# Patient Record
Sex: FEMALE | Race: WHITE | NOT HISPANIC OR LATINO | Employment: UNEMPLOYED | URBAN - METROPOLITAN AREA
[De-identification: names, ages, dates, MRNs, and addresses within clinical notes are randomized per-mention and may not be internally consistent; named-entity substitution may affect disease eponyms.]

---

## 2017-04-06 ENCOUNTER — HOSPITAL ENCOUNTER (OUTPATIENT)
Dept: RADIOLOGY | Age: 47
Discharge: HOME | End: 2017-04-06
Payer: COMMERCIAL

## 2017-04-06 ENCOUNTER — TRANSCRIBE ORDERS (OUTPATIENT)
Dept: ADMINISTRATIVE | Facility: HOSPITAL | Age: 47
End: 2017-04-06

## 2017-04-06 DIAGNOSIS — M54.2 CERVICALGIA: ICD-10-CM

## 2017-04-06 DIAGNOSIS — R52 PAIN: ICD-10-CM

## 2017-04-06 DIAGNOSIS — R52 PAIN: Primary | ICD-10-CM

## 2017-04-06 PROCEDURE — 76536 US EXAM OF HEAD AND NECK: CPT

## 2017-06-13 ENCOUNTER — ALLSCRIPTS OFFICE VISIT (OUTPATIENT)
Dept: OTHER | Facility: OTHER | Age: 47
End: 2017-06-13

## 2017-06-27 ENCOUNTER — ALLSCRIPTS OFFICE VISIT (OUTPATIENT)
Dept: OTHER | Facility: OTHER | Age: 47
End: 2017-06-27

## 2017-06-27 ENCOUNTER — TRANSCRIBE ORDERS (OUTPATIENT)
Dept: ADMINISTRATIVE | Facility: HOSPITAL | Age: 47
End: 2017-06-27

## 2017-06-27 DIAGNOSIS — I10 ESSENTIAL HYPERTENSION, MALIGNANT: Primary | ICD-10-CM

## 2017-06-27 DIAGNOSIS — I10 ESSENTIAL (PRIMARY) HYPERTENSION: ICD-10-CM

## 2017-07-18 ENCOUNTER — ALLSCRIPTS OFFICE VISIT (OUTPATIENT)
Dept: OTHER | Facility: OTHER | Age: 47
End: 2017-07-18

## 2017-09-03 ENCOUNTER — OFFICE VISIT (OUTPATIENT)
Dept: URGENT CARE | Facility: MEDICAL CENTER | Age: 47
End: 2017-09-03
Payer: COMMERCIAL

## 2017-09-03 PROCEDURE — 99203 OFFICE O/P NEW LOW 30 MIN: CPT

## 2018-01-13 VITALS
BODY MASS INDEX: 25.36 KG/M2 | HEART RATE: 64 BPM | SYSTOLIC BLOOD PRESSURE: 180 MMHG | HEIGHT: 60 IN | DIASTOLIC BLOOD PRESSURE: 100 MMHG | WEIGHT: 129.19 LBS

## 2018-01-13 VITALS
HEIGHT: 60 IN | BODY MASS INDEX: 25.13 KG/M2 | WEIGHT: 128 LBS | DIASTOLIC BLOOD PRESSURE: 100 MMHG | SYSTOLIC BLOOD PRESSURE: 180 MMHG

## 2018-01-14 VITALS — SYSTOLIC BLOOD PRESSURE: 136 MMHG | WEIGHT: 129 LBS | BODY MASS INDEX: 25.19 KG/M2 | DIASTOLIC BLOOD PRESSURE: 92 MMHG

## 2018-01-23 NOTE — PROGRESS NOTES
Assessment    1  Hives (628 9) (L50 9)    Plan  Hives    · MethylPREDNISolone 4 MG Oral Tablet Therapy Pack (Medrol); As directed on card    Discussion/Summary  Discussion Summary:   Stop using face wash  Take steroid as directed  Medication Side Effects Reviewed: Possible side effects of new medications were reviewed with the patient/guardian today  Understands and agrees with treatment plan: The treatment plan was reviewed with the patient/guardian  The patient/guardian understands and agrees with the treatment plan   Counseling Documentation With Imm: The patient was counseled regarding diagnostic results, instructions for management, risk factor reductions, prognosis, patient and family education, impressions, risks and benefits of treatment options, importance of compliance with treatment  Follow Up Instructions: Follow Up with your Primary Care Provider in 1-2 days  If your symptoms worsen, go to the nearest [place] [address] Emergency Department  Chief Complaint    1  Rash  Chief Complaint Free Text Note Form: pt presents with hives and facial swelling since yesterday, improving with benadryl      History of Present Illness  HPI: 51 y/o c/o hives  Pt reports she woke up yesterday with swollen eyes and hives  Denies any lip tongue or throat swelling  Denies any chest pain or shortness of breath  Patient denies any known allergies   Hospital Based Practices Required Assessment:   Pain Assessment   the patient states they do not have pain  Review of Systems  Focused-Female:   Constitutional: No fever, no chills, feels well, no tiredness, no recent weight gain or loss  ENT: no ear ache, no loss of hearing, no nosebleeds or nasal discharge, no sore throat or hoarseness  Cardiovascular: no complaints of slow or fast heart rate, no chest pain, no palpitations, no leg claudication or lower extremity edema     Respiratory: no complaints of shortness of breath, no wheezing, no dyspnea on exertion, no orthopnea or PND  Breasts: no complaints of breast pain, breast lump or nipple discharge  Gastrointestinal: no complaints of abdominal pain, no constipation, no nausea or diarrhea, no vomiting, no bloody stools  Genitourinary: no complaints of dysuria, no incontinence, no pelvic pain, no dysmenorrhea, no vaginal discharge or abnormal vaginal bleeding  Musculoskeletal: no complaints of arthralgia, no myalgia, no joint swelling or stiffness, no limb pain or swelling  Integumentary: no complaints of skin rash or lesion, no itching or dry skin, no skin wounds  Neurological: no complaints of headache, no confusion, no numbness or tingling, no dizziness or fainting  Active Problems    1  ASCUS with positive high risk HPV (796 9)   2  ASCUS with positive high risk human papillomavirus of vagina (795 15)   (R87 620,R87 811)   3  Condyloma acuminata (078 11) (A63 0)   4  Encounter for screening mammogram for malignant neoplasm of breast (V76 12)   (Z12 31)   5  Hypertension (401 9) (I10)   6  LGSIL Pap smear of vagina (795 13) (R87 622)   7  Pap smear of vagina with LGSIL (795 13) (R87 622)   8  Pap smear, as part of routine gynecological examination (V76 2) (Z01 419)   9  Vaginitis (616 10) (N76 0)   10  VAIN I (vaginal intraepithelial neoplasia grade I) (623 0) (N89 0)   11  Visit for screening mammogram (V76 12) (Z12 31)    Past Medical History    1  History of Bacterial vaginosis (616 10,041 9) (N76 0,B96 89)   2  History of Cervical dysplasia (622 10) (N87 9)   3  History of menorrhagia (V13 29) (Z87 42)   4  History of sleep disturbance (V13 89) (Z87 898)   5  History of uterine leiomyoma (V13 29) (Z86 018)   6  History of Previous Pregnancies Resulted In 2  Living Children   7  History of Previously Pregnant Including 1  Miscarriage(S)   8  History of Previously Pregnant With 1  Normal Vaginal Deliveries   9  History of Problem Concerning Adopted Child (V61 24)   10   History of Summary Of Previous Pregnancies  3  (Total No )  Active Problems And Past Medical History Reviewed: The active problems and past medical history were reviewed and updated today  Family History  Mother    1  Adopted  Father    2  Family history of Hypertension (V17 49)  Family History Reviewed: The family history was reviewed and updated today  Social History    · Being A Social Drinker   · Caffeine use (V49 89) (F15 90)   · Denied: History of Drug Use   · Moderate Exercising Less Than 3 Times A Week   · Never A Smoker  Social History Reviewed: The social history was reviewed and updated today  Surgical History    1  History of Breast Surgery Enlargement Procedure   2  History of Cervix Cryosurgery   3  History of Excision Of Perianal Skin Tags   4  History of Hysterectomy  Surgical History Reviewed: The surgical history was reviewed and updated today  Current Meds   1  Amlodipine Besy-Benazepril HCl - 5-10 MG Oral Capsule; TAKE 1 CAPSULE Every twelve   hours; Therapy: [id number] to (Evaluate:[id number])  Requested for: 2017; Last   QB:[id number] Ordered  Medication List Reviewed: The medication list was reviewed and updated today  Allergies    1  No Known Drug Allergies    2  Shellfish    Vitals  Signs   Recorded: [id number] 03:16PM   Temperature: 98 6 F  Heart Rate: 56  Respiration: 16  Systolic: 582  Diastolic: 77  O2 Saturation: 98  Pain Scale: 0    Physical Exam    Constitutional   General appearance: No acute distress, well appearing and well nourished  Eyes   Conjunctiva and lids: No swelling, erythema or discharge  Edema surrounding bilateral eyes  Pupils and irises: Equal, round and reactive to light  Ears, Nose, Mouth, and Throat   External inspection of ears and nose: Normal     Otoscopic examination: Tympanic membranes translucent with normal light reflex  Canals patent without erythema  Nasal mucosa, septum, and turbinates: Normal without edema or erythema  Pulmonary   Respiratory effort: No increased work of breathing or signs of respiratory distress  Auscultation of lungs: Clear to auscultation  Cardiovascular   Auscultation of heart: Normal rate and rhythm, normal S1 and S2, without murmurs  Lymphatic   Palpation of lymph nodes in neck: No lymphadenopathy  Skin   Examination of the skin for lesions: Abnormal   Hives bilateral upper extremity        Signatures   Electronically signed by : Emmanuel Oh, [facility]; Jan 19 2018  6:05PM EST                       (Author)

## 2018-06-12 ENCOUNTER — OFFICE VISIT (OUTPATIENT)
Dept: GYNECOLOGIC ONCOLOGY | Facility: CLINIC | Age: 48
End: 2018-06-12
Payer: COMMERCIAL

## 2018-06-12 VITALS
SYSTOLIC BLOOD PRESSURE: 162 MMHG | DIASTOLIC BLOOD PRESSURE: 98 MMHG | RESPIRATION RATE: 16 BRPM | HEART RATE: 75 BPM | BODY MASS INDEX: 23.85 KG/M2 | TEMPERATURE: 98.6 F | WEIGHT: 121.5 LBS | HEIGHT: 60 IN

## 2018-06-12 DIAGNOSIS — N89.0 VAIN I (VAGINAL INTRAEPITHELIAL NEOPLASIA GRADE I): Primary | ICD-10-CM

## 2018-06-12 DIAGNOSIS — A63.0 VULVOVAGINAL CONDYLOMA: ICD-10-CM

## 2018-06-12 PROCEDURE — 88175 CYTOPATH C/V AUTO FLUID REDO: CPT | Performed by: PATHOLOGY

## 2018-06-12 PROCEDURE — 99213 OFFICE O/P EST LOW 20 MIN: CPT | Performed by: PHYSICIAN ASSISTANT

## 2018-06-12 PROCEDURE — 88141 CYTOPATH C/V INTERPRET: CPT | Performed by: PATHOLOGY

## 2018-06-12 PROCEDURE — 87624 HPV HI-RISK TYP POOLED RSLT: CPT | Performed by: PHYSICIAN ASSISTANT

## 2018-06-12 NOTE — ASSESSMENT & PLAN NOTE
PAP smear obtained today  If negative, cotesting in 5 yrs would be appropriate per ASSCP guidelines

## 2018-06-12 NOTE — ASSESSMENT & PLAN NOTE
Persistent ongoing issue  Biopsy in 2017, low grade  Patient did not use aldara cream      Now with 3 separate lesions  Return to the office in the next few weeks for treatment discussion

## 2018-06-12 NOTE — PROGRESS NOTES
Assessment/Plan:    Problem List Items Addressed This Visit        Musculoskeletal and Integument    Vulvovaginal condyloma     Persistent ongoing issue  Biopsy in 2017, low grade  Patient did not use aldara cream      Now with 3 separate lesions  Return to the office in the next few weeks for treatment discussion  Genitourinary    VAIN I (vaginal intraepithelial neoplasia grade I) - Primary     PAP smear obtained today  If negative, cotesting in 5 yrs would be appropriate per [facility] guidelines  Relevant Orders    Liquid-based pap, diagnostic            CHIEF COMPLAINT:   Follow-up    Problem:  History of cervical dysplasia  Biopsy-proven VAIN 1, September 2014  Vulvar condyloma    Previous therapy:  1  Cervical cryotherapy  2  Vaginal hysterectomy for fibroid uterus, menorrhagia and low-grade cervical dysplasia  3  Imiquimod and TCA treatments for vulvar condyloma  Patient ID: Marian Biswas is a 52 y o  female  who presents to the office for follow-up  Patient notes vulvar lesions  She has not received treatment since 2016  The patient never used Aldara cream as prescribed  She denies vaginal bleeding or discharge  She is without pelvic pain  The following portions of the patient's history were reviewed and updated as appropriate: allergies, current medications, past medical history, past surgical history and problem list     Review of Systems   Constitutional: Negative  Respiratory: Negative  Gastrointestinal: Negative  Genitourinary:        As per HPI       No current outpatient prescriptions on file  No current facility-administered medications for this visit  Objective:    Blood pressure 162/98, pulse 75, temperature 98 6 °F (37 °C), temperature source Oral, resp  rate 16, height 5' (1 524 m), weight 55 1 kg (121 lb 8 oz)  Body mass index is 23 73 kg/m²  Body surface area is 1 51 meters squared      Physical Exam   Constitutional: She appears well-developed and well-nourished  Pulmonary/Chest: Effort normal    Genitourinary:         Genitourinary Comments: Three separate areas of vulvar condyloma noted  Speculum exam reveals a grossly normal vagina   PAP smear obtained

## 2018-06-14 LAB — HPV RRNA GENITAL QL NAA+PROBE: NORMAL

## 2018-06-15 LAB
LAB AP GYN PRIMARY INTERPRETATION: NORMAL
Lab: NORMAL
PATH INTERP SPEC-IMP: NORMAL

## 2018-07-12 ENCOUNTER — OFFICE VISIT (OUTPATIENT)
Dept: GYNECOLOGIC ONCOLOGY | Facility: CLINIC | Age: 48
End: 2018-07-12
Payer: COMMERCIAL

## 2018-07-12 VITALS
HEIGHT: 60 IN | TEMPERATURE: 98.1 F | BODY MASS INDEX: 25.52 KG/M2 | WEIGHT: 130 LBS | DIASTOLIC BLOOD PRESSURE: 96 MMHG | HEART RATE: 76 BPM | RESPIRATION RATE: 14 BRPM | SYSTOLIC BLOOD PRESSURE: 140 MMHG

## 2018-07-12 DIAGNOSIS — A63.0 VULVOVAGINAL CONDYLOMA: Primary | ICD-10-CM

## 2018-07-12 PROCEDURE — 99213 OFFICE O/P EST LOW 20 MIN: CPT | Performed by: OBSTETRICS & GYNECOLOGY

## 2018-07-12 RX ORDER — SODIUM CHLORIDE, SODIUM LACTATE, POTASSIUM CHLORIDE, CALCIUM CHLORIDE 600; 310; 30; 20 MG/100ML; MG/100ML; MG/100ML; MG/100ML
125 INJECTION, SOLUTION INTRAVENOUS CONTINUOUS
Status: CANCELLED | OUTPATIENT
Start: 2018-07-12 | End: 2019-07-12

## 2018-07-12 NOTE — H&P
Assessment/Plan:    Problem List Items Addressed This Visit        Musculoskeletal and Integument    Vulvovaginal condyloma - Primary     Recurrent vulvar condyloma, multifocal   She also has a low-grade Pap smear that was negative for HPV  Her performance status is 0     1  I discussed the risks and benefits of a CO2 laser ablation of vulvar condyloma  She understands the risks and benefits of the surgery and agrees to proceed as outlined  Consent was obtained by me in the office  I spent 20 minutes with the patient  More than half the time was spent in counseling and coordination of care regarding treatment of her recurrent vulvar condyloma  CHIEF COMPLAINT:   Recurrent vulvar condyloma            Patient ID: Cathye Phoenix is a 52 y o  female   80-year-old with recurrent vulvar condyloma  She has had previous therapy with laser, TCA  Her recent Pap smear in 2018 was low-grade squamous intraepithelial lesion and was HPV negative  No new complaints  No other interval change in her medications or medical history since her last visit  Review of Systems   Constitutional: Negative for activity change  Gastrointestinal: Negative for abdominal distention and abdominal pain  Genitourinary: Negative for menstrual problem and vaginal discharge  All other systems reviewed and are negative  No current outpatient prescriptions on file  No current facility-administered medications for this visit          Allergies   Allergen Reactions    Shellfish Allergy Anaphylaxis       Past Medical History:   Diagnosis Date    Cervical dysplasia     Uterine leiomyoma        Past Surgical History:   Procedure Laterality Date    AUGMENTATION MAMMAPLASTY      GYNECOLOGIC CRYOSURGERY      CERVIX    HYSTERECTOMY      SKIN TAG REMOVAL      EXICISON OF PERIANAL       OB History      Para Term  AB Living    3              SAB TAB Ectopic Multiple Live Births Obstetric Comments    MENORRHAGIA  2 LIVING CHILDREN  1 MISCARRIAGE  1 NORMAL VAGINAL DELIVERY          Family History   Problem Relation Age of Onset    Hypertension Father        The following portions of the patient's history were reviewed and updated as appropriate: allergies, current medications, past family history, past medical history, past social history, past surgical history and problem list       Objective:    Blood pressure 140/96, pulse 76, temperature 98 1 °F (36 7 °C), temperature source Oral, resp  rate 14, height 5' (1 524 m), weight 59 kg (130 lb)  Body mass index is 25 39 kg/m²  Physical Exam   Constitutional: She is oriented to person, place, and time  She appears well-developed and well-nourished  No distress  HENT:   Head: Normocephalic and atraumatic  Cardiovascular: Normal rate, regular rhythm and normal heart sounds  Pulmonary/Chest: Effort normal and breath sounds normal    Genitourinary:   Genitourinary Comments: Deferred     Neurological: She is alert and oriented to person, place, and time  Skin: Skin is warm and dry  She is not diaphoretic  Psychiatric: She has a normal mood and affect   Judgment normal

## 2018-07-12 NOTE — ASSESSMENT & PLAN NOTE
Recurrent vulvar condyloma, multifocal   She also has a low-grade Pap smear that was negative for HPV  Her performance status is 0     1  I discussed the risks and benefits of a CO2 laser ablation of vulvar condyloma  She understands the risks and benefits of the surgery and agrees to proceed as outlined  Consent was obtained by me in the office  I spent 20 minutes with the patient  More than half the time was spent in counseling and coordination of care regarding treatment of her recurrent vulvar condyloma

## 2018-07-18 NOTE — PRE-PROCEDURE INSTRUCTIONS
No outpatient prescriptions have been marked as taking for the 7/30/18 encounter [facility] Encounter)  Pre op and bathing instructions given   Pt has hibiclens

## 2018-07-30 ENCOUNTER — ANESTHESIA (OUTPATIENT)
Dept: PERIOP | Facility: HOSPITAL | Age: 48
End: 2018-07-30
Payer: COMMERCIAL

## 2018-07-30 ENCOUNTER — ANESTHESIA EVENT (OUTPATIENT)
Dept: PERIOP | Facility: HOSPITAL | Age: 48
End: 2018-07-30
Payer: COMMERCIAL

## 2018-07-30 ENCOUNTER — HOSPITAL ENCOUNTER (OUTPATIENT)
Facility: HOSPITAL | Age: 48
Setting detail: OUTPATIENT SURGERY
Discharge: HOME | End: 2018-07-30
Attending: OBSTETRICS & GYNECOLOGY | Admitting: OBSTETRICS & GYNECOLOGY
Payer: COMMERCIAL

## 2018-07-30 VITALS
DIASTOLIC BLOOD PRESSURE: 121 MMHG | TEMPERATURE: 97.6 F | HEIGHT: 60 IN | WEIGHT: 130 LBS | RESPIRATION RATE: 18 BRPM | BODY MASS INDEX: 25.52 KG/M2 | SYSTOLIC BLOOD PRESSURE: 214 MMHG | HEART RATE: 54 BPM | OXYGEN SATURATION: 100 %

## 2018-07-30 DIAGNOSIS — A63.0 VULVOVAGINAL CONDYLOMA: Primary | ICD-10-CM

## 2018-07-30 PROCEDURE — 56515 DESTROY VULVA LESION/S COMPL: CPT | Performed by: OBSTETRICS & GYNECOLOGY

## 2018-07-30 RX ORDER — SODIUM CHLORIDE 9 MG/ML
INJECTION, SOLUTION INTRAVENOUS CONTINUOUS PRN
Status: DISCONTINUED | OUTPATIENT
Start: 2018-07-30 | End: 2018-07-30 | Stop reason: SURG

## 2018-07-30 RX ORDER — ONDANSETRON 2 MG/ML
4 INJECTION INTRAMUSCULAR; INTRAVENOUS ONCE AS NEEDED
Status: DISCONTINUED | OUTPATIENT
Start: 2018-07-30 | End: 2018-07-30 | Stop reason: HOSPADM

## 2018-07-30 RX ORDER — LABETALOL HYDROCHLORIDE 5 MG/ML
10 INJECTION, SOLUTION INTRAVENOUS ONCE
Status: COMPLETED | OUTPATIENT
Start: 2018-07-30 | End: 2018-07-30

## 2018-07-30 RX ORDER — FENTANYL CITRATE/PF 50 MCG/ML
25 SYRINGE (ML) INJECTION
Status: DISCONTINUED | OUTPATIENT
Start: 2018-07-30 | End: 2018-07-30 | Stop reason: HOSPADM

## 2018-07-30 RX ORDER — LABETALOL HYDROCHLORIDE 5 MG/ML
INJECTION, SOLUTION INTRAVENOUS AS NEEDED
Status: DISCONTINUED | OUTPATIENT
Start: 2018-07-30 | End: 2018-07-30 | Stop reason: SURG

## 2018-07-30 RX ORDER — SODIUM CHLORIDE, SODIUM LACTATE, POTASSIUM CHLORIDE, CALCIUM CHLORIDE 600; 310; 30; 20 MG/100ML; MG/100ML; MG/100ML; MG/100ML
125 INJECTION, SOLUTION INTRAVENOUS CONTINUOUS
Status: DISCONTINUED | OUTPATIENT
Start: 2018-07-30 | End: 2018-07-30 | Stop reason: HOSPADM

## 2018-07-30 RX ORDER — SODIUM CHLORIDE 9 MG/ML
100 INJECTION, SOLUTION INTRAVENOUS CONTINUOUS
Status: DISCONTINUED | OUTPATIENT
Start: 2018-07-30 | End: 2018-07-30 | Stop reason: HOSPADM

## 2018-07-30 RX ORDER — IBUPROFEN 600 MG/1
600 TABLET ORAL EVERY 6 HOURS PRN
Status: DISCONTINUED | OUTPATIENT
Start: 2018-07-30 | End: 2018-07-30 | Stop reason: HOSPADM

## 2018-07-30 RX ORDER — ONDANSETRON 2 MG/ML
4 INJECTION INTRAMUSCULAR; INTRAVENOUS EVERY 6 HOURS PRN
Status: DISCONTINUED | OUTPATIENT
Start: 2018-07-30 | End: 2018-07-30 | Stop reason: HOSPADM

## 2018-07-30 RX ORDER — IBUPROFEN 600 MG/1
600 TABLET ORAL EVERY 6 HOURS PRN
Qty: 30 TABLET | Refills: 0
Start: 2018-07-30 | End: 2019-01-27 | Stop reason: ALTCHOICE

## 2018-07-30 RX ORDER — FENTANYL CITRATE 50 UG/ML
INJECTION, SOLUTION INTRAMUSCULAR; INTRAVENOUS AS NEEDED
Status: DISCONTINUED | OUTPATIENT
Start: 2018-07-30 | End: 2018-07-30 | Stop reason: SURG

## 2018-07-30 RX ORDER — KETOROLAC TROMETHAMINE 30 MG/ML
INJECTION, SOLUTION INTRAMUSCULAR; INTRAVENOUS AS NEEDED
Status: DISCONTINUED | OUTPATIENT
Start: 2018-07-30 | End: 2018-07-30 | Stop reason: SURG

## 2018-07-30 RX ORDER — MAGNESIUM HYDROXIDE 1200 MG/15ML
LIQUID ORAL AS NEEDED
Status: DISCONTINUED | OUTPATIENT
Start: 2018-07-30 | End: 2018-07-30 | Stop reason: HOSPADM

## 2018-07-30 RX ORDER — MIDAZOLAM HYDROCHLORIDE 1 MG/ML
INJECTION INTRAMUSCULAR; INTRAVENOUS AS NEEDED
Status: DISCONTINUED | OUTPATIENT
Start: 2018-07-30 | End: 2018-07-30 | Stop reason: SURG

## 2018-07-30 RX ORDER — TRAMADOL HYDROCHLORIDE 50 MG/1
50 TABLET ORAL EVERY 6 HOURS PRN
Status: DISCONTINUED | OUTPATIENT
Start: 2018-07-30 | End: 2018-07-30 | Stop reason: HOSPADM

## 2018-07-30 RX ORDER — GLYCOPYRROLATE 0.2 MG/ML
INJECTION INTRAMUSCULAR; INTRAVENOUS AS NEEDED
Status: DISCONTINUED | OUTPATIENT
Start: 2018-07-30 | End: 2018-07-30 | Stop reason: SURG

## 2018-07-30 RX ORDER — PROPOFOL 10 MG/ML
INJECTION, EMULSION INTRAVENOUS AS NEEDED
Status: DISCONTINUED | OUTPATIENT
Start: 2018-07-30 | End: 2018-07-30 | Stop reason: SURG

## 2018-07-30 RX ORDER — ACETAMINOPHEN 325 MG/1
650 TABLET ORAL EVERY 6 HOURS PRN
Status: DISCONTINUED | OUTPATIENT
Start: 2018-07-30 | End: 2018-07-30 | Stop reason: HOSPADM

## 2018-07-30 RX ORDER — LIDOCAINE HYDROCHLORIDE 10 MG/ML
INJECTION, SOLUTION INFILTRATION; PERINEURAL AS NEEDED
Status: DISCONTINUED | OUTPATIENT
Start: 2018-07-30 | End: 2018-07-30 | Stop reason: SURG

## 2018-07-30 RX ADMIN — FENTANYL CITRATE 25 MCG: 50 INJECTION INTRAMUSCULAR; INTRAVENOUS at 12:40

## 2018-07-30 RX ADMIN — SODIUM CHLORIDE: 0.9 INJECTION, SOLUTION INTRAVENOUS at 11:27

## 2018-07-30 RX ADMIN — KETOROLAC TROMETHAMINE 30 MG: 30 INJECTION, SOLUTION INTRAMUSCULAR at 12:43

## 2018-07-30 RX ADMIN — LABETALOL HYDROCHLORIDE 10 MG: 5 INJECTION, SOLUTION INTRAVENOUS at 12:20

## 2018-07-30 RX ADMIN — PROPOFOL 200 MG: 10 INJECTION, EMULSION INTRAVENOUS at 12:16

## 2018-07-30 RX ADMIN — GLYCOPYRROLATE 0.2 MG: 0.2 INJECTION, SOLUTION INTRAMUSCULAR; INTRAVENOUS at 12:16

## 2018-07-30 RX ADMIN — MIDAZOLAM HYDROCHLORIDE 2 MG: 1 INJECTION, SOLUTION INTRAMUSCULAR; INTRAVENOUS at 12:12

## 2018-07-30 RX ADMIN — LABETALOL 20 MG/4 ML (5 MG/ML) INTRAVENOUS SYRINGE 10 MG: at 13:10

## 2018-07-30 RX ADMIN — FENTANYL CITRATE 50 MCG: 50 INJECTION INTRAMUSCULAR; INTRAVENOUS at 12:20

## 2018-07-30 RX ADMIN — FENTANYL CITRATE 25 MCG: 50 INJECTION INTRAMUSCULAR; INTRAVENOUS at 12:33

## 2018-07-30 RX ADMIN — LIDOCAINE HYDROCHLORIDE 50 MG: 10 INJECTION, SOLUTION INFILTRATION; PERINEURAL at 12:16

## 2018-07-30 NOTE — DISCHARGE INSTRUCTIONS
Condyloma  WHAT YOU NEED TO KNOW:   Genital warts are caused by the human papillomavirus (HPV)  Condyloma are growths that appear in or on the penis, vagina, or anus  They are spread during genital, anal, or oral sex or by contact  DISCHARGE INSTRUCTIONS:   Contact your healthcare provider if:   · Your genital warts return  · The skin that is being treated for genital warts is very painful or swollen  · You see or feel new warts on another part of your body  · You have questions or concerns about your condition or care  Medicines:   · Immunomodulators  help strengthen your immune system and treat genital warts  · Antiproliferatives  help stop genital warts from growing in size or increasing in number  · Antivirals  help control and stop virus growth, such as HPV  · Take your medicine as directed  Contact your healthcare provider if you think your medicine is not helping or if you have side effects  Tell him or her if you are allergic to any medicine  Keep a list of the medicines, vitamins, and herbs you take  Include the amounts, and when and why you take them  Bring the list or the pill bottles to follow-up visits  Carry your medicine list with you in case of an emergency  Self-care:   · Do not touch or scratch the warts  This can cause the infection to spread to other parts of your body  · Do not have sex while you are being treated for genital warts  Medicine used on your skin weakens condoms and diaphragms  You also risk spreading genital warts to your partner  · Get regular Pap smears  If you are a woman, this can help diagnose HPV and prevent the spread of the virus  Prevent genital warts:   · Tell your sexual partners that you are being treated for genital warts  They may also be infected and need treatment  · Get the HPV vaccine  The HPV vaccine is given at 5to 32years of age to help prevent cervical cancer and genital warts   Ask your healthcare provider for more information about this vaccine  Follow up with your healthcare provider as directed:  Write down your questions so you remember to ask them during your visits  © 2017 [employer] Information is for End User's use only and may not be sold, redistributed or otherwise used for commercial purposes  All illustrations and images included in CareNotes® are the copyrighted property of [employer]  or Matthieu Mcqueen  The above information is an  only  It is not intended as medical advice for individual conditions or treatments  Talk to your doctor, nurse or pharmacist before following any medical regimen to see if it is safe and effective for you

## 2018-07-30 NOTE — OP NOTE
OPERATIVE REPORT  PATIENT NAME: Vale Tan    :  1970  MRN: [id number]  Pt Location: AN OR ROOM 01    SURGERY DATE: 2018    Surgeon(s) and Role:     * Jerod Koenig MD - Primary     * Marisela Restrepo MD - Horacio España MD - Assisting    Preop Diagnosis:  Vulvovaginal condyloma [A63 0]    Post-Op Diagnosis Codes:     * Vulvovaginal condyloma [A63 0]    Procedure(s) (LRB):  OMNIGUIDE LASER ABLATION OF VULVA (N/A)    Specimen(s):  * No specimens in log *    Estimated Blood Loss:   Minimal    Drains:       Anesthesia Type:   General/LMA    Operative Indications:  Vulvovaginal condyloma [A610]  80-year-old with recurrent multifocal vulvar condyloma presents for CO2 laser ablation    Operative Findings:  Condyloma identified on the labia minora bilaterally at 11 o'clock and 1 o'clock as well as at the vaginal epithelium/vulvar border at 11 o'clock and on the perineal body  Complications:   None    Procedure and Technique:  After informed consent was obtained, the patient was taken the operating room where general anesthesia was administered via LMA  She was then prepped and draped in normal sterile fashion in the dorsal lithotomy position  Exam under anesthesia revealed the above-mentioned findings  The CO2 laser was set at 15 w continuous and the visible vulvar condyloma were ablated  A split speculum examination was then performed to evaluate the introitus  No additional condyloma were identified  Hemostasis was achieved using bipolar cautery to the left labia minora  Silvadene cream was then applied  She was then awakened and transferred to the recovery room stable condition  All instrument counts correct x2 for procedure  No complications   Estimated blood loss minimal    I was present for the entire procedure    Patient Disposition:  PACU     SIGNATURE: Jerod Koenig MD  DATE: 2018  TIME: 12:53 PM

## 2018-07-30 NOTE — ANESTHESIA POSTPROCEDURE EVALUATION
Post-Op Assessment Note      CV Status:  Stable    Hydration Status:  Euvolemic    PONV Controlled:  Controlled    Airway Patency:  Patent    Post Op Vitals Reviewed: Yes          Staff: CRNA       Comments: sleeping vss report rn          BP     Temp 98 °F (36 7 °C) (07/30/18 1252)    Pulse     Resp      SpO2

## 2018-07-30 NOTE — H&P (VIEW-ONLY)
Assessment/Plan:    Problem List Items Addressed This Visit        Musculoskeletal and Integument    Vulvovaginal condyloma - Primary     Recurrent vulvar condyloma, multifocal   She also has a low-grade Pap smear that was negative for HPV  Her performance status is 0     1  I discussed the risks and benefits of a CO2 laser ablation of vulvar condyloma  She understands the risks and benefits of the surgery and agrees to proceed as outlined  Consent was obtained by me in the office  I spent 20 minutes with the patient  More than half the time was spent in counseling and coordination of care regarding treatment of her recurrent vulvar condyloma  CHIEF COMPLAINT:   Recurrent vulvar condyloma            Patient ID: Asha Ellsworth is a 52 y o  female   75-year-old with recurrent vulvar condyloma  She has had previous therapy with laser, TCA  Her recent Pap smear in 2018 was low-grade squamous intraepithelial lesion and was HPV negative  No new complaints  No other interval change in her medications or medical history since her last visit  Review of Systems   Constitutional: Negative for activity change  Gastrointestinal: Negative for abdominal distention and abdominal pain  Genitourinary: Negative for menstrual problem and vaginal discharge  All other systems reviewed and are negative  No current outpatient prescriptions on file  No current facility-administered medications for this visit          Allergies   Allergen Reactions    Shellfish Allergy Anaphylaxis       Past Medical History:   Diagnosis Date    Cervical dysplasia     Uterine leiomyoma        Past Surgical History:   Procedure Laterality Date    AUGMENTATION MAMMAPLASTY      GYNECOLOGIC CRYOSURGERY      CERVIX    HYSTERECTOMY      SKIN TAG REMOVAL      EXICISON OF PERIANAL       OB History      Para Term  AB Living    3              SAB TAB Ectopic Multiple Live Births Obstetric Comments    MENORRHAGIA  2 LIVING CHILDREN  1 MISCARRIAGE  1 NORMAL VAGINAL DELIVERY          Family History   Problem Relation Age of Onset    Hypertension Father        The following portions of the patient's history were reviewed and updated as appropriate: allergies, current medications, past family history, past medical history, past social history, past surgical history and problem list       Objective:    Blood pressure 140/96, pulse 76, temperature 98 1 °F (36 7 °C), temperature source Oral, resp  rate 14, height 5' (1 524 m), weight 59 kg (130 lb)  Body mass index is 25 39 kg/m²  Physical Exam   Constitutional: She is oriented to person, place, and time  She appears well-developed and well-nourished  No distress  HENT:   Head: Normocephalic and atraumatic  Cardiovascular: Normal rate, regular rhythm and normal heart sounds  Pulmonary/Chest: Effort normal and breath sounds normal    Genitourinary:   Genitourinary Comments: Deferred     Neurological: She is alert and oriented to person, place, and time  Skin: Skin is warm and dry  She is not diaphoretic  Psychiatric: She has a normal mood and affect   Judgment normal

## 2018-08-16 ENCOUNTER — OFFICE VISIT (OUTPATIENT)
Dept: GYNECOLOGIC ONCOLOGY | Facility: CLINIC | Age: 48
End: 2018-08-16

## 2018-08-16 VITALS
WEIGHT: 122.5 LBS | BODY MASS INDEX: 24.05 KG/M2 | HEIGHT: 60 IN | RESPIRATION RATE: 16 BRPM | HEART RATE: 70 BPM | DIASTOLIC BLOOD PRESSURE: 92 MMHG | SYSTOLIC BLOOD PRESSURE: 142 MMHG | TEMPERATURE: 98.6 F

## 2018-08-16 DIAGNOSIS — A63.0 VULVOVAGINAL CONDYLOMA: Primary | ICD-10-CM

## 2018-08-16 PROCEDURE — 99024 POSTOP FOLLOW-UP VISIT: CPT | Performed by: OBSTETRICS & GYNECOLOGY

## 2018-08-16 NOTE — PROGRESS NOTES
Assessment/Plan:    Problem List Items Addressed This Visit        Musculoskeletal and Integument    Vulvovaginal condyloma - Primary      Recovering well from CO2 laser  1  Return in 6 months for evaluation                 CHIEF COMPLAINT: postoperative visit       Problem:   vulvar condyloma    Previous therapy:   status post CO2 laser ablation of vulvar condyloma on 7/30/2018    Patient ID: Asha Ellsworth is a 52 y o  female   26-year-old returns for postoperative visit  She is recovering well from surgery  She had some postoperative bleeding from 1 of the sites which has resolved  She is back to her normal level of activity  The following portions of the patient's history were reviewed and updated as appropriate: allergies, current medications, past family history, past medical history, past social history, past surgical history and problem list     Review of Systems      Current Outpatient Prescriptions:     ibuprofen (MOTRIN) 600 mg tablet, Take 1 tablet (600 mg total) by mouth every 6 (six) hours as needed for mild pain or moderate pain, Disp: 30 tablet, Rfl: 0    tetrahydrozoline-zinc (VISINE-AC) 0 05-0 25 % ophthalmic solution, 2 drops 3 (three) times a day as needed, Disp: , Rfl:     Objective:    Blood pressure 142/92, pulse 70, temperature 98 6 °F (37 °C), temperature source Oral, resp  rate 16, height 5' (1 524 m), weight 55 6 kg (122 lb 8 oz)  Body mass index is 23 92 kg/m²  Body surface area is 1 52 meters squared  Physical Exam   Constitutional: She appears well-developed and well-nourished  No distress  Pulmonary/Chest: Effort normal    Genitourinary:   Genitourinary Comments: There is evidence of mild hyperkeratosis from the CO2 laser at the perineal body  All other laser sites have healed well  Skin: She is not diaphoretic  Psychiatric: She has a normal mood and affect   Her behavior is normal  Judgment normal

## 2018-11-06 ENCOUNTER — TRANSCRIBE ORDERS (OUTPATIENT)
Dept: ADMINISTRATIVE | Facility: HOSPITAL | Age: 48
End: 2018-11-06

## 2018-11-06 DIAGNOSIS — M79.671 RIGHT FOOT PAIN: Primary | ICD-10-CM

## 2019-01-05 NOTE — ANESTHESIA PREPROCEDURE EVALUATION
Review of Systems/Medical History  Patient summary reviewed        Cardiovascular  Hypertension ,    Pulmonary  Negative pulmonary ROS        GI/Hepatic    GERD ,        Negative  ROS        Endo/Other  Negative endo/other ROS      GYN  Negative gynecology ROS          Hematology  Negative hematology ROS      Musculoskeletal  Negative musculoskeletal ROS        Neurology  Negative neurology ROS      Psychology   Negative psychology ROS              Physical Exam    Airway    Mallampati score: II  TM Distance: >3 FB  Neck ROM: full     Dental       Cardiovascular  Cardiovascular exam normal    Pulmonary  Pulmonary exam normal     Other Findings        Anesthesia Plan  ASA Score- 2     Anesthesia Type- general with ASA Monitors  Additional Monitors:   Airway Plan:         Plan Factors-    Induction- intravenous  Postoperative Plan-     Informed Consent- Anesthetic plan and risks discussed with patient  I personally reviewed this patient with the CRNA  Discussed and agreed on the Anesthesia Plan with the CRNA  Paras White Productive cough w/ fever and chills (subjective Tmax 102F yesterday evening) X2 days.  Pt. is visiting from [place] until Sunday, January 6, 2019

## 2019-01-27 ENCOUNTER — APPOINTMENT (EMERGENCY)
Dept: CT IMAGING | Facility: HOSPITAL | Age: 49
End: 2019-01-27
Payer: COMMERCIAL

## 2019-01-27 ENCOUNTER — OFFICE VISIT (OUTPATIENT)
Dept: URGENT CARE | Age: 49
End: 2019-01-27
Payer: COMMERCIAL

## 2019-01-27 ENCOUNTER — HOSPITAL ENCOUNTER (OUTPATIENT)
Facility: HOSPITAL | Age: 49
Setting detail: OBSERVATION
Discharge: HOME | End: 2019-01-28
Attending: EMERGENCY MEDICINE | Admitting: INTERNAL MEDICINE
Payer: COMMERCIAL

## 2019-01-27 VITALS
BODY MASS INDEX: 20.99 KG/M2 | HEART RATE: 62 BPM | HEIGHT: 65 IN | WEIGHT: 126 LBS | DIASTOLIC BLOOD PRESSURE: 90 MMHG | RESPIRATION RATE: 20 BRPM | SYSTOLIC BLOOD PRESSURE: 150 MMHG | OXYGEN SATURATION: 97 % | TEMPERATURE: 98.9 F

## 2019-01-27 DIAGNOSIS — I16.0 HYPERTENSIVE URGENCY: Primary | ICD-10-CM

## 2019-01-27 DIAGNOSIS — R51.9 ACUTE NONINTRACTABLE HEADACHE, UNSPECIFIED HEADACHE TYPE: ICD-10-CM

## 2019-01-27 DIAGNOSIS — R41.0 DISORIENTATION: Primary | ICD-10-CM

## 2019-01-27 LAB
ALBUMIN SERPL BCP-MCNC: 4.2 G/DL (ref 3.5–5)
ALP SERPL-CCNC: 76 U/L (ref 46–116)
ALT SERPL W P-5'-P-CCNC: 25 U/L (ref 12–78)
ANION GAP SERPL CALCULATED.3IONS-SCNC: 12 MMOL/L (ref 4–13)
AST SERPL W P-5'-P-CCNC: 15 U/L (ref 5–45)
BASOPHILS # BLD AUTO: 0.03 THOUSANDS/ΜL (ref 0–0.1)
BASOPHILS NFR BLD AUTO: 0 % (ref 0–1)
BILIRUB SERPL-MCNC: 0.8 MG/DL (ref 0.2–1)
BUN SERPL-MCNC: 12 MG/DL (ref 5–25)
CALCIUM SERPL-MCNC: 9.4 MG/DL (ref 8.3–10.1)
CHLORIDE SERPL-SCNC: 103 MMOL/L (ref 100–108)
CO2 SERPL-SCNC: 25 MMOL/L (ref 21–32)
CREAT SERPL-MCNC: 0.88 MG/DL (ref 0.6–1.3)
EOSINOPHIL # BLD AUTO: 0.04 THOUSAND/ΜL (ref 0–0.61)
EOSINOPHIL NFR BLD AUTO: 0 % (ref 0–6)
ERYTHROCYTE [DISTWIDTH] IN BLOOD BY AUTOMATED COUNT: 12 % (ref 11.6–15.1)
GFR SERPL CREATININE-BSD FRML MDRD: 78 ML/MIN/1.73SQ M
GLUCOSE SERPL-MCNC: 89 MG/DL (ref 65–140)
HCT VFR BLD AUTO: 44.1 % (ref 34.8–46.1)
HGB BLD-MCNC: 15.1 G/DL (ref 11.5–15.4)
IMM GRANULOCYTES # BLD AUTO: 0.03 THOUSAND/UL (ref 0–0.2)
IMM GRANULOCYTES NFR BLD AUTO: 0 % (ref 0–2)
LYMPHOCYTES # BLD AUTO: 1.7 THOUSANDS/ΜL (ref 0.6–4.47)
LYMPHOCYTES NFR BLD AUTO: 16 % (ref 14–44)
MCH RBC QN AUTO: 31.9 PG (ref 26.8–34.3)
MCHC RBC AUTO-ENTMCNC: 34.2 G/DL (ref 31.4–37.4)
MCV RBC AUTO: 93 FL (ref 82–98)
MONOCYTES # BLD AUTO: 0.51 THOUSAND/ΜL (ref 0.17–1.22)
MONOCYTES NFR BLD AUTO: 5 % (ref 4–12)
NEUTROPHILS # BLD AUTO: 8.53 THOUSANDS/ΜL (ref 1.85–7.62)
NEUTS SEG NFR BLD AUTO: 79 % (ref 43–75)
NRBC BLD AUTO-RTO: 0 /100 WBCS
PLATELET # BLD AUTO: 237 THOUSANDS/UL (ref 149–390)
PMV BLD AUTO: 10 FL (ref 8.9–12.7)
POTASSIUM SERPL-SCNC: 4.2 MMOL/L (ref 3.5–5.3)
PROT SERPL-MCNC: 7.8 G/DL (ref 6.4–8.2)
RBC # BLD AUTO: 4.74 MILLION/UL (ref 3.81–5.12)
SODIUM SERPL-SCNC: 140 MMOL/L (ref 136–145)
TROPONIN I SERPL-MCNC: <0.02 NG/ML
WBC # BLD AUTO: 10.84 THOUSAND/UL (ref 4.31–10.16)

## 2019-01-27 PROCEDURE — 99213 OFFICE O/P EST LOW 20 MIN: CPT | Performed by: FAMILY MEDICINE

## 2019-01-27 PROCEDURE — 99220 PR INITIAL OBSERVATION CARE/DAY 70 MINUTES: CPT | Performed by: PHYSICIAN ASSISTANT

## 2019-01-27 PROCEDURE — 96374 THER/PROPH/DIAG INJ IV PUSH: CPT

## 2019-01-27 PROCEDURE — 85025 COMPLETE CBC W/AUTO DIFF WBC: CPT | Performed by: EMERGENCY MEDICINE

## 2019-01-27 PROCEDURE — 36415 COLL VENOUS BLD VENIPUNCTURE: CPT | Performed by: EMERGENCY MEDICINE

## 2019-01-27 PROCEDURE — 93005 ELECTROCARDIOGRAM TRACING: CPT

## 2019-01-27 PROCEDURE — 70450 CT HEAD/BRAIN W/O DYE: CPT

## 2019-01-27 PROCEDURE — 99285 EMERGENCY DEPT VISIT HI MDM: CPT

## 2019-01-27 PROCEDURE — 80053 COMPREHEN METABOLIC PANEL: CPT | Performed by: EMERGENCY MEDICINE

## 2019-01-27 PROCEDURE — 96375 TX/PRO/DX INJ NEW DRUG ADDON: CPT

## 2019-01-27 PROCEDURE — 84484 ASSAY OF TROPONIN QUANT: CPT | Performed by: EMERGENCY MEDICINE

## 2019-01-27 RX ORDER — HYDRALAZINE HYDROCHLORIDE 20 MG/ML
10 INJECTION INTRAMUSCULAR; INTRAVENOUS ONCE
Status: COMPLETED | OUTPATIENT
Start: 2019-01-27 | End: 2019-01-27

## 2019-01-27 RX ORDER — ACETAMINOPHEN 325 MG/1
650 TABLET ORAL EVERY 6 HOURS PRN
Status: DISCONTINUED | OUTPATIENT
Start: 2019-01-27 | End: 2019-01-28 | Stop reason: HOSPADM

## 2019-01-27 RX ORDER — HYDRALAZINE HYDROCHLORIDE 20 MG/ML
5 INJECTION INTRAMUSCULAR; INTRAVENOUS EVERY 4 HOURS PRN
Status: DISCONTINUED | OUTPATIENT
Start: 2019-01-27 | End: 2019-01-28 | Stop reason: HOSPADM

## 2019-01-27 RX ORDER — LABETALOL 20 MG/4 ML (5 MG/ML) INTRAVENOUS SYRINGE
10 ONCE
Status: COMPLETED | OUTPATIENT
Start: 2019-01-27 | End: 2019-01-27

## 2019-01-27 RX ADMIN — HYDRALAZINE HYDROCHLORIDE 10 MG: 20 INJECTION INTRAMUSCULAR; INTRAVENOUS at 20:34

## 2019-01-27 RX ADMIN — HYDRALAZINE HYDROCHLORIDE 10 MG: 20 INJECTION INTRAMUSCULAR; INTRAVENOUS at 19:18

## 2019-01-27 RX ADMIN — LABETALOL 20 MG/4 ML (5 MG/ML) INTRAVENOUS SYRINGE 10 MG: at 18:35

## 2019-01-27 NOTE — ED PROVIDER NOTES
History  Chief Complaint   Patient presents with    Hypertension     Pt  c/o headache, blurred vision with high blood pressure for one day  Pt has hx  of high blood pressure  50year-old female, presents intermittent episodes of headaches, dizziness over past few days  Went to urgent care center found to be hypertensive patient at the urgent care center said she did start feeling panicky but was also very confused and cannot remember the reason why she came to the urgent care center  The symptoms have resolved she now is completely alert orientated under does remember confusion believes it is related to anxiety but is not 100% sure  Currently denies any headache  No fevers no chills no nausea no vomiting  There has been no trauma  History provided by:  Patient, spouse and medical records  Headache   Pain location:  Generalized  Quality:  Dull  Radiates to:  Does not radiate  Severity currently:  0/10  Severity at highest:  5/10  Onset quality:  Gradual  Duration:  1 week  Timing:  Intermittent  Progression:  Waxing and waning  Chronicity:  New  Similar to prior headaches: no    Relieved by:  None tried  Worsened by:  Nothing  Ineffective treatments:  None tried  Associated symptoms: no abdominal pain, no congestion, no cough, no diarrhea, no dizziness, no eye pain, no fever, no nausea, no neck pain, no neck stiffness, no numbness, no sinus pressure, no sore throat and no vomiting        Prior to Admission Medications   Prescriptions Last Dose Informant Patient Reported? Taking?    tetrahydrozoline-zinc (VISINE-AC) 0 05-0 25 % ophthalmic solution  Self Yes Yes   Si drops 3 (three) times a day as needed      Facility-Administered Medications: None       Past Medical History:   Diagnosis Date    Cervical dysplasia     GERD (gastroesophageal reflux disease)     occasionally    Hypertension     controlling with diet and exercise    Uterine leiomyoma        Past Surgical History:   Procedure Laterality Date    AUGMENTATION MAMMAPLASTY      GYNECOLOGIC CRYOSURGERY      CERVIX    HYSTERECTOMY      LASER ABLATION OF CONDYLOMAS N/A 7/30/2018    Procedure: Lavclari Casar ABLATION OF VULVA;  Surgeon: Nicole Soliman MD;  [facility];  Service: Gynecology Oncology    SKIN TAG REMOVAL      EXICISON OF PERIANAL    WISDOM TOOTH EXTRACTION         Family History   Problem Relation Age of Onset    Hypertension Father      I have reviewed and agree with the history as documented  Social History   Substance Use Topics    Smoking status: Never Smoker    Smokeless tobacco: Never Used    Alcohol use Yes      Comment: SOCIAL-1-2 drinks per week        Review of Systems   Constitutional: Negative for activity change, chills, diaphoresis and fever  HENT: Negative for congestion, sinus pressure and sore throat  Eyes: Negative for pain and visual disturbance  Respiratory: Negative for cough, chest tightness, shortness of breath, wheezing and stridor  Cardiovascular: Negative for chest pain and palpitations  Gastrointestinal: Negative for abdominal distention, abdominal pain, constipation, diarrhea, nausea and vomiting  Genitourinary: Negative for dysuria and frequency  Musculoskeletal: Negative for neck pain and neck stiffness  Skin: Negative for rash  Neurological: Positive for headaches  Negative for dizziness, speech difficulty, light-headedness and numbness  Physical Exam  Physical Exam   Constitutional: She is oriented to person, place, and time  She appears well-developed  HENT:   Head: Normocephalic and atraumatic  Eyes: Pupils are equal, round, and reactive to light  Neck: Normal range of motion  Neck supple  No tracheal deviation present  Cardiovascular: Normal rate, regular rhythm, normal heart sounds and intact distal pulses  No murmur heard  Pulmonary/Chest: Effort normal and breath sounds normal  No stridor  No respiratory distress  Abdominal: Soft  She exhibits no distension  There is no tenderness  There is no rebound and no guarding  Musculoskeletal: Normal range of motion  Neurological: She is alert and oriented to person, place, and time  Skin: Skin is warm and dry  She is not diaphoretic  No erythema  No pallor  Psychiatric: She has a normal mood and affect  Vitals reviewed        Vital Signs  ED Triage Vitals   Temperature Pulse Respirations Blood Pressure SpO2   01/27/19 1822 01/27/19 1731 01/27/19 1731 01/27/19 1731 01/27/19 1731   98 2 °F (36 8 °C) 65 18 (!) 244/124 97 %      Temp Source Heart Rate Source Patient Position - Orthostatic VS BP Location FiO2 (%)   01/27/19 1822 01/27/19 1834 01/27/19 1834 01/27/19 1834 --   Oral Monitor Sitting Left arm       Pain Score       01/27/19 1731       No Pain           Vitals:    01/27/19 1932 01/27/19 1945 01/27/19 1955 01/27/19 2023   BP: (!) 191/91 (!) 191/91 (!) 196/107 (!) 194/96   Pulse: 72 68 75 73   Patient Position - Orthostatic VS: Lying Lying Lying Sitting       Visual Acuity      ED Medications  Medications   hydrALAZINE (APRESOLINE) injection 10 mg (not administered)   Labetalol HCl (NORMODYNE) injection 10 mg (10 mg Intravenous Given 1/27/19 1835)   hydrALAZINE (APRESOLINE) injection 10 mg (10 mg Intravenous Given 1/27/19 1918)       Diagnostic Studies  Results Reviewed     Procedure Component Value Units Date/Time    Troponin I [[id number]]  (Normal) Collected:  01/27/19 1827    Lab Status:  Final result Specimen:  Blood from Arm, Right Updated:  01/27/19 1852     Troponin I <0 02 ng/mL     Comprehensive metabolic panel [[id number]] Collected:  01/27/19 1827    Lab Status:  Final result Specimen:  Blood from Arm, Right Updated:  01/27/19 1849     Sodium 140 mmol/L      Potassium 4 2 mmol/L      Chloride 103 mmol/L      CO2 25 mmol/L      ANION GAP 12 mmol/L      BUN 12 mg/dL      Creatinine 0 88 mg/dL      Glucose 89 mg/dL      Calcium 9 4 mg/dL      AST 15 U/L      ALT 25 U/L      Alkaline Phosphatase 76 U/L      Total Protein 7 8 g/dL      Albumin 4 2 g/dL      Total Bilirubin 0 80 mg/dL      eGFR 78 ml/min/1 73sq m     Narrative:         National Kidney Disease Education Program recommendations are as follows:  GFR calculation is accurate only with a steady state creatinine  Chronic Kidney disease less than 60 ml/min/1 73 sq  meters  Kidney failure less than 15 ml/min/1 73 sq  meters  CBC and differential [[id number]]  (Abnormal) Collected:  01/27/19 1827    Lab Status:  Final result Specimen:  Blood from Arm, Right Updated:  01/27/19 1833     WBC 10 84 (H) Thousand/uL      RBC 4 74 Million/uL      Hemoglobin 15 1 g/dL      Hematocrit 44 1 %      MCV 93 fL      MCH 31 9 pg      MCHC 34 2 g/dL      RDW 12 0 %      MPV 10 0 fL      Platelets 379 Thousands/uL      nRBC 0 /100 WBCs      Neutrophils Relative 79 (H) %      Immat GRANS % 0 %      Lymphocytes Relative 16 %      Monocytes Relative 5 %      Eosinophils Relative 0 %      Basophils Relative 0 %      Neutrophils Absolute 8 53 (H) Thousands/µL      Immature Grans Absolute 0 03 Thousand/uL      Lymphocytes Absolute 1 70 Thousands/µL      Monocytes Absolute 0 51 Thousand/µL      Eosinophils Absolute 0 04 Thousand/µL      Basophils Absolute 0 03 Thousands/µL                  CT head without contrast   Final Result by Callum Lyle MD (01/27 1956)      No acute intracranial abnormality                    Workstation performed: [id number]                    Procedures  ECG 12 Lead Documentation  Date/Time: 1/27/2019 6:50 PM  Performed by: Suri Connor by: Jose Nieves     ECG reviewed by me, the ED Provider: yes    Patient location:  ED  Interpretation:     Interpretation: non-specific    Rate:     ECG rate:  53    ECG rate assessment: bradycardic    Rhythm:     Rhythm: sinus rhythm    Ectopy:     Ectopy: none    QRS:     QRS axis:  Normal    QRS intervals:  Normal  Conduction:     Conduction: normal    ST segments:     ST segments:  Normal  T waves:     T waves: normal             Phone Contacts  ED Phone Contact    ED Course                               MDM  Number of Diagnoses or Management Options  Hypertensive urgency: new and requires workup  Diagnosis management comments:       Initial ED assessment:  51-year-old female, presents with hypertensive urgency       Initial ED plan:   IV antihypertensives, evaluate for end-organ damage as she is having headaches will CT head, ultimate disposition pending ED workup        Final ED summary/disposition:   After evaluation and workup in the emergency department, after multiple doses of IV antihypertensives blood pressure did decrease  , admitted to hospital for further management        Amount and/or Complexity of Data Reviewed  Clinical lab tests: ordered and reviewed  Tests in the radiology section of CPT®: ordered and reviewed  Decide to obtain previous medical records or to obtain history from someone other than the patient: yes  Obtain history from someone other than the patient: yes  Review and summarize past medical records: yes  Independent visualization of images, tracings, or specimens: yes      The patient presented with a condition in which there was a high probability of imminent or life-threatening deterioration, and critical care services (excluding separately billable procedures) totalled 30-74 minutes  Disposition  Final diagnoses:   Hypertensive urgency     Time reflects when diagnosis was documented in both MDM as applicable and the Disposition within this note     Time User Action Codes Description Comment    1/27/2019  8:27 PM Vaughn aMce Add [I16 0] Hypertensive urgency       ED Disposition     ED Disposition Condition Comment    Admit  Case was discussed with rosa Metcalf and the patient's admission status was agreed to be Admission Status: observation status to the service of Dr Mayra Bone           Follow-up Information    None         Patient's Medications   Discharge Prescriptions    No medications on file     No discharge procedures on file      ED Provider  Electronically Signed by           Lyndsey Garzon DO  01/27/19 2028

## 2019-01-27 NOTE — PROGRESS NOTES
[facility]        NAME: Shireen Goldman is a 50 y o  female  : 1970    MRN: [id number]  DATE: 2019  TIME: 4:50 PM    Assessment and Plan   Disorientation [R41 0]  1  Disorientation  Transfer to other facility   2  Acute nonintractable headache, unspecified headache type  Transfer to other facility         Patient Instructions       Follow up with PCP in 3-5 days  Proceed to  ER if symptoms worsen  Chief Complaint     Chief Complaint   Patient presents with    Headache     Sinus pressure between eyes and vison loss for example everthing turn black off and on uit started today   Eye Problem     patient feels disorient   Dizziness         History of Present Illness       Patient is here for evaluation but states she does not remember why she came here  She did tell the tech that she was having headaches and having loss of vision like she was going to pass out off and on throughout the day  She also complains of a headache and flashes of light in her vision upon further questioning  She denies any history of migraines or similar symptoms    She states she has had problems with her blood pressure in the past   Patient was driven here by her         Review of Systems   Review of Systems      Current Medications       Current Outpatient Prescriptions:     ibuprofen (MOTRIN) 600 mg tablet, Take 1 tablet (600 mg total) by mouth every 6 (six) hours as needed for mild pain or moderate pain (Patient not taking: Reported on 2019 ), Disp: 30 tablet, Rfl: 0    tetrahydrozoline-zinc (VISINE-AC) 0 05-0 25 % ophthalmic solution, 2 drops 3 (three) times a day as needed, Disp: , Rfl:     Current Allergies     Allergies as of 2019 - Reviewed 2019   Allergen Reaction Noted    Shellfish allergy Anaphylaxis 10/06/2014            The following portions of the patient's history were reviewed and updated as appropriate: allergies, current medications, past family history, past medical history, past social history, past surgical history and problem list      Past Medical History:   Diagnosis Date    Cervical dysplasia     GERD (gastroesophageal reflux disease)     occasionally    Hypertension     controlling with diet and exercise    Uterine leiomyoma        Past Surgical History:   Procedure Laterality Date    AUGMENTATION MAMMAPLASTY      GYNECOLOGIC CRYOSURGERY      CERVIX    HYSTERECTOMY      LASER ABLATION OF CONDYLOMAS N/A 7/30/2018    Procedure: Anirudh Fisher;  Surgeon: Ramiro Taylor MD;  [facility];  Service: Gynecology Oncology    SKIN TAG REMOVAL      EXICISON OF PERIANAL    WISDOM TOOTH EXTRACTION         Family History   Problem Relation Age of Onset    Hypertension Father          Medications have been verified  Objective   /90   Pulse 62   Temp 98 9 °F (37 2 °C) (Temporal)   Resp 20   Ht 5' 5" (1 651 m)   Wt 57 2 kg (126 lb)   SpO2 97%   BMI 20 97 kg/m²        Physical Exam     Physical Exam   Constitutional: She appears well-developed and well-nourished  No distress  HENT:   Head: Normocephalic and atraumatic  Tongue midline   Eyes: Pupils are equal, round, and reactive to light  Conjunctivae and EOM are normal    Neurological: She is alert  No cranial nerve deficit  Coordination normal    Psychiatric: Her affect is labile  Her speech is rapid and/or pressured  Cognition and memory are impaired (Patient is unsure why she is here)  Nursing note and vitals reviewed

## 2019-01-28 VITALS
WEIGHT: 125.44 LBS | TEMPERATURE: 98.9 F | DIASTOLIC BLOOD PRESSURE: 96 MMHG | BODY MASS INDEX: 26.33 KG/M2 | RESPIRATION RATE: 18 BRPM | HEART RATE: 62 BPM | SYSTOLIC BLOOD PRESSURE: 142 MMHG | HEIGHT: 58 IN | OXYGEN SATURATION: 96 %

## 2019-01-28 LAB
BASOPHILS # BLD AUTO: 0.04 THOUSANDS/ΜL (ref 0–0.1)
BASOPHILS NFR BLD AUTO: 0 % (ref 0–1)
EOSINOPHIL # BLD AUTO: 0.11 THOUSAND/ΜL (ref 0–0.61)
EOSINOPHIL NFR BLD AUTO: 1 % (ref 0–6)
ERYTHROCYTE [DISTWIDTH] IN BLOOD BY AUTOMATED COUNT: 12.1 % (ref 11.6–15.1)
HCT VFR BLD AUTO: 43.4 % (ref 34.8–46.1)
HGB BLD-MCNC: 14.8 G/DL (ref 11.5–15.4)
IMM GRANULOCYTES # BLD AUTO: 0.03 THOUSAND/UL (ref 0–0.2)
IMM GRANULOCYTES NFR BLD AUTO: 0 % (ref 0–2)
LYMPHOCYTES # BLD AUTO: 2.19 THOUSANDS/ΜL (ref 0.6–4.47)
LYMPHOCYTES NFR BLD AUTO: 23 % (ref 14–44)
MCH RBC QN AUTO: 31.3 PG (ref 26.8–34.3)
MCHC RBC AUTO-ENTMCNC: 34.1 G/DL (ref 31.4–37.4)
MCV RBC AUTO: 92 FL (ref 82–98)
MONOCYTES # BLD AUTO: 0.82 THOUSAND/ΜL (ref 0.17–1.22)
MONOCYTES NFR BLD AUTO: 9 % (ref 4–12)
NEUTROPHILS # BLD AUTO: 6.44 THOUSANDS/ΜL (ref 1.85–7.62)
NEUTS SEG NFR BLD AUTO: 67 % (ref 43–75)
NRBC BLD AUTO-RTO: 0 /100 WBCS
PLATELET # BLD AUTO: 243 THOUSANDS/UL (ref 149–390)
PMV BLD AUTO: 10 FL (ref 8.9–12.7)
RBC # BLD AUTO: 4.73 MILLION/UL (ref 3.81–5.12)
WBC # BLD AUTO: 9.63 THOUSAND/UL (ref 4.31–10.16)

## 2019-01-28 PROCEDURE — 85025 COMPLETE CBC W/AUTO DIFF WBC: CPT | Performed by: PHYSICIAN ASSISTANT

## 2019-01-28 PROCEDURE — 99217 PR OBSERVATION CARE DISCHARGE MANAGEMENT: CPT | Performed by: INTERNAL MEDICINE

## 2019-01-28 RX ORDER — AMLODIPINE BESYLATE 5 MG/1
5 TABLET ORAL DAILY
Qty: 30 TABLET | Refills: 0 | Status: SHIPPED | OUTPATIENT
Start: 2019-01-29 | End: 2019-01-29

## 2019-01-28 RX ORDER — AMLODIPINE BESYLATE 5 MG/1
5 TABLET ORAL DAILY
Status: DISCONTINUED | OUTPATIENT
Start: 2019-01-28 | End: 2019-01-28 | Stop reason: HOSPADM

## 2019-01-28 RX ADMIN — ACETAMINOPHEN 650 MG: 325 TABLET ORAL at 06:31

## 2019-01-28 RX ADMIN — AMLODIPINE BESYLATE 5 MG: 5 TABLET ORAL at 09:40

## 2019-01-28 RX ADMIN — ACETAMINOPHEN 650 MG: 325 TABLET ORAL at 00:19

## 2019-01-28 NOTE — ASSESSMENT & PLAN NOTE
· Presents with HA/dizziness and some disorientation  · Seen at urgent care and found to be hypertensive  · Presents today at 240/120>>191/91 after 10 mg Labetalol  · 182/84 on arrival to the floor after 10 mg IV hydralazine x2 before leaving ED  · Currently symptoms have resolved  · Trop negative, CMP unremarkable  · CBC with WBC of 10, likely reactive, will repeat  · CT head negative   · Will admit for obs and BP monitoring   · Admits to self discontinuation of what sounds like a benazepril-amlodipine combo pill  · Prn IV anti-hypertensives  · Once BP stable, consider starting PO anti-hypertensive

## 2019-01-28 NOTE — UTILIZATION REVIEW
Initial Clinical Review    Admission: Date/Time/Statement: 1/27/2019 2031 OBSERVATION     Orders Placed This Encounter   Procedures    Place in Observation (expected length of stay for this patient is less than two midnights)     Standing Status:   Standing     Number of Occurrences:   1     Order Specific Question:   Admitting Physician     Answer:   Isabelle Babin     Order Specific Question:   Level of Care     Answer:   Med Surg [16]         ED: Date/Time/Mode of Arrival:   ED Arrival Information     Expected Arrival Acuity Means of Arrival Escorted By Service Admission Type    1/27/2019 16:55 1/27/2019 17:16 Emergent [facility]In [facility] General Medicine Emergency    Arrival Complaint    disorientation          Chief Complaint:   Chief Complaint   Patient presents with    Hypertension     Pt  c/o headache, blurred vision with high blood pressure for one day  Pt has hx  of high blood pressure  History of Illness: 50 y o  female who presents with headache and lightheadedness  She states she began having symptoms of sinus congestion and dry cough for the past couple weeks and had plans to go to urgent care today for further evaluation  She says that today she developed intermittent headache on the top of her head, and further reports that just before leaving for urgent care, she got up to go to the bathroom and had visual chagnes she describes as "kalaeidoscope vision" and blurred that lasted about two minutes  She also felt very lightheaded  She states she was able to feel her way to the bed and did not pass out although she felt she was close  She also admits to nausea  Denies any numbness, tingling or paraesthesias of the face or extremities  She only reports resolved heaviness of her LE's when she became lightheaded  No weakness  She denies vomiting or abdominal pain   Reports one episode of diarrhea this AM    Patient states she has had HTN and has required trips to the ED previously for symptomatic hypertension  She also states she had been on what she believes was a combo pill of benazepril and amlodipine which she stopped herself as she felt well and did not like taking medication  She does state however that she has recently seen the [address] [place] [address] team as an OP and was told that her BP's had been controlled         ED Vital Signs:   ED Triage Vitals   Temperature Pulse Respirations Blood Pressure SpO2   01/27/19 1822 01/27/19 1731 01/27/19 1731 01/27/19 1731 01/27/19 1731   98 2 °F (36 8 °C) 65 18 (!) 244/124 97 %      Temp Source Heart Rate Source Patient Position - Orthostatic VS BP Location FiO2 (%)   01/27/19 1822 01/27/19 1834 01/27/19 1834 01/27/19 1834 --   Oral Monitor Sitting Left arm       Pain Score       01/27/19 1731       No Pain        Wt Readings from Last 1 Encounters:   01/27/19 56 9 kg (125 lb 7 1 oz)       Vital Signs (abnormal):   01/27/19 2036  --  75  19   203/97  99 %  None (Room air)       Abnormal Labs/Diagnostic Test Results:   Wbc 10 84   hgb 15 1, hct 44 1     Ct head - No acute intracranial abnormality    ED Treatment:   Medication Administration from 01/27/2019 1655 to 01/27/2019 2112       Date/Time Order Dose Route Action Comments     01/27/2019 1835 Labetalol HCl (NORMODYNE) injection 10 mg 10 mg Intravenous Given      01/27/2019 1918 hydrALAZINE (APRESOLINE) injection 10 mg 10 mg Intravenous Given      01/27/2019 2034 hydrALAZINE (APRESOLINE) injection 10 mg 10 mg Intravenous Given           Past Medical/Surgical History:   Past Medical History:   Diagnosis Date    Cervical dysplasia     GERD (gastroesophageal reflux disease)     Hypertension     Uterine leiomyoma        Admitting Diagnosis: Hypertension [I10]  Hypertensive urgency [I16 0]    Age/Sex: 50 y o  female    Assessment/Plan:   Hypertensive urgency   Assessment & Plan     · Presents with HA/dizziness and some disorientation  ? Seen at urgent care and found to be hypertensive  ?  Presents today at 240/120>>191/91 after 10 mg Labetalol  ? 182/84 on arrival to the floor after 10 mg IV hydralazine x2 before leaving ED  ? Currently symptoms have resolved  ? Trop negative, CMP unremarkable  ? CBC with WBC of 10, likely reactive, will repeat  ? CT head negative   · Will admit for obs and BP monitoring   ? Admits to self discontinuation of what sounds like a benazepril-amlodipine combo pill  · Prn IV anti-hypertensives  · Once BP stable, consider starting PO anti-hypertensive      Acute nonintractable headache   Assessment & Plan     · CT head negative  · Supportive care, tight BP control          Admission Orders:  1/27/2019 2031 OBSERVATION   Scheduled Meds:   Current Facility-Administered Medications:  acetaminophen 650 mg Oral Q6H PRN   hydrALAZINE 5 mg Intravenous Q4H PRN     Continuous Infusions:    PRN Meds:   Acetaminophen - used x 2  Network Utilization Review Department  Phone: [phone]; Fax [phone]  [email or web address]  ATTENTION: Please call with any questions or concerns to [phone]  and carefully listen to the prompts so that you are directed to the right person  Send all requests for admission clinical reviews, approved or denied determinations and any other requests to fax [phone]   All voicemails are confidential

## 2019-01-28 NOTE — H&P
H&P- Payton Mohamud 1970, 50 y o  female MRN: [id number]    Unit/Bed#: -01 Encounter: [id number]    Primary Care Provider: Yoel Lima DO   Date and time admitted to hospital: 1/27/2019  6:02 PM    * Hypertensive urgency   Assessment & Plan    · Presents with HA/dizziness and some disorientation  · Seen at urgent care and found to be hypertensive  · Presents today at 240/120>>191/91 after 10 mg Labetalol  · 182/84 on arrival to the floor after 10 mg IV hydralazine x2 before leaving ED  · Currently symptoms have resolved  · Trop negative, CMP unremarkable  · CBC with WBC of 10, likely reactive, will repeat  · CT head negative   · Will admit for obs and BP monitoring   · Admits to self discontinuation of what sounds like a benazepril-amlodipine combo pill  · Prn IV anti-hypertensives  · Once BP stable, consider starting PO anti-hypertensive     Acute nonintractable headache   Assessment & Plan    · CT head negative  · Supportive care, tight BP control        VTE Prophylaxis: low risk, not indicated  / sequential compression device   Code Status: FULL  POLST: POLST form is not discussed and not completed at this time  Discussion with family: none    Anticipated Length of Stay:  Patient will be admitted on an Observation basis with an anticipated length of stay of  < 2 midnights  Justification for Hospital Stay: per plan above    Total Time for Visit, including Counseling / Coordination of Care: 30 minutes  Greater than 50% of this total time spent on direct patient counseling and coordination of care  Chief Complaint:   HA, dizziness    History of Present Illness:    Payton Mohamud is a 50 y o  female who presents with headache and lightheadedness  She states she began having symptoms of sinus congestion and dry cough for the past couple weeks and had plans to go to urgent care today for further valuation    She says that today she developed intermittent headache on the top of her head, and further reports that just before leaving for urgent care, she got up to go to the bathroom and had visual cahgnes she describes as "kalaeidoscope vision" and blurred that lasted about two minutes  She also felt very lightheaded  She states she was able to feel her way to the bed and did not pass out although she felt she was close  She also admits to nausea  Denies any numbness, tingling or paraesthesias of the face or extremities  She only reports resolved heaviness of her LE's when she became lightheaded  No weakness  She denies vomiting or abdominal pain  Reports one episode of diarrhea this AM  Denies any fever or chills  Samantha chest pain or SOB  Denies tinnitus  Denies any significant diet change in the form of increased salt intake or packaged foods  Patient states she has had HTN and has required trips to the ED previously for symptomatic hypertension  She also states she had been on what she believes was a combo pill of benazepril and amlodipine which she stopped herself as she felt well and did not like taking medication  She does state however that she has recently seen the [address] [place] [address] team as an OP and was told that her BP's had been controlled  Review of Systems:    Review of Systems   Constitutional: Negative  HENT: Positive for congestion and sinus pressure  Eyes: Positive for visual disturbance  Respiratory: Positive for cough  Cardiovascular: Negative  Gastrointestinal: Positive for diarrhea and nausea  Endocrine: Negative  Genitourinary: Negative  Musculoskeletal: Negative  Skin: Negative  Allergic/Immunologic: Negative  Neurological: Positive for dizziness, light-headedness and headaches  Hematological: Negative  Psychiatric/Behavioral: Negative          Past Medical and Surgical History:     Past Medical History:   Diagnosis Date    Cervical dysplasia     GERD (gastroesophageal reflux disease)     occasionally    Hypertension     controlling with diet and exercise    Uterine leiomyoma        Past Surgical History:   Procedure Laterality Date    AUGMENTATION MAMMAPLASTY      GYNECOLOGIC CRYOSURGERY      CERVIX    HYSTERECTOMY      LASER ABLATION OF CONDYLOMAS N/A 7/30/2018    Procedure: Vitaliy Sawyer ABLATION OF VULVA;  Surgeon: Ramiro Taylor MD;  Location: [facility];  Service: Gynecology Oncology    SKIN TAG REMOVAL      EXICISON OF PERIANAL    WISDOM TOOTH EXTRACTION         Meds/Allergies:    Prior to Admission medications    Medication Sig Start Date End Date Taking? Authorizing Provider   tetrahydrozoline-zinc (VISINE-AC) 0 05-0 25 % ophthalmic solution 2 drops 3 (three) times a day as needed   Yes Historical Provider, MD   ibuprofen (MOTRIN) 600 mg tablet Take 1 tablet (600 mg total) by mouth every 6 (six) hours as needed for mild pain or moderate pain  Patient not taking: Reported on 1/27/2019 7/30/18 1/27/19  Alesha Luis MD     I have reviewed home medications with patient personally  Allergies:    Allergies   Allergen Reactions    Shellfish Allergy Anaphylaxis       Social History:     Marital Status: /Civil Union   Occupation: not discussed  Patient Pre-hospital Living Situation: not discussed  Patient Pre-hospital Level of Mobility: independent  Patient Pre-hospital Diet Restrictions: none  Substance Use History:   History   Alcohol Use    Yes     Comment: SOCIAL-1-2 drinks per week     History   Smoking Status    Never Smoker   Smokeless Tobacco    Never Used     History   Drug Use No       Family History:    non-contributory    Physical Exam:     Vitals:   Blood Pressure: 162/84 (01/27/19 2112)  Pulse: 102 (01/27/19 2112)  Temperature: 98 8 °F (37 1 °C) (01/27/19 2112)  Temp Source: Oral (01/27/19 2112)  Respirations: 17 (01/27/19 2112)  Height: 4' 10" (147 3 cm) (01/27/19 2112)  Weight - Scale: 56 9 kg (125 lb 7 1 oz) (01/27/19 2112)  SpO2: 99 % (01/27/19 2112)    Physical Exam   Constitutional: She appears well-developed and well-nourished  No distress  HENT:   Head: Normocephalic  Mouth/Throat: Oropharynx is clear and moist    Eyes:   Conjunctival injection   Cardiovascular: Normal rate, regular rhythm, normal heart sounds and intact distal pulses  Exam reveals no gallop and no friction rub  No murmur heard  Pulmonary/Chest: Effort normal and breath sounds normal  No respiratory distress  She has no wheezes  She has no rales  She exhibits no tenderness  Abdominal: Soft  Bowel sounds are normal  She exhibits no distension and no mass  There is no tenderness  There is no rebound and no guarding  Musculoskeletal: She exhibits no edema or tenderness  Neurological: She is alert  Skin: Skin is warm and dry  No rash noted  She is not diaphoretic  No erythema  No pallor  Flush   Psychiatric: She has a normal mood and affect  Her behavior is normal    Nursing note and vitals reviewed  Additional Data:     Lab Results: I have personally reviewed pertinent reports  Results from last 7 days  Lab Units 01/27/19  1827   WBC Thousand/uL 10 84*   HEMOGLOBIN g/dL 15 1   HEMATOCRIT % 44 1   PLATELETS Thousands/uL 237   NEUTROS PCT % 79*   LYMPHS PCT % 16   MONOS PCT % 5   EOS PCT % 0       Results from last 7 days  Lab Units 01/27/19  1827   SODIUM mmol/L 140   POTASSIUM mmol/L 4 2   CHLORIDE mmol/L 103   CO2 mmol/L 25   BUN mg/dL 12   CREATININE mg/dL 0 88   ANION GAP mmol/L 12   CALCIUM mg/dL 9 4   ALBUMIN g/dL 4 2   TOTAL BILIRUBIN mg/dL 0 80   ALK PHOS U/L 76   ALT U/L 25   AST U/L 15   GLUCOSE RANDOM mg/dL 89                       Imaging: I have personally reviewed pertinent reports  CT head without contrast   Final Result by Noah Davis MD (01/27 1956)      No acute intracranial abnormality  Workstation performed: [id number]             Informative / [employer] Records Reviewed: Yes     ** Please Note: This note has been constructed using a voice recognition system  **

## 2019-01-28 NOTE — DISCHARGE INSTRUCTIONS
Dear Angle Liang,     It was our pleasure to care for you here at [facility]   It is our hope that we were always able to meet and exceed the expected standards for your care during your stay  You were hospitalized due to Hypertensive Urgency  You were cared for on the 3rd floor under the service of Abundio Berg DO with the [facility] who covers for your primary care physician (PCP), Lexie Velez DO, while you were hospitalized  If you have any questions or concerns related to this hospitalization, you may contact us at [phone]  For follow up, we recommend that you follow up with your primary care physician  Please review this entire discharge summary as additional general instructions may be provided later as well  However, at this time we provide for you here, the most important instructions / recommendations at discharge:     · Start amlodipine 5 mg daily  Follow up with PCP within the next 1 week to have blood pressure rechecked and determine if any adjustments of this medication are needed  · Follow a low sodium diet  Do not add salt to foods and be sure to read labels on food, avoiding foods that have a lot of sodium  Limit sodium to no more than 2000 mg per day  · See below for additional information regarding your medical condition and treatment plan  Sincerely,     Abundio Berg DO    Hypertensive Crisis   WHAT YOU NEED TO KNOW:   A hypertensive crisis is a sudden spike in blood pressure to 180/120 or higher  It is also known as acute hypertension  A hypertensive crisis is a medical emergency  It could lead to organ damage or be life-threatening  DISCHARGE INSTRUCTIONS:   Medicines: The following medicines may be ordered for you:  · Blood pressure medicine  is given to lower your blood pressure  There are many different types of blood pressure medicine, and you may need more than one type       · Diuretics  help decrease extra fluid that collects in your blood vessels  This lowers your blood pressure by reducing pressure in your arteries  Diuretics are often called water pills  You may urinate more often while you take this medicine  · Take your medicine as directed  Contact your healthcare provider if you think your medicine is not helping or if you have side effects  Tell him or her if you are allergic to any medicine  Keep a list of the medicines, vitamins, and herbs you take  Include the amounts, and when and why you take them  Bring the list or the pill bottles to follow-up visits  Carry your medicine list with you in case of an emergency  Follow up with your healthcare provider within 1 week or as directed: You will need to return to have your blood pressure checked and other tests  Your healthcare provider may also refer to you a cardiologist  Write down your questions so you remember to ask them during your visits  Monitor your blood pressure at home:  Take your blood pressure while in a seated position  Take it at least twice a day, such as morning and evening  Keep a log of your blood pressure readings and bring it to your follow-up visits  Help prevent another hypertensive crisis:   · Manage other health conditions  such as diabetes, thyroid disease, or adrenal problems  These conditions can cause or worsen a hypertensive crisis  · Eat a variety of healthy foods  including fruits, vegetables, whole-grain breads, low-fat dairy products, beans, lean meats, and fish  You will need to limit how much sodium and fat you eat  You also may need to eat more potassium  Ask if you need to be on a special diet  Changes to your diet will help lower your blood pressure  · Ask if you are at a healthy weight  Ask your healthcare provider to help you create a weight loss plan if you are overweight  Even a little weight loss can help lower your blood pressure       · Ask your healthcare provider about the best exercise plan for you  Exercise may help lower your blood pressure  · Limit alcohol  to 1 drink a day if you are a woman  A man should limit alcohol to 2 drinks a day  A drink of alcohol is 12 ounces of beer, 5 ounces of wine, or 1½ ounces of liquor  · Do not smoke  Smoking causes heart disease and may also raise your blood pressure  If you smoke, it is never too late to quit  Ask your healthcare provider for information if you need help to quit smoking  For more information:   · [address]  [place] , [place]   Phone: [phone]  Web Address: [email or web address]  [email or web address]   Contact your healthcare provider if:   · You run out of medicine  · You have questions or concerns about your condition or care  Seek care immediately or call 911 if:   · You take your blood pressure and it is 180/110 or higher  · You have a severe headache  · You have chest pain or shortness of breath  · You have weakness or numbness in your face, arms, or legs  · You cannot see or talk as well as usual   © 2017 [facility] [employer] is for End User's use only and may not be sold, redistributed or otherwise used for commercial purposes  All illustrations and images included in CareNotes® are the copyrighted property of A D A M , Inc  or Matthieu Mcqueen  The above information is an  only  It is not intended as medical advice for individual conditions or treatments  Talk to your doctor, nurse or pharmacist before following any medical regimen to see if it is safe and effective for you  Low-Sodium Diet   WHAT YOU NEED TO KNOW:   A low-sodium diet limits foods that are high in sodium (salt)  You will need to follow a low-sodium diet if you have high blood pressure, kidney disease, or heart failure  You may also need to follow this diet if you have a condition that is causing your body to retain (hold) extra fluid   You may need to limit the amount of sodium you eat to 1,500 mg  Ask your healthcare provider how much sodium you can have each day  DISCHARGE INSTRUCTIONS:   How to use food labels to choose foods that are low in sodium:  Read food labels to find the amount of sodium they contain  The amount of sodium is listed in milligrams (mg)  The % Daily Value (DV) column tells you how much of your daily needs are met by 1 serving of the food for each nutrient listed  Choose foods that have less than 5% of the DV of sodium  These foods are considered low in sodium  Foods that have 20% or more of the DV of sodium are considered high in sodium  Some food labels may also list any of the following terms that tell you about the sodium content in the food:  · Sodium-free:  Less than 5 mg in each serving    · Very low sodium:  35 mg of sodium or less in each serving    · Low sodium:  140 mg of sodium or less in each serving    · Reduced sodium: At least 25% less sodium in each serving than the regular type    · Light in sodium:  50% less sodium in each serving    · Unsalted or no added salt:  No extra salt is added during processing (the food may still contain sodium)  Foods to avoid:  Salty foods are high in sodium   You should avoid the following:  · Processed foods:      ¨ Mixes for cornbread, biscuits, cake, and pudding     ¨ Instant foods, such as potatoes, cereals, noodles, and rice     ¨ Packaged foods, such as bread stuffing, rice and pasta mixes, snack dip mixes, and macaroni and cheese     ¨ Canned foods, such as canned vegetables, soups, broths, sauces, and vegetable or tomato juice    ¨ Snack foods, such as salted chips, popcorn, pretzels, pork rinds, salted crackers, and salted nuts    ¨ Frozen foods, such as dinners, entrees, vegetables with sauces, and breaded meats    ¨ Sauerkraut, pickled vegetables, and other foods prepared in brine    · Meats and cheeses:      ¨ Smoked or cured meat, such as corned beef, jones, ham, hot dogs, and sausage    ¨ Canned meats or spreads, such as potted meats, sardines, anchovies, and imitation seafood    ¨ Deli or lunch meats, such as bologna, ham, turkey, and roast beef    ¨ Processed cheese, such as American cheese and cheese spreads    · Condiments, sauces, and seasonings:      ¨ Salt (¼ teaspoon of salt contains 575 mg of sodium)    ¨ Seasonings made with salt, such as garlic salt, celery salt, onion salt, and seasoned salt    ¨ Regular soy sauce, barbecue sauce, teriyaki sauce, steak sauce, Worcestershire sauce, and most flavored vinegars    ¨ Canned gravy and mixes     ¨ Regular condiments, such as mustard, ketchup, and salad dressings    ¨ Pickles and olives    ¨ Meat tenderizers and monosodium glutamate (MSG)  Foods to include:  Read the food label to find the amount of sodium in each serving  · Bread and cereal:  Try to choose breads with less than 80 mg of sodium per serving  ¨ Bread, roll, delilah, tortilla, or unsalted crackers  ¨ Ready-to-eat cereals with less than 5% DV of sodium (examples include shredded wheat and puffed rice)    ¨ Pasta    · Vegetables and fruits:      ¨ Unsalted fresh, frozen, or canned vegetables    ¨ Fresh, frozen, or canned fruits    ¨ Fruit juice    · Dairy:  One serving has about 150 mg of sodium  ¨ Milk, all types    ¨ Yogurt    ¨ Hard cheese, such as cheddar, Swiss, [employer], or mozzarella    · Meat and other protein foods:  Some raw meats may have added sodium  ¨ Plain meats, fish, and poultry     ¨ Egg    · Other foods:      ¨ Homemade pudding    ¨ Unsalted nuts, popcorn, or pretzels    ¨ Unsalted butter or margarine  Ways to decrease sodium:   · Add spices and herbs to foods instead of salt during cooking  Use salt-free seasonings to add flavor to foods  Examples include onion powder, garlic powder, basil, park powder, paprika, and parsley  Try lemon or lime juice or vinegar to give foods a tart flavor   Use hot peppers, pepper, or cayenne pepper to add a spicy flavor to foods  · Do not keep a salt shaker at your kitchen table  This may help keep you from adding salt to food at the table  It may take time to get used to enjoying the natural flavor of food instead of adding salt  Talk to your healthcare provider before you use salt substitutes  Some salt substitutes have a high amount of potassium and need to be avoided if you have kidney disease  · Choose low-sodium foods at restaurants  Meals from restaurants are often high in sodium  Some restaurants have nutrition information on the menu that tells you the amount of sodium in their foods  If possible, ask for your food to be prepared with less, or no salt  · Shop for unsalted or low-sodium foods and snacks at the grocery store  Examples include unsalted or low-sodium broths, soups, and canned vegetables  Choose fresh or frozen vegetables instead  Choose unsalted nuts or seeds or fresh fruits or vegetables as snacks  Read food labels and choose salt-free, very low-sodium, or low-sodium foods  © 2017 [address] Information is for End User's use only and may not be sold, redistributed or otherwise used for commercial purposes  All illustrations and images included in CareNotes® are the copyrighted property of A D A M , Inc  or Matthieu Mcqueen  The above information is an  only  It is not intended as medical advice for individual conditions or treatments  Talk to your doctor, nurse or pharmacist before following any medical regimen to see if it is safe and effective for you  Amlodipine (By mouth)   Amlodipine ([id number])  Treats high blood pressure and angina (chest pain)  This medicine is a calcium channel blocker  Brand Name(s): Norvasc   There may be other brand names for this medicine  When This Medicine Should Not Be Used: This medicine is not right for everyone  Do not use it if you had an allergic reaction to amlodipine    How to Use This Medicine:   Tablet, Dissolving Tablet  · Take your medicine as directed  Your dose may need to be changed several times to find what works best for you  Take this medicine at the same time each day  · Read and follow the patient instructions that come with this medicine  Talk to your doctor or pharmacist if you have any questions  · Missed dose: Take a dose as soon as you remember  If it has been more than 12 hours since you were supposed to take your dose, skip the missed dose and take your next regular dose at the regular time  · Store the medicine in a closed container at room temperature, away from heat, moisture, and direct light  Drugs and Foods to Avoid:   Ask your doctor or pharmacist before using any other medicine, including over-the-counter medicines, vitamins, and herbal products  · Some medicines can affect how amlodipine works  Tell your doctor if you are also using any of the following:   ¨ Clarithromycin, cyclosporine, diltiazem, itraconazole, ritonavir, sildenafil, simvastatin, tacrolimus  Warnings While Using This Medicine:   · Tell your doctor if you are pregnant or breastfeeding, or if you have liver disease, heart disease, coronary artery disease, or aortic stenosis  · This medicine could lower your blood pressure too much, especially when you first use it or if you are dehydrated  Stand or sit up slowly if you feel lightheaded or dizzy  · Your doctor will check your progress and the effects of this medicine at regular visits  Keep all appointments  · Do not stop using this medicine without asking your doctor, even if you feel well  This medicine will not cure high blood pressure, but it will help keep it in normal range  You may have to take blood pressure medicine for the rest of your life  · Keep all medicine out of the reach of children  Never share your medicine with anyone    Possible Side Effects While Using This Medicine:   Call your doctor right away if you notice any of these side effects:  · Allergic reaction: Itching or hives, swelling in your face or hands, swelling or tingling in your mouth or throat, chest tightness, trouble breathing  · Lightheadedness, dizziness  · New or worsening chest pain  · Swelling in your hands, ankles, or legs  · Trouble breathing, nausea, unusual sweating, fainting  If you notice other side effects that you think are caused by this medicine, tell your doctor  Call your doctor for medical advice about side effects  You may report side effects to FDA at 0-016-FDA-7175  © 2017 [address] [employer] Information is for End User's use only and may not be sold, redistributed or otherwise used for commercial purposes  The above information is an  only  It is not intended as medical advice for individual conditions or treatments  Talk to your doctor, nurse or pharmacist before following any medical regimen to see if it is safe and effective for you

## 2019-01-28 NOTE — PLAN OF CARE
CARDIOVASCULAR - ADULT     Maintains optimal cardiac output and hemodynamic stability Progressing     Absence of cardiac dysrhythmias or at baseline rhythm Progressing        HEMATOLOGIC - ADULT     Maintains hematologic stability Progressing        Potential for Falls     Patient will remain free of falls Progressing

## 2019-01-28 NOTE — PLAN OF CARE
CARDIOVASCULAR - ADULT     Maintains optimal cardiac output and hemodynamic stability Completed     Absence of cardiac dysrhythmias or at baseline rhythm Completed        HEMATOLOGIC - ADULT     Maintains hematologic stability Completed        Potential for Falls     Patient will remain free of falls Completed

## 2019-01-29 ENCOUNTER — HOSPITAL ENCOUNTER (EMERGENCY)
Facility: HOSPITAL | Age: 49
Discharge: HOME | End: 2019-01-29
Attending: EMERGENCY MEDICINE
Payer: COMMERCIAL

## 2019-01-29 ENCOUNTER — APPOINTMENT (EMERGENCY)
Dept: CT IMAGING | Facility: HOSPITAL | Age: 49
End: 2019-01-29
Payer: COMMERCIAL

## 2019-01-29 VITALS
OXYGEN SATURATION: 97 % | DIASTOLIC BLOOD PRESSURE: 94 MMHG | HEART RATE: 86 BPM | RESPIRATION RATE: 18 BRPM | TEMPERATURE: 98.6 F | SYSTOLIC BLOOD PRESSURE: 185 MMHG

## 2019-01-29 DIAGNOSIS — R51.9 HEADACHE: ICD-10-CM

## 2019-01-29 DIAGNOSIS — I10 HYPERTENSION: Primary | ICD-10-CM

## 2019-01-29 LAB
ALBUMIN SERPL BCP-MCNC: 4.3 G/DL (ref 3.5–5)
ALP SERPL-CCNC: 78 U/L (ref 46–116)
ALT SERPL W P-5'-P-CCNC: 24 U/L (ref 12–78)
ANION GAP SERPL CALCULATED.3IONS-SCNC: 11 MMOL/L (ref 4–13)
AST SERPL W P-5'-P-CCNC: 18 U/L (ref 5–45)
ATRIAL RATE: 53 BPM
ATRIAL RATE: 54 BPM
BASOPHILS # BLD AUTO: 0.04 THOUSANDS/ΜL (ref 0–0.1)
BASOPHILS NFR BLD AUTO: 1 % (ref 0–1)
BILIRUB SERPL-MCNC: 1.1 MG/DL (ref 0.2–1)
BUN SERPL-MCNC: 14 MG/DL (ref 5–25)
CALCIUM SERPL-MCNC: 9.3 MG/DL (ref 8.3–10.1)
CHLORIDE SERPL-SCNC: 103 MMOL/L (ref 100–108)
CO2 SERPL-SCNC: 26 MMOL/L (ref 21–32)
CREAT SERPL-MCNC: 0.82 MG/DL (ref 0.6–1.3)
EOSINOPHIL # BLD AUTO: 0.13 THOUSAND/ΜL (ref 0–0.61)
EOSINOPHIL NFR BLD AUTO: 2 % (ref 0–6)
ERYTHROCYTE [DISTWIDTH] IN BLOOD BY AUTOMATED COUNT: 12.2 % (ref 11.6–15.1)
GFR SERPL CREATININE-BSD FRML MDRD: 85 ML/MIN/1.73SQ M
GLUCOSE SERPL-MCNC: 107 MG/DL (ref 65–140)
HCT VFR BLD AUTO: 46.3 % (ref 34.8–46.1)
HGB BLD-MCNC: 15.8 G/DL (ref 11.5–15.4)
IMM GRANULOCYTES # BLD AUTO: 0.02 THOUSAND/UL (ref 0–0.2)
IMM GRANULOCYTES NFR BLD AUTO: 0 % (ref 0–2)
LYMPHOCYTES # BLD AUTO: 2.11 THOUSANDS/ΜL (ref 0.6–4.47)
LYMPHOCYTES NFR BLD AUTO: 24 % (ref 14–44)
MCH RBC QN AUTO: 31.7 PG (ref 26.8–34.3)
MCHC RBC AUTO-ENTMCNC: 34.1 G/DL (ref 31.4–37.4)
MCV RBC AUTO: 93 FL (ref 82–98)
MONOCYTES # BLD AUTO: 0.77 THOUSAND/ΜL (ref 0.17–1.22)
MONOCYTES NFR BLD AUTO: 9 % (ref 4–12)
NEUTROPHILS # BLD AUTO: 5.76 THOUSANDS/ΜL (ref 1.85–7.62)
NEUTS SEG NFR BLD AUTO: 64 % (ref 43–75)
NRBC BLD AUTO-RTO: 0 /100 WBCS
P AXIS: 40 DEGREES
P AXIS: 70 DEGREES
PLATELET # BLD AUTO: 253 THOUSANDS/UL (ref 149–390)
PMV BLD AUTO: 10.1 FL (ref 8.9–12.7)
POTASSIUM SERPL-SCNC: 3.9 MMOL/L (ref 3.5–5.3)
PR INTERVAL: 146 MS
PR INTERVAL: 152 MS
PROT SERPL-MCNC: 8 G/DL (ref 6.4–8.2)
QRS AXIS: 63 DEGREES
QRS AXIS: 65 DEGREES
QRSD INTERVAL: 78 MS
QRSD INTERVAL: 78 MS
QT INTERVAL: 426 MS
QT INTERVAL: 426 MS
QTC INTERVAL: 399 MS
QTC INTERVAL: 403 MS
RBC # BLD AUTO: 4.99 MILLION/UL (ref 3.81–5.12)
SODIUM SERPL-SCNC: 140 MMOL/L (ref 136–145)
T WAVE AXIS: 63 DEGREES
T WAVE AXIS: 65 DEGREES
TROPONIN I SERPL-MCNC: <0.02 NG/ML
VENTRICULAR RATE: 53 BPM
VENTRICULAR RATE: 54 BPM
WBC # BLD AUTO: 8.83 THOUSAND/UL (ref 4.31–10.16)

## 2019-01-29 PROCEDURE — 70450 CT HEAD/BRAIN W/O DYE: CPT

## 2019-01-29 PROCEDURE — 85025 COMPLETE CBC W/AUTO DIFF WBC: CPT | Performed by: EMERGENCY MEDICINE

## 2019-01-29 PROCEDURE — 93005 ELECTROCARDIOGRAM TRACING: CPT

## 2019-01-29 PROCEDURE — 96374 THER/PROPH/DIAG INJ IV PUSH: CPT

## 2019-01-29 PROCEDURE — 84484 ASSAY OF TROPONIN QUANT: CPT | Performed by: EMERGENCY MEDICINE

## 2019-01-29 PROCEDURE — 93010 ELECTROCARDIOGRAM REPORT: CPT | Performed by: INTERNAL MEDICINE

## 2019-01-29 PROCEDURE — 36415 COLL VENOUS BLD VENIPUNCTURE: CPT | Performed by: EMERGENCY MEDICINE

## 2019-01-29 PROCEDURE — 80053 COMPREHEN METABOLIC PANEL: CPT | Performed by: EMERGENCY MEDICINE

## 2019-01-29 PROCEDURE — 99284 EMERGENCY DEPT VISIT MOD MDM: CPT

## 2019-01-29 RX ORDER — AMLODIPINE BESYLATE 10 MG/1
10 TABLET ORAL DAILY
Qty: 14 TABLET | Refills: 0 | Status: SHIPPED | OUTPATIENT
Start: 2019-01-29

## 2019-01-29 RX ORDER — AMLODIPINE BESYLATE 5 MG/1
5 TABLET ORAL ONCE
Status: COMPLETED | OUTPATIENT
Start: 2019-01-29 | End: 2019-01-29

## 2019-01-29 RX ORDER — HYDRALAZINE HYDROCHLORIDE 20 MG/ML
10 INJECTION INTRAMUSCULAR; INTRAVENOUS ONCE
Status: COMPLETED | OUTPATIENT
Start: 2019-01-29 | End: 2019-01-29

## 2019-01-29 RX ORDER — ACETAMINOPHEN 325 MG/1
650 TABLET ORAL ONCE
Status: COMPLETED | OUTPATIENT
Start: 2019-01-29 | End: 2019-01-29

## 2019-01-29 RX ADMIN — AMLODIPINE BESYLATE 5 MG: 5 TABLET ORAL at 14:26

## 2019-01-29 RX ADMIN — HYDRALAZINE HYDROCHLORIDE 10 MG: 20 INJECTION INTRAMUSCULAR; INTRAVENOUS at 12:16

## 2019-01-29 RX ADMIN — ACETAMINOPHEN 650 MG: 325 TABLET ORAL at 14:27

## 2019-01-29 NOTE — ED PROVIDER NOTES
History  Chief Complaint   Patient presents with    Dizziness     Pt reports sudden onset of HA, nausea, and lightheadedness about 1 hr ago  She reports recent admission for HTN  History provided by:  Patient   used: No    Hypertension   Severity:  Moderate  Onset quality:  Gradual  Duration:  1 day  Timing:  Intermittent  Progression:  Partially resolved  Chronicity:  New  Context: caffeine and noncompliance    Relieved by:  None tried  Worsened by:  Nothing  Ineffective treatments:  None tried  Associated symptoms: dizziness, headaches and nausea    Associated symptoms: no abdominal pain, no anxiety, no blurred vision, no chest pain, no confusion, no epistaxis, no fatigue, no fever, no hematuria, no neck pain, no palpitations, no shortness of breath, not vomiting and no weakness    Risk factors: no cocaine use        Prior to Admission Medications   Prescriptions Last Dose Informant Patient Reported? Taking?    amLODIPine (NORVASC) 5 mg tablet   No Yes   Sig: Take 1 tablet (5 mg total) by mouth daily   tetrahydrozoline-zinc (VISINE-AC) 0 05-0 25 % ophthalmic solution  Self Yes Yes   Si drops 3 (three) times a day as needed      Facility-Administered Medications: None       Past Medical History:   Diagnosis Date    Cervical dysplasia     GERD (gastroesophageal reflux disease)     occasionally    Hypertension     controlling with diet and exercise    Uterine leiomyoma        Past Surgical History:   Procedure Laterality Date    AUGMENTATION MAMMAPLASTY      GYNECOLOGIC CRYOSURGERY      CERVIX    HYSTERECTOMY      LASER ABLATION OF CONDYLOMAS N/A 2018    Procedure: Kedar Saldaña;  Surgeon: Juan Ramon Baptiste MD;  Location: [facility];  Service: Gynecology Oncology    SKIN TAG REMOVAL      EXICISON OF PERIANAL    WISDOM TOOTH EXTRACTION         Family History   Problem Relation Age of Onset    Hypertension Father      I have reviewed and agree with the history as documented  Social History   Substance Use Topics    Smoking status: Never Smoker    Smokeless tobacco: Never Used    Alcohol use Yes      Comment: SOCIAL-1-2 drinks per week        Review of Systems   Constitutional: Negative for activity change, appetite change, chills, diaphoresis, fatigue and fever  HENT: Negative for congestion, dental problem, ear discharge, facial swelling, nosebleeds, rhinorrhea, sinus pressure and trouble swallowing  Eyes: Negative for blurred vision, photophobia, discharge, itching and visual disturbance  Respiratory: Negative for choking, chest tightness and shortness of breath  Cardiovascular: Negative for chest pain, palpitations and leg swelling  Gastrointestinal: Positive for nausea  Negative for abdominal distention, abdominal pain, constipation, diarrhea and vomiting  Endocrine: Negative for polydipsia and polyphagia  Genitourinary: Negative for decreased urine volume, difficulty urinating, dysuria, flank pain, frequency, hematuria, vaginal bleeding and vaginal discharge  Musculoskeletal: Negative for back pain, gait problem, joint swelling, neck pain and neck stiffness  Skin: Negative for color change and rash  Neurological: Positive for dizziness and headaches  Negative for facial asymmetry, speech difficulty, weakness and light-headedness  Psychiatric/Behavioral: Negative for agitation, behavioral problems and confusion  The patient is not nervous/anxious and is not hyperactive  All other systems reviewed and are negative  Physical Exam  Physical Exam   Constitutional: She is oriented to person, place, and time  She appears well-developed and well-nourished  No distress  HENT:   Head: Normocephalic and atraumatic  Eyes: Pupils are equal, round, and reactive to light  EOM are normal    Neck: Normal range of motion  Neck supple  Cardiovascular: Normal rate, regular rhythm and normal heart sounds      No murmur heard   Pulmonary/Chest: Effort normal and breath sounds normal  No respiratory distress  She has no wheezes  She has no rales  Abdominal: Soft  Bowel sounds are normal  She exhibits no distension  There is no tenderness  There is no rebound and no guarding  Musculoskeletal: Normal range of motion  She exhibits no edema or deformity  Lymphadenopathy:     She has no cervical adenopathy  Neurological: She is alert and oriented to person, place, and time  No cranial nerve deficit  She exhibits normal muscle tone  Coordination normal    Full range of motion of extremities  No focal neuro deficits  GCS 15  Skin: Capillary refill takes less than 2 seconds  No rash noted  No erythema  Psychiatric: She has a normal mood and affect  Her behavior is normal    Nursing note and vitals reviewed        Vital Signs  ED Triage Vitals   Temperature Pulse Respirations Blood Pressure SpO2   01/29/19 1143 01/29/19 1141 01/29/19 1141 01/29/19 1141 01/29/19 1141   98 6 °F (37 °C) 71 18 (!) 232/111 98 %      Temp Source Heart Rate Source Patient Position - Orthostatic VS BP Location FiO2 (%)   01/29/19 1143 01/29/19 1141 01/29/19 1141 01/29/19 1141 --   Oral Monitor Sitting Right arm       Pain Score       01/29/19 1141       3           Vitals:    01/29/19 1230 01/29/19 1345 01/29/19 1415 01/29/19 1430   BP: 169/83 163/87 (!) 185/94    Pulse: 76 68  86   Patient Position - Orthostatic VS:           Visual Acuity      ED Medications  Medications   hydrALAZINE (APRESOLINE) injection 10 mg (10 mg Intravenous Given 1/29/19 1216)   acetaminophen (TYLENOL) tablet 650 mg (650 mg Oral Given 1/29/19 1427)   amLODIPine (NORVASC) tablet 5 mg (5 mg Oral Given 1/29/19 1426)       Diagnostic Studies  Results Reviewed     Procedure Component Value Units Date/Time    Comprehensive metabolic panel [[id number]]  (Abnormal) Collected:  01/29/19 1218    Lab Status:  Final result Specimen:  Blood from Arm, Left Updated:  01/29/19 1305     Sodium 140 mmol/L      Potassium 3 9 mmol/L      Chloride 103 mmol/L      CO2 26 mmol/L      ANION GAP 11 mmol/L      BUN 14 mg/dL      Creatinine 0 82 mg/dL      Glucose 107 mg/dL      Calcium 9 3 mg/dL      AST 18 U/L      ALT 24 U/L      Alkaline Phosphatase 78 U/L      Total Protein 8 0 g/dL      Albumin 4 3 g/dL      Total Bilirubin 1 10 (H) mg/dL      eGFR 85 ml/min/1 73sq m     Narrative:         National Kidney Disease Education Program recommendations are as follows:  GFR calculation is accurate only with a steady state creatinine  Chronic Kidney disease less than 60 ml/min/1 73 sq  meters  Kidney failure less than 15 ml/min/1 73 sq  meters  Troponin I [[id number]]  (Normal) Collected:  01/29/19 1218    Lab Status:  Final result Specimen:  Blood from Arm, Left Updated:  01/29/19 1245     Troponin I <0 02 ng/mL     CBC and differential [[id number]]  (Abnormal) Collected:  01/29/19 1218    Lab Status:  Final result Specimen:  Blood from Arm, Left Updated:  01/29/19 1225     WBC 8 83 Thousand/uL      RBC 4 99 Million/uL      Hemoglobin 15 8 (H) g/dL      Hematocrit 46 3 (H) %      MCV 93 fL      MCH 31 7 pg      MCHC 34 1 g/dL      RDW 12 2 %      MPV 10 1 fL      Platelets 476 Thousands/uL      nRBC 0 /100 WBCs      Neutrophils Relative 64 %      Immat GRANS % 0 %      Lymphocytes Relative 24 %      Monocytes Relative 9 %      Eosinophils Relative 2 %      Basophils Relative 1 %      Neutrophils Absolute 5 76 Thousands/µL      Immature Grans Absolute 0 02 Thousand/uL      Lymphocytes Absolute 2 11 Thousands/µL      Monocytes Absolute 0 77 Thousand/µL      Eosinophils Absolute 0 13 Thousand/µL      Basophils Absolute 0 04 Thousands/µL                  CT head without contrast   Final Result by Karthik Lopez DO (01/29 1223)   No acute intracranial abnormality                    Workstation performed: [id number]                    Procedures  ECG 12 Lead Documentation  Date/Time: 1/29/2019 12:16 PM  Performed by: Venkat Mccoy  Authorized by: Venkat Mccoy     Indications / Diagnosis:  Hypertension  ECG reviewed by me, the ED Provider: yes    Previous ECG:     Previous ECG:  Compared to current    Similarity:  No change  Interpretation:     Interpretation: abnormal    Rate:     ECG rate:  54    ECG rate assessment: bradycardic    Rhythm:     Rhythm: sinus bradycardia    Ectopy:     Ectopy: none    QRS:     QRS axis:  Normal    QRS intervals:  Normal  Conduction:     Conduction: normal    ST segments:     ST segments:  Normal  T waves:     T waves: normal             Phone Contacts  ED Phone Contact    ED Course                               MDM  Number of Diagnoses or Management Options  Headache: new and requires workup  Hypertension: new and requires workup  Diagnosis management comments: Patient with return of headache, nausea and lightheadedness  Recent admission for hypertensive urgency, recently started on amlodipine  Denies any fevers  Vital signs reveal hypertension  Normal neuro exam   Due to significant hypertension, headache and nausea, CT head obtained which showed no acute findings  Laboratory studies revealed normal troponin, normal CMP  Patient given hydralazine and amlodipine in emergency department  Complete resolution of patient's symptoms  No focal neuro deficits, not suspect TIA or stroke  No end-organ damage, no signs of hypertensive emergency  I had a long discussion with patient  She has a long history of hypertension and was lost to follow-up  Increased amlodipine to 10 mg and asked patient to see her family doctor  She states she will call and get an appointment in the next day or 2 for blood pressure recheck, long-term management of chronic hypertension  Recommended decreasing caffeine  Work note provided as she works in a high stress environment and I feel like she needs time to get her blood pressure under control    Patient ambulatory, no acute distress at time of discharge  Blood pressure improved with ED treatment  Amount and/or Complexity of Data Reviewed  Clinical lab tests: ordered and reviewed  Tests in the radiology section of CPT®: ordered and reviewed  Tests in the medicine section of CPT®: ordered and reviewed    Risk of Complications, Morbidity, and/or Mortality  Presenting problems: moderate  Diagnostic procedures: moderate  Management options: moderate    Patient Progress  Patient progress: improved      Disposition  Final diagnoses:   Hypertension   Headache     Time reflects when diagnosis was documented in both MDM as applicable and the Disposition within this note     Time User Action Codes Description Comment    1/29/2019  2:50 PM Mello Muster Add [I10] Hypertension     1/29/2019  2:50 PM Mello Muster Add [R51] Headache       ED Disposition     ED Disposition Condition Date/Time Comment    Discharge  Tue Jan 29, 2019  2:50  [address] discharge to home/self care  Condition at discharge: Good        Follow-up Information     Follow up With Specialties Details Why Contact Info    Jamari Simmons DO Family Medicine Schedule an appointment as soon as possible for a visit in 2 days For BP recheck [place] [id number]  [phone]            Patient's Medications   Discharge Prescriptions    AMLODIPINE (NORVASC) 10 MG TABLET    Take 1 tablet (10 mg total) by mouth daily for 14 days       Start Date: 1/29/2019 End Date: 2/12/2019       Order Dose: 10 mg       Quantity: 14 tablet    Refills: 0     No discharge procedures on file      ED Provider  Electronically Signed by           Michael Aguilar MD  01/29/19 3740

## 2019-01-29 NOTE — DISCHARGE INSTRUCTIONS
Acute Headache   WHAT YOU NEED TO KNOW:   An acute headache is pain or discomfort that starts suddenly and gets worse quickly  You may have an acute headache only when you feel stress or eat certain foods  Other acute headache pain can happen every day, and sometimes several times a day  DISCHARGE INSTRUCTIONS:   Seek care immediately if:   · You have severe pain  · You have numbness or weakness on one side of your face or body  · You have a headache that occurs after a blow to the head, a fall, or other trauma  · You have a headache, are forgetful or confused, or have trouble speaking  · You have a headache, stiff neck, and a fever  Contact your healthcare provider if:   · You have a constant headache and are vomiting  · You have a headache each day that does not get better, even after treatment  · You have changes in your headaches, or new symptoms that occur when you have a headache  · You have questions or concerns about your condition or care  Medicines: You may need any of the following:  · Prescription pain medicine  may be given  The medicine your healthcare provider recommends will depend on the kind of headaches you have  You will need to take prescription headache medicines as directed to prevent a problem called rebound headache  These headaches happen with regular use of pain relievers for headache disorders  · NSAIDs , such as ibuprofen, help decrease swelling, pain, and fever  This medicine is available with or without a doctor's order  NSAIDs can cause stomach bleeding or kidney problems in certain people  If you take blood thinner medicine, always ask your healthcare provider if NSAIDs are safe for you  Always read the medicine label and follow directions  · Acetaminophen  decreases pain and fever  It is available without a doctor's order  Ask how much to take and how often to take it  Follow directions   Read the labels of all other medicines you are using to see if they also contain acetaminophen, or ask your doctor or pharmacist  Acetaminophen can cause liver damage if not taken correctly  Do not use more than 3 grams (3,000 milligrams) total of acetaminophen in one day  · Antidepressants  may be given for some kinds of headaches  · Take your medicine as directed  Contact your healthcare provider if you think your medicine is not helping or if you have side effects  Tell him or her if you are allergic to any medicine  Keep a list of the medicines, vitamins, and herbs you take  Include the amounts, and when and why you take them  Bring the list or the pill bottles to follow-up visits  Carry your medicine list with you in case of an emergency  Manage your symptoms:   · Apply heat or ice  on the headache area  Use a heat or ice pack  For an ice pack, you can also put crushed ice in a plastic bag  Cover the pack or bag with a towel before you apply it to your skin  Ice and heat both help decrease pain, and heat also helps decrease muscle spasms  Apply heat for 20 to 30 minutes every 2 hours  Apply ice for 15 to 20 minutes every hour  Apply heat or ice for as long and for as many days as directed  You may alternate heat and ice  · Relax your muscles  Lie down in a comfortable position and close your eyes  Relax your muscles slowly  Start at your toes and work your way up your body  · Keep a record of your headaches  Write down when your headaches start and stop  Include your symptoms and what you were doing when the headache began  Record what you ate or drank for 24 hours before the headache started  Describe the pain and where it hurts  Keep track of what you did to treat your headache and if it worked  Prevent an acute headache:   · Avoid anything that triggers an acute headache  Examples include exposure to chemicals, going to high altitude, or not getting enough sleep  Create a regular sleep routine   Go to sleep at the same time and wake up at the same time each day  Do not use electronic devices before bedtime  These may trigger a headache or prevent you from sleeping well  · Do not smoke  Nicotine and other chemicals in cigarettes and cigars can trigger an acute headache or make it worse  Ask your healthcare provider for information if you currently smoke and need help to quit  E-cigarettes or smokeless tobacco still contain nicotine  Talk to your healthcare provider before you use these products  · Limit alcohol as directed  Alcohol can trigger an acute headache or make it worse  If you have cluster headaches, do not drink alcohol during an episode  For other types of headaches, ask your healthcare provider if it is safe for you to drink alcohol  Ask how much is safe for you to drink, and how often  · Exercise as directed  Exercise can reduce tension and help with headache pain  Aim for 30 minutes of physical activity on most days of the week  Your healthcare provider can help you create an exercise plan  · Eat a variety of healthy foods  Healthy foods include fruits, vegetables, low-fat dairy products, lean meats, fish, whole grains, and cooked beans  Your healthcare provider or dietitian can help you create meals plans if you need to avoid foods that trigger headaches  Follow up with your healthcare provider as directed:  Bring your headache record with you when you see your healthcare provider  Write down your questions so you remember to ask them during your visits  © 2017 [employer] Information is for End User's use only and may not be sold, redistributed or otherwise used for commercial purposes  All illustrations and images included in CareNotes® are the copyrighted property of [employer]  or [employer]  The above information is an  only  It is not intended as medical advice for individual conditions or treatments   Talk to your doctor, nurse or pharmacist before following any medical regimen to see if it is safe and effective for you  Chronic Hypertension   WHAT YOU NEED TO KNOW:   Hypertension is high blood pressure (BP)  Your BP is the force of your blood moving against the walls of your arteries  Normal BP is less than 120/80  Prehypertension is between 120/80 and 139/89  Hypertension is 140/90 or higher  Hypertension causes your BP to get so high that your heart has to work much harder than normal  This can damage your heart  Chronic hypertension is a long-term condition that you can control with a healthy lifestyle or medicines  A controlled blood pressure helps protect your organs, such as your heart, lungs, brain, and kidneys  DISCHARGE INSTRUCTIONS:   Call 911 for any of the following:   · You have discomfort in your chest that feels like squeezing, pressure, fullness, or pain  · You become confused or have difficulty speaking  · You suddenly feel lightheaded or have trouble breathing  · You have pain or discomfort in your back, neck, jaw, stomach, or arm  Seek care immediately if:   · You have a severe headache or vision loss  · You have weakness in an arm or leg  Contact your healthcare provider if:   · You feel faint, dizzy, confused, or drowsy  · You have been taking your BP medicine and your BP is still higher than your healthcare provider says it should be  · You have questions or concerns about your condition or care  Medicines: You may need any of the following:  · Medicine  may be used to help lower your BP  You may need more than one type of medicine  Take the medicine exactly as directed  · Diuretics  help decrease extra fluid that collects in your body  This will help lower your BP  You may urinate more often while you take this medicine  · Cholesterol medicine  helps lower your cholesterol level  A low cholesterol level helps prevent heart disease and makes it easier to control your blood pressure       · Take your medicine as directed  Contact your healthcare provider if you think your medicine is not helping or if you have side effects  Tell him or her if you are allergic to any medicine  Keep a list of the medicines, vitamins, and herbs you take  Include the amounts, and when and why you take them  Bring the list or the pill bottles to follow-up visits  Carry your medicine list with you in case of an emergency  Follow up with your healthcare provider as directed: You will need to return to have your blood pressure checked and to have other lab tests done  Write down your questions so you remember to ask them during your visits  Manage chronic hypertension:  Talk with your healthcare provider about these and other ways to manage hypertension:  · Take your BP at home  Sit and rest for 5 minutes before you take your BP  Extend your arm and support it on a flat surface  Your arm should be at the same level as your heart  Follow the directions that came with your BP monitor  If possible, take at least 2 BP readings each time  Take your BP at least twice a day at the same times each day, such as morning and evening  Keep a record of your BP readings and bring it to your follow-up visits  Ask your healthcare provider what your blood pressure should be  · Limit sodium (salt) as directed  Too much sodium can affect your fluid balance  Check labels to find low-sodium or no-salt-added foods  Some low-sodium foods use potassium salts for flavor  Too much potassium can also cause health problems  Your healthcare provider will tell you how much sodium and potassium are safe for you to have in a day  He or she may recommend that you limit sodium to 2,300 mg a day  · Follow the meal plan recommended by your healthcare provider  A dietitian or your provider can give you more information on low-sodium plans or the DASH (Dietary Approaches to Stop Hypertension) eating plan   The DASH plan is low in sodium, unhealthy fats, and total fat  It is high in potassium, calcium, and fiber  · Exercise to maintain a healthy weight  Exercise at least 30 minutes per day, on most days of the week  This will help decrease your blood pressure  Ask about the best exercise plan for you  · Decrease stress  This may help lower your BP  Learn ways to relax, such as deep breathing or listening to music  · Limit alcohol  Women should limit alcohol to 1 drink a day  Men should limit alcohol to 2 drinks a day  A drink of alcohol is 12 ounces of beer, 5 ounces of wine, or 1½ ounces of liquor  · Do not smoke  Nicotine and other chemicals in cigarettes and cigars can increase your BP and also cause lung damage  Ask your healthcare provider for information if you currently smoke and need help to quit  E-cigarettes or smokeless tobacco still contain nicotine  Talk to your healthcare provider before you use these products  © 2017 [employer]  Information is for End User's use only and may not be sold, redistributed or otherwise used for commercial purposes  All illustrations and images included in CareNotes® are the copyrighted property of [employer]  or [employer]que[employer]  The above information is an  only  It is not intended as medical advice for individual conditions or treatments  Talk to your doctor, nurse or pharmacist before following any medical regimen to see if it is safe and effective for you

## 2019-02-01 ENCOUNTER — OFFICE VISIT (OUTPATIENT)
Dept: CARDIOLOGY CLINIC | Facility: CLINIC | Age: 49
End: 2019-02-01
Payer: COMMERCIAL

## 2019-02-01 VITALS
BODY MASS INDEX: 24.86 KG/M2 | SYSTOLIC BLOOD PRESSURE: 154 MMHG | DIASTOLIC BLOOD PRESSURE: 98 MMHG | WEIGHT: 123.3 LBS | HEIGHT: 59 IN | HEART RATE: 64 BPM

## 2019-02-01 DIAGNOSIS — H53.9 VISION CHANGES: ICD-10-CM

## 2019-02-01 DIAGNOSIS — I16.0 HYPERTENSIVE URGENCY: Primary | ICD-10-CM

## 2019-02-01 DIAGNOSIS — I10 ESSENTIAL HYPERTENSION: ICD-10-CM

## 2019-02-01 PROCEDURE — 99214 OFFICE O/P EST MOD 30 MIN: CPT | Performed by: INTERNAL MEDICINE

## 2019-02-01 NOTE — PROGRESS NOTES
Cardiology Follow Up    Karie Severs  1970  [id number]  [address] [facility]  [address] [place] [address]  [phone]    1  Hypertensive urgency  Echo complete with contrast if indicated    TSH, 3rd generation with Free T4 reflex    VAS carotid complete study    Basic metabolic panel    Lipid Panel with Direct LDL reflex   2  Essential hypertension     3  Vision changes  VAS carotid complete study       Discussion/Summary:  She is here today for a hospital follow-up  She has poorly controlled blood pressure and has not been taking medications  She was recently started on Norvasc 10 mg a day which I agree with  Hydrochlorothiazide was added yesterday she will be starting on this tomorrow  I have given her a slip for a basic metabolic profile to have this checked 1 week after initiating HCTZ  She needs an echocardiogram as well as thyroid function testing  She needs lipid for cardiac risk stratification  She did have some visual changes which was felt to be secondary to hypertensive urgency I have recommended a carotid scan to make sure there is no abnormal plaque  I will see her back in 6 weeks  Interval History:  42-year-old female with a history of difficult to control hypertension mostly secondary to medication noncompliance presents for a hospital follow-up  She has had headaches and visual changes with blood pressures over 317 systolic  She was admitted to the hospital overnight started on medications  She has some stressors in her life diet is moderate in sodium  She denies any exertional chest pain or discomfort shortness of breath, palpitations, lower extremity edema, PND, orthopnea  She has had some periods where she states that she was clumsy  She had 2 CT scans of the head which did not show any abnormalities      Problem List     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    Acute nonintractable headache    Disorientation    Hypertensive urgency    Essential hypertension    Vision changes        Past Medical History:   Diagnosis Date    Cervical dysplasia     GERD (gastroesophageal reflux disease)     occasionally    Hypertension     controlling with diet and exercise    Uterine leiomyoma      Social History     Social History    Marital status: /Civil Union     Spouse name: N/A    Number of children: N/A    Years of education: N/A     Occupational History    Not on file       Social History Main Topics    Smoking status: Never Smoker    Smokeless tobacco: Never Used    Alcohol use Yes      Comment: SOCIAL-1-2 drinks per week    Drug use: No    Sexual activity: Not on file     Other Topics Concern    Not on file     Social History Narrative    PROBLEMS CONCERNING ADOPTED CHILD    CAFFEINE USE    MODERATE EXERCISING LESS THAN 3x WEEK      Family History   Problem Relation Age of Onset    Hypertension Father      Past Surgical History:   Procedure Laterality Date    AUGMENTATION MAMMAPLASTY      GYNECOLOGIC CRYOSURGERY      CERVIX    HYSTERECTOMY      LASER ABLATION OF CONDYLOMAS N/A 7/30/2018    Procedure: Shila Lino ABLATION OF VULVA;  Surgeon: Yaneth Wang MD;  Location: [facility];  Service: Gynecology Oncology    SKIN TAG REMOVAL      EXICISON OF PERIANAL    WISDOM TOOTH EXTRACTION         Current Outpatient Prescriptions:     amLODIPine (NORVASC) 10 mg tablet, Take 1 tablet (10 mg total) by mouth daily for 14 days, Disp: 14 tablet, Rfl: 0    tetrahydrozoline-zinc (VISINE-AC) 0 05-0 25 % ophthalmic solution, 2 drops 3 (three) times a day as needed, Disp: , Rfl:   Allergies   Allergen Reactions    Shellfish Allergy Anaphylaxis       Labs:     Chemistry        Component Value Date/Time    K 3 9 01/29/2019 1218     01/29/2019 1218    CO2 26 01/29/2019 1218    BUN 14 01/29/2019 1218    CREATININE 0 82 01/29/2019 1218 Component Value Date/Time    CALCIUM 9 3 01/29/2019 1218    ALKPHOS 78 01/29/2019 1218    AST 18 01/29/2019 1218    ALT 24 01/29/2019 1218            No results found for: CHOL  No results found for: HDL  No results found for: LDLCALC  No results found for: TRIG  No results found for: CHOLHDL    Imaging: Ct Head Without Contrast    Result Date: 1/29/2019  Narrative: CT BRAIN - WITHOUT CONTRAST INDICATION:   Headache, hypertension  COMPARISON:  January 27, 2019 TECHNIQUE:  CT examination of the brain was performed  In addition to axial images, coronal 2D reformatted images were created and submitted for interpretation  Radiation dose length product (DLP) for this visit:  830 mGy-cm   This examination, like all CT scans performed in the [facility], was performed utilizing techniques to minimize radiation dose exposure, including the use of iterative reconstruction and automated exposure control  IMAGE QUALITY:  Diagnostic  FINDINGS: PARENCHYMA:  No intracranial mass, mass effect or midline shift  No CT signs of acute infarction  No acute parenchymal hemorrhage  VENTRICLES AND EXTRA-AXIAL SPACES:  Normal for the patient's age  VISUALIZED ORBITS AND PARANASAL SINUSES:  Unremarkable  CALVARIUM AND EXTRACRANIAL SOFT TISSUES:  Normal      Impression: No acute intracranial abnormality  Workstation performed: [id number]     Ct Head Without Contrast    Result Date: 1/27/2019  Narrative: CT BRAIN - WITHOUT CONTRAST INDICATION:   headaches, confusion, hypertensive urgency  COMPARISON:  March 22, 2009 TECHNIQUE:  CT examination of the brain was performed  In addition to axial images, coronal 2D reformatted images were created and submitted for interpretation  Radiation dose length product (DLP) for this visit:  923 mGy-cm     This examination, like all CT scans performed in the [facility], was performed utilizing techniques to minimize radiation dose exposure, including the use of iterative reconstruction and automated exposure control  IMAGE QUALITY:  Diagnostic  FINDINGS: PARENCHYMA:  No intracranial mass, mass effect or midline shift  No CT signs of acute infarction  No acute parenchymal hemorrhage  VENTRICLES AND EXTRA-AXIAL SPACES:  Normal for the patient's age  VISUALIZED ORBITS AND PARANASAL SINUSES:  Unremarkable  CALVARIUM AND EXTRACRANIAL SOFT TISSUES:  Normal      Impression: No acute intracranial abnormality  Workstation performed: [id number]       ECG:        Review of Systems   Constitution: Negative  HENT: Negative  Eyes: Negative  Cardiovascular: Negative  Respiratory: Negative  Endocrine: Negative  Hematologic/Lymphatic: Negative  Skin: Negative  Musculoskeletal: Negative  Gastrointestinal: Negative  Genitourinary: Negative  Neurological: Negative  Psychiatric/Behavioral: Negative  Vitals:    02/01/19 1022   BP: 154/98   Pulse: 64     Vitals:    02/01/19 1022   Weight: 55 9 kg (123 lb 4 8 oz)     Height: 4' 11" (149 9 cm)   Body mass index is 24 9 kg/m²  Physical Exam:  Vital signs reviewed    General appearance:  Appears stated age, alert, well appearing and in no distress  HEENT:  PERRLA, EOMI, no scleral icterus, no conjunctival pallor  NECK:  Supple, No elevated JVP, no thyromegaly, no carotid bruits  HEART:  Regular rate and rhythm positive S1/S2 no S3 no S4 no murmurs rubs or gallops  LUNGS:  Clear to auscultation bilaterally no wheezes rales or rhonchi  ABDOMEN:  Soft, non-tender, positive bowel sounds, no rebound or guarding, no organomegaly   EXTREMITIES:  No edema no clubbing no cyanosis good range of motion  VASCULAR:  Normal pedal pulses, good pulse volume   SKIN: No lesions or rashes on exposed skin  NEURO:  CN II-XII intact, no focal deficits

## 2019-02-04 ENCOUNTER — HOSPITAL ENCOUNTER (EMERGENCY)
Facility: HOSPITAL | Age: 49
End: 2019-02-05
Attending: EMERGENCY MEDICINE | Admitting: EMERGENCY MEDICINE
Payer: COMMERCIAL

## 2019-02-04 ENCOUNTER — APPOINTMENT (EMERGENCY)
Dept: MRI IMAGING | Facility: HOSPITAL | Age: 49
End: 2019-02-04
Payer: COMMERCIAL

## 2019-02-04 DIAGNOSIS — I77.74 VERTEBRAL ARTERY DISSECTION (HCC): Primary | ICD-10-CM

## 2019-02-04 LAB
ALBUMIN SERPL BCP-MCNC: 4.5 G/DL (ref 3.5–5)
ALP SERPL-CCNC: 75 U/L (ref 46–116)
ALT SERPL W P-5'-P-CCNC: 25 U/L (ref 12–78)
ANION GAP SERPL CALCULATED.3IONS-SCNC: 9 MMOL/L (ref 4–13)
AST SERPL W P-5'-P-CCNC: 19 U/L (ref 5–45)
BASOPHILS # BLD AUTO: 0.05 THOUSANDS/ΜL (ref 0–0.1)
BASOPHILS NFR BLD AUTO: 0 % (ref 0–1)
BILIRUB SERPL-MCNC: 0.4 MG/DL (ref 0.2–1)
BUN SERPL-MCNC: 17 MG/DL (ref 5–25)
CALCIUM SERPL-MCNC: 9.1 MG/DL (ref 8.3–10.1)
CHLORIDE SERPL-SCNC: 99 MMOL/L (ref 100–108)
CO2 SERPL-SCNC: 29 MMOL/L (ref 21–32)
CREAT SERPL-MCNC: 0.78 MG/DL (ref 0.6–1.3)
EOSINOPHIL # BLD AUTO: 0.09 THOUSAND/ΜL (ref 0–0.61)
EOSINOPHIL NFR BLD AUTO: 1 % (ref 0–6)
ERYTHROCYTE [DISTWIDTH] IN BLOOD BY AUTOMATED COUNT: 11.8 % (ref 11.6–15.1)
GFR SERPL CREATININE-BSD FRML MDRD: 90 ML/MIN/1.73SQ M
GLUCOSE SERPL-MCNC: 104 MG/DL (ref 65–140)
HCT VFR BLD AUTO: 43 % (ref 34.8–46.1)
HGB BLD-MCNC: 15 G/DL (ref 11.5–15.4)
IMM GRANULOCYTES # BLD AUTO: 0.05 THOUSAND/UL (ref 0–0.2)
IMM GRANULOCYTES NFR BLD AUTO: 0 % (ref 0–2)
LYMPHOCYTES # BLD AUTO: 1.57 THOUSANDS/ΜL (ref 0.6–4.47)
LYMPHOCYTES NFR BLD AUTO: 14 % (ref 14–44)
MCH RBC QN AUTO: 31.6 PG (ref 26.8–34.3)
MCHC RBC AUTO-ENTMCNC: 34.9 G/DL (ref 31.4–37.4)
MCV RBC AUTO: 91 FL (ref 82–98)
MONOCYTES # BLD AUTO: 0.91 THOUSAND/ΜL (ref 0.17–1.22)
MONOCYTES NFR BLD AUTO: 8 % (ref 4–12)
NEUTROPHILS # BLD AUTO: 8.56 THOUSANDS/ΜL (ref 1.85–7.62)
NEUTS SEG NFR BLD AUTO: 77 % (ref 43–75)
NRBC BLD AUTO-RTO: 0 /100 WBCS
PLATELET # BLD AUTO: 231 THOUSANDS/UL (ref 149–390)
PMV BLD AUTO: 10 FL (ref 8.9–12.7)
POTASSIUM SERPL-SCNC: 3.7 MMOL/L (ref 3.5–5.3)
PROT SERPL-MCNC: 7.9 G/DL (ref 6.4–8.2)
RBC # BLD AUTO: 4.75 MILLION/UL (ref 3.81–5.12)
SODIUM SERPL-SCNC: 137 MMOL/L (ref 136–145)
WBC # BLD AUTO: 11.23 THOUSAND/UL (ref 4.31–10.16)

## 2019-02-04 PROCEDURE — 80053 COMPREHEN METABOLIC PANEL: CPT | Performed by: EMERGENCY MEDICINE

## 2019-02-04 PROCEDURE — 70551 MRI BRAIN STEM W/O DYE: CPT

## 2019-02-04 PROCEDURE — 93005 ELECTROCARDIOGRAM TRACING: CPT | Performed by: PHYSICIAN ASSISTANT

## 2019-02-04 PROCEDURE — 70544 MR ANGIOGRAPHY HEAD W/O DYE: CPT

## 2019-02-04 PROCEDURE — 96361 HYDRATE IV INFUSION ADD-ON: CPT

## 2019-02-04 PROCEDURE — 96365 THER/PROPH/DIAG IV INF INIT: CPT

## 2019-02-04 PROCEDURE — 36415 COLL VENOUS BLD VENIPUNCTURE: CPT | Performed by: EMERGENCY MEDICINE

## 2019-02-04 PROCEDURE — 99285 EMERGENCY DEPT VISIT HI MDM: CPT

## 2019-02-04 PROCEDURE — 96375 TX/PRO/DX INJ NEW DRUG ADDON: CPT

## 2019-02-04 PROCEDURE — 85025 COMPLETE CBC W/AUTO DIFF WBC: CPT | Performed by: EMERGENCY MEDICINE

## 2019-02-04 RX ORDER — METOCLOPRAMIDE HYDROCHLORIDE 5 MG/ML
10 INJECTION INTRAMUSCULAR; INTRAVENOUS ONCE
Status: COMPLETED | OUTPATIENT
Start: 2019-02-04 | End: 2019-02-04

## 2019-02-04 RX ORDER — DIPHENHYDRAMINE HYDROCHLORIDE 50 MG/ML
25 INJECTION INTRAMUSCULAR; INTRAVENOUS ONCE
Status: COMPLETED | OUTPATIENT
Start: 2019-02-04 | End: 2019-02-04

## 2019-02-04 RX ORDER — DIAZEPAM 5 MG/ML
2.5 INJECTION, SOLUTION INTRAMUSCULAR; INTRAVENOUS ONCE
Status: COMPLETED | OUTPATIENT
Start: 2019-02-04 | End: 2019-02-04

## 2019-02-04 RX ORDER — MAGNESIUM SULFATE HEPTAHYDRATE 40 MG/ML
2 INJECTION, SOLUTION INTRAVENOUS ONCE
Status: COMPLETED | OUTPATIENT
Start: 2019-02-04 | End: 2019-02-04

## 2019-02-04 RX ADMIN — DIPHENHYDRAMINE HYDROCHLORIDE 25 MG: 50 INJECTION, SOLUTION INTRAMUSCULAR; INTRAVENOUS at 20:38

## 2019-02-04 RX ADMIN — MAGNESIUM SULFATE HEPTAHYDRATE 2 G: 40 INJECTION, SOLUTION INTRAVENOUS at 20:45

## 2019-02-04 RX ADMIN — Medication 2.5 MG: at 20:36

## 2019-02-04 RX ADMIN — METOCLOPRAMIDE 10 MG: 5 INJECTION, SOLUTION INTRAMUSCULAR; INTRAVENOUS at 20:37

## 2019-02-04 RX ADMIN — SODIUM CHLORIDE 1000 ML: 0.9 INJECTION, SOLUTION INTRAVENOUS at 20:29

## 2019-02-05 ENCOUNTER — APPOINTMENT (INPATIENT)
Dept: RADIOLOGY | Facility: HOSPITAL | Age: 49
DRG: 299 | End: 2019-02-05
Payer: COMMERCIAL

## 2019-02-05 ENCOUNTER — HOSPITAL ENCOUNTER (INPATIENT)
Facility: HOSPITAL | Age: 49
LOS: 6 days | Discharge: HOME | DRG: 299 | End: 2019-02-11
Attending: EMERGENCY MEDICINE | Admitting: EMERGENCY MEDICINE
Payer: COMMERCIAL

## 2019-02-05 ENCOUNTER — APPOINTMENT (EMERGENCY)
Dept: CT IMAGING | Facility: HOSPITAL | Age: 49
End: 2019-02-05
Payer: COMMERCIAL

## 2019-02-05 ENCOUNTER — APPOINTMENT (INPATIENT)
Dept: NON INVASIVE DIAGNOSTICS | Facility: HOSPITAL | Age: 49
DRG: 299 | End: 2019-02-05
Payer: COMMERCIAL

## 2019-02-05 VITALS
RESPIRATION RATE: 18 BRPM | BODY MASS INDEX: 25.96 KG/M2 | TEMPERATURE: 97.9 F | SYSTOLIC BLOOD PRESSURE: 164 MMHG | HEART RATE: 63 BPM | DIASTOLIC BLOOD PRESSURE: 88 MMHG | OXYGEN SATURATION: 99 % | WEIGHT: 128.53 LBS

## 2019-02-05 DIAGNOSIS — I63.9 CVA (CEREBRAL VASCULAR ACCIDENT) (HCC): ICD-10-CM

## 2019-02-05 DIAGNOSIS — I77.74 VERTEBRAL ARTERY DISSECTION (HCC): Primary | ICD-10-CM

## 2019-02-05 LAB
ABO GROUP BLD: NORMAL
ALBUMIN SERPL BCP-MCNC: 4.6 G/DL (ref 3.5–5)
ALP SERPL-CCNC: 82 U/L (ref 46–116)
ALT SERPL W P-5'-P-CCNC: 20 U/L (ref 12–78)
ANION GAP SERPL CALCULATED.3IONS-SCNC: 11 MMOL/L (ref 4–13)
APTT PPP: 28 SECONDS (ref 26–38)
APTT PPP: 47 SECONDS (ref 26–38)
AST SERPL W P-5'-P-CCNC: 17 U/L (ref 5–45)
ATRIAL RATE: 59 BPM
BASOPHILS # BLD AUTO: 0.06 THOUSANDS/ΜL (ref 0–0.1)
BASOPHILS NFR BLD AUTO: 1 % (ref 0–1)
BILIRUB SERPL-MCNC: 0.59 MG/DL (ref 0.2–1)
BLD GP AB SCN SERPL QL: NEGATIVE
BUN SERPL-MCNC: 9 MG/DL (ref 5–25)
CALCIUM SERPL-MCNC: 8.8 MG/DL (ref 8.3–10.1)
CHLORIDE SERPL-SCNC: 106 MMOL/L (ref 100–108)
CHOLEST SERPL-MCNC: 262 MG/DL (ref 50–200)
CO2 SERPL-SCNC: 23 MMOL/L (ref 21–32)
CREAT SERPL-MCNC: 0.58 MG/DL (ref 0.6–1.3)
EOSINOPHIL # BLD AUTO: 0.12 THOUSAND/ΜL (ref 0–0.61)
EOSINOPHIL NFR BLD AUTO: 2 % (ref 0–6)
ERYTHROCYTE [DISTWIDTH] IN BLOOD BY AUTOMATED COUNT: 11.7 % (ref 11.6–15.1)
EST. AVERAGE GLUCOSE BLD GHB EST-MCNC: 105 MG/DL
GFR SERPL CREATININE-BSD FRML MDRD: 109 ML/MIN/1.73SQ M
GLUCOSE SERPL-MCNC: 101 MG/DL (ref 65–140)
HBA1C MFR BLD: 5.3 % (ref 4.2–6.3)
HCT VFR BLD AUTO: 43.8 % (ref 34.8–46.1)
HDLC SERPL-MCNC: 58 MG/DL (ref 40–60)
HGB BLD-MCNC: 15.1 G/DL (ref 11.5–15.4)
IMM GRANULOCYTES # BLD AUTO: 0.03 THOUSAND/UL (ref 0–0.2)
IMM GRANULOCYTES NFR BLD AUTO: 0 % (ref 0–2)
INR PPP: 1.03 (ref 0.86–1.17)
INR PPP: 1.09 (ref 0.86–1.17)
INR PPP: 1.15 (ref 0.86–1.17)
LDLC SERPL CALC-MCNC: 181 MG/DL (ref 0–100)
LYMPHOCYTES # BLD AUTO: 2.31 THOUSANDS/ΜL (ref 0.6–4.47)
LYMPHOCYTES NFR BLD AUTO: 29 % (ref 14–44)
MAGNESIUM SERPL-MCNC: 2.5 MG/DL (ref 1.6–2.6)
MCH RBC QN AUTO: 31.5 PG (ref 26.8–34.3)
MCHC RBC AUTO-ENTMCNC: 34.5 G/DL (ref 31.4–37.4)
MCV RBC AUTO: 91 FL (ref 82–98)
MONOCYTES # BLD AUTO: 0.67 THOUSAND/ΜL (ref 0.17–1.22)
MONOCYTES NFR BLD AUTO: 8 % (ref 4–12)
NEUTROPHILS # BLD AUTO: 4.84 THOUSANDS/ΜL (ref 1.85–7.62)
NEUTS SEG NFR BLD AUTO: 60 % (ref 43–75)
NONHDLC SERPL-MCNC: 204 MG/DL
NRBC BLD AUTO-RTO: 0 /100 WBCS
P AXIS: 69 DEGREES
PHOSPHATE SERPL-MCNC: 2.3 MG/DL (ref 2.7–4.5)
PLATELET # BLD AUTO: 222 THOUSANDS/UL (ref 149–390)
PMV BLD AUTO: 10.2 FL (ref 8.9–12.7)
POTASSIUM SERPL-SCNC: 3.4 MMOL/L (ref 3.5–5.3)
PR INTERVAL: 162 MS
PROT SERPL-MCNC: 7.9 G/DL (ref 6.4–8.2)
PROTHROMBIN TIME: 13.6 SECONDS (ref 11.8–14.2)
PROTHROMBIN TIME: 14.2 SECONDS (ref 11.8–14.2)
PROTHROMBIN TIME: 14.4 SECONDS (ref 11.8–14.2)
QRS AXIS: 49 DEGREES
QRSD INTERVAL: 90 MS
QT INTERVAL: 460 MS
QTC INTERVAL: 455 MS
RBC # BLD AUTO: 4.8 MILLION/UL (ref 3.81–5.12)
RH BLD: POSITIVE
SODIUM SERPL-SCNC: 140 MMOL/L (ref 136–145)
SPECIMEN EXPIRATION DATE: NORMAL
T WAVE AXIS: 83 DEGREES
T4 FREE SERPL-MCNC: 1.16 NG/DL (ref 0.76–1.46)
TRIGL SERPL-MCNC: 115 MG/DL
TROPONIN I SERPL-MCNC: <0.02 NG/ML
TSH SERPL DL<=0.05 MIU/L-ACNC: 5.26 UIU/ML (ref 0.36–3.74)
VENTRICULAR RATE: 59 BPM
WBC # BLD AUTO: 8.03 THOUSAND/UL (ref 4.31–10.16)

## 2019-02-05 PROCEDURE — 85610 PROTHROMBIN TIME: CPT | Performed by: EMERGENCY MEDICINE

## 2019-02-05 PROCEDURE — 99291 CRITICAL CARE FIRST HOUR: CPT | Performed by: EMERGENCY MEDICINE

## 2019-02-05 PROCEDURE — 99255 IP/OBS CONSLTJ NEW/EST HI 80: CPT | Performed by: PSYCHIATRY & NEUROLOGY

## 2019-02-05 PROCEDURE — 85730 THROMBOPLASTIN TIME PARTIAL: CPT | Performed by: EMERGENCY MEDICINE

## 2019-02-05 PROCEDURE — 86901 BLOOD TYPING SEROLOGIC RH(D): CPT | Performed by: EMERGENCY MEDICINE

## 2019-02-05 PROCEDURE — G8988 SELF CARE GOAL STATUS: HCPCS

## 2019-02-05 PROCEDURE — G8987 SELF CARE CURRENT STATUS: HCPCS

## 2019-02-05 PROCEDURE — 85025 COMPLETE CBC W/AUTO DIFF WBC: CPT | Performed by: INTERNAL MEDICINE

## 2019-02-05 PROCEDURE — 84484 ASSAY OF TROPONIN QUANT: CPT | Performed by: EMERGENCY MEDICINE

## 2019-02-05 PROCEDURE — 96361 HYDRATE IV INFUSION ADD-ON: CPT

## 2019-02-05 PROCEDURE — 36415 COLL VENOUS BLD VENIPUNCTURE: CPT | Performed by: EMERGENCY MEDICINE

## 2019-02-05 PROCEDURE — 84100 ASSAY OF PHOSPHORUS: CPT | Performed by: INTERNAL MEDICINE

## 2019-02-05 PROCEDURE — G8979 MOBILITY GOAL STATUS: HCPCS

## 2019-02-05 PROCEDURE — 70450 CT HEAD/BRAIN W/O DYE: CPT

## 2019-02-05 PROCEDURE — 93306 TTE W/DOPPLER COMPLETE: CPT | Performed by: INTERNAL MEDICINE

## 2019-02-05 PROCEDURE — 80061 LIPID PANEL: CPT | Performed by: INTERNAL MEDICINE

## 2019-02-05 PROCEDURE — 97163 PT EVAL HIGH COMPLEX 45 MIN: CPT

## 2019-02-05 PROCEDURE — 70498 CT ANGIOGRAPHY NECK: CPT

## 2019-02-05 PROCEDURE — 93306 TTE W/DOPPLER COMPLETE: CPT

## 2019-02-05 PROCEDURE — 83735 ASSAY OF MAGNESIUM: CPT | Performed by: INTERNAL MEDICINE

## 2019-02-05 PROCEDURE — 70496 CT ANGIOGRAPHY HEAD: CPT

## 2019-02-05 PROCEDURE — G8978 MOBILITY CURRENT STATUS: HCPCS

## 2019-02-05 PROCEDURE — 84443 ASSAY THYROID STIM HORMONE: CPT | Performed by: INTERNAL MEDICINE

## 2019-02-05 PROCEDURE — 93010 ELECTROCARDIOGRAM REPORT: CPT | Performed by: INTERNAL MEDICINE

## 2019-02-05 PROCEDURE — 80053 COMPREHEN METABOLIC PANEL: CPT | Performed by: INTERNAL MEDICINE

## 2019-02-05 PROCEDURE — G8980 MOBILITY D/C STATUS: HCPCS

## 2019-02-05 PROCEDURE — 85610 PROTHROMBIN TIME: CPT | Performed by: INTERNAL MEDICINE

## 2019-02-05 PROCEDURE — G8989 SELF CARE D/C STATUS: HCPCS

## 2019-02-05 PROCEDURE — 86900 BLOOD TYPING SEROLOGIC ABO: CPT | Performed by: EMERGENCY MEDICINE

## 2019-02-05 PROCEDURE — 99255 IP/OBS CONSLTJ NEW/EST HI 80: CPT | Performed by: NEUROLOGICAL SURGERY

## 2019-02-05 PROCEDURE — 84439 ASSAY OF FREE THYROXINE: CPT | Performed by: INTERNAL MEDICINE

## 2019-02-05 PROCEDURE — 86850 RBC ANTIBODY SCREEN: CPT | Performed by: EMERGENCY MEDICINE

## 2019-02-05 PROCEDURE — 99285 EMERGENCY DEPT VISIT HI MDM: CPT

## 2019-02-05 PROCEDURE — 83036 HEMOGLOBIN GLYCOSYLATED A1C: CPT | Performed by: EMERGENCY MEDICINE

## 2019-02-05 PROCEDURE — 97166 OT EVAL MOD COMPLEX 45 MIN: CPT

## 2019-02-05 RX ORDER — HYDRALAZINE HYDROCHLORIDE 20 MG/ML
10 INJECTION INTRAMUSCULAR; INTRAVENOUS EVERY 6 HOURS PRN
Status: DISCONTINUED | OUTPATIENT
Start: 2019-02-05 | End: 2019-02-11 | Stop reason: HOSPADM

## 2019-02-05 RX ORDER — POTASSIUM CHLORIDE 20 MEQ/1
20 TABLET, EXTENDED RELEASE ORAL ONCE
Status: COMPLETED | OUTPATIENT
Start: 2019-02-05 | End: 2019-02-05

## 2019-02-05 RX ORDER — AMLODIPINE BESYLATE 10 MG/1
10 TABLET ORAL DAILY
Status: DISCONTINUED | OUTPATIENT
Start: 2019-02-05 | End: 2019-02-05

## 2019-02-05 RX ORDER — ASPIRIN 325 MG
325 TABLET ORAL DAILY
Status: DISCONTINUED | OUTPATIENT
Start: 2019-02-05 | End: 2019-02-05

## 2019-02-05 RX ORDER — METOCLOPRAMIDE HYDROCHLORIDE 5 MG/ML
10 INJECTION INTRAMUSCULAR; INTRAVENOUS EVERY 8 HOURS PRN
Status: DISCONTINUED | OUTPATIENT
Start: 2019-02-05 | End: 2019-02-06

## 2019-02-05 RX ORDER — ASPIRIN 325 MG
325 TABLET ORAL ONCE
Status: COMPLETED | OUTPATIENT
Start: 2019-02-05 | End: 2019-02-05

## 2019-02-05 RX ORDER — SODIUM CHLORIDE 9 MG/ML
100 INJECTION, SOLUTION INTRAVENOUS CONTINUOUS
Status: DISCONTINUED | OUTPATIENT
Start: 2019-02-05 | End: 2019-02-05

## 2019-02-05 RX ORDER — ACETAMINOPHEN 325 MG/1
975 TABLET ORAL ONCE
Status: COMPLETED | OUTPATIENT
Start: 2019-02-05 | End: 2019-02-05

## 2019-02-05 RX ORDER — ACETAMINOPHEN 325 MG/1
650 TABLET ORAL EVERY 6 HOURS PRN
Status: DISCONTINUED | OUTPATIENT
Start: 2019-02-05 | End: 2019-02-11 | Stop reason: HOSPADM

## 2019-02-05 RX ORDER — ATORVASTATIN CALCIUM 40 MG/1
40 TABLET, FILM COATED ORAL EVERY EVENING
Status: DISCONTINUED | OUTPATIENT
Start: 2019-02-05 | End: 2019-02-05

## 2019-02-05 RX ORDER — ATORVASTATIN CALCIUM 80 MG/1
80 TABLET, FILM COATED ORAL
Status: DISCONTINUED | OUTPATIENT
Start: 2019-02-05 | End: 2019-02-11 | Stop reason: HOSPADM

## 2019-02-05 RX ORDER — DIAZEPAM 5 MG/ML
5 INJECTION, SOLUTION INTRAMUSCULAR; INTRAVENOUS ONCE
Status: COMPLETED | OUTPATIENT
Start: 2019-02-05 | End: 2019-02-05

## 2019-02-05 RX ORDER — HEPARIN SODIUM 10000 [USP'U]/100ML
3-20 INJECTION, SOLUTION INTRAVENOUS
Status: DISCONTINUED | OUTPATIENT
Start: 2019-02-05 | End: 2019-02-10

## 2019-02-05 RX ORDER — ASPIRIN 81 MG/1
81 TABLET ORAL DAILY
Status: DISCONTINUED | OUTPATIENT
Start: 2019-02-05 | End: 2019-02-11 | Stop reason: HOSPADM

## 2019-02-05 RX ORDER — AMLODIPINE BESYLATE 10 MG/1
10 TABLET ORAL DAILY
Status: DISCONTINUED | OUTPATIENT
Start: 2019-02-05 | End: 2019-02-11 | Stop reason: HOSPADM

## 2019-02-05 RX ORDER — HYDROCHLOROTHIAZIDE 12.5 MG/1
12.5 TABLET ORAL DAILY
Status: DISCONTINUED | OUTPATIENT
Start: 2019-02-05 | End: 2019-02-06

## 2019-02-05 RX ORDER — PHENYLEPHRINE HYDROCHLORIDE 100 MG/ML
1 SOLUTION/ DROPS OPHTHALMIC AS NEEDED
Status: DISCONTINUED | OUTPATIENT
Start: 2019-02-05 | End: 2019-02-05

## 2019-02-05 RX ORDER — MORPHINE SULFATE 4 MG/ML
4 INJECTION, SOLUTION INTRAMUSCULAR; INTRAVENOUS ONCE
Status: COMPLETED | OUTPATIENT
Start: 2019-02-05 | End: 2019-02-05

## 2019-02-05 RX ORDER — HYDRALAZINE HYDROCHLORIDE 50 MG/1
50 TABLET, FILM COATED ORAL EVERY 8 HOURS PRN
Status: DISCONTINUED | OUTPATIENT
Start: 2019-02-05 | End: 2019-02-05

## 2019-02-05 RX ORDER — HYDRALAZINE HYDROCHLORIDE 20 MG/ML
10 INJECTION INTRAMUSCULAR; INTRAVENOUS EVERY 4 HOURS PRN
Status: DISCONTINUED | OUTPATIENT
Start: 2019-02-05 | End: 2019-02-05

## 2019-02-05 RX ORDER — HYDRALAZINE HYDROCHLORIDE 20 MG/ML
INJECTION INTRAMUSCULAR; INTRAVENOUS
Status: COMPLETED
Start: 2019-02-05 | End: 2019-02-05

## 2019-02-05 RX ORDER — HYDROCHLOROTHIAZIDE 12.5 MG/1
25 TABLET ORAL DAILY
COMMUNITY
End: 2019-02-26 | Stop reason: DRUGHIGH

## 2019-02-05 RX ORDER — DIAZEPAM 5 MG/ML
2.5 INJECTION, SOLUTION INTRAMUSCULAR; INTRAVENOUS EVERY 8 HOURS PRN
Status: DISCONTINUED | OUTPATIENT
Start: 2019-02-05 | End: 2019-02-05

## 2019-02-05 RX ORDER — ASPIRIN 81 MG/1
81 TABLET, CHEWABLE ORAL DAILY
Status: DISCONTINUED | OUTPATIENT
Start: 2019-02-05 | End: 2019-02-05

## 2019-02-05 RX ORDER — HYDROCHLOROTHIAZIDE 12.5 MG/1
12.5 TABLET ORAL DAILY
Status: DISCONTINUED | OUTPATIENT
Start: 2019-02-05 | End: 2019-02-05

## 2019-02-05 RX ADMIN — DIBASIC SODIUM PHOSPHATE, MONOBASIC POTASSIUM PHOSPHATE AND MONOBASIC SODIUM PHOSPHATE 1 TABLET: 852; 155; 130 TABLET ORAL at 16:35

## 2019-02-05 RX ADMIN — ENOXAPARIN SODIUM 40 MG: 40 INJECTION SUBCUTANEOUS at 08:33

## 2019-02-05 RX ADMIN — ACETAMINOPHEN 975 MG: 325 TABLET, FILM COATED ORAL at 17:18

## 2019-02-05 RX ADMIN — ASPIRIN 325 MG: 325 TABLET ORAL at 02:08

## 2019-02-05 RX ADMIN — SODIUM CHLORIDE 100 ML/HR: 0.9 INJECTION, SOLUTION INTRAVENOUS at 05:00

## 2019-02-05 RX ADMIN — HYDRALAZINE HYDROCHLORIDE 10 MG: 20 INJECTION INTRAMUSCULAR; INTRAVENOUS at 16:36

## 2019-02-05 RX ADMIN — ACETAMINOPHEN 650 MG: 325 TABLET, FILM COATED ORAL at 05:56

## 2019-02-05 RX ADMIN — ASPIRIN 325 MG ORAL TABLET 325 MG: 325 PILL ORAL at 08:33

## 2019-02-05 RX ADMIN — HYDRALAZINE HYDROCHLORIDE 10 MG: 20 INJECTION INTRAMUSCULAR; INTRAVENOUS at 04:35

## 2019-02-05 RX ADMIN — HEPARIN SODIUM AND DEXTROSE 12 UNITS/KG/HR: 10000; 5 INJECTION INTRAVENOUS at 11:47

## 2019-02-05 RX ADMIN — METOCLOPRAMIDE 10 MG: 5 INJECTION, SOLUTION INTRAMUSCULAR; INTRAVENOUS at 05:54

## 2019-02-05 RX ADMIN — DIBASIC SODIUM PHOSPHATE, MONOBASIC POTASSIUM PHOSPHATE AND MONOBASIC SODIUM PHOSPHATE 1 TABLET: 852; 155; 130 TABLET ORAL at 11:17

## 2019-02-05 RX ADMIN — DIBASIC SODIUM PHOSPHATE, MONOBASIC POTASSIUM PHOSPHATE AND MONOBASIC SODIUM PHOSPHATE 1 TABLET: 852; 155; 130 TABLET ORAL at 08:34

## 2019-02-05 RX ADMIN — ATORVASTATIN CALCIUM 80 MG: 80 TABLET, FILM COATED ORAL at 16:35

## 2019-02-05 RX ADMIN — DIBASIC SODIUM PHOSPHATE, MONOBASIC POTASSIUM PHOSPHATE AND MONOBASIC SODIUM PHOSPHATE 1 TABLET: 852; 155; 130 TABLET ORAL at 22:30

## 2019-02-05 RX ADMIN — MORPHINE SULFATE 4 MG: 4 INJECTION INTRAVENOUS at 17:55

## 2019-02-05 RX ADMIN — Medication 5 MG: at 17:11

## 2019-02-05 RX ADMIN — IOHEXOL 85 ML: 350 INJECTION, SOLUTION INTRAVENOUS at 01:00

## 2019-02-05 RX ADMIN — POTASSIUM CHLORIDE 20 MEQ: 1500 TABLET, EXTENDED RELEASE ORAL at 08:34

## 2019-02-05 NOTE — PROGRESS NOTES
02/05/19 1400   Spiritual Beliefs/Perceptions   Support Systems Spouse/significant other;Parent; Children   Stress Factors   Patient Stress Factors Health changes   Coping Responses   Patient Coping Open/discussion   Plan of Care   Comments Provided chaplaincy education  Explored extended support network of family and friends     Assessment Completed by: Unit visit

## 2019-02-05 NOTE — SOCIAL WORK
CM met pt at bedside and made aware of CM role at dc  Pt denies having a LW or POA  Primary contact is her  Licia Kehr- [phone]  Pt lives with Licia Kehr and their 13year old son in a 1 story house with no CARLOS form the front door and 13 steps to the basement  Pt was [employer] with all ADL's, drive and employed  Pt has no DME  Pharmacy is [facility] in [facility]  PCP is with  [facility]  Pt denies hx with HHC, STR alc, drug and IP psych tx  Pt reported that she was dx with depression but not on meds and follows with PCP  Pt ha transportation when dc  CM reviewed d/c planning process including the following: identifying help at home, patient preference for d/c planning needs, Discharge Lounge, [facility] Meds to Bed program, availability of treatment team to discuss questions or concerns patient and/or family may have regarding understanding medications and recognizing signs and symptoms once discharged  CM also encouraged patient to follow up with all recommended appointments after discharge  Patient advised of importance for patient and family to participate in managing patients medical well being

## 2019-02-05 NOTE — ED PROVIDER NOTES
History  Chief Complaint   Patient presents with    Dizziness     Pt reports taking a shower, and feeling suddenly dizzy "room spinning, couldnt stand up" and nausea with vomiting  Pt reports headaches for the last week, was here last week for BP and headaches, reports headache worsened today, still has posterior head pain     70-year-old female comes in for evaluation of dizziness  Patient states that this evening she was taking a shower when she suddenly began to feel dizzy describes it is being lightheaded like she was going to pass out and also some elements of the room spinning  Patient states she became nauseous went to sit down on the floor symptoms got somewhat better but when she went to stand up she became nauseous and vomited  Patient has been seen twice over the last 10 days in the emergency department for headache and hypertension  Patient had CT scan both times as well as blood work that was within normal limits she was placed on new blood pressure medications and her blood pressure is now under control  However patient still complains of a posterior headache          History provided by:  Patient   used: No    Dizziness   Quality:  Lightheadedness and room spinning  Severity:  Severe  Onset quality:  Sudden  Duration:  1 hour  Timing:  Constant  Progression:  Waxing and waning  Chronicity:  New  Context: head movement    Relieved by:  Lying down  Worsened by:  Standing up  Associated symptoms: headaches, nausea and vomiting    Associated symptoms: no blood in stool, no chest pain, no diarrhea, no shortness of breath and no tinnitus    Headaches:     Severity:  Severe    Onset quality:  Gradual    Duration:  10 days    Timing:  Constant    Progression:  Waxing and waning    Chronicity:  New  Nausea:     Severity:  Severe    Onset quality:  Sudden    Duration:  1 hour    Timing:  Constant    Progression:  Waxing and waning  Vomiting:     Quality:  Stomach contents    Severity: Mild    Progression:  Resolved  Risk factors: new medications    Risk factors: no anemia and no Meniere's disease        Prior to Admission Medications   Prescriptions Last Dose Informant Patient Reported? Taking? amLODIPine (NORVASC) 10 mg tablet  Self No Yes   Sig: Take 1 tablet (10 mg total) by mouth daily for 14 days   tetrahydrozoline-zinc (VISINE-AC) 0 05-0 25 % ophthalmic solution  Self Yes Yes   Si drops 3 (three) times a day as needed      Facility-Administered Medications: None       Past Medical History:   Diagnosis Date    Cervical dysplasia     GERD (gastroesophageal reflux disease)     occasionally    Hypertension     controlling with diet and exercise    Uterine leiomyoma        Past Surgical History:   Procedure Laterality Date    AUGMENTATION MAMMAPLASTY      GYNECOLOGIC CRYOSURGERY      CERVIX    HYSTERECTOMY      LASER ABLATION OF CONDYLOMAS N/A 2018    Procedure: Anirudh Fisher;  Surgeon: Ramiro Taylor MD;  [facility];  Service: Gynecology Oncology    SKIN TAG REMOVAL      EXICISON OF PERIANAL    WISDOM TOOTH EXTRACTION         Family History   Problem Relation Age of Onset    Adopted: Yes     I have reviewed and agree with the history as documented  Social History   Substance Use Topics    Smoking status: Never Smoker    Smokeless tobacco: Never Used    Alcohol use Yes      Comment: SOCIAL-1-2 drinks per week        Review of Systems   Constitutional: Negative for fatigue and fever  HENT: Negative for congestion, ear pain and tinnitus  Eyes: Negative for discharge and redness  Respiratory: Negative for apnea, cough, shortness of breath and wheezing  Cardiovascular: Negative for chest pain  Gastrointestinal: Positive for nausea and vomiting  Negative for abdominal pain, blood in stool and diarrhea  Endocrine: Negative for cold intolerance and polydipsia  Genitourinary: Negative for difficulty urinating and hematuria  Musculoskeletal: Negative for arthralgias and back pain  Skin: Negative for color change and rash  Allergic/Immunologic: Negative for environmental allergies and immunocompromised state  Neurological: Positive for dizziness and headaches  Negative for numbness  Hematological: Negative for adenopathy  Does not bruise/bleed easily  Psychiatric/Behavioral: Negative for agitation and behavioral problems  Physical Exam  Physical Exam   Constitutional: She is oriented to person, place, and time  Vital signs are normal  She appears well-developed and well-nourished  Non-toxic appearance  She appears distressed  HENT:   Head: Normocephalic and atraumatic  Right Ear: Tympanic membrane and external ear normal    Left Ear: Tympanic membrane and external ear normal    Nose: Nose normal  No rhinorrhea, sinus tenderness or nasal deformity  Mouth/Throat: Uvula is midline and oropharynx is clear and moist  Normal dentition  Eyes: Pupils are equal, round, and reactive to light  Conjunctivae, EOM and lids are normal  Right eye exhibits no discharge  Left eye exhibits no discharge  Neck: Trachea normal and normal range of motion  Neck supple  No JVD present  Carotid bruit is not present  Cardiovascular: Normal rate, regular rhythm, intact distal pulses and normal pulses  No extrasystoles are present  PMI is not displaced  Pulmonary/Chest: Effort normal and breath sounds normal  No accessory muscle usage  No respiratory distress  She has no wheezes  She has no rhonchi  She has no rales  Abdominal: Soft  Normal appearance and bowel sounds are normal  She exhibits no mass  There is no tenderness  There is no rigidity, no rebound and no guarding  Musculoskeletal:        Right shoulder: She exhibits normal range of motion, no bony tenderness, no swelling and no deformity  Cervical back: Normal  She exhibits normal range of motion, no tenderness, no bony tenderness and no deformity  Lymphadenopathy:     She has no cervical adenopathy  She has no axillary adenopathy  Neurological: She is alert and oriented to person, place, and time  She has normal strength and normal reflexes  No cranial nerve deficit or sensory deficit  GCS eye subscore is 4  GCS verbal subscore is 5  GCS motor subscore is 6  Skin: Skin is warm and dry  No rash noted  Psychiatric: She has a normal mood and affect  Her speech is normal and behavior is normal    Nursing note and vitals reviewed        Vital Signs  ED Triage Vitals   Temperature Pulse Respirations Blood Pressure SpO2   02/04/19 1914 02/04/19 1914 02/04/19 1914 02/04/19 1914 02/04/19 1914   97 9 °F (36 6 °C) 78 18 139/65 98 %      Temp Source Heart Rate Source Patient Position - Orthostatic VS BP Location FiO2 (%)   02/04/19 1914 02/04/19 1914 02/04/19 1914 02/04/19 1914 --   Oral Monitor Sitting Right arm       Pain Score       02/04/19 2012       5           Vitals:    02/04/19 2130 02/04/19 2200 02/05/19 0116 02/05/19 0215   BP: 153/85 165/85 154/95 164/88   Pulse: 66 64 61 63   Patient Position - Orthostatic VS: Sitting Sitting Lying Sitting       Visual Acuity  Visual Acuity      Most Recent Value   L Pupil Size (mm)  3   R Pupil Size (mm)  3          ED Medications  Medications   sodium chloride 0 9 % bolus 1,000 mL (0 mL Intravenous Stopped 2/5/19 0141)   diphenhydrAMINE (BENADRYL) injection 25 mg (25 mg Intravenous Given 2/4/19 2038)   metoclopramide (REGLAN) injection 10 mg (10 mg Intravenous Given 2/4/19 2037)   magnesium sulfate 2 g/50 mL IVPB (premix) 2 g (0 g Intravenous Stopped 2/4/19 2155)   diazepam (VALIUM) injection 2 5 mg (2 5 mg Intravenous Given 2/4/19 2036)   iohexol (OMNIPAQUE) 350 MG/ML injection (SINGLE-DOSE) 85 mL (85 mL Intravenous Given 2/5/19 0100)   aspirin tablet 325 mg (325 mg Oral Given 2/5/19 0208)       Diagnostic Studies  Results Reviewed     Procedure Component Value Units Date/Time    Troponin I [[id number]] (Normal) Collected:  02/05/19 0121    Lab Status:  Final result Specimen:  Blood from Arm, Left Updated:  02/05/19 0153     Troponin I <0 02 ng/mL     Protime-INR [[id number]]  (Abnormal) Collected:  02/05/19 0121    Lab Status:  Final result Specimen:  Blood from Arm, Left Updated:  02/05/19 0140     Protime 14 4 (H) seconds      INR 1 15    APTT [[id number]]  (Normal) Collected:  02/05/19 0121    Lab Status:  Final result Specimen:  Blood from Arm, Left Updated:  02/05/19 0140     PTT 28 seconds     Comprehensive metabolic panel [[id number]]  (Abnormal) Collected:  02/04/19 2029    Lab Status:  Final result Specimen:  Blood from Arm, Left Updated:  02/04/19 2126     Sodium 137 mmol/L      Potassium 3 7 mmol/L      Chloride 99 (L) mmol/L      CO2 29 mmol/L      ANION GAP 9 mmol/L      BUN 17 mg/dL      Creatinine 0 78 mg/dL      Glucose 104 mg/dL      Calcium 9 1 mg/dL      AST 19 U/L      ALT 25 U/L      Alkaline Phosphatase 75 U/L      Total Protein 7 9 g/dL      Albumin 4 5 g/dL      Total Bilirubin 0 40 mg/dL      eGFR 90 ml/min/1 73sq m     Narrative:         National Kidney Disease Education Program recommendations are as follows:  GFR calculation is accurate only with a steady state creatinine  Chronic Kidney disease less than 60 ml/min/1 73 sq  meters  Kidney failure less than 15 ml/min/1 73 sq  meters      CBC and differential [[id number]]  (Abnormal) Collected:  02/04/19 2029    Lab Status:  Final result Specimen:  Blood from Arm, Left Updated:  02/04/19 2041     WBC 11 23 (H) Thousand/uL      RBC 4 75 Million/uL      Hemoglobin 15 0 g/dL      Hematocrit 43 0 %      MCV 91 fL      MCH 31 6 pg      MCHC 34 9 g/dL      RDW 11 8 %      MPV 10 0 fL      Platelets 930 Thousands/uL      nRBC 0 /100 WBCs      Neutrophils Relative 77 (H) %      Immat GRANS % 0 %      Lymphocytes Relative 14 %      Monocytes Relative 8 %      Eosinophils Relative 1 %      Basophils Relative 0 %      Neutrophils Absolute 8 56 (H) Thousands/µL      Immature Grans Absolute 0 05 Thousand/uL      Lymphocytes Absolute 1 57 Thousands/µL      Monocytes Absolute 0 91 Thousand/µL      Eosinophils Absolute 0 09 Thousand/µL      Basophils Absolute 0 05 Thousands/µL                  CTA head and neck w wo contrast   Final Result by Pee Carson MD (02/05 0149)         1  Left vertebral artery dissection, 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 and extending just proximal to the vertebrobasilar junction  Neurovascular service consultation recommended  2   Multifocal progressive narrowing of the distal intradural segment of the right vertebral artery and mild narrowing in the proximal basilar artery  3   Multifocal mild to moderate narrowing in the left posterior cerebral artery  I personally discussed this study with Angela Pickett on 2/5/2019 at 1:38 AM                      Workstation performed: [id number]         MRI brain wo contrast   Final Result by Pee Carson MD (02/05 0048)         1  Acute to early subacute infarcts in the bilateral posterior parietal lobes and in the right cerebellar hemisphere  No hemorrhage or mass effect  2   Signal abnormality within distal cervical and intracranial segments of the left vertebral artery could represent thrombus or dissection  Recommend CT angiogram of the head and neck for further evaluation  I personally discussed this study with Angela Pickett on 2/5/2019 at 12:36 AM                Workstation performed: [id number]         MRA and or MRV head wo contrast   Final Result by Pee Carson MD (02/05 0048)         1  Absence of signal within the distal cervical and proximal intradural segments of the left vertebral artery, compatible with findings of signal abnormality on the MRI, concerning for thrombus or dissection  Multifocal irregularity of the distal    intradural segment of the right vertebral artery    Recommend CTA of the head and neck to exclude vertebral artery dissection  2   No other evidence of significant stenosis in the posterior circulation  3   No stenosis or occlusion of the major vessels in the anterior circulation               I personally discussed this study with Justyn Aguilera on 2/5/2019 at 12:48 AM                   Workstation performed: [id number]                    Procedures  ECG 12 Lead Documentation  Date/Time: 2/4/2019 8:24 PM  Performed by: Heather Higgins by: Trixie NEIL     Patient location:  ED  Previous ECG:     Previous ECG:  Compared to current  Rate:     ECG rate:  59  Rhythm:     Rhythm: sinus bradycardia    Ectopy:     Ectopy: none             Phone Contacts  ED Phone Contact    ED Course                               MDM  Number of Diagnoses or Management Options  Vertebral artery dissection [facility]): new and requires workup     Amount and/or Complexity of Data Reviewed  Clinical lab tests: ordered and reviewed  Tests in the radiology section of CPT®: ordered and reviewed  Tests in the medicine section of CPT®: ordered and reviewed  Decide to obtain previous medical records or to obtain history from someone other than the patient: yes  Review and summarize past medical records: yes  Discuss the patient with other providers: yes  Independent visualization of images, tracings, or specimens: yes    Patient Progress  Patient progress: stable      Disposition  Final diagnoses:   Vertebral artery dissection ([facility] [address] )     Time reflects when diagnosis was documented in both MDM as applicable and the Disposition within this note     Time User Action Codes Description Comment    2/5/2019  2:12 AM Geoff Post Add [I77 74] Vertebral artery dissection [facility])       ED Disposition     ED Disposition Condition Date/Time Comment    Transfer to Another Facility  Tue Feb 5, 2019  2:12 AM Mackey Najjar should be transferred out to [facility] critical care doctor keila MD Documentation      Most Recent Value   Patient Condition  The patient has been stabilized such that within reasonable medical probability, no material deterioration of the patient condition or the condition of the unborn child(kaz) is likely to result from the transfer   Reason for Transfer  Level of Care needed not available at this facility   Benefits of Transfer  Specialized equipment and/or services available at the receiving facility (Include comment)________________________   Risks of Transfer  Potential for delay in receiving treatment   Accepting Physician  dr Lupe Cotnreras Name, [address]     (Name & Tel number)  kathleen   Transported by [employer] and Unit #)  slets   Sending MD garcia   Provider Certification  General risk, such as traffic hazards, adverse weather conditions, rough terrain or turbulence, possible failure of equipment (including vehicle or aircraft), or consequences of actions of persons outside the control of the transport personnel      RN Documentation      [address] Name, [facility]   [facility]     (Name & Tel number)  kathleen   Transported by [employer] and Unit #)  slets      Follow-up Information    None         Discharge Medication List as of 2/5/2019  2:39 AM      CONTINUE these medications which have NOT CHANGED    Details   amLODIPine (NORVASC) 10 mg tablet Take 1 tablet (10 mg total) by mouth daily for 14 days, Starting Tue 1/29/2019, Until Tue 2/12/2019, Print      tetrahydrozoline-zinc (VISINE-AC) 0 05-0 25 % ophthalmic solution 2 drops 3 (three) times a day as needed, Historical Med           No discharge procedures on file      ED Provider  Electronically Signed by           Marychuy Amanda DO  02/06/19 2551

## 2019-02-05 NOTE — UTILIZATION REVIEW
Initial Clinical Review    Admission: Date/Time/Statement: 2/5/19 @ 0426   Orders Placed This Encounter   Procedures    Inpatient Admission     Standing Status:   Standing     Number of Occurrences:   1     Order Specific Question:   Admitting Physician     Answer:   Lian Cerrato [[id number]]     Order Specific Question:   Level of Care     Answer:   Critical Care [15]     Order Specific Question:   Estimated length of stay     Answer:   More than 2 Midnights     Order Specific Question:   Certification     Answer:   I certify that inpatient services are medically necessary for this patient for a duration of greater than two midnights  See H&P and MD Progress Notes for additional information about the patient's course of treatment  2/4/2019 - 2/5/2019 (6 hours)  [facility]  VERTEBRAL ARTERY DISSECTION  Transfer to [facility]    Prior to arrival   Iv mag, iv diazepam, ivf  9% ns 1 L bolus, iv diphenyhydramine,       ED: Date/Time/Mode of Arrival:   ED Arrival Information     Expected Arrival Acuity Means of Arrival Escorted By Service Admission Type    2/5/2019 2/5/2019 02:53 [facility]) Critical Care/ICU Emergency    Arrival Complaint    headache        Chief Complaint:   Chief Complaint   Patient presents with    Headache     Pt transferred from [facility] for CC evaluation due to abnormal CT findings  History of Illness:    50year old female received from [facility] for further evaluation and management of dizziness and abnormal finding of possible  Vertebral artery dissection      Patient also has severe headache  And 2 episodes of kaleidescope vision  And intermittent short term memory loss     ED Vital Signs:   02/05/19 0254 02/05/19 0254 02/05/19 0254 02/05/19 0254 02/05/19 0254   98 1 °F (36 7 °C) 59 16 (!) 209/96 100 %         Pain Score       5       02/05/19 54 3 kg (119 lb 11 4 oz)         Pertinent Labs/Diagnostic Test Results:    MRI brain indication dizziness   Acute to early subacute infarcts in the bilateral posterior parietal lobes and in the right cerebellar hemisphere  Signal abnormality within distal cervical and intracranial segments of the left vertebral artery could represent thrombus or dissection  MRA  Brain   Absence of signal within the distal cervical and proximal intradural segments of the left vertebral artery, compatible with findings of signal abnormality on the MRI, concerning for thrombus or dissection  Multifocal irregularity of the distal   intradural segment of the right vertebral artery  Recommend CTA of the head and neck to exclude vertebral artery dissection  ED Treatment:   Medication Administration from 02/05/2019 0241 to 02/05/2019 0426     None        Past Medical/Surgical History:     Diagnosis    VAIN I (vaginal intraepithelial neoplasia grade I)    Vulvovaginal condyloma    Acute nonintractable headache    Disorientation    Hypertensive urgency    Essential hypertension    Vision changes         Admitting Diagnosis:     Vertebral artery dissection ([facility]) [I77 74]  CVA (cerebral vascular accident) ([facility] [address] ) [I63 9]  Headache [R51]      Age/Sex: 50 y o  female       Assessment/Plan    Vertebral artery dissection  Consult neuro surgery  Aspirin daily  Repeat CT of head   Echo TSH  Lipid panel and A! C  Cardene gtt if sbp is uncontrolled   Goal  140 to 160      Reason for not initiating IV thrombolytic Dissection unknown stroke onset    Admission Orders:    Heparin infusion protocol   PT OT and Speech eval and treat  Neuro checks  q1 hr  Consult neuro surgery  Consult neurology  Npo   Sequential compression device  Stroke education   Sequential compression device  echo  Renal artery complete   Carotid complete study   APTT  Q6 HR  HBA1C    LIPID PANEL     Scheduled Meds:     acetaminophen 650 mg Oral Q6H PRN   amLODIPine 10 mg Oral Daily   aspirin 81 mg Oral Daily atorvastatin 80 mg Oral Daily With Dinner   heparin (porcine) 3-20 Units/kg/hr (Order-Specific) Intravenous Titrated   hydrALAZINE 10 mg Intravenous Q6H PRN   hydrochlorothiazide 12 5 mg Oral Daily   metoclopramide 10 mg Intravenous Q8H PRN   potassium-sodium phosphateS 1 tablet Oral 4x Daily (with meals and at bedtime)

## 2019-02-05 NOTE — CONSULTS
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 extending just proximal to the vertebrobasilar junction identified on CTA head/neck  Plan:  -Heparin gtt and aspirin 81mg   -Lipitor 80mg daily  -Recommend normotension  -Continue telemetry  -PT/OT  -Continue supportive care per primary team  -Continue to monitor and notify with changes  Follow up as outpatient with stroke clinic 2-4 weeks  Appointment requesting  Results:  -MRI brain without contrast revealed acute to early subacute infarcts in the bilateral posterior parietal lobes and the right cerebellar hemisphere  -CTA head/neck revealed 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 level and extending just proximally to the vertebrobasilar junction, multifocal progressive narrowing of the distal intradural segment of the right vertebral artery and mild narrowing in the proximal basilar artery, and multifocal mild to moderate narrowing in the left PCA  -TTE: LVEF 70%, no regional wall abnormalities, no thrombus, atria size normal b/l  -TSH elevated, T4 normal; ; HbA1c 5 3    History of Present Illness     Reason for Consult / Principal Problem: Vertebral dissection    HPI: Dean Morley is a 50 y o  female with past medical history of hypertension who presented to [address] [place] [address] ED last evening with repeated complaints of headache and dizziness    Patient has recently been to the ED to times previously for similar complaints, the 1st time on 01/27 with a BP of 244/124 requiring short admission and the 2nd on 01/29 with a BP of 232/111  Patient noted to have nonfocal exam   CT head x2 negative for acute abnormality  Patient re-presented again last evening with headache and dizziness are associated with nausea vomiting  BP noted to be 209/96  MRI brain completed, which revealed acute to early subacute infarcts in the bilateral posterior parietal lobes and the right cerebellar hemisphere  MRA head revealed absence of signal within the distal cervical a proximal intradural segment of the left vertebral artery concerning for thrombus or dissection  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 level extending just proximal of the vertebrobasilar junction  Patient [facility] for [facility] care  Today, patient is seen resting in bed comfortably  She currently states all symptoms of dizziness and headache have resolved  She denies history of trauma, vigorous exercise, chiropractic adjustment, prolong flexion or extension of neck  She denies similar episodes prior to the events in the last week  She is adopted and cannot offer any family history  Denies CP, SOB, headache, dizziness, vision changes, N/V, abdominal pain, weakness or numbness  Inpatient consult to Neurology  Consult performed by: [place]  Consult ordered by: Misti Montgomery          Review of Systems  12 point ROS performed, as stated above, all others negative      Historical Information   Past Medical History:   Diagnosis Date    Cervical dysplasia     GERD (gastroesophageal reflux disease)     occasionally    Hypertension     controlling with diet and exercise    Uterine leiomyoma      Past Surgical History:   Procedure Laterality Date    AUGMENTATION MAMMAPLASTY      GYNECOLOGIC CRYOSURGERY      CERVIX    HYSTERECTOMY      LASER ABLATION OF CONDYLOMAS N/A 7/30/2018    Procedure: Dolly [place] LASER ABLATION OF VULVA;  Surgeon: Ravi Wang Ila Corado MD;  Location: [facility];  Service: Gynecology Oncology    SKIN TAG REMOVAL      EXICISON OF PERIANAL    WISDOM TOOTH EXTRACTION       Social History   History   Alcohol Use    Yes     Comment: SOCIAL-1-2 drinks per week     History   Drug Use No     History   Smoking Status    Never Smoker   Smokeless Tobacco    Never Used     Family History:   Family History   Problem Relation Age of Onset    Hypertension Father        Review of previous medical records was completed  Meds/Allergies   all current active meds have been reviewed and current meds:   Current Facility-Administered Medications   Medication Dose Route Frequency    acetaminophen (TYLENOL) tablet 650 mg  650 mg Oral Q6H PRN    amLODIPine (NORVASC) tablet 10 mg  10 mg Oral Daily    aspirin tablet 325 mg  325 mg Oral Daily    atorvastatin (LIPITOR) tablet 80 mg  80 mg Oral Daily With Dinner    enoxaparin (LOVENOX) subcutaneous injection 40 mg  40 mg Subcutaneous Q24H FABIENNE    hydrALAZINE (APRESOLINE) injection 10 mg  10 mg Intravenous Q4H PRN    hydrochlorothiazide (HYDRODIURIL) tablet 12 5 mg  12 5 mg Oral Daily    metoclopramide (REGLAN) injection 10 mg  10 mg Intravenous Q8H PRN    potassium chloride (K-DUR,KLOR-CON) CR tablet 20 mEq  20 mEq Oral Once    potassium-sodium phosphateS (K-PHOS,PHOSPHA 250) -250 mg tablet 1 tablet  1 tablet Oral 4x Daily (with meals and at bedtime)    sodium chloride 0 9 % infusion  100 mL/hr Intravenous Continuous       Allergies   Allergen Reactions    Shellfish Allergy Anaphylaxis       Objective   Vitals:Blood pressure 122/78, pulse 76, temperature 98 3 °F (36 8 °C), temperature source Oral, resp  rate (!) 33, height 4' 11" (1 499 m), weight 54 3 kg (119 lb 11 4 oz), SpO2 99 %  ,Body mass index is 24 18 kg/m²      Intake/Output Summary (Last 24 hours) at 02/05/19 0727  Last data filed at 02/05/19 0600   Gross per 24 hour   Intake              100 ml   Output              400 ml   Net -300 ml       Invasive Devices: Invasive Devices     Peripheral Intravenous Line            Peripheral IV 02/04/19 Left Antecubital less than 1 day    Peripheral IV 02/05/19 Left Hand less than 1 day                Physical Exam   Constitutional: She is oriented to person, place, and time  No distress  HENT:   Head: Normocephalic and atraumatic  Eyes: Pupils are equal, round, and reactive to light  EOM are normal    Neck: Normal range of motion  Neck supple  Cardiovascular: Normal rate and regular rhythm  Pulmonary/Chest: Effort normal and breath sounds normal    Neurological: She is oriented to person, place, and time  She has normal strength  She has a normal Finger-Nose-Finger Test    Reflex Scores:       Tricep reflexes are 2+ on the right side and 2+ on the left side  Bicep reflexes are 2+ on the right side and 2+ on the left side  Brachioradialis reflexes are 2+ on the right side and 2+ on the left side  Patellar reflexes are 3+ on the right side and 3+ on the left side  Achilles reflexes are 2+ on the right side and 2+ on the left side  Skin: Skin is warm and dry  She is not diaphoretic  Psychiatric: She has a normal mood and affect  Her speech is normal and behavior is normal  Judgment and thought content normal      Neurologic Exam     Mental Status   Oriented to person, place, and time  Attention: normal  Concentration: normal    Speech: speech is normal   Level of consciousness: alert    Cranial Nerves     CN II   Visual fields full to confrontation  CN III, IV, VI   Pupils are equal, round, and reactive to light  Extraocular motions are normal      CN V   Facial sensation intact  CN VII   Facial expression full, symmetric       CN VIII   CN VIII normal      CN IX, X   CN IX normal    CN X normal      CN XI   CN XI normal      CN XII   CN XII normal      Motor Exam   Right arm pronator drift: absent  Left arm pronator drift: absent    Strength   Strength 5/5 throughout  Sensory Exam   Intact to light touch and temperature throughout     Gait, Coordination, and Reflexes     Coordination   Finger to nose coordination: normal    Reflexes   Right brachioradialis: 2+  Left brachioradialis: 2+  Right biceps: 2+  Left biceps: 2+  Right triceps: 2+  Left triceps: 2+  Right patellar: 3+  Left patellar: 3+  Right achilles: 2+  Left achilles: 2+  Right plantar: equivocal  Left plantar: equivocal  Right Ackerman: absent  Left Ackerman: absent  Right ankle clonus: absent  Left ankle clonus: absent  Positive crossed adductor on the right       Lab Results: I have personally reviewed pertinent reports       Recent Results (from the past 24 hour(s))   CBC and differential    Collection Time: 02/04/19  8:29 PM   Result Value Ref Range    WBC 11 23 (H) 4 31 - 10 16 Thousand/uL    RBC 4 75 3 81 - 5 12 Million/uL    Hemoglobin 15 0 11 5 - 15 4 g/dL    Hematocrit 43 0 34 8 - 46 1 %    MCV 91 82 - 98 fL    MCH 31 6 26 8 - 34 3 pg    MCHC 34 9 31 4 - 37 4 g/dL    RDW 11 8 11 6 - 15 1 %    MPV 10 0 8 9 - 12 7 fL    Platelets 464 005 - 007 Thousands/uL    nRBC 0 /100 WBCs    Neutrophils Relative 77 (H) 43 - 75 %    Immat GRANS % 0 0 - 2 %    Lymphocytes Relative 14 14 - 44 %    Monocytes Relative 8 4 - 12 %    Eosinophils Relative 1 0 - 6 %    Basophils Relative 0 0 - 1 %    Neutrophils Absolute 8 56 (H) 1 85 - 7 62 Thousands/µL    Immature Grans Absolute 0 05 0 00 - 0 20 Thousand/uL    Lymphocytes Absolute 1 57 0 60 - 4 47 Thousands/µL    Monocytes Absolute 0 91 0 17 - 1 22 Thousand/µL    Eosinophils Absolute 0 09 0 00 - 0 61 Thousand/µL    Basophils Absolute 0 05 0 00 - 0 10 Thousands/µL   Comprehensive metabolic panel    Collection Time: 02/04/19  8:29 PM   Result Value Ref Range    Sodium 137 136 - 145 mmol/L    Potassium 3 7 3 5 - 5 3 mmol/L    Chloride 99 (L) 100 - 108 mmol/L    CO2 29 21 - 32 mmol/L    ANION GAP 9 4 - 13 mmol/L    BUN 17 5 - 25 mg/dL    Creatinine 0 78 0 60 - 1 30 mg/dL    Glucose 104 65 - 140 mg/dL    Calcium 9 1 8 3 - 10 1 mg/dL    AST 19 5 - 45 U/L    ALT 25 12 - 78 U/L    Alkaline Phosphatase 75 46 - 116 U/L    Total Protein 7 9 6 4 - 8 2 g/dL    Albumin 4 5 3 5 - 5 0 g/dL    Total Bilirubin 0 40 0 20 - 1 00 mg/dL    eGFR 90 ml/min/1 73sq m   Protime-INR    Collection Time: 02/05/19  1:21 AM   Result Value Ref Range    Protime 14 4 (H) 11 8 - 14 2 seconds    INR 1 15 0 86 - 1 17   APTT    Collection Time: 02/05/19  1:21 AM   Result Value Ref Range    PTT 28 26 - 38 seconds   Troponin I    Collection Time: 02/05/19  1:21 AM   Result Value Ref Range    Troponin I <0 02 <=0 04 ng/mL   Type and screen    Collection Time: 02/05/19  1:21 AM   Result Value Ref Range    ABO Grouping A     Rh Factor Positive     Antibody Screen Negative     Specimen Expiration Date [id number]    CBC and differential    Collection Time: 02/05/19  4:39 AM   Result Value Ref Range    WBC 8 03 4 31 - 10 16 Thousand/uL    RBC 4 80 3 81 - 5 12 Million/uL    Hemoglobin 15 1 11 5 - 15 4 g/dL    Hematocrit 43 8 34 8 - 46 1 %    MCV 91 82 - 98 fL    MCH 31 5 26 8 - 34 3 pg    MCHC 34 5 31 4 - 37 4 g/dL    RDW 11 7 11 6 - 15 1 %    MPV 10 2 8 9 - 12 7 fL    Platelets 818 629 - 085 Thousands/uL    nRBC 0 /100 WBCs    Neutrophils Relative 60 43 - 75 %    Immat GRANS % 0 0 - 2 %    Lymphocytes Relative 29 14 - 44 %    Monocytes Relative 8 4 - 12 %    Eosinophils Relative 2 0 - 6 %    Basophils Relative 1 0 - 1 %    Neutrophils Absolute 4 84 1 85 - 7 62 Thousands/µL    Immature Grans Absolute 0 03 0 00 - 0 20 Thousand/uL    Lymphocytes Absolute 2 31 0 60 - 4 47 Thousands/µL    Monocytes Absolute 0 67 0 17 - 1 22 Thousand/µL    Eosinophils Absolute 0 12 0 00 - 0 61 Thousand/µL    Basophils Absolute 0 06 0 00 - 0 10 Thousands/µL   Comprehensive metabolic panel    Collection Time: 02/05/19  4:39 AM   Result Value Ref Range    Sodium 140 136 - 145 mmol/L    Potassium 3 4 (L) 3 5 - 5 3 mmol/L    Chloride 106 100 - 108 mmol/L    CO2 23 21 - 32 mmol/L    ANION GAP 11 4 - 13 mmol/L    BUN 9 5 - 25 mg/dL    Creatinine 0 58 (L) 0 60 - 1 30 mg/dL    Glucose 101 65 - 140 mg/dL    Calcium 8 8 8 3 - 10 1 mg/dL    AST 17 5 - 45 U/L    ALT 20 12 - 78 U/L    Alkaline Phosphatase 82 46 - 116 U/L    Total Protein 7 9 6 4 - 8 2 g/dL    Albumin 4 6 3 5 - 5 0 g/dL    Total Bilirubin 0 59 0 20 - 1 00 mg/dL    eGFR 109 ml/min/1 73sq m   Magnesium    Collection Time: 02/05/19  4:39 AM   Result Value Ref Range    Magnesium 2 5 1 6 - 2 6 mg/dL   Phosphorus    Collection Time: 02/05/19  4:39 AM   Result Value Ref Range    Phosphorus 2 3 (L) 2 7 - 4 5 mg/dL   Protime-INR    Collection Time: 02/05/19  4:39 AM   Result Value Ref Range    Protime 13 6 11 8 - 14 2 seconds    INR 1 03 0 86 - 1 17   Lipid panel    Collection Time: 02/05/19  4:39 AM   Result Value Ref Range    Cholesterol 262 (H) 50 - 200 mg/dL    Triglycerides 115 <=150 mg/dL    HDL, Direct 58 40 - 60 mg/dL    LDL Calculated 181 (H) 0 - 100 mg/dL    Non-HDL-Chol (CHOL-HDL) 204 mg/dl   TSH, 3rd generation with Free T4 reflex    Collection Time: 02/05/19  4:39 AM   Result Value Ref Range    TSH 3RD GENERATON 5 260 (H) 0 358 - 3 740 uIU/mL   T4, free    Collection Time: 02/05/19  4:39 AM   Result Value Ref Range    Free T4 1 16 0 76 - 1 46 ng/dL   Hemoglobin A1c w/EAG Estimation    Collection Time: 02/05/19  4:43 AM   Result Value Ref Range    Hemoglobin A1C 5 3 4 2 - 6 3 %     mg/dl     Imaging Studies: I have personally reviewed pertinent reports  and I have personally reviewed pertinent films in PACS  EKG, Pathology, and Other Studies: I have personally reviewed pertinent reports      VTE Prophylaxis: Enoxaparin (Lovenox)

## 2019-02-05 NOTE — EMTALA/ACUTE CARE TRANSFER
[address] [address] [address]  Dept: [phone]      EMTALA TRANSFER CONSENT    NAME Andres Arnold                                         1970                              MRN [id number]    I have been informed of my rights regarding examination, treatment, and transfer   by Dr Maureen Bay DO    Benefits: Specialized equipment and/or services available at the receiving facility (Include comment)________________________    Risks: Potential for delay in receiving treatment      Consent for Transfer:  I acknowledge that my medical condition has been evaluated and explained to me by the emergency department physician or other qualified medical person and/or my attending physician, who has recommended that I be transferred to the service of  Accepting Physician: dr Orlando Camacho at [facility] Name, [address] : [place]   The above potential benefits of such transfer, the potential risks associated with such transfer, and the probable risks of not being transferred have been explained to me, and I fully understand them  The doctor has explained that, in my case, the benefits of transfer outweigh the risks  I agree to be transferred  I authorize the performance of emergency medical procedures and treatments upon me in both transit and upon arrival at the receiving facility  Additionally, I authorize the release of any and all medical records to the receiving facility and request they be transported with me, if possible  I understand that the safest mode of transportation during a medical emergency is an ambulance and that the Hospital advocates the use of this mode of transport  Risks of traveling to the receiving facility by car, including absence of medical control, life sustaining equipment, such as oxygen, and medical personnel has been explained to me and I fully understand them      (JESSI CORRECT BOX BELOW)  [  ]  I consent to the stated transfer and to be transported by ambulance/helicopter  [  ]  I consent to the stated transfer, but refuse transportation by ambulance and accept full responsibility for my transportation by car  I understand the risks of non-ambulance transfers and I exonerate the Hospital and its staff from any deterioration in my condition that results from this refusal     X___________________________________________    DATE  19  TIME________  Signature of patient or legally responsible individual signing on patient behalf           RELATIONSHIP TO PATIENT_________________________          Provider Certification    NAME Devora Arellano                                         1970                              MRN [id number]    A medical screening exam was performed on the above named patient  Based on the examination:    Condition Necessitating Transfer The encounter diagnosis was Vertebral artery dissection ([address] )  Patient Condition: The patient has been stabilized such that within reasonable medical probability, no material deterioration of the patient condition or the condition of the unborn child(kaz) is likely to result from the transfer    Reason for Transfer: Level of Care needed not available at this facility    Transfer Requirements: Facility [facility]    · Space available and qualified personnel available for treatment as acknowledged by kathleen  · Agreed to accept transfer and to provide appropriate medical treatment as acknowledged by       dr Maite Paris  · Appropriate medical records of the examination and treatment of the patient are provided at the time of transfer   [address], [address] _______  · Transfer will be performed by qualified personnel from [facility]  and appropriate transfer equipment as required, including the use of necessary and appropriate life support measures      Provider Certification: I have examined the patient and explained the following risks and benefits of being transferred/refusing transfer to the patient/family:  General risk, such as traffic hazards, adverse weather conditions, rough terrain or turbulence, possible failure of equipment (including vehicle or aircraft), or consequences of actions of persons outside the control of the transport personnel      Based on these reasonable risks and benefits to the patient and/or the unborn child(kaz), and based upon the information available at the time of the patients examination, I certify that the medical benefits reasonably to be expected from the provision of appropriate medical treatments at another medical facility outweigh the increasing risks, if any, to the individuals medical condition, and in the case of labor to the unborn child, from effecting the transfer      X____________________________________________ DATE 02/05/19        TIME_______      ORIGINAL - SEND TO MEDICAL RECORDS   COPY - SEND WITH PATIENT DURING TRANSFER

## 2019-02-05 NOTE — OCCUPATIONAL THERAPY NOTE
[address] Evaluation     Patient Name: Cathy Kohli  GSWSN'U Date: 2/5/2019  Problem List  Patient Active Problem List   Diagnosis    VAIN I (vaginal intraepithelial neoplasia grade I)    Vulvovaginal condyloma    Acute nonintractable headache    Disorientation    Hypertensive urgency    Essential hypertension    Vision changes    [facility] [address] [facility]    Vertebral artery dissection ([facility] [address] )     Past Medical History  Past Medical History:   Diagnosis Date    Cervical dysplasia     GERD (gastroesophageal reflux disease)     occasionally    Hypertension     controlling with diet and exercise    Uterine leiomyoma      Past Surgical History  Past Surgical History:   Procedure Laterality Date    AUGMENTATION MAMMAPLASTY      GYNECOLOGIC CRYOSURGERY      CERVIX    HYSTERECTOMY      LASER ABLATION OF CONDYLOMAS N/A 7/30/2018    Procedure: Sharon Pipes ABLATION OF VULVA;  Surgeon: Macey Vasques MD;  [facility];  Service: Gynecology Oncology    SKIN TAG REMOVAL      EXICISON OF PERIANAL    WISDOM TOOTH EXTRACTION             02/05/19 0958   Note Type   Note type Eval only   Restrictions/Precautions   Weight Bearing Precautions Per Order No   Other Precautions Multiple lines;Telemetry; Fall Risk   Pain Assessment   Pain Assessment No/denies pain   Pain Score No Pain   Home Living   Type of Home House   Home Layout Two level;Performs ADLs on one level; Able to live on main level with bedroom/bathroom  (no CARLOS)   Bathroom Shower/Tub Tub/shower unit   Bathroom Toilet Standard   Bathroom Equipment Grab bars in [facility] Other (Comment)  (denies any)   Additional Comments Pt reports living in 2 SH, 0 CARLOS, 1 floor setup   Prior Function   Level of [place] Independent with ADLs and functional mobility   Lives With Spouse   Receives Help From Family   ADL Assistance Independent   IADLs Independent   Falls in the last 6 months 0   Vocational Full time employment  (and PTE)   Comments Pt reports being I w/ ADLS, IADLS, transfers and functional mobility PTA   Lifestyle   Autonomy I ADLS, IADLS, transfers/functional mobility PTA   Reciprocal Relationships Pt lives w/ spouse   Service to Others Pt works full time as a manager and also works at [employer] in [place] [employer] Enjoys yoga and vacation   Psychosocial   Psychosocial (WDL) [address] [place]  Independent   Grooming Assistance 7  [facility] 7  [address] 5  [facility] 66  7  [address] [facility] 5  [address]  5  [address]; Perineal hygiene;Grab bar use   Functional Assistance 5  Supervision/Setup   Functional Deficit Toilet transfer;Supervision/safety   Bed Mobility   Supine to Sit 6  Modified independent   Additional items HOB elevated;Verbal cues   Sit to Supine Unable to assess   Additional Comments Pt went from supine to sit w/ Mod I; HOB elevated for assist   Transfers   Sit to Stand 5  Supervision   Additional items Verbal cues   Stand to Sit 5  Supervision   Additional items Verbal cues   Toilet transfer 5  Supervision   Additional items Increased time required;Verbal cues;Standard toilet   Additional Comments Pt performed sit-stand from EOB w/ S and 1 toilet transfer to standard toilet w/ S for safety, grab bars for support   Functional Mobility   Functional Mobility 5  Supervision   Additional Comments Pt ambulated short household distance w/ S for safety, no AD/DME needed, assist for line management   Balance   Static Sitting Normal   Dynamic Sitting Good   Static Standing Good   Dynamic Standing Fair +   Ambulatory Good   Activity Tolerance   Activity Tolerance Patient tolerated treatment well   Medical Staff Made Aware PT, Rhonda   Nurse Made Aware yes, Rohini Marrero Assessment   RUE Assessment [facility] LUE Assessment   LUE Assessment WFL   Hand Function   Gross Motor Coordination Functional   Fine Motor Coordination Functional   Sensation   Light Touch No apparent deficits   Proprioception   Proprioception No apparent deficits   Vision-Basic Assessment   Current Vision No visual deficits   Vision - Complex Assessment   Ocular Range of Motion WFL   Perception   Inattention/Neglect Appears intact   Cognition   Overall Cognitive Status WFL   Arousal/Participation Responsive; Cooperative   Attention Within functional limits   Orientation Level Oriented X4   Memory Within functional limits   Following Commands Follows all commands and directions without difficulty   Comments Pt is pleasant and cooperative   Assessment   Limitation (no significant deficits)   Prognosis Good   Assessment Pt is a 49 y/o female seen for OT eval s/p adm to [facility] as a transfer from [facility] where she presented w/ dizziness, nausea, and vomiting  CTA h&n this admission showed left vertebral artery dissection, right vertebral and basilar narrowing and mild to moderate narrowing of left PCA  MRI/MRA sowed acute to subacute infarcts in bilateral parietal loves and in the right cerebellar hemisphere  Pt is dx'd w/ vertebral artery dissection  Comorbidities include a h/o cervical dysplasia, GERD, HTN, and uterine leiomyoma  Pt with active OT orders and up with assistance  orders  Pt lives with spouse and kid in 2 SH, no CARLOS, small flight inside to main floor  Pt was I w/  ADLS and IADLS, drove, & required no use of DME PTA  Pt is not currently demonstrating any significant impairments in occupational performance at this time  Pt is overall functioning at a Mod I/ supervision level w/ no use of AD/DME needed  Pt will have family support at home as needed  Pt scored overall 90/100 on the Barthel Index   Based on the aforementioned OT evaluation, functional performance deficits, and assessments, pt has been identified as a moderate complexity evaluation  Recommend home with family support upon D/C  Pt will no longer benefit from immediate acute skilled OT services  Pt left w/ OT contact information if questions arise in the future  Pt w/ no further questions or concerns regarding OT services  Will D/C OT at this time  Please re-consult if pt's medical status changes     Goals   Patient Goals to get back to work   Recommendation   OT Discharge Recommendation Home with family support   OT - OK to Discharge Yes  (when medically stable)   Barthel Index   Feeding 10   Bathing 5   Grooming Score 5   Dressing Score 10   Bladder Score 10   Bowels Score 10   Toilet Use Score 10   Transfers (Bed/Chair) Score 15   Mobility (Level Surface) Score 10   Stairs Score 5   Barthel Index Score 90   Modified Bibb Scale   Modified Zurdo Scale 0       Marichuy Freeman MS, OTR/L

## 2019-02-05 NOTE — CONSULTS
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  3  Right VA stenosis  4  Left PCA stenosis  5  Cephalgia  6  HTN  7  HLD    Plan:  · Exam:  Grossly nonfocal   GCS 15  Visual fields full to confrontation  Extraocular movements intact without nystagmus  Moving all extremities with full strength  No drift  Good finger-nose coordination  · Continue to monitor neurological exam    · Images reviewed personally and by attending  Final results as below  · MRI brain wo 2/5/19: acute to early subacute infarcts bilateral posterior parietal lobes in right cerebellum without hemorrhage  · CTA head and neck wo 2/5/19: left vertebral artery dissection originating approx  C2 level  Multifocal progressive narrowing distal right VA and mild narrowing proximal basilar artery  Multifocal narrowing left PCA  · CT head wo 2/5/19: No evidence of hemorrhage  · Patient was started in ASA and given 325mg this AM  Will change to ASA 81mg with plan to start Heparin gtt today with goal aPTT 50-70  · Recommend repeat CTA in 1 week  At that time, will consider transition to DAPT  · Recommend normotensive  · Neurology consult pending  · Mobilize as tolerated with assistance  · DVT PPX: SCDs and plan to start heparin gtt today  · Ongoing medical management per primary team  · No neurosurgical intervention anticipated at this juncture  · Will continue to follow  Call with questions/concerns       History of Present Illness     HPI: [facility] is a 50 y o  female with PMH including hypertension and GERD who presents with complaint of headache, nausea and vomiting one day ago  Patient with recent admission January 27th to 28th 2019 for hypertensive urgency  Patient presented the emergency room with complaints of headache and lightheadedness and presenting blood pressure 244/124  She also noted intermittent "kalaeidoscope vision and blurry vision  She required multiple IV medication and was ultimately discharged today on amlodipine  Patient admits longstanding intermittent difficulties with blood pressure control since birth of her 13year-old son  She required hospitalization around that time and was given magnesium though denies any frequency or eclampsia  She was on oral medications but failed ongoing follow-up  Patient then presented to the emergency room with similar complaints of headache, nausea and lightheadedness and blood pressure 232/111  She was given IV hydralazine and again discharged home on increased dose of amlodipine close outpatient follow-up  Patient states since that time she has not felt overall well and rather disconnected  She continues posterior severe intermittent nonradiating headaches along dizziness and lightheadedness  She had persistent transient visual complaints without definitive field cuts  Patient then developed severe nausea and vomiting with progressive headache and for this reason re-presented to the emergency room on February 4, 2019  Blood pressure is now better controlled at 139/65  Patient underwent an MRI/A which demonstrated multiple posterior fossa ischemic changes and concern for left vertebral artery dissection  Follow-up CTA confirmed left vertebral artery dissection along with additional areas of stenosis  For this reason patient was admitted to the intensive care unit for close monitoring and Neurosurgical along with Neurology consult for requested        Review of Systems   Constitutional: Positive for activity change  Negative for fatigue and fever  HENT: Negative for trouble swallowing and voice change  Eyes: Positive for visual disturbance  Negative for photophobia  Respiratory: Negative for cough and shortness of breath  Cardiovascular: Negative for chest pain and leg swelling  Gastrointestinal: Positive for nausea and vomiting  Negative for abdominal pain  Genitourinary: Negative for decreased urine volume and difficulty urinating  Musculoskeletal: Negative for back pain, gait problem and neck pain  Skin: Negative for pallor, rash and wound  Neurological: Positive for dizziness, light-headedness and headaches  Negative for tremors, seizures, speech difficulty, weakness and numbness  Psychiatric/Behavioral: Negative for agitation and confusion         Historical Information   Past Medical History:   Diagnosis Date    Cervical dysplasia     GERD (gastroesophageal reflux disease)     occasionally    Hypertension     controlling with diet and exercise    Uterine leiomyoma      Past Surgical History:   Procedure Laterality Date    AUGMENTATION MAMMAPLASTY      GYNECOLOGIC CRYOSURGERY      CERVIX    HYSTERECTOMY      LASER ABLATION OF CONDYLOMAS N/A 7/30/2018    Procedure: Gayatri Reba ABLATION OF VULVA;  Surgeon: Radha Ferguson MD;  [facility];  Service: Gynecology Oncology    SKIN TAG REMOVAL      EXICISON OF PERIANAL    WISDOM TOOTH EXTRACTION       History   Alcohol Use    Yes     Comment: SOCIAL-1-2 drinks per week     History   Drug Use No     History   Smoking Status    Never Smoker   Smokeless Tobacco    Never Used     Family History   Problem Relation Age of Onset    Adopted: Yes       Meds/Allergies   all current active meds have been reviewed, current meds:   Current Facility-Administered Medications   Medication Dose Route Frequency    acetaminophen (TYLENOL) tablet 650 mg  650 mg Oral Q6H PRN    amLODIPine (NORVASC) tablet 10 mg  10 mg Oral Daily    aspirin (ECOTRIN LOW STRENGTH) EC tablet 81 mg  81 mg Oral Daily    atorvastatin (LIPITOR) tablet 80 mg  80 mg Oral Daily With Dinner    heparin (porcine) 25,000 units in 250 mL infusion (premix)  3-20 Units/kg/hr (Order-Specific) Intravenous Titrated    hydrALAZINE (APRESOLINE) injection 10 mg  10 mg Intravenous Q6H PRN    hydrochlorothiazide (HYDRODIURIL) tablet 12 5 mg  12 5 mg Oral Daily    metoclopramide (REGLAN) injection 10 mg  10 mg Intravenous Q8H PRN    potassium-sodium phosphateS (K-PHOS,PHOSPHA 250) -250 mg tablet 1 tablet  1 tablet Oral 4x Daily (with meals and at bedtime)    and PTA meds:   Prior to Admission Medications   Prescriptions Last Dose Informant Patient Reported? Taking? amLODIPine (NORVASC) 10 mg tablet 2019 at 1500 Self No Yes   Sig: Take 1 tablet (10 mg total) by mouth daily for 14 days   hydrochlorothiazide (HYDRODIURIL) 12 5 mg tablet 2019 at 1500  Yes Yes   Sig: Take by mouth daily   tetrahydrozoline-zinc (VISINE-AC) 0 05-0 25 % ophthalmic solution  Self Yes No   Si drops 3 (three) times a day as needed      Facility-Administered Medications: None     Allergies   Allergen Reactions    Shellfish Allergy Anaphylaxis       Objective   I/O       701 -  07 07 -  0700  07 -  0700    I V  (mL/kg)  100 (1 8)     Total Intake(mL/kg)  100 (1 8)     Urine (mL/kg/hr)  400     Total Output   400      Net   -300                   Physical Exam   Constitutional: She is oriented to person, place, and time  She appears well-developed and well-nourished  No distress  HENT:   Head: Normocephalic and atraumatic  Eyes: Pupils are equal, round, and reactive to light  Conjunctivae and EOM are normal  Right eye exhibits no discharge  Left eye exhibits no discharge  No scleral icterus  Neck: Normal range of motion  Neck supple  Cardiovascular: Normal rate  Pulmonary/Chest: Effort normal  No respiratory distress  Abdominal: Soft   She exhibits no distension  Musculoskeletal: She exhibits deformity  Neurological: She is oriented to person, place, and time  She has normal strength  She has a normal Finger-Nose-Finger Test    Reflex Scores:       Bicep reflexes are 2+ on the right side and 2+ on the left side  Patellar reflexes are 2+ on the right side and 2+ on the left side  Skin: Skin is warm and dry  No rash noted  Psychiatric: She has a normal mood and affect  Her speech is normal and behavior is normal  Judgment and thought content normal      Neurologic Exam     Mental Status   Oriented to person, place, and time  Follows 3 step commands  Attention: normal  Concentration: normal    Speech: speech is normal   Level of consciousness: alert  Knowledge: good  Able to perform simple calculations  Normal comprehension  Cranial Nerves     CN II   Visual fields full to confrontation  CN III, IV, VI   Pupils are equal, round, and reactive to light  Extraocular motions are normal    Right pupil: Size: 3 mm  Shape: regular  Reactivity: brisk  Left pupil: Size: 3 mm  Shape: regular  Reactivity: brisk  Nystagmus: none   Upgaze: normal  Conjugate gaze: present    CN V   Facial sensation intact  CN VII   Facial expression full, symmetric  CN VIII   Hearing: intact    CN XI   Right trapezius strength: normal  Left trapezius strength: normal    CN XII   Tongue: not atrophic  Fasciculations: absent  Tongue deviation: none    Motor Exam   Muscle bulk: normal  Overall muscle tone: normal  Right arm pronator drift: absent  Left arm pronator drift: absent    Strength   Strength 5/5 throughout       Sensory Exam   Light touch normal      Gait, Coordination, and Reflexes     Coordination   Finger to nose coordination: normal    Tremor   Resting tremor: absent  Intention tremor: absent  Action tremor: absent    Reflexes   Right biceps: 2+  Left biceps: 2+  Right patellar: 2+  Left patellar: 2+  Right Ackerman: absent  Left Ackerman: absent  Right ankle clonus: absent  Left ankle clonus: absent      Vitals:Blood pressure 153/94, pulse 78, temperature 98 1 °F (36 7 °C), temperature source Oral, resp  rate (!) 29, height 4' 11" (1 499 m), weight 54 3 kg (119 lb 11 4 oz), SpO2 98 %  ,Body mass index is 24 18 kg/m²  Lab Results:     Results from last 7 days  Lab Units 02/05/19 0439 02/04/19 2029   WBC Thousand/uL 8 03 11 23*   HEMOGLOBIN g/dL 15 1 15 0   HEMATOCRIT % 43 8 43 0   PLATELETS Thousands/uL 222 231   NEUTROS PCT % 60 77*   MONOS PCT % 8 8       Results from last 7 days  Lab Units 02/05/19 0439 02/04/19 2029   POTASSIUM mmol/L 3 4* 3 7   CHLORIDE mmol/L 106 99*   CO2 mmol/L 23 29   BUN mg/dL 9 17   CREATININE mg/dL 0 58* 0 78   CALCIUM mg/dL 8 8 9 1   ALK PHOS U/L 82 75   ALT U/L 20 25   AST U/L 17 19       Results from last 7 days  Lab Units 02/05/19  0439   MAGNESIUM mg/dL 2 5       Results from last 7 days  Lab Units 02/05/19  0439   PHOSPHORUS mg/dL 2 3*       Results from last 7 days  Lab Units 02/05/19 0439 02/05/19  0121   INR  1 03 1 15   PTT seconds  --  28     No results found for: TROPONINT  ABG:No results found for: PHART, XVV2QAF, PO2ART, KSX7GXN, D7ZRMKHK, BEART, SOURCE    Imaging Studies: I have personally reviewed pertinent reports  and I have personally reviewed pertinent films in PACS  Cta Head And Neck W Wo Contrast    Result Date: 2/5/2019  Impression: 1  Left vertebral artery dissection, 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 and extending just proximal to the vertebrobasilar junction  Neurovascular service consultation recommended  2   Multifocal progressive narrowing of the distal intradural segment of the right vertebral artery and mild narrowing in the proximal basilar artery  3   Multifocal mild to moderate narrowing in the left posterior cerebral artery    I personally discussed this study with Tiff Goodman on 2/5/2019 at 1:38 AM  Workstation performed: [id number]     Ct Head Wo Contrast    Result Date: 2/5/2019  Impression: No acute intracranial abnormality  Workstation performed: [id number]     Mra And Or Mrv Head Wo Contrast    Result Date: 2/5/2019  Impression: 1  Absence of signal within the distal cervical and proximal intradural segments of the left vertebral artery, compatible with findings of signal abnormality on the MRI, concerning for thrombus or dissection  Multifocal irregularity of the distal intradural segment of the right vertebral artery  Recommend CTA of the head and neck to exclude vertebral artery dissection  2   No other evidence of significant stenosis in the posterior circulation  3   No stenosis or occlusion of the major vessels in the anterior circulation  I personally discussed this study with Missy Alejandre on 2/5/2019 at 12:48 AM  Workstation performed: [id number]     Mri Brain Wo Contrast    Result Date: 2/5/2019  Impression: 1  Acute to early subacute infarcts in the bilateral posterior parietal lobes and in the right cerebellar hemisphere  No hemorrhage or mass effect  2   Signal abnormality within distal cervical and intracranial segments of the left vertebral artery could represent thrombus or dissection  Recommend CT angiogram of the head and neck for further evaluation  I personally discussed this study with Missy Alejandre on 2/5/2019 at 12:36 AM  Workstation performed: [id number]     EKG, Pathology, and Other Studies: I have personally reviewed pertinent reports  VTE Prophylaxis: Sequential compression device Paola Harkins)     Code Status: Level 1 - Full Code  Advance Directive and Living Will:      Power of :    POLST:      Counseling / Coordination of Care  I spent 45 minutes with the patient

## 2019-02-05 NOTE — H&P
History and Physical - Critical Care   Shireen Goldman 50 y o  female MRN: [id number]  Unit/Bed#: 3150 [facility] -01 Encounter: [id number]    Reason for Admission / Chief Complaint: Vertebral artery dissection    History of Present Illness:  Shireen Goldman is a 50 y o  female with h/o HTN on Norvasc and more recently with addition of HCTZ who presented to [facility] earlier this evening with dizziness, nausea and vomiting  She was taking a shower and suddenly felt dizzy, the room was spinning and she felt faint  She laid herself to the floor without hitting her head and then proceeded to feel nauseous and vomit  There was no loss of consciousness  Patient had been seen twice in the hospital over the past 10 days for posterior headache and hypertensive urgency  She had non-con CT head both visits which showed no evidence of acute bleed or other acute process, and labwork during those visits were grossly unremarkable  She was admitted from 1/27-1/28 for hypertensive urgency and discharged on Norvasc 10 mg daily  No MRI was performed at either visit  She followed up with cardiology outpatient on 2/01 and was started on hctz 12 5 mg at that time which patient reports she has been taking  She states that aleve did not resolve her headache  She has also been having visual changes, 2 episodes of "kaleidescope" vision and earlier tonight her vision went black temporarily  Also reports intermittent problems with short term memory the past 2 weeks  Denies pain in her eyes, blurry or double vision  Also denies CP, SOB, palpitations, fever/chills, weakness, seizure-like activity, & slurred speech  CTA h&n this admission showed left vertebral artery dissection, right vertebral and basilar narrowing and mild to moderate narrowing of left PCA  MRI/MRA sowed acute to subacute infarcts in bilateral parietal loves and in the right cerebellar hemisphere   The patient has no focal neurologic deficits on exam  BP in the 255'M systolic on arrival  Labs were essentially WNL  History obtained from the patient  Past Medical History:  Past Medical History:   Diagnosis Date    Cervical dysplasia     GERD (gastroesophageal reflux disease)     occasionally    Hypertension     controlling with diet and exercise    Uterine leiomyoma        Past Surgical History:  Past Surgical History:   Procedure Laterality Date    AUGMENTATION MAMMAPLASTY      GYNECOLOGIC CRYOSURGERY      CERVIX    HYSTERECTOMY      LASER ABLATION OF CONDYLOMAS N/A 7/30/2018    Procedure: OMNIGUIDE LASER ABLATION OF VULVA;  Surgeon: Aron Martinez MD;  [facility];  Service: Gynecology Oncology    SKIN TAG REMOVAL      EXICISON OF PERIANAL    WISDOM TOOTH EXTRACTION         Past Family History:  Family History   Problem Relation Age of Onset    Hypertension Father        Social History:  History   Smoking Status    Never Smoker   Smokeless Tobacco    Never Used     History   Alcohol Use    Yes     Comment: SOCIAL-1-2 drinks per week     History   Drug Use No       Medications:  Current Facility-Administered Medications   Medication Dose Route Frequency    acetaminophen (TYLENOL) tablet 650 mg  650 mg Oral Q6H PRN    amLODIPine (NORVASC) tablet 10 mg  10 mg Oral Daily    aspirin tablet 325 mg  325 mg Oral Daily    atorvastatin (LIPITOR) tablet 80 mg  80 mg Oral Daily With Dinner    diazepam (VALIUM) injection 2 5 mg  2 5 mg Intravenous Q8H PRN    hydrALAZINE (APRESOLINE) tablet 50 mg  50 mg Oral Q8H PRN    hydrochlorothiazide (HYDRODIURIL) tablet 12 5 mg  12 5 mg Oral Daily    metoclopramide (REGLAN) injection 10 mg  10 mg Intravenous Q8H PRN    phenylephrine (HOLGER-SYNEPHRINE) 10 % ophthalmic solution 1 drop  1 drop Both Eyes PRN     Home medications:  Prior to Admission medications    Medication Sig Start Date End Date Taking?  Authorizing Provider   amLODIPine (NORVASC) 10 mg tablet Take 1 tablet (10 mg total) by mouth daily for 14 days 1/29/19 19 Yes Milka Bates MD   hydrochlorothiazide (HYDRODIURIL) 12 5 mg tablet Take by mouth daily   Yes Historical Provider, MD   tetrahydrozoline-zinc (VISINE-AC) 0 05-0 25 % ophthalmic solution 2 drops 3 (three) times a day as needed    Historical Provider, MD     Allergies: Allergies   Allergen Reactions    Shellfish Allergy Anaphylaxis       ROS:   Review of Systems   Constitutional: Negative for activity change, appetite change, chills and fever  Eyes: Positive for visual disturbance  Negative for pain  Respiratory: Negative for chest tightness and shortness of breath  Cardiovascular: Negative for chest pain, palpitations and leg swelling  Gastrointestinal: Positive for nausea and vomiting  Negative for abdominal pain and diarrhea  Endocrine: Negative for polyuria  Musculoskeletal: Negative for gait problem and neck pain  Neurological: Positive for dizziness, light-headedness and headaches  Negative for syncope, speech difficulty and weakness  Psychiatric/Behavioral: The patient is nervous/anxious  Vitals:  Vitals:    19 0254 19 0400   BP: (!) 209/96 (!) 176/110   Pulse: 59 58   Resp: 16 18   Temp: 98 1 °F (36 7 °C)    SpO2: 100% 96%     Temperature:   Temp (24hrs), Av °F (36 7 °C), Min:97 9 °F (36 6 °C), Max:98 1 °F (36 7 °C)    Current Temperature: 98 1 °F (36 7 °C)    Weights: There is no height or weight on file to calculate BMI  Physical Exam  GEN: AAOx3, NAD, answers questions appropriately, resting comfortably  HEENT: MM moist  No scleral icterus  PERRLA, EOMI, no nystagmus noted  CV: RRR, nml S1/S2, no murmur appreciated  Lungs: CTA bilaterally, no w/r/r  Neuro: No focal deficits, CN 2-12 intact   MS 5/5 all 4 extremities, normal sensation throughout, normal finger-to-nose and DIMITRIOS, speech fluent, AAOx3, normal hand  bilaterally, 2+ patellar reflexes bilaterally  Skin: No rashes or lesions noted  Psych: Normal mood and affect    Labs:    Results from last 7 days  Lab Units 02/04/19 2029 01/29/19  1218   WBC Thousand/uL 11 23* 8 83   HEMOGLOBIN g/dL 15 0 15 8*   HEMATOCRIT % 43 0 46 3*   PLATELETS Thousands/uL 231 253   NEUTROS PCT % 77* 64   MONOS PCT % 8 9       Results from last 7 days  Lab Units 02/04/19 2029 01/29/19  1218   SODIUM mmol/L 137 140   POTASSIUM mmol/L 3 7 3 9   CHLORIDE mmol/L 99* 103   CO2 mmol/L 29 26   ANION GAP mmol/L 9 11   BUN mg/dL 17 14   CREATININE mg/dL 0 78 0 82   CALCIUM mg/dL 9 1 9 3   ALT U/L 25 24   AST U/L 19 18   ALK PHOS U/L 75 78   ALBUMIN g/dL 4 5 4 3   TOTAL BILIRUBIN mg/dL 0 40 1 10*            Results from last 7 days  Lab Units 02/05/19  0121   INR  1 15   PTT seconds 28       Results from last 7 days  Lab Units 02/05/19 0121 01/29/19  1218   TROPONIN I ng/mL <0 02 <0 02       Imaging:   Cta Head And Neck W Wo Contrast  Result Date: 2/5/2019  Impression: 1  Left vertebral artery dissection, 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 and extending just proximal to the vertebrobasilar junction  Neurovascular service consultation recommended  2   Multifocal progressive narrowing of the distal intradural segment of the right vertebral artery and mild narrowing in the proximal basilar artery  3   Multifocal mild to moderate narrowing in the left posterior cerebral artery  Mra And Or Mrv Head Wo Contrast  Result Date: 2/5/2019  Impression: 1  Absence of signal within the distal cervical and proximal intradural segments of the left vertebral artery, compatible with findings of signal abnormality on the MRI, concerning for thrombus or dissection  Multifocal irregularity of the distal intradural segment of the right vertebral artery  Recommend CTA of the head and neck to exclude vertebral artery dissection  2   No other evidence of significant stenosis in the posterior circulation  3   No stenosis or occlusion of the major vessels in the anterior circulation       Mri Brain Wo Contrast  Result Date: 2/5/2019  Impression: 1  Acute to early subacute infarcts in the bilateral posterior parietal lobes and in the right cerebellar hemisphere  No hemorrhage or mass effect  2   Signal abnormality within distal cervical and intracranial segments of the left vertebral artery could represent thrombus or dissection  Recommend CT angiogram of the head and neck for further evaluation  EKG: Sinus bradycardia  ______________________________________________________________________    Assessment: 80-year-old female with h/o HTN presenting with ongoing HAs, dizziness, n/vomiting and visual changes  Found to have left vertebral artery dissection, right vertebral narrowing, and acute to subacute infarcts in bilateral posterior parietal artery and right cerebellum  Plan:      Neuro:   1  Left vertebral dissection, right vertebral/basilar stenosis, PCA narrowing and acute to subacute bilateral posterior parietal and right cerebellar infarcts  · Neurology and neurosurgery consults  ·  daily  · Repeat CT head in AM, if normal, consider initiation of therapeutic heparin  · Start statin  · Check ECHO, TSH, lipid panel, A1c  · Speech eval and treat  · Neuro checks q1h    2  Nausea, vomiting, dizziness and HA 2/2 #1  · Reglan IV PRN for nausea, tylenol for HA, Valium IV PRN for dizziness     CV:   1  HTN  · Permissive HTN with goal -160, the patient got dizzy with SBP around 135 in ED  · Continue home hctz and norvasc  · PO hydralazine 50 mg q8h PRN for SBP >160; avoid IV hydralazine so BP will not drop too low acutely  · Can add cardene gtt if SBP remains uncontrolled  · Check ECHO as above    2   Sinus bradycardia  · Avoid using BB at this time  · Continue to monitor     Pulm:   · No acute issues     GI:   · No acute issues     :   · No acute issues     F/E/N:   · F- NSS at 100 cc/hr  · E- stable  · N- NPO, ice chips, sips with meds for now     ID:   · No acute issues, leukocytosis is likely reactive     Heme:   · Lovenox for dvt PPX  · Possible initiation of heparin as above- await AM CT head     Endo:   · No acute issues     Msk/Skin:   · PT/OT eval and treat     Disposition: Continue ICU level of care    ______________________________________________________________________    VTE Pharmacologic Prophylaxis: Enoxaparin (Lovenox)  VTE Mechanical Prophylaxis: sequential compression device    Invasive lines and devices: Invasive Devices     Peripheral Intravenous Line            Peripheral IV 02/04/19 Left Antecubital less than 1 day    Peripheral IV 02/05/19 Left Hand less than 1 day                Code Status: Level 1 - Full Code    Given critical illness, patient length of stay will require greater than two midnights            Corey Harry, DO   Internal Medicine PGY-3

## 2019-02-05 NOTE — PLAN OF CARE
Activity Intolerance/Impaired Mobility     Mobility/activity is maintained at optimum level for patient Progressing        Communication Impairment     Ability to express needs and understand communication [place] Discharge to home or other facility with appropriate resources Progressing        HEMATOLOGIC - ADULT     Maintains hematologic stability Progressing        INFECTION - ADULT     Absence or prevention of progression during hospitalization Progressing        Knowledge Deficit     Patient/family/caregiver demonstrates understanding of disease process, treatment plan, medications, and discharge instructions Progressing        METABOLIC, FLUID AND ELECTROLYTES - ADULT     Electrolytes maintained within normal limits Progressing     Fluid balance maintained Progressing     Glucose maintained within target range Progressing        Neurological Deficit     Neurological status is stable or improving Progressing        NEUROSENSORY - ADULT     Achieves stable or improved neurological status Progressing     Absence of seizures Progressing     Remains free of injury related to seizures activity Progressing     Achieves maximal functionality and self care Progressing        Nutrition     Nutrition/Hydration status is improving Progressing        Nutrition/Hydration-ADULT     Nutrient/Hydration intake appropriate for improving, restoring or maintaining nutritional needs Progressing        PAIN - ADULT     Verbalizes/displays adequate comfort level or baseline comfort level Progressing        Potential for Aspiration     Non-ventilated patient's risk of aspiration is minimized Progressing        SAFETY ADULT     Patient will remain free of falls Progressing     Maintain or return to baseline ADL function Progressing     Maintain or return mobility status to optimal level Progressing        SKIN/TISSUE INTEGRITY - ADULT     Skin integrity remains intact Progressing  Incision(s), wounds(s) or drain site(s) healing without S/S of infection Progressing     Oral mucous membranes remain intact Progressing

## 2019-02-05 NOTE — SPEECH THERAPY NOTE
Consult received and chart reviewed  Per chart review, and discussion with RN, pt passed RN dysphagia assessment and is tolerating regular diet with thin liquids with no s/s of aspiration  Speech/Language deficits also denied  Therefore, formal speech/swallow evaluation not indicated at this time   If new concerns arise, please reconsult

## 2019-02-05 NOTE — PHYSICAL THERAPY NOTE
Physical Therapy Evaluation     Patient's Name: Shyla Ano    Admitting Diagnosis  Vertebral artery dissection ([facility] [address] ) [I77 74]  CVA (cerebral vascular accident) ([facility] [address] ) [I63 9]  Headache [R51]    Problem List  Patient Active Problem List   Diagnosis    VAIN I (vaginal intraepithelial neoplasia grade I)    Vulvovaginal condyloma    Acute nonintractable headache    Disorientation    Hypertensive urgency    Essential hypertension    Vision changes    CVA (cerebral vascular accident) ([facility] [address] )    Vertebral artery dissection ([facility] [address] )       Past Medical History  Past Medical History:   Diagnosis Date    Cervical dysplasia     GERD (gastroesophageal reflux disease)     occasionally    Hypertension     controlling with diet and exercise    Uterine leiomyoma        Past Surgical History  Past Surgical History:   Procedure Laterality Date    AUGMENTATION MAMMAPLASTY      GYNECOLOGIC CRYOSURGERY      CERVIX    HYSTERECTOMY      LASER ABLATION OF CONDYLOMAS N/A 7/30/2018    Procedure: Mary Redo LASER ABLATION OF VULVA;  Surgeon: Enrico Love MD;  [facility];  Service: Gynecology Oncology    SKIN TAG REMOVAL      EXICISON OF PERIANAL    WISDOM TOOTH EXTRACTION          02/05/19 1000   Note Type   Note type Eval only   Pain Assessment   Pain Assessment No/denies pain  (MILD PAIN AT IV SITE (L WRIST) )   Pain Score No Pain   Home Living   Type of [address] One level;Stairs to enter with rails  ([address] )   Home Equipment (DENIES)   Prior Function   Level of [place] Independent with ADLs and functional mobility   Lives With Spouse   ADL Assistance Independent   IADLs Independent   Falls in the last 6 months 0   Vocational Full time employment   Restrictions/Precautions   Weight Bearing Precautions Per Order No   Braces or Orthoses (NONE)   Other Precautions Fall Risk;Telemetry;Multiple lines   General   Family/Caregiver Present No   Cognition Overall Cognitive Status WFL   Orientation Level Oriented X4   RUE Assessment   RUE Assessment WFL   LUE Assessment   LUE Assessment WFL   RLE Assessment   RLE Assessment WFL  (4+/5 (GROSSLY))   LLE Assessment   LLE Assessment WFL  (4+/5 (GROSSLY))   Bed Mobility   Supine to Sit 6  Modified independent   Additional items Bedrails   Sit to Supine Unable to assess   Transfers   Sit to Stand 5  Supervision   Stand to Sit 5  Supervision   Ambulation/Elevation   Gait pattern WNL   Gait Assistance 5  Supervision   Assistive Device None   Distance 200 FEET   Balance   Static Sitting Normal   Static Standing Good   Ambulatory Good   Activity Tolerance   Activity Tolerance Patient tolerated treatment well   Medical Staff Made Aware OT [address]   Nurse Made Aware RN DEEP   Assessment   Prognosis Good   Problem List Decreased endurance  (FATIGUE)   Assessment PT COMPLETED EVALUATION OF 50YEAR OLD FEMALE AD[facility]ITTED TO [facility] ON 2/5/[facility] AS TRANSFER FROM [facility] WHERE SHE INITIALLY PRESENTED WITH SYMPTOMS OF DIZZINESS  CTA HEAD/NECK IDENTIFIED LEFT VERTEBRAL ARTERY DISSECTION, RIGHT VERTEBRAL AND BASILAR NARROWING, AND MILD TO MODERATE NARROWING OF LEFT PCA  MRI/MRA BRAIN IDENTIFIED ACUTE TO SUBACUTE INFARCTS IN BL/ PARIETAL LOBES AND IN THE R CEREBELLAR HEMISPHERE  CURRENT STATUS INSTABILITIES INCLUDE ICU ADMISSION AND ONGOING MONITORING OF O2/HR/BP  PMH IS SIGNIFICANT FOR CERVICAL DYSPLAGIA, HTN, AND UTERINE LEIOMYOMA  PRIOR TO THIS ADMISSION PATIENT RESIDED WITH SPOUSE IN A ONE LEVEL HOME (FULL FLIGHT OF STEPS TO ENTER) WHERE SHE WAS PREVIOUSLY I WITH MOBILITY (NO AD), ADLS, AND IADLS  CURRENT IMPAIRMENTS INCLUDE DECREASED ACTIVITY TOLERANCE, GROSS FATIGUE, FALLS RISK (MULTIPLE LINES), AND GAIT DEVIATIONS (MILDLY REDUCED SPEED)  DURING PT EVALUATION PATIENT WAS I WITH BED MOBILITY AND S LEVEL FOR SIT<-->STAND TRANSFERS AND AMBULATION  PATIENT AMBULATED 200 FEET W/O USE OF AD PRESENTING WITH MILDLY REDUCED GAIT SPEED  NO LOB   DO NOT ANTICIPATE DIFFICULTY ON STAIRS  AT THIS POINT IN TIME PATIENT DEMONSTRATES SAFE/EFFECTIVE MOBILITY AND DENIES QUESTIONS/CONCERNS ABOUT D/C HOME  RECOMMEND D/C HOME I  RECOMMEND CONTINUED AMBULATION WITH RN STAFF THIS ADMISSION  D/C INPT PT      Goals   Patient Goals TO USE BATHROOM    Treatment Day 0   Recommendation   Recommendation Home independently;D/C PT   Equipment Recommended (NONE)   PT - OK to Discharge Yes  (HOME I WHEN MED JELENA )   Modified [place] Scale   Modified [place] Scale 0   Barthel Index   Feeding 10   Bathing 5   Grooming Score 5   Dressing Score 10   Bladder Score 10   Bowels Score 10   Toilet Use Score 10   Transfers (Bed/Chair) Score 15   Mobility (Level Surface) Score 10   Stairs Score 5   Barthel Index Score 90         Kylah Carpenter, PT

## 2019-02-05 NOTE — PHYSICAL THERAPY NOTE
PT COMPLETED EVALUATION OF 50YEAR OLD FEMALE ADMITTED TO [facility] ON 2/5/19 AS TRANSFER FROM [facility] WHERE SHE INITIALLY PRESENTED WITH SYMPTOMS OF DIZZINESS  CTA HEAD/NECK IDENTIFIED LEFT VERTEBRAL ARTERY DISSECTION, RIGHT VERTEBRAL AND BASILAR NARROWING, AND MILD TO MODERATE NARROWING OF LEFT PCA  MRI/MRA BRAIN IDENTIFIED ACUTE TO SUBACUTE INFARCTS IN BL/ PARIETAL LOBES AND IN THE R CEREBELLAR HEMISPHERE  CURRENT STATUS INSTABILITIES INCLUDE ICU ADMISSION AND ONGOING MONITORING OF O2/HR/BP   PMH IS SIGNIFICANT FOR

## 2019-02-06 ENCOUNTER — APPOINTMENT (INPATIENT)
Dept: NON INVASIVE DIAGNOSTICS | Facility: HOSPITAL | Age: 49
DRG: 299 | End: 2019-02-06
Payer: COMMERCIAL

## 2019-02-06 LAB
ANION GAP SERPL CALCULATED.3IONS-SCNC: 6 MMOL/L (ref 4–13)
APTT PPP: 62 SECONDS (ref 26–38)
APTT PPP: 74 SECONDS (ref 26–38)
BASOPHILS # BLD AUTO: 0.02 THOUSANDS/ΜL (ref 0–0.1)
BASOPHILS NFR BLD AUTO: 0 % (ref 0–1)
BUN SERPL-MCNC: 12 MG/DL (ref 5–25)
CALCIUM SERPL-MCNC: 8.4 MG/DL (ref 8.3–10.1)
CHLORIDE SERPL-SCNC: 106 MMOL/L (ref 100–108)
CO2 SERPL-SCNC: 26 MMOL/L (ref 21–32)
CREAT SERPL-MCNC: 0.68 MG/DL (ref 0.6–1.3)
EOSINOPHIL # BLD AUTO: 0.12 THOUSAND/ΜL (ref 0–0.61)
EOSINOPHIL NFR BLD AUTO: 2 % (ref 0–6)
ERYTHROCYTE [DISTWIDTH] IN BLOOD BY AUTOMATED COUNT: 12 % (ref 11.6–15.1)
GFR SERPL CREATININE-BSD FRML MDRD: 104 ML/MIN/1.73SQ M
GLUCOSE SERPL-MCNC: 95 MG/DL (ref 65–140)
HCT VFR BLD AUTO: 41.4 % (ref 34.8–46.1)
HGB BLD-MCNC: 13.9 G/DL (ref 11.5–15.4)
IMM GRANULOCYTES # BLD AUTO: 0.01 THOUSAND/UL (ref 0–0.2)
IMM GRANULOCYTES NFR BLD AUTO: 0 % (ref 0–2)
LYMPHOCYTES # BLD AUTO: 2.32 THOUSANDS/ΜL (ref 0.6–4.47)
LYMPHOCYTES NFR BLD AUTO: 38 % (ref 14–44)
MAGNESIUM SERPL-MCNC: 2.4 MG/DL (ref 1.6–2.6)
MCH RBC QN AUTO: 31.2 PG (ref 26.8–34.3)
MCHC RBC AUTO-ENTMCNC: 33.6 G/DL (ref 31.4–37.4)
MCV RBC AUTO: 93 FL (ref 82–98)
MONOCYTES # BLD AUTO: 0.52 THOUSAND/ΜL (ref 0.17–1.22)
MONOCYTES NFR BLD AUTO: 9 % (ref 4–12)
NEUTROPHILS # BLD AUTO: 3.09 THOUSANDS/ΜL (ref 1.85–7.62)
NEUTS SEG NFR BLD AUTO: 51 % (ref 43–75)
NRBC BLD AUTO-RTO: 0 /100 WBCS
PHOSPHATE SERPL-MCNC: 3.6 MG/DL (ref 2.7–4.5)
PLATELET # BLD AUTO: 229 THOUSANDS/UL (ref 149–390)
PMV BLD AUTO: 11 FL (ref 8.9–12.7)
POTASSIUM SERPL-SCNC: 3.5 MMOL/L (ref 3.5–5.3)
RBC # BLD AUTO: 4.46 MILLION/UL (ref 3.81–5.12)
SODIUM SERPL-SCNC: 138 MMOL/L (ref 136–145)
WBC # BLD AUTO: 6.08 THOUSAND/UL (ref 4.31–10.16)

## 2019-02-06 PROCEDURE — 80048 BASIC METABOLIC PNL TOTAL CA: CPT | Performed by: EMERGENCY MEDICINE

## 2019-02-06 PROCEDURE — 85025 COMPLETE CBC W/AUTO DIFF WBC: CPT | Performed by: EMERGENCY MEDICINE

## 2019-02-06 PROCEDURE — 83735 ASSAY OF MAGNESIUM: CPT | Performed by: EMERGENCY MEDICINE

## 2019-02-06 PROCEDURE — 99233 SBSQ HOSP IP/OBS HIGH 50: CPT | Performed by: INTERNAL MEDICINE

## 2019-02-06 PROCEDURE — 93975 VASCULAR STUDY: CPT

## 2019-02-06 PROCEDURE — 85730 THROMBOPLASTIN TIME PARTIAL: CPT | Performed by: EMERGENCY MEDICINE

## 2019-02-06 PROCEDURE — 99233 SBSQ HOSP IP/OBS HIGH 50: CPT | Performed by: PSYCHIATRY & NEUROLOGY

## 2019-02-06 PROCEDURE — 93975 VASCULAR STUDY: CPT | Performed by: SURGERY

## 2019-02-06 PROCEDURE — 99233 SBSQ HOSP IP/OBS HIGH 50: CPT | Performed by: EMERGENCY MEDICINE

## 2019-02-06 PROCEDURE — 99232 SBSQ HOSP IP/OBS MODERATE 35: CPT | Performed by: NEUROLOGICAL SURGERY

## 2019-02-06 PROCEDURE — 84100 ASSAY OF PHOSPHORUS: CPT | Performed by: EMERGENCY MEDICINE

## 2019-02-06 RX ORDER — POTASSIUM CHLORIDE 20 MEQ/1
40 TABLET, EXTENDED RELEASE ORAL ONCE
Status: COMPLETED | OUTPATIENT
Start: 2019-02-06 | End: 2019-02-06

## 2019-02-06 RX ORDER — ONDANSETRON 2 MG/ML
4 INJECTION INTRAMUSCULAR; INTRAVENOUS EVERY 8 HOURS PRN
Status: DISCONTINUED | OUTPATIENT
Start: 2019-02-06 | End: 2019-02-11 | Stop reason: HOSPADM

## 2019-02-06 RX ORDER — DEXAMETHASONE 2 MG/1
2 TABLET ORAL EVERY 8 HOURS PRN
Status: DISCONTINUED | OUTPATIENT
Start: 2019-02-06 | End: 2019-02-11 | Stop reason: HOSPADM

## 2019-02-06 RX ADMIN — AMLODIPINE BESYLATE 10 MG: 10 TABLET ORAL at 09:57

## 2019-02-06 RX ADMIN — DIBASIC SODIUM PHOSPHATE, MONOBASIC POTASSIUM PHOSPHATE AND MONOBASIC SODIUM PHOSPHATE 1 TABLET: 852; 155; 130 TABLET ORAL at 06:36

## 2019-02-06 RX ADMIN — HYDROCHLOROTHIAZIDE 12.5 MG: 12.5 TABLET ORAL at 09:57

## 2019-02-06 RX ADMIN — ATORVASTATIN CALCIUM 80 MG: 80 TABLET, FILM COATED ORAL at 17:33

## 2019-02-06 RX ADMIN — HEPARIN SODIUM AND DEXTROSE 14 UNITS/KG/HR: 10000; 5 INJECTION INTRAVENOUS at 15:09

## 2019-02-06 RX ADMIN — ASPIRIN 81 MG: 81 TABLET, COATED ORAL at 09:57

## 2019-02-06 RX ADMIN — POTASSIUM CHLORIDE 40 MEQ: 1500 TABLET, EXTENDED RELEASE ORAL at 09:58

## 2019-02-06 RX ADMIN — DIBASIC SODIUM PHOSPHATE, MONOBASIC POTASSIUM PHOSPHATE AND MONOBASIC SODIUM PHOSPHATE 1 TABLET: 852; 155; 130 TABLET ORAL at 12:24

## 2019-02-06 NOTE — PROGRESS NOTES
Progress Note - Critical Care   Susan Munguia 50 y o  female MRN: [id number]  Unit/Bed#:  -01 Encounter: [id number]    Assessment:  Patient is a 70-year-old female with a history of untreated hypertension recently started on Norvasc and hydrochlorothiazide the presents with left vertebral artery dissection and acute bilateral parietal lobe infarcts and right cerebellar infarct  Patient does not have any neurologic deficits at this time  Plan:       Neuro:   1  Left vertebral dissection, right vertebral/basilar stenosis, PCA narrowing and acute to subacute bilateral posterior parietal and right cerebellar infarcts  · CT head yesterday a m  Was normal  · Patient became symptomatic during the afternoon yesterday, repeat CT head was consistent with prior  · Patient currently asymptomatic  · Aspirin 81 mg daily and heparin drip per Neurosurgery  · Goal aPTT 50-70  · Continue statin  · Cholesterhol and LDL elevated on lipid panel  · Neurosurgery advises de-escalation to Q4H neuro checks      2  Nausea, vomiting, dizziness and HA 2/2 #1  · Tylenol and decadron for HA prn                  CV: 1  HTN  · Goal Systolic blood pressure less than 160  · Hydralazine 10 mg IV p r n  Ordered  · Continue home norvasc  · Echo showed hyperdynamic systolic function with ejection fraction of 70% and grade 1 diastolic dysfunction  · Renal artery study this am                   Pulm:   · Saturations greater than 92%  · Saturating well on room air                 GI:   · No acute issues--does not meet indications for GI prophylaxis at this time  · Zofran IV PRN nausea                 :   · No acute issues                 F/E/N:   · F- no fluids at this time as patient handling p o   Well  · E- hypokalemic at 3 5--repleted with 40 mEq K PO  · N- regular diet                 ID:   · Patient has been afebrile over the past 24 hr  · Continue to trend temperature                 Heme:   · Continue heparin infusion  · CBC consistent with prior                 Endo:   · No acute issues                 Msk/Skin:   · PT/OT eval and treat                  Disposition: Continue ICU level of care    Chief Complaint:  Left vertebral artery dissection with small bilateral parietal infarcts and right cerebellar infarct without neurologic dysfunction    HPI/24hr events:  No acute events overnight    Physical Exam:   Physical Exam   Constitutional: She is oriented to person, place, and time  She appears well-developed and well-nourished  No distress  HENT:   Head: Normocephalic  Eyes: Pupils are equal, round, and reactive to light  3 mm and reactive bilaterally   Neck: Normal range of motion  Neck supple  Cardiovascular: Normal rate, regular rhythm, normal heart sounds and intact distal pulses  Pulmonary/Chest: Effort normal and breath sounds normal    Lungs are clear bilaterally   Abdominal: Soft  Bowel sounds are normal  She exhibits no distension  There is no tenderness  There is no guarding  Abdomen is soft, nondistended, nontender  No rebound tenderness or guarding is noted  No masses palpated  Normal bowel sounds  Musculoskeletal: Normal range of motion  She exhibits no edema, tenderness or deformity  Neurological: She is alert and oriented to person, place, and time  No cranial nerve deficit or sensory deficit  Alert oriented x3  Strength 5/5 in all 4 extremities  Sensation intact throughout  Skin: Skin is warm and dry  Capillary refill takes less than 2 seconds  Psychiatric: She has a normal mood and affect  Her behavior is normal  Judgment and thought content normal    Vitals reviewed          Vitals:    19 0957 19 1000 19 1100 19 1200   BP: 145/92 145/89 153/88 126/91   Pulse:  64 78 70   Resp:  18 21 16   Temp:    98 7 °F (37 1 °C)   TempSrc:    Oral   SpO2:  98% 99% 98%   Weight:       Height:                 Temperature:   Temp (24hrs), Av 5 °F (36 9 °C), Min:98 °F (36 7 °C), Max:99 1 °F (37 3 °C)    Current: Temperature: 98 7 °F (37 1 °C)    Weights:   IBW: 43 2 kg    Body mass index is 24 18 kg/m²  Weight (last 2 days)     Date/Time   Weight    02/05/19 0435  54 3 (119 71)              Hemodynamic Monitoring:  N/A     Non-Invasive/Invasive Ventilation Settings:  Respiratory    Lab Data (Last 4 hours)    None         O2/Vent Data (Last 4 hours)    None              No results found for: PHART, VNO7CYS, PO2ART, VLC0OIS, U6XOOZMX, BEART, SOURCE  SpO2: SpO2: 98 %    Intake and Outputs:  I/O       02/04 0701 - 02/05 0700 02/05 0701 - 02/06 0700 02/06 0701 - 02/07 0700    P  O   1320     I V  (mL/kg) 100 (1 8) 652 9 (12)     Total Intake(mL/kg) 100 (1 8) 1972 9 (36 3)     Urine (mL/kg/hr) 400 2000 (1 5)     Total Output 400 2000      Net -300 -27 1                 UOP: 80 cc/hour   Nutrition:        Diet Orders            Start     Ordered    02/06/19 0852  Diet Regular; Regular House  Diet effective now     Question Answer Comment   Diet Type Regular    Regular Regular House    RD to adjust diet per protocol?  Yes        02/06/19 0852            Labs:     Results from last 7 days  Lab Units 02/06/19 0558 02/05/19 0439 02/04/19 2029   WBC Thousand/uL 6 08 8 03 11 23*   HEMOGLOBIN g/dL 13 9 15 1 15 0   HEMATOCRIT % 41 4 43 8 43 0   PLATELETS Thousands/uL 229 222 231   NEUTROS PCT % 51 60 77*   MONOS PCT % 9 8 8        Results from last 7 days  Lab Units 02/06/19 0558 02/05/19 0439 02/04/19 2029   SODIUM mmol/L 138 140 137   POTASSIUM mmol/L 3 5 3 4* 3 7   CHLORIDE mmol/L 106 106 99*   CO2 mmol/L 26 23 29   BUN mg/dL 12 9 17   CREATININE mg/dL 0 68 0 58* 0 78   CALCIUM mg/dL 8 4 8 8 9 1   ALK PHOS U/L  --  82 75   ALT U/L  --  20 25   AST U/L  --  17 19       Results from last 7 days  Lab Units 02/06/19  0558 02/05/19  0439   MAGNESIUM mg/dL 2 4 2 5       Results from last 7 days  Lab Units 02/06/19  0558 02/05/19  0439   PHOSPHORUS mg/dL 3 6 2 3*        Results from last 7 days  Lab Units 02/06/19  0558 02/05/19  2338 02/05/19  1727 02/05/19  0439 02/05/19  0121   INR   --   --  1 09 1 03 1 15   PTT seconds 74* 62* 47*  --  28           0  Lab Value Date/Time   TROPONINI <0 02 02/05/2019 0121   TROPONINI <0 02 01/29/2019 1218   TROPONINI <0 02 01/27/2019 1827       Imaging:   CT head showed no acute abnormality    I have personally reviewed pertinent reports  EKG: none    Micro:  No results found for: LINDA LozanoLTRBIT    Allergies: Allergies   Allergen Reactions    Shellfish Allergy Anaphylaxis       Medications:   Scheduled Meds:    Current Facility-Administered Medications:  acetaminophen 650 mg Oral Q6H PRN Leigha Rosen, DO    amLODIPine 10 mg Oral Daily Negra Llanes, DO    aspirin 81 mg Oral Daily Silvia Mulligan PA-C    atorvastatin 80 mg Oral Daily With Katie Welch, DO    dexamethasone 2 mg Oral Q8H PRN Lurdes Mcconnell PA-C    heparin (porcine) 3-20 Units/kg/hr (Order-Specific) Intravenous Titrated Herminia Tabor MD Last Rate: 14 Units/kg/hr (02/06/19 0800)   hydrALAZINE 10 mg Intravenous Q6H PRN Herminia Tabor MD    ondansetron 4 mg Intravenous Q8H PRN Herminia Tabor MD    potassium-sodium phosphateS 1 tablet Oral 4x Daily (with meals and at bedtime) Herminia Tabor MD      Continuous Infusions:    heparin (porcine) 3-20 Units/kg/hr (Order-Specific) Last Rate: 14 Units/kg/hr (02/06/19 0800)     PRN Meds:    acetaminophen 650 mg Q6H PRN   dexamethasone 2 mg Q8H PRN   hydrALAZINE 10 mg Q6H PRN   ondansetron 4 mg Q8H PRN       VTE Pharmacologic Prophylaxis: Heparin  VTE Mechanical Prophylaxis: sequential compression device    Invasive lines and devices:   Invasive Devices     Peripheral Intravenous Line            Peripheral IV 02/04/19 Left Antecubital 1 day    Peripheral IV 02/05/19 Left Hand 1 day                   Counseling / Coordination of Care  Total Critical Care time spent 42 minutes excluding procedures, teaching and family updates  Code Status: Level 1 - Full Code     Portions of the record may have been created with voice recognition software  Occasional wrong word or "sound a like" substitutions may have occurred due to the inherent limitations of voice recognition software  Read the chart carefully and recognize, using context, where substitutions have occurred       Leopoldo Lamprey, MD

## 2019-02-06 NOTE — PROGRESS NOTES
Progress Note - Neurosurgery   Shireen Goldman 50 y o  female MRN: [id number]  Unit/Bed#:  -01 Encounter: [id number]    Assessment:  1  Left VA dissection  2  Acute to early subacute bilateral posterior occipital and right cerebellar infarcts  3  Right VA stenosis  4  Left PCA stenosis  5  Cephalgia  6  HTN  7  HLD    Plan:  · Exam is grossly non focal  GCS 15  IBARRA FS  VFFC  EOMI without nystagmus  No drift Accurate FTN  · Continue frequent neurological checks  · Imaging reviewed personally and by attending  Final results as below  · Mri brain wo 2/5/19: acute to early subacute infarcts bilateral posterior parietal/occipital and right cerebellum without hemorrhage  · CTA head/neck 2/5/19: left vertebral artery dissection originating approx  C2 level  Multifocal progressive narrowing distal right VA and mild narrowing proximal basilar artery  Multifocal narrowing left PCA  · CT head wo 2/5/19: No evidence of hemorrhage  · Continue ASA 81mg and heparin gtt with goal aPTT 50-70  · Plan for repeat CTA head/neck in 1 week  At that time, will consider transition to DAPT  May require P2Y12 to ensure efficacy of Plavix  · Recommend normotensive  · Neurology input appreciated  · Pain control as needed per primary team    · Mobilize with assistance  · DVT PPX: Heparin gtt and SCDs  · No neurosurgical intervention anticipated at this juncture  Ongoing medical management  · Call with questions/concerns  Subjective/Objective   Chief Complaint: "I feel okay"    Subjective: Patient noted dizziness along with mild posterior headache and transient visual disturbance during third walk yesterday  She also noted associated SOB which was felt to anxiety related  Symptoms improved without recurrence  Repeat CT head wo demonstrated no intracranial hemorrhage  Objective: Sitting up in bed  NAD  I/O       02/04 0701 - 02/05 0700 02/05 0701 - 02/06 0700 02/06 0701 - 02/07 0700    P  O   1320     I  V  (mL/kg) 100 (1 8) 652 9 (12)     Total Intake(mL/kg) 100 (1 8) 1972 9 (36 3)     Urine (mL/kg/hr) 400 2000 (1 5)     Total Output 400 2000      Net -300 -27 1                   Invasive Devices     Peripheral Intravenous Line            Peripheral IV 02/04/19 Left Antecubital 1 day    Peripheral IV 02/05/19 Left Hand 1 day                Physical Exam:  Vitals: Blood pressure 116/66, pulse 74, temperature 98 3 °F (36 8 °C), temperature source Oral, resp  rate 15, height 4' 11" (1 499 m), weight 54 3 kg (119 lb 11 4 oz), SpO2 98 %  ,Body mass index is 24 18 kg/m²  General appearance: alert, appears stated age, cooperative and no distress  Head: Normocephalic, without obvious abnormality, atraumatic  Eyes: EOMI, PERRL, VFFC  Neck: supple, symmetrical, trachea midline   Lungs: non labored breathing  Heart: regular heart rate  Neurologic:   Mental status: Alert, oriented, thought content appropriate, speech fluent, able to add coins     Cranial nerves: grossly intact (Cranial nerves II-XII)  Sensory: normal to LT  Motor: moving all extremities without focal weakness, 5/5 BUE/BLE  Coordination: finger to nose normal bilaterally, no drift bilaterally    Lab Results:    Results from last 7 days  Lab Units 02/06/19 0558 02/05/19 0439 02/04/19 2029   WBC Thousand/uL 6 08 8 03 11 23*   HEMOGLOBIN g/dL 13 9 15 1 15 0   HEMATOCRIT % 41 4 43 8 43 0   PLATELETS Thousands/uL 229 222 231   NEUTROS PCT % 51 60 77*   MONOS PCT % 9 8 8       Results from last 7 days  Lab Units 02/06/19 0558 02/05/19 0439 02/04/19 2029   POTASSIUM mmol/L 3 5 3 4* 3 7   CHLORIDE mmol/L 106 106 99*   CO2 mmol/L 26 23 29   BUN mg/dL 12 9 17   CREATININE mg/dL 0 68 0 58* 0 78   CALCIUM mg/dL 8 4 8 8 9 1   ALK PHOS U/L  --  82 75   ALT U/L  --  20 25   AST U/L  --  17 19       Results from last 7 days  Lab Units 02/06/19 0558 02/05/19 0439   MAGNESIUM mg/dL 2 4 2 5       Results from last 7 days  Lab Units 02/06/19  0558 02/05/19  0439   PHOSPHORUS mg/dL 3 6 2 3*       Results from last 7 days  Lab Units 02/06/19  0558 02/05/19  2338 02/05/19  1727 02/05/19  0439 02/05/19  0121   INR   --   --  1 09 1 03 1 15   PTT seconds 74* 62* 47*  --  28     No results found for: TROPONINT  ABG:No results found for: PHART, UYO0BPH, PO2ART, GOV0KZA, E3CWKLVV, BEART, SOURCE    Imaging Studies: I have personally reviewed pertinent reports  and I have personally reviewed pertinent films in PACS    Cta Head And Neck W Wo Contrast    Result Date: 2/5/2019  Impression: 1  Left vertebral artery dissection, 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 and extending just proximal to the vertebrobasilar junction  Neurovascular service consultation recommended  2   Multifocal progressive narrowing of the distal intradural segment of the right vertebral artery and mild narrowing in the proximal basilar artery  3   Multifocal mild to moderate narrowing in the left posterior cerebral artery  I personally discussed this study with Amanda Little on 2/5/2019 at 1:38 AM  Workstation performed: [id number]     Ct Head Without Contrast    Result Date: 2/5/2019  Impression: Tiny foci of low-attenuation involving the posterior right parietal lobe and right cerebellar hemisphere which correspond to areas of acute ischemia described on a recent MRI brain dated February 4, 2019  No acute intracranial hemorrhage  Workstation performed: [id number]     Ct Head Wo Contrast    Result Date: 2/5/2019  Impression: No acute intracranial abnormality  Workstation performed: [id number]     Mra And Or Mrv Head Wo Contrast    Result Date: 2/5/2019  Impression: 1  Absence of signal within the distal cervical and proximal intradural segments of the left vertebral artery, compatible with findings of signal abnormality on the MRI, concerning for thrombus or dissection  Multifocal irregularity of the distal intradural segment of the right vertebral artery    Recommend CTA of the head and neck to exclude vertebral artery dissection  2   No other evidence of significant stenosis in the posterior circulation  3   No stenosis or occlusion of the major vessels in the anterior circulation  I personally discussed this study with Cher Maria on 2/5/2019 at 12:48 AM  Workstation performed: [id number]     Mri Brain Wo Contrast    Result Date: 2/5/2019  Impression: 1  Acute to early subacute infarcts in the bilateral posterior parietal lobes and in the right cerebellar hemisphere  No hemorrhage or mass effect  2   Signal abnormality within distal cervical and intracranial segments of the left vertebral artery could represent thrombus or dissection  Recommend CT angiogram of the head and neck for further evaluation  I personally discussed this study with Cher Maria on 2/5/2019 at 12:36 AM  Workstation performed: [id number]     EKG, Pathology, and Other Studies: I have personally reviewed pertinent reports        VTE Pharmacologic Prophylaxis: Heparin    VTE Mechanical Prophylaxis: sequential compression device

## 2019-02-06 NOTE — PROGRESS NOTES
Progress Note - ICU Transfer to Step down/med  surg  - Marie Apolonia Paredes 50 y o  female MRN: [id number]    Unit/Bed#: [address] [facility] -01 Encounter: [id number]      Code Status: Level 1 - Full Code    Date of ICU admission: 2/5/19    Reason for ICU adm:  Left-sided vertebral artery dissection and small areas of infarct and bilateral parietal lobes and right cerebellum without neurologic deficit    Active problems:   Patient Active Problem List   Diagnosis    VAIN I (vaginal intraepithelial neoplasia grade I)    Vulvovaginal condyloma    Acute nonintractable headache    Disorientation    Hypertensive urgency    Essential hypertension    Vision changes    CVA (cerebral vascular accident) ([facility] [address] )    Vertebral artery dissection ([facility] )       Summary of clinical course:  Patient is a 42-year-old female with a history of untreated hypertension that presented with posterior headache, nausea, vomiting, dizziness, lightheadedness, and vision changes found to be due to a left vertebral artery dissection and small areas of infarct in bilateral parietal lobes and right cerebellum  After blood pressure control, the patient's symptoms spontaneously resolved without intervention  On day 1 of ICU admission, the decision was made that the patient will be started on heparin infusion along with aspirin 81 mg  Neurology and Neurosurgery have discussed case in this was agreed upon  Since admission to the ICU, the patient has became symptomatic once over a 0 5 hour  She complained of some dizziness and headache for which she was treated symptomatically and these resolved  Otherwise, the patient has been entirely neurologically intact throughout her stay here in the ICU  She has been alert oriented x3, strength 5/5 in all 4 extremities, cranial nerves 2-12 intact, sensation intact throughout  Systolic blood pressure goals have been less than 160 and the patient has only needed 1 dose of p r n  hydralazine    Due to hypotension overnight, the patient's previous hydrochlorothiazide was discontinued and the patient will be treated with 10 mg Norvasc moving for pending future blood pressures  On 02/06/2019, the patient has been totally asymptomatic without any neurologic complaints  Recent or scheduled procedures:   Echo:  Patient with hyperdynamic systolic function and grade 1 diastolic dysfunction  Renal artery Doppler:  No renal artery stenosis is noted  CT head 02/05/2019:  Consistent with prior      Outstanding/pending diagnostics: None    Hospital discharge planning:  Patient will need to be admitted until heparin infusion is discontinued  The future anticoagulation and transition of anticoagulation will be determined by Neurosurgery/Neurology

## 2019-02-06 NOTE — PROGRESS NOTES
SOD - ICU Acceptance Note      PATIENT INFORMATION      Patient: Sangeeta Ortiz 50 y o  female   MRN: [id number]  PCP: Reza Squires DO  Unit/Bed#:  -01 Encounter: [id number]  Date Of Visit: 02/06/19  Current Length of Stay: 1 day(s)     ASSESSMENTS & PLAN    Patient is a 71-year-old female with a past medical history of hypertension (not on antihypertensives at home), GERD presented with symptoms of headache, dizziness, nausea, vomiting and was found to have acute to early subacute bilateral posterior parietal lobe and right cerebellar hemisphere infarct likely secondary to left vertebral artery dissection (evidenced by CTA)  Unclear if it spontaneously versus underlying collagen vascular disease at this time  1   Ischemic stroke secondary to left vertebral artery dissection  MRI positive for acute to early subacute bilateral posterior parietal lobe and right cerebellar hemisphere infarcts  CT demonstrated left vertebral artery dissection  Neurologic exam is nonfocal   GCS 15    · Neurosurgery and neurology following  · Continue heparin drip  · Aspirin/statin  · Tylenol and dexamethasone for headache  · Goal blood pressure normal tension  · Continue telemetry  · PT/OT  · Neuro checks q 4 hours  Currently no focal deficits  · Outpatient vascular Neurology follow-up in 2-4 weeks  · Repeat CTA head/neck in 1 week  · Analgesia  · Zofran p r n  For nausea    2  Hypertension  · Goal blood pressure normal tension  · Continue amlodipine 10 mg daily,   · IV hydralazine 10 mg q 6 hours p r n  For systolic blood pressure greater than 160    3  Hyperlipidemia  · Continue Lipitor 80 mg daily    4  Subclinical hypothyroidism  TSH elevated 5 26 with normal free T4  · Stable  Monitor clinically  Follow up as outpatient  5   Hypokalemia  · Depleted    Follow up morning BMP    VTE Pharmacologic Prophylaxis: Heparin   VTE Mechanical Prophylaxis: SCD's    Disposition:  Inpatient care for heparin [place] [facility] Course   Per HPI by Dr Marlo Alpers on 06/09/2019 at 2:04 p m  "Summary of clinical course:  Patient is a 17-year-old female with a history of untreated hypertension that presented with posterior headache, nausea, vomiting, dizziness, lightheadedness, and vision changes found to be due to a left vertebral artery dissection and small areas of infarct in bilateral parietal lobes and right cerebellum  After blood pressure control, the patient's symptoms spontaneously resolved without intervention  On day 1 of ICU admission, the decision was made that the patient will be started on heparin infusion along with aspirin 81 mg  Neurology and Neurosurgery have discussed case in this was agreed upon  Since admission to the ICU, the patient has became symptomatic once over a 0 5 hour  She complained of some dizziness and headache for which she was treated symptomatically and these resolved  Otherwise, the patient has been entirely neurologically intact throughout her stay here in the ICU  She has been alert oriented x3, strength 5/5 in all 4 extremities, cranial nerves 2-12 intact, sensation intact throughout  Systolic blood pressure goals have been less than 160 and the patient has only needed 1 dose of p r n  hydralazine  Due to hypotension overnight, the patient's previous hydrochlorothiazide was discontinued and the patient will be treated with 10 mg Norvasc moving for pending future blood pressures  On 02/06/2019, the patient has been totally asymptomatic without any neurologic complaints      Recent or scheduled procedures:   Echo:  Patient with hyperdynamic systolic function and grade 1 diastolic dysfunction  Renal artery Doppler:  No renal artery stenosis is noted  CT head 02/05/2019:  Consistent with prior        Outstanding/pending diagnostics: None     Hospital discharge planning:  Patient will need to be admitted until heparin infusion is discontinued    The future anticoagulation and transition of anticoagulation will be determined by Neurosurgery/Neurology  "     SUBJECTIVE   Patient currently has no complaints  She is in a wonderful mood very pleasant to interact with despite this difficult time that she is going through  Patient denies chest pain, palpitations, SOB, cough, abdominal pain, nausea, vomiting, constipation, diarrhea, fevers/chills, headaches, dysuria  OBJECTIVE     Vitals:   Temp (24hrs), Av 5 °F (36 9 °C), Min:98 °F (36 7 °C), Max:99 1 °F (37 3 °C)    Temp:  [98 °F (36 7 °C)-99 1 °F (37 3 °C)] 98 7 °F (37 1 °C)  HR:  [58-94] 70  Resp:  [12-21] 16  BP: ()/(51-94) 126/91  SpO2:  [97 %-99 %] 98 %  Body mass index is 24 18 kg/m²  Input and Output Summary (last 24 hours): Intake/Output Summary (Last 24 hours) at 19 1420  Last data filed at 19 1201   Gross per 24 hour   Intake          1595 35 ml   Output             1075 ml   Net           520 35 ml       Physical Exam:   GENERAL: NAD  HEENT:  NC/AT  CARDIAC:  RRR, +S1/S2, no S3/S4 heard, no m/g/r  PULMONARY:  CTA B/L, no wheezing/rales/rhonci, non-labored breathing  ABDOMEN:  Soft, NT/ND, +BS, no rebound/guarding/rigidity  Extremities:  2+ Pulses in DP/PT  No edema, cyanosis, or clubbing  NEUROLOGIC:  Alert/oriented x3   No motor or sensory deficits          ADDITIONAL DATA     Labs & Recent Cultures:       Results from last 7 days  Lab Units 19  0558   WBC Thousand/uL 6 08   HEMOGLOBIN g/dL 13 9   HEMATOCRIT % 41 4   PLATELETS Thousands/uL 229   NEUTROS PCT % 51   LYMPHS PCT % 38   MONOS PCT % 9   EOS PCT % 2       Results from last 7 days  Lab Units 19  0558 19  0439   POTASSIUM mmol/L 3 5 3 4*   CHLORIDE mmol/L 106 106   CO2 mmol/L 26 23   BUN mg/dL 12 9   CREATININE mg/dL 0 68 0 58*   CALCIUM mg/dL 8 4 8 8   ALK PHOS U/L  --  82   ALT U/L  --  20   AST U/L  --  17       Results from last 7 days  Lab Units 19  1727   INR  1 09                 Nutrition:  Diet Regular; Regular House  Radiology Results:   CT head without contrast   Final Result by Catracho Logan MD (02/05 1807)      Tiny foci of low-attenuation involving the posterior right parietal lobe and right cerebellar hemisphere which correspond to areas of acute ischemia described on a recent MRI brain dated February 4, 2019  No acute intracranial hemorrhage  Workstation performed: [id number]         CT head wo contrast   Final Result by Carey Olvera MD (02/05 6710)      No acute intracranial abnormality  Workstation performed: [id number]         VAS renal artery complete    (Results Pending)     Scheduled Medications:    amLODIPine 10 mg Daily   aspirin 81 mg Daily   atorvastatin 80 mg Daily With Dinner       PRN MEDS:    acetaminophen 650 mg Q6H PRN   dexamethasone 2 mg Q8H PRN   hydrALAZINE 10 mg Q6H PRN   ondansetron 4 mg Q8H PRN       Last 24 Hours Medication List:     Current Facility-Administered Medications:  acetaminophen 650 mg Oral Q6H PRN Leighaisabell Madseni, DO    amLODIPine 10 mg Oral Daily Negra Llanes, DO    aspirin 81 mg Oral Daily Silvia Mulligan PA-C    atorvastatin 80 mg Oral Daily With Katie Domingueze, DO    dexamethasone 2 mg Oral Q8H PRN Jose Cordero PA-C    heparin (porcine) 3-20 Units/kg/hr (Order-Specific) Intravenous Titrated Elsy Murphy MD Last Rate: 14 Units/kg/hr (02/06/19 0800)   hydrALAZINE 10 mg Intravenous Q6H PRN Elsy Murphy MD    ondansetron 4 mg Intravenous Q8H PRN Elsy Murphy MD           Time Spent for Care: 30 mins spent in total   More than 50% of total time spent on counseling and coordination of care as described above  Current Length of Stay: 1 day(s)      Code Status: Level 1 - Full Code          ** Please Note: This note is constructed using a voice recognition dictation system   **

## 2019-02-07 DIAGNOSIS — Z71.89 COMPLEX CARE COORDINATION: Primary | ICD-10-CM

## 2019-02-07 LAB
ANION GAP SERPL CALCULATED.3IONS-SCNC: 8 MMOL/L (ref 4–13)
APTT PPP: 77 SECONDS (ref 26–38)
BASOPHILS # BLD AUTO: 0.03 THOUSANDS/ΜL (ref 0–0.1)
BASOPHILS NFR BLD AUTO: 1 % (ref 0–1)
BUN SERPL-MCNC: 15 MG/DL (ref 5–25)
CALCIUM SERPL-MCNC: 8.9 MG/DL (ref 8.3–10.1)
CHLORIDE SERPL-SCNC: 103 MMOL/L (ref 100–108)
CO2 SERPL-SCNC: 25 MMOL/L (ref 21–32)
CREAT SERPL-MCNC: 0.68 MG/DL (ref 0.6–1.3)
EOSINOPHIL # BLD AUTO: 0.16 THOUSAND/ΜL (ref 0–0.61)
EOSINOPHIL NFR BLD AUTO: 3 % (ref 0–6)
ERYTHROCYTE [DISTWIDTH] IN BLOOD BY AUTOMATED COUNT: 11.9 % (ref 11.6–15.1)
GFR SERPL CREATININE-BSD FRML MDRD: 104 ML/MIN/1.73SQ M
GLUCOSE SERPL-MCNC: 87 MG/DL (ref 65–140)
HCT VFR BLD AUTO: 43.4 % (ref 34.8–46.1)
HGB BLD-MCNC: 14.9 G/DL (ref 11.5–15.4)
IMM GRANULOCYTES # BLD AUTO: 0.01 THOUSAND/UL (ref 0–0.2)
IMM GRANULOCYTES NFR BLD AUTO: 0 % (ref 0–2)
LYMPHOCYTES # BLD AUTO: 2.42 THOUSANDS/ΜL (ref 0.6–4.47)
LYMPHOCYTES NFR BLD AUTO: 43 % (ref 14–44)
MAGNESIUM SERPL-MCNC: 2.2 MG/DL (ref 1.6–2.6)
MCH RBC QN AUTO: 31.6 PG (ref 26.8–34.3)
MCHC RBC AUTO-ENTMCNC: 34.3 G/DL (ref 31.4–37.4)
MCV RBC AUTO: 92 FL (ref 82–98)
MONOCYTES # BLD AUTO: 0.52 THOUSAND/ΜL (ref 0.17–1.22)
MONOCYTES NFR BLD AUTO: 9 % (ref 4–12)
NEUTROPHILS # BLD AUTO: 2.49 THOUSANDS/ΜL (ref 1.85–7.62)
NEUTS SEG NFR BLD AUTO: 44 % (ref 43–75)
NRBC BLD AUTO-RTO: 0 /100 WBCS
PLATELET # BLD AUTO: 235 THOUSANDS/UL (ref 149–390)
PMV BLD AUTO: 11.2 FL (ref 8.9–12.7)
POTASSIUM SERPL-SCNC: 3.6 MMOL/L (ref 3.5–5.3)
RBC # BLD AUTO: 4.72 MILLION/UL (ref 3.81–5.12)
SODIUM SERPL-SCNC: 136 MMOL/L (ref 136–145)
WBC # BLD AUTO: 5.63 THOUSAND/UL (ref 4.31–10.16)

## 2019-02-07 PROCEDURE — 99233 SBSQ HOSP IP/OBS HIGH 50: CPT | Performed by: INTERNAL MEDICINE

## 2019-02-07 PROCEDURE — 83735 ASSAY OF MAGNESIUM: CPT | Performed by: PHYSICIAN ASSISTANT

## 2019-02-07 PROCEDURE — 80048 BASIC METABOLIC PNL TOTAL CA: CPT | Performed by: PHYSICIAN ASSISTANT

## 2019-02-07 PROCEDURE — 85730 THROMBOPLASTIN TIME PARTIAL: CPT | Performed by: INTERNAL MEDICINE

## 2019-02-07 PROCEDURE — 85025 COMPLETE CBC W/AUTO DIFF WBC: CPT | Performed by: PHYSICIAN ASSISTANT

## 2019-02-07 RX ORDER — DOCUSATE SODIUM 100 MG/1
100 CAPSULE, LIQUID FILLED ORAL 2 TIMES DAILY
Status: DISCONTINUED | OUTPATIENT
Start: 2019-02-07 | End: 2019-02-11 | Stop reason: HOSPADM

## 2019-02-07 RX ORDER — SENNOSIDES 8.6 MG
1 TABLET ORAL
Status: DISCONTINUED | OUTPATIENT
Start: 2019-02-07 | End: 2019-02-11 | Stop reason: HOSPADM

## 2019-02-07 RX ADMIN — DOCUSATE SODIUM 100 MG: 100 CAPSULE, LIQUID FILLED ORAL at 11:08

## 2019-02-07 RX ADMIN — AMLODIPINE BESYLATE 10 MG: 10 TABLET ORAL at 08:20

## 2019-02-07 RX ADMIN — ATORVASTATIN CALCIUM 80 MG: 80 TABLET, FILM COATED ORAL at 17:05

## 2019-02-07 RX ADMIN — DOCUSATE SODIUM 100 MG: 100 CAPSULE, LIQUID FILLED ORAL at 17:04

## 2019-02-07 RX ADMIN — ASPIRIN 81 MG: 81 TABLET, COATED ORAL at 08:20

## 2019-02-07 NOTE — MEDICAL STUDENT
INTERNAL MEDICINE HISTORY AND PHYSICAL   [facility] 723-01 SOD Team C      NAME: Frances Paredes  AGE: 50 y o  SEX: female  : 1970   MRN: [id number]  ENCOUNTER: [id number]    DATE: 2019  TIME: 7:02 AM     Chief Complaint:  Dizziness    History of Present Illness:  Samuel Bean is a 46yo female with a PMH of GERD and HTN who presents with dizziness  She went to [facility] last week  for similar sxs of dizzienss, peripheral visio swirling and loss of vision, where her non-con head CT was normal and she was dced with amlodipine 10mg qd  On , she was getting in the shower and had sudden onset dizziness, loss of vision (peripheral first), lightheadedness, warmth and vomiting, and she went to the [facility] ED, where another non-con head CT was done, which was normal  She was then transferred to [place], and a CTA of the H&N showed L vertebral artery dissection, R vertebrobasilar artery narrowing, and mild-mod L PCA narrowing  MRI/MRA showed acute-subacute infarcts in b/l parietal lobes and R cerebellar hemisphere  She was seen by neuro, who started her on Lipitor 80mg, a heparin drip, ASA with a PTT goal of 50-70, telemetry and PT/OT evals  NSx also saw her and recommended no surgical intervention at this time, with repeat CTA on 2/10/19 before starting DAPT (ASA + Plavix)  On , she had 1 episode of vertigo, for which she received morphine    She reports having episodes of dizziness all January, but not as severe as this episode  Surgical History:  Breast implants, hysterectomy    Medical History:  GERD, HTN    Allergies:  Shellfish     Home Medications:  Amlodipine 10mg qd    Family History:  Adopted     Social History:  No tobacco or illicit drug use, drinks socially    Review of Systems   Constitutional: Negative for diaphoresis and fatigue  HENT: Negative for hearing loss, rhinorrhea and sore throat  Eyes: Negative for pain, discharge and visual disturbance     Respiratory: Negative for chest tightness and shortness of breath  Cardiovascular: Negative for chest pain  Gastrointestinal: Negative for abdominal pain, constipation, diarrhea, nausea and vomiting  Genitourinary: Negative for difficulty urinating, dysuria and urgency  Musculoskeletal: Negative for back pain and myalgias  Skin: Negative for color change  Neurological: Negative for dizziness and headaches  Psychiatric/Behavioral: Negative for agitation and behavioral problems  Objective   Temp:  [98 7 °F (37 1 °C)-99 1 °F (37 3 °C)] 98 7 °F (37 1 °C)  HR:  [58-90] 86  Resp:  [15-21] 16  BP: (126-153)/(73-95) 142/95    I/O       02/05 0701 - 02/06 0700 02/06 0701 - 02/07 0700 02/07 0701 - 02/08 0700    P  O  1320 716     I V  (mL/kg) 652 9 (12) 163 1 (3)     Total Intake(mL/kg) 1972 9 (36 3) 879 1 (16 2)     Urine (mL/kg/hr) 2000 (1 5) 650 (0 5)     Total Output 2000 650      Net -27 1 +229 1                 Physical Exam   Constitutional: She is oriented to person, place, and time  She appears well-developed and well-nourished  HENT:   Head: Normocephalic and atraumatic  Eyes: Pupils are equal, round, and reactive to light  Conjunctivae and EOM are normal  No scleral icterus  Neck: Normal range of motion  No JVD present  No thyromegaly present  Cardiovascular: Normal rate, regular rhythm, normal heart sounds and intact distal pulses  Exam reveals no gallop and no friction rub  No murmur heard  Pulmonary/Chest: Effort normal and breath sounds normal  No stridor  She has no wheezes  She has no rales  Abdominal: Soft  Bowel sounds are normal  She exhibits no distension  There is no tenderness  Musculoskeletal: Normal range of motion  Lymphadenopathy:     She has no cervical adenopathy  Neurological: She is alert and oriented to person, place, and time  She has normal strength  No cranial nerve deficit  She exhibits normal muscle tone   She has an abnormal Finger-Nose-Finger Test (mild slowness/hesitancy), an abnormal Heel to [employer] (mild slowness/hesitancy) and an abnormal Tandem Gait Test (mild slowness/hesitancy)  She has a normal Romberg Test  Gait normal  She displays no Babinski's sign on the right side  She displays no Babinski's sign on the left side  Reflex Scores:       Tricep reflexes are 3+ on the right side and 3+ on the left side  Bicep reflexes are 3+ on the right side and 3+ on the left side  Brachioradialis reflexes are 3+ on the right side and 3+ on the left side  Patellar reflexes are 3+ on the right side and 3+ on the left side  Achilles reflexes are 3+ on the right side and 3+ on the left side  Skin: Skin is warm and dry  No rash noted  She is not diaphoretic  No erythema  Psychiatric: She has a normal mood and affect  Her speech is normal and behavior is normal       Neurologic Exam     Mental Status   Oriented to person, place, and time  Attention: normal  Concentration: normal    Speech: speech is normal   Level of consciousness: alert  Knowledge: good  Cranial Nerves   Cranial nerves II through XII intact  CN II   Visual fields full to confrontation  CN III, IV, VI   Pupils are equal, round, and reactive to light  Extraocular motions are normal    Right pupil: Size: 3 mm  Shape: regular  Reactivity: brisk  Consensual response: intact  Left pupil: Size: 3 mm  Shape: regular  Reactivity: brisk  Consensual response: intact  CN III: no CN III palsy  CN VI: no CN VI palsy  Nystagmus: none   Diplopia: none  Ophthalmoparesis: none  Upgaze: normal    CN V   Facial sensation intact  CN VII   Facial expression full, symmetric       CN VIII   CN VIII normal    Hearing: intact    CN IX, X   CN IX normal    Palate: symmetric    CN XI   CN XI normal      CN XII   CN XII normal      Motor Exam   Muscle bulk: normal  Overall muscle tone: normal  Right arm tone: normal  Left arm tone: normal  Right arm pronator drift: absent  Left arm pronator drift: absent  Right leg tone: normal  Left leg tone: normal    Strength   Strength 5/5 throughout       Sensory Exam   Light touch normal    Vibration normal    Proprioception normal    Pinprick normal      Gait, Coordination, and Reflexes     Gait  Gait: normal    Coordination   Romberg: negative  Finger to nose coordination: abnormal (mild slowness/hesitancy)  Heel to shin coordination: abnormal (mild slowness/hesitancy)  Tandem walking coordination: abnormal (mild slowness/hesitancy)    Tremor   Resting tremor: absent  Intention tremor: absent  Action tremor: absent    Reflexes   Right brachioradialis: 3+  Left brachioradialis: 3+  Right biceps: 3+  Left biceps: 3+  Right triceps: 3+  Left triceps: 3+  Right patellar: 3+  Left patellar: 3+  Right achilles: 3+  Left achilles: 3+  Right plantar: normal  Left plantar: normal    Basic Laboratory Review:     Results from last 7 days  Lab Units 02/08/19 0526 02/07/19  0508 02/06/19  0558 02/05/19  0439 02/04/19 2029   SODIUM mmol/L 137 136 138 140 137   POTASSIUM mmol/L 3 9 3 6 3 5 3 4* 3 7   CHLORIDE mmol/L 104 103 106 106 99*   CO2 mmol/L 24 25 26 23 29   BUN mg/dL 20 15 12 9 17   CREATININE mg/dL 0 73 0 68 0 68 0 58* 0 78   CALCIUM mg/dL 9 0 8 9 8 4 8 8 9 1   ALT U/L  --   --   --  20 25   AST U/L  --   --   --  17 19   ALK PHOS U/L  --   --   --  82 75   EGFR ml/min/1 73sq m 98 104 104 109 90       Results from last 7 days  Lab Units 02/08/19  0526 02/07/19  0508 02/06/19  0558   WBC Thousand/uL 8 69 5 63 6 08   RBC Million/uL 4 74 4 72 4 46   HEMOGLOBIN g/dL 14 9 14 9 13 9   HEMATOCRIT % 43 9 43 4 41 4   MCV fL 93 92 93   MCH pg 31 4 31 6 31 2   MCHC g/dL 33 9 34 3 33 6   RDW % 11 7 11 9 12 0   MPV fL 11 5 11 2 11 0   PLATELETS Thousands/uL 224 235 229   NRBC AUTO /100 WBCs 0 0 0   NEUTROS PCT % 68 44 51   LYMPHS PCT % 21 43 38   MONOS PCT % 9 9 9   EOS PCT % 1 3 2   BASOS PCT % 1 1 0   NEUTROS ABS Thousands/µL 5 88 2 49 3 09   LYMPHS ABS Thousands/µL 1 86 2 42 2 32 MONOS ABS Thousand/µL 0 78 0 52 0 52   EOS ABS Thousand/µL 0 12 0 16 0 12   BASOS ABS Thousands/µL 0 04 0 03 0 02       Results from last 7 days  Lab Units 02/08/19  0526 02/07/19  0508 02/06/19  0558  02/05/19  1727 02/05/19  0439 02/05/19  0121   PROTIME seconds  --   --   --   --  14 2 13 6 14 4*   INR   --   --   --   --  1 09 1 03 1 15   PTT seconds 63* 77* 74*  < > 47*  --  28   < > = values in this interval not displayed  Results from last 7 days  Lab Units 02/05/19  0121   TROPONIN I ng/mL <0 02       Additional Labs  Lab Results   Component Value Date    MG 2 2 02/07/2019    PHOS 3 6 02/06/2019      No results found for: PHART, EKP7IWE, PO2ART, XNF2AIF, E6CUIMQI, BEART, SOURCE  Lab Results   Component Value Date    HGBA1C 5 3 02/05/2019    TRIG 115 02/05/2019    HDL 58 02/05/2019    LDLCALC 181 (H) 02/05/2019    [id number] 5 260 (H) 02/05/2019    FREET4 1 16 02/05/2019     Assessment and Plan   1  Posterior stroke 2/2 vertebral artery dissection   - MRI/MRA 2/5 showed acute-subacute infarcts in the bilateral parietal lobes and R cerebellar hemisphere  CTA 2/5 showed L vertebral artery dissection   - neuro exam shows mild cerebellar signs   - neuro and NSx following   - per NSx, repeat CTA on 2/10, then begin DAPT   - maintain SBP < 160, with hydralazine prn   - neuro checks q4h   - continue lipitor, heparin, ASA, amlodipine    2  HTN   - maintain SBP < 160, with hydralazine prn   - continue amlodipine    3  HLD   - lipid panel 2/5 chol 262, , HDL 58,    - continue lipitor   - recommend dietary modifications    4   Hypothyroidism   - TSH 5 26, T4 1 16   - followup outpatient and recheck TSH/T4 in 2 months    Magali Huynh, MS3

## 2019-02-07 NOTE — PROGRESS NOTES
Progress Note - Neurosurgery   Jacklyn Ervin 50 y o  female MRN: [id number]  Unit/Bed#: [facility] 723-01 Encounter: [id number]    Assessment:  1  Left VA dissection  2  Acute to early subacute bilateral posterior occipital and right cerebellar infarcts  3  Right VA stenosis  4  Left PCA stenosis  5  Cephalgia  6  HTN  7  HLD    Plan:  · Exam is grossly non focal  GCS 15  IBARRA FS  VFFC  EOMI without nystagmus  No drift Accurate FTN  · Continue frequent neurological checks  · Imaging reviewed personally and by attending  Final results as below  · Mri brain wo 2/5/19: acute to early subacute infarcts bilateral posterior parietal/occipital and right cerebellum without hemorrhage  · CTA head/neck 2/5/19: left vertebral artery dissection originating approx  C2 level  Multifocal progressive narrowing distal right VA and mild narrowing proximal basilar artery  Multifocal narrowing left PCA  · CT head wo 2/5/19: No evidence of hemorrhage  · Continue ASA 81mg and heparin gtt with goal aPTT 50-70  · Plan for repeat CTA head/neck on 2/10 - order placed  At that time, will plan transition to DAPT with possibel loading of Plavix  May require OP P2Y12 to ensure efficacy of Plavix  · Recommend normotensive  · Neurology input appreciated  · Pain control as needed per primary team    · Mobilize with assistance  · DVT PPX: Heparin gtt and SCDs  · No neurosurgical intervention anticipated at this juncture  · Ongoing medical management  Continue amlodipine and Lipitor  Consider prn anxiety medication  · Call with questions/concerns  Subjective/Objective   Chief Complaint: "I am good"    Subjective: patient states she woke up crying this morning and overall scared  States she keeps thinking "what if scenarios "  Denies headache, vision or speech problems  Denies dizziness  Limited walking yesterday  No weakness or sensory changes  Father currently admitted for hip replacement  Objective: Sitting up in bed  NAD  I/O       02/05 0701 - 02/06 0700 02/06 0701 - 02/07 0700 02/07 0701 - 02/08 0700    P  O  1320 716     I V  (mL/kg) 652 9 (12) 163 1 (3) 37 9 (0 7)    Total Intake(mL/kg) 1972 9 (36 3) 879 1 (16 2) 37 9 (0 7)    Urine (mL/kg/hr) 2000 (1 5) 650 (0 5)     Total Output 2000 650      Net -27 1 +229 1 +37 9                 Invasive Devices     Peripheral Intravenous Line            Peripheral IV 02/04/19 Left Antecubital 2 days    Peripheral IV 02/05/19 Left Hand 2 days                Physical Exam:  Vitals: Blood pressure 148/95, pulse 83, temperature 98 9 °F (37 2 °C), temperature source Oral, resp  rate 16, height 4' 11" (1 499 m), weight 54 3 kg (119 lb 11 4 oz), SpO2 98 %  ,Body mass index is 24 18 kg/m²      General appearance: alert, appears stated age, cooperative and no distress  Head: Normocephalic, without obvious abnormality, atraumatic  Eyes: EOMI, PERRL, VFFC though consistent uncertain responses in left inferior quadrant - correct responses  Neck: supple, symmetrical, trachea midline   Lungs: non labored breathing  Heart: regular heart rate  Neurologic:   Mental status: Alert, oriented, thought content appropriate  Cranial nerves: grossly intact (Cranial nerves II-XII)  Sensory: normal to LT  Motor: moving all extremities without focal weakness, 5/5 BUE/BLE  Reflexes: no clonus b/l  Coordination: finger to nose normal bilaterally, no drift bilaterally    Lab Results:    Results from last 7 days  Lab Units 02/07/19  0508 02/06/19 0558 02/05/19  0439   WBC Thousand/uL 5 63 6 08 8 03   HEMOGLOBIN g/dL 14 9 13 9 15 1   HEMATOCRIT % 43 4 41 4 43 8   PLATELETS Thousands/uL 235 229 222   NEUTROS PCT % 44 51 60   MONOS PCT % 9 9 8       Results from last 7 days  Lab Units 02/07/19  0508 02/06/19  0558 02/05/19  0439 02/04/19 2029   POTASSIUM mmol/L 3 6 3 5 3 4* 3 7   CHLORIDE mmol/L 103 106 106 99*   CO2 mmol/L 25 26 23 29   BUN mg/dL 15 12 9 17   CREATININE mg/dL 0 68 0 68 0 58* 0 78   CALCIUM mg/dL 8 9 8 4 8 8 9  1   ALK PHOS U/L  --   --  82 75   ALT U/L  --   --  20 25   AST U/L  --   --  17 19       Results from last 7 days  Lab Units 02/07/19  0508 02/06/19  0558 02/05/19  0439   MAGNESIUM mg/dL 2 2 2 4 2 5       Results from last 7 days  Lab Units 02/06/19  0558 02/05/19  0439   PHOSPHORUS mg/dL 3 6 2 3*       Results from last 7 days  Lab Units 02/07/19  0508 02/06/19  0558 02/05/19  2338 02/05/19  1727 02/05/19  0439 02/05/19  0121   INR   --   --   --  1 09 1 03 1 15   PTT seconds 77* 74* 62* 47*  --  28     No results found for: TROPONINT  ABG:No results found for: PHART, MOA1QYL, PO2ART, NKT7JUX, W2JFHINK, BEART, SOURCE    Imaging Studies: I have personally reviewed pertinent reports  and I have personally reviewed pertinent films in PACS    Cta Head And Neck W Wo Contrast    Result Date: 2/5/2019  Impression: 1  Left vertebral artery dissection, 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 and extending just proximal to the vertebrobasilar junction  Neurovascular service consultation recommended  2   Multifocal progressive narrowing of the distal intradural segment of the right vertebral artery and mild narrowing in the proximal basilar artery  3   Multifocal mild to moderate narrowing in the left posterior cerebral artery  I personally discussed this study with Roseline Reyes on 2/5/2019 at 1:38 AM  Workstation performed: [id number]     Ct Head Without Contrast    Result Date: 2/5/2019  Impression: Tiny foci of low-attenuation involving the posterior right parietal lobe and right cerebellar hemisphere which correspond to areas of acute ischemia described on a recent MRI brain dated February 4, 2019  No acute intracranial hemorrhage  Workstation performed: [id number]     Ct Head Wo Contrast    Result Date: 2/5/2019  Impression: No acute intracranial abnormality  Workstation performed: [id number]     Mra And Or Mrv Head Wo Contrast    Result Date: 2/5/2019  Impression: 1    Absence of signal within the distal cervical and proximal intradural segments of the left vertebral artery, compatible with findings of signal abnormality on the MRI, concerning for thrombus or dissection  Multifocal irregularity of the distal intradural segment of the right vertebral artery  Recommend CTA of the head and neck to exclude vertebral artery dissection  2   No other evidence of significant stenosis in the posterior circulation  3   No stenosis or occlusion of the major vessels in the anterior circulation  I personally discussed this study with Oswald Fairchild on 2/5/2019 at 12:48 AM  Workstation performed: [id number]     Mri Brain Wo Contrast    Result Date: 2/5/2019  Impression: 1  Acute to early subacute infarcts in the bilateral posterior parietal lobes and in the right cerebellar hemisphere  No hemorrhage or mass effect  2   Signal abnormality within distal cervical and intracranial segments of the left vertebral artery could represent thrombus or dissection  Recommend CT angiogram of the head and neck for further evaluation  I personally discussed this study with Oswald Fairchild on 2/5/2019 at 12:36 AM  Workstation performed: [id number]     EKG, Pathology, and Other Studies: I have personally reviewed pertinent reports        VTE Pharmacologic Prophylaxis: Heparin    VTE Mechanical Prophylaxis: sequential compression device

## 2019-02-07 NOTE — PROGRESS NOTES
IM Residency Progress Note   Unit/Bed#: [facility] 723-01   Encounter: [id number]  SOD Team C     Edwardo Nickerson 50 y o  female   MRN: [id number]  Hospital Stay Days: 2    Assessment/Plan:  Principal Problem:    Vertebral artery dissection [facility])  Active Problems:    Acute nonintractable headache    Hypertensive urgency    Essential hypertension    Vision changes    CVA (cerebral vascular accident) ([address] )    1  Ischemic stroke secondary to left vertebral artery dissection  · MRI positive for acute to early subacute bilateral posterior parietal lobe and right cerebellar hemisphere infarcts  CTA demonstrated left vertebral artery dissection  Neurologic exam is nonfocal   GCS 15    · Neurosurgery and neurology following, appreciate recommendations  · Has not required any IV Decadron for headaches  · Patient has no focal neurological deficits  · Continue heparin drip with aspirin; with plan to repeat CTA before initiating dual anti-platelet therapy  · Continue statin therapy  · Continue neuro checks every 4 hours  · Continue telemetry monitoring  · Goal SBP less than 160; with p r n  IV hydralazine available  · Would get stat CT head for any acute change in neurological exam     2   Hypertension  · Goal systolic blood pressure normotensive less than 160  · Has not required any PRN overnight  · Continue amlodipine 10 mg daily  · Give as needed IV hydralazine 10 mg q 6 hours p r n  For SBP greater than 160     3  Hyperlipidemia  · Cholesterol 262, triglycerides 115, HDL 58, and    · Patient does not smoke  · Family history unknown as patient is adopted  · Continue Lipitor 80 mg daily  · Encourage lifestyle modifications     4   Subclinical hypothyroidism  · TSH elevated 5 26 with normal free T4   · Will need repeat TSH and free T4 in 6 to 8 weeks as an outpatient      5  Hypokalemia  · Potassium 3 6 this morning encourage oral intake    · Continue to monitor electrolytes and replete as needed    Disposition: Continue inpatient care     Subjective:   Patient seen and examined  No acute events overnight  Patient is tearful and anxious this morning  She denies any headache, blurry vision, chest pain, palpitations, shortness of breath nausea, abdominal pain  She has no numbness and tingling in her extremities  She feels no weakness  Vitals: Temp (24hrs), Av 9 °F (37 2 °C), Min:98 7 °F (37 1 °C), Max:99 1 °F (37 3 °C)   Temperature: 98 7 °F (37 1 °C)  Vitals:    19 1000 19 1100 19 1200 19 1300   BP: 145/89 153/88 126/91 142/95   Pulse: 64 78 70 86   Resp: 18 21 16 16   Temp:   98 7 °F (37 1 °C)    TempSrc:   Oral    SpO2: 98% 99% 98% 98%   Weight:       Height:          Body mass index is 24 18 kg/m²  I/O last 24 hours: In: 1426 3 [P O :1196; I V :230 3]  Out: 1100 [Urine:1100]    Physical Exam   Constitutional: She is oriented to person, place, and time  She appears well-developed and well-nourished  No distress  HENT:   Head: Normocephalic and atraumatic  Mouth/Throat: No oropharyngeal exudate  Eyes: Pupils are equal, round, and reactive to light  Conjunctivae are normal    Cardiovascular: Normal rate, regular rhythm, normal heart sounds and intact distal pulses  Exam reveals no gallop and no friction rub  No murmur heard  Pulmonary/Chest: Effort normal and breath sounds normal  No respiratory distress  She has no wheezes  She has no rales  She exhibits no tenderness  Abdominal: Bowel sounds are normal  She exhibits no distension  There is no tenderness  There is no rebound and no guarding  Musculoskeletal: Normal range of motion  She exhibits no edema or tenderness  Lymphadenopathy:     She has no cervical adenopathy  Neurological: She is alert and oriented to person, place, and time  No cranial nerve deficit or sensory deficit  She exhibits normal muscle tone  Skin: Skin is warm and dry  She is not diaphoretic  No erythema     Psychiatric: Her behavior is normal  Anxious, tearful   Vitals reviewed  Invasive Devices     Peripheral Intravenous Line            Peripheral IV 02/04/19 Left Antecubital 2 days    Peripheral IV 02/05/19 Left Hand 2 days                Labs: I have personally reviewed pertinent reports  and I have personally reviewed pertinent films in PACS  No results found for this or any previous visit (from the past 24 hour(s))  Radiology Results: I have personally reviewed pertinent reports  and I have personally reviewed pertinent films in PACS  Cta Head And Neck W Wo Contrast    Result Date: 2/5/2019  Impression: 1  Left vertebral artery dissection, 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 and extending just proximal to the vertebrobasilar junction  Neurovascular service consultation recommended  2   Multifocal progressive narrowing of the distal intradural segment of the right vertebral artery and mild narrowing in the proximal basilar artery  3   Multifocal mild to moderate narrowing in the left posterior cerebral artery  I personally discussed this study with Huma Barrett on 2/5/2019 at 1:38 AM  Workstation performed: [id number]     Ct Head Without Contrast    Result Date: 2/5/2019  Impression: Tiny foci of low-attenuation involving the posterior right parietal lobe and right cerebellar hemisphere which correspond to areas of acute ischemia described on a recent MRI brain dated February 4, 2019  No acute intracranial hemorrhage  Workstation performed: [id number]     Ct Head Wo Contrast    Result Date: 2/5/2019  Impression: No acute intracranial abnormality  Workstation performed: [id number]     Mra And Or Mrv Head Wo Contrast    Result Date: 2/5/2019  Impression: 1  Absence of signal within the distal cervical and proximal intradural segments of the left vertebral artery, compatible with findings of signal abnormality on the MRI, concerning for thrombus or dissection    Multifocal irregularity of the distal intradural segment of the right vertebral artery  Recommend CTA of the head and neck to exclude vertebral artery dissection  2   No other evidence of significant stenosis in the posterior circulation  3   No stenosis or occlusion of the major vessels in the anterior circulation  I personally discussed this study with Oswald Fairchild on 2/5/2019 at 12:48 AM  Workstation performed: [id number]     Mri Brain Wo Contrast    Result Date: 2/5/2019  Impression: 1  Acute to early subacute infarcts in the bilateral posterior parietal lobes and in the right cerebellar hemisphere  No hemorrhage or mass effect  2   Signal abnormality within distal cervical and intracranial segments of the left vertebral artery could represent thrombus or dissection  Recommend CT angiogram of the head and neck for further evaluation  I personally discussed this study with Oswald Fairchild on 2/5/2019 at 12:36 AM  Workstation performed: [id number]     Other Diagnostic Testing: I have personally reviewed pertinent reports  and I have personally reviewed pertinent films in PACS    Active Meds:   Current Facility-Administered Medications   Medication Dose Route Frequency    acetaminophen (TYLENOL) tablet 650 mg  650 mg Oral Q6H PRN    amLODIPine (NORVASC) tablet 10 mg  10 mg Oral Daily    aspirin (ECOTRIN LOW STRENGTH) EC tablet 81 mg  81 mg Oral Daily    atorvastatin (LIPITOR) tablet 80 mg  80 mg Oral Daily With Dinner    dexamethasone (DECADRON) tablet 2 mg  2 mg Oral Q8H PRN    heparin (porcine) 25,000 units in 250 mL infusion (premix)  3-20 Units/kg/hr (Order-Specific) Intravenous Titrated    hydrALAZINE (APRESOLINE) injection 10 mg  10 mg Intravenous Q6H PRN    ondansetron (ZOFRAN) injection 4 mg  4 mg Intravenous Q8H PRN       VTE Pharmacologic Prophylaxis: Heparin  VTE Mechanical Prophylaxis: sequential compression device    VIVIANA Navarro     [facility]  Internal Medicine Resident PGY-2

## 2019-02-07 NOTE — PLAN OF CARE
Activity Intolerance/Impaired Mobility     Mobility/activity is maintained at optimum level for patient Progressing        Communication Impairment     Ability to express needs and understand communication [address] Discharge to home or other facility with appropriate resources Progressing        DISCHARGE PLANNING - CARE MANAGEMENT     Discharge to post-acute care or home with appropriate resources Progressing        HEMATOLOGIC - ADULT     Maintains hematologic stability Progressing        INFECTION - ADULT     Absence or prevention of progression during hospitalization Progressing        Knowledge Deficit     Patient/family/caregiver demonstrates understanding of disease process, treatment plan, medications, and discharge instructions Progressing        METABOLIC, FLUID AND ELECTROLYTES - ADULT     Electrolytes maintained within normal limits Progressing     Fluid balance maintained Progressing     Glucose maintained within target range Progressing        Neurological Deficit     Neurological status is stable or improving Progressing        NEUROSENSORY - ADULT     Achieves stable or improved neurological status Progressing     Absence of seizures Progressing     Remains free of injury related to seizures activity Progressing     Achieves maximal functionality and self care Progressing        Nutrition     Nutrition/Hydration status is improving Progressing        Nutrition/Hydration-ADULT     Nutrient/Hydration intake appropriate for improving, restoring or maintaining nutritional needs Progressing        PAIN - ADULT     Verbalizes/displays adequate comfort level or baseline comfort level Progressing        Potential for Aspiration     Non-ventilated patient's risk of aspiration is minimized Progressing        Potential for Falls     Patient will remain free of falls Progressing        SAFETY ADULT     Patient will remain free of falls Progressing     Maintain or return to baseline ADL function Progressing     Maintain or return mobility status to optimal level Progressing        SKIN/TISSUE INTEGRITY - ADULT     Skin integrity remains intact Progressing     Incision(s), wounds(s) or drain site(s) healing without S/S of infection Progressing     Oral mucous membranes remain intact Progressing

## 2019-02-08 LAB
ANION GAP SERPL CALCULATED.3IONS-SCNC: 9 MMOL/L (ref 4–13)
APTT PPP: 63 SECONDS (ref 26–38)
BASOPHILS # BLD AUTO: 0.04 THOUSANDS/ΜL (ref 0–0.1)
BASOPHILS NFR BLD AUTO: 1 % (ref 0–1)
BUN SERPL-MCNC: 20 MG/DL (ref 5–25)
CALCIUM SERPL-MCNC: 9 MG/DL (ref 8.3–10.1)
CHLORIDE SERPL-SCNC: 104 MMOL/L (ref 100–108)
CO2 SERPL-SCNC: 24 MMOL/L (ref 21–32)
CREAT SERPL-MCNC: 0.73 MG/DL (ref 0.6–1.3)
EOSINOPHIL # BLD AUTO: 0.12 THOUSAND/ΜL (ref 0–0.61)
EOSINOPHIL NFR BLD AUTO: 1 % (ref 0–6)
ERYTHROCYTE [DISTWIDTH] IN BLOOD BY AUTOMATED COUNT: 11.7 % (ref 11.6–15.1)
GFR SERPL CREATININE-BSD FRML MDRD: 98 ML/MIN/1.73SQ M
GLUCOSE SERPL-MCNC: 135 MG/DL (ref 65–140)
GLUCOSE SERPL-MCNC: 98 MG/DL (ref 65–140)
HCT VFR BLD AUTO: 43.9 % (ref 34.8–46.1)
HGB BLD-MCNC: 14.9 G/DL (ref 11.5–15.4)
IMM GRANULOCYTES # BLD AUTO: 0.01 THOUSAND/UL (ref 0–0.2)
IMM GRANULOCYTES NFR BLD AUTO: 0 % (ref 0–2)
LYMPHOCYTES # BLD AUTO: 1.86 THOUSANDS/ΜL (ref 0.6–4.47)
LYMPHOCYTES NFR BLD AUTO: 21 % (ref 14–44)
MCH RBC QN AUTO: 31.4 PG (ref 26.8–34.3)
MCHC RBC AUTO-ENTMCNC: 33.9 G/DL (ref 31.4–37.4)
MCV RBC AUTO: 93 FL (ref 82–98)
MONOCYTES # BLD AUTO: 0.78 THOUSAND/ΜL (ref 0.17–1.22)
MONOCYTES NFR BLD AUTO: 9 % (ref 4–12)
NEUTROPHILS # BLD AUTO: 5.88 THOUSANDS/ΜL (ref 1.85–7.62)
NEUTS SEG NFR BLD AUTO: 68 % (ref 43–75)
NRBC BLD AUTO-RTO: 0 /100 WBCS
PLATELET # BLD AUTO: 224 THOUSANDS/UL (ref 149–390)
PMV BLD AUTO: 11.5 FL (ref 8.9–12.7)
POTASSIUM SERPL-SCNC: 3.9 MMOL/L (ref 3.5–5.3)
RBC # BLD AUTO: 4.74 MILLION/UL (ref 3.81–5.12)
SODIUM SERPL-SCNC: 137 MMOL/L (ref 136–145)
WBC # BLD AUTO: 8.69 THOUSAND/UL (ref 4.31–10.16)

## 2019-02-08 PROCEDURE — 99232 SBSQ HOSP IP/OBS MODERATE 35: CPT | Performed by: NEUROLOGICAL SURGERY

## 2019-02-08 PROCEDURE — 85730 THROMBOPLASTIN TIME PARTIAL: CPT | Performed by: INTERNAL MEDICINE

## 2019-02-08 PROCEDURE — 82948 REAGENT STRIP/BLOOD GLUCOSE: CPT

## 2019-02-08 PROCEDURE — 85025 COMPLETE CBC W/AUTO DIFF WBC: CPT | Performed by: INTERNAL MEDICINE

## 2019-02-08 PROCEDURE — 80048 BASIC METABOLIC PNL TOTAL CA: CPT | Performed by: INTERNAL MEDICINE

## 2019-02-08 PROCEDURE — 99233 SBSQ HOSP IP/OBS HIGH 50: CPT | Performed by: INTERNAL MEDICINE

## 2019-02-08 RX ORDER — HYDROXYZINE 50 MG/1
50 TABLET, FILM COATED ORAL ONCE
Status: COMPLETED | OUTPATIENT
Start: 2019-02-08 | End: 2019-02-08

## 2019-02-08 RX ADMIN — ASPIRIN 81 MG: 81 TABLET, COATED ORAL at 08:46

## 2019-02-08 RX ADMIN — DOCUSATE SODIUM 100 MG: 100 CAPSULE, LIQUID FILLED ORAL at 08:46

## 2019-02-08 RX ADMIN — AMLODIPINE BESYLATE 10 MG: 10 TABLET ORAL at 08:45

## 2019-02-08 RX ADMIN — HYDROXYZINE HYDROCHLORIDE 50 MG: 50 TABLET, FILM COATED ORAL at 18:07

## 2019-02-08 RX ADMIN — HEPARIN SODIUM AND DEXTROSE 14 UNITS/KG/HR: 10000; 5 INJECTION INTRAVENOUS at 04:43

## 2019-02-08 RX ADMIN — ATORVASTATIN CALCIUM 80 MG: 80 TABLET, FILM COATED ORAL at 17:37

## 2019-02-08 RX ADMIN — HYDRALAZINE HYDROCHLORIDE 10 MG: 20 INJECTION INTRAMUSCULAR; INTRAVENOUS at 17:35

## 2019-02-08 NOTE — PROGRESS NOTES
Met with patient to discuss follow up care with outpatient neurology  Parents at bedside  Plan at this time is for patient to be discharged home where she will receive outpatient PT/OT  Patient resides with  and son  Patient has stroke education at bedside, she states Judy in the ICU went over in depth the risk factors and symptoms and does not have any questions at this time  I provided her my card and a flyer for [facility]  She shows great interest in attending  Patient agreeable to follow up  She does not have any questions or concerns at this time  Will follow up after discharge

## 2019-02-08 NOTE — PROGRESS NOTES
IM Residency Progress Note   Unit/Bed#: [facility] 723-01   Encounter: [id number]  SOD Team C     Tevin Casas 50 y o  female   MRN: [id number]  Hospital Stay Days: 3    Assessment/Plan:  Principal Problem:    Vertebral artery dissection [facility])  Active Problems:    Acute nonintractable headache    Hypertensive urgency    Essential hypertension    Vision changes    CVA (cerebral vascular accident) ([facility] [address] )    1  Ischemic stroke secondary to left vertebral artery dissection  MRI positive for acute to early subacute bilateral posterior parietal lobe and right cerebellar hemisphere infarcts  CTA demonstrated left vertebral artery dissection  Neurologic exam is nonfocal   GCS 15  Neurosurgery and neurology following, appreciate recommendations  Has not required any IV Decadron for headaches  Patient has no focal neurological deficits  Continue heparin drip with aspirin; with plan to repeat CTA 2/10/2019 before initiating dual anti-platelet therapy  Continue statin therapy  Continue neuro checks every 4 hours  Goal SBP less than 160; with p r n  IV hydralazine available  Would get stat CT head for any acute change in neurological exam      2  Hypertension  Goal systolic blood pressure normotensive less than 160  Has not required any PRN overnight  Continue amlodipine 10 mg daily  Give as needed IV hydralazine 10 mg q 6 hours p r n  For SBP greater than 160     3  Hyperlipidemia  Cholesterol 262, triglycerides 115, HDL 58, and    Patient does not smoke  Family history unknown as patient is adopted  Continue Lipitor 80 mg daily  Encourage lifestyle modifications     4   Subclinical hypothyroidism  TSH elevated 5 26 with normal free T4   Will need repeat TSH and free T4 in 6 to 8 weeks as an outpatient      5  Hypokalemia  Potassium 3 9 this morning encourage oral intake    Continue to monitor electrolytes and replete as needed    Disposition:  Continue inpatient care     Subjective:   Patient seen and examined  No acute events overnight  Patient slept well through the night  Reports good appetite  Patient had a bowel movement yesterday  Patient was ambulating through the halls yesterday at times feels a little unsteady but overall doing well  She denies any headache, dizziness, blurry vision, chest pain, palpitations, or numbness and tingling in all her extremities  Vitals: Temp (24hrs), Av 6 °F (37 °C), Min:98 3 °F (36 8 °C), Max:98 9 °F (37 2 °C)   Temperature: 98 3 °F (36 8 °C)  Vitals:    19 1300 19 0809 19 1500 19 2200   BP: 142/95 148/95 145/85 149/96   BP Location:  Right arm Right arm Right arm   Pulse: 86 83 84 78   Resp: 16 16 20 20   Temp:  98 9 °F (37 2 °C) 98 6 °F (37 °C) 98 3 °F (36 8 °C)   TempSrc:  Oral Oral Oral   SpO2:  98% 96% 98%   Weight:       Height:          Body mass index is 24 18 kg/m²  I/O last 24 hours: In: 402 2 [P O :220; I V :182 2]  Out: -     Physical Exam   Constitutional: She is oriented to person, place, and time  She appears well-developed and well-nourished  No distress  HENT:   Head: Normocephalic and atraumatic  Mouth/Throat: No oropharyngeal exudate  Eyes: Pupils are equal, round, and reactive to light  Conjunctivae are normal  No scleral icterus  Neck: Normal range of motion  Cardiovascular: Normal rate, regular rhythm, normal heart sounds and intact distal pulses  Exam reveals no gallop and no friction rub  No murmur heard  Pulmonary/Chest: Effort normal and breath sounds normal  No respiratory distress  She has no wheezes  She exhibits no tenderness  Abdominal: Soft  Bowel sounds are normal  She exhibits no distension and no mass  There is no tenderness  There is no rebound and no guarding  Musculoskeletal: Normal range of motion  She exhibits no edema or tenderness  Lymphadenopathy:     She has no cervical adenopathy  Neurological: She is alert and oriented to person, place, and time   No cranial nerve deficit or sensory deficit  She exhibits normal muscle tone  Coordination abnormal    Skin: Skin is warm and dry  She is not diaphoretic  No erythema  Psychiatric: Her behavior is normal    Anxious     Vitals reviewed  Invasive Devices     Peripheral Intravenous Line            Peripheral IV 02/05/19 Left Hand 3 days                Labs: I have personally reviewed pertinent reports  and I have personally reviewed pertinent films in PACS  Recent Results (from the past 24 hour(s))   APTT    Collection Time: 02/08/19  5:26 AM   Result Value Ref Range    PTT 63 (H) 26 - 38 seconds   Basic metabolic panel    Collection Time: 02/08/19  5:26 AM   Result Value Ref Range    Sodium 137 136 - 145 mmol/L    Potassium 3 9 3 5 - 5 3 mmol/L    Chloride 104 100 - 108 mmol/L    CO2 24 21 - 32 mmol/L    ANION GAP 9 4 - 13 mmol/L    BUN 20 5 - 25 mg/dL    Creatinine 0 73 0 60 - 1 30 mg/dL    Glucose 98 65 - 140 mg/dL    Calcium 9 0 8 3 - 10 1 mg/dL    eGFR 98 ml/min/1 73sq m     Radiology Results: I have personally reviewed pertinent reports  and I have personally reviewed pertinent films in PACS  Cta Head And Neck W Wo Contrast    Result Date: 2/5/2019  Impression: 1  Left vertebral artery dissection, 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 and extending just proximal to the vertebrobasilar junction  Neurovascular service consultation recommended  2   Multifocal progressive narrowing of the distal intradural segment of the right vertebral artery and mild narrowing in the proximal basilar artery  3   Multifocal mild to moderate narrowing in the left posterior cerebral artery    I personally discussed this study with Bebeto Burch on 2/5/2019 at 1:38 AM  Workstation performed: [id number]     Ct Head Without Contrast    Result Date: 2/5/2019  Impression: Tiny foci of low-attenuation involving the posterior right parietal lobe and right cerebellar hemisphere which correspond to areas of acute ischemia described on a recent MRI brain dated February 4, 2019  No acute intracranial hemorrhage  Workstation performed: [id number]     Ct Head Wo Contrast    Result Date: 2/5/2019  Impression: No acute intracranial abnormality  Workstation performed: [id number]     Mra And Or Mrv Head Wo Contrast    Result Date: 2/5/2019  Impression: 1  Absence of signal within the distal cervical and proximal intradural segments of the left vertebral artery, compatible with findings of signal abnormality on the MRI, concerning for thrombus or dissection  Multifocal irregularity of the distal intradural segment of the right vertebral artery  Recommend CTA of the head and neck to exclude vertebral artery dissection  2   No other evidence of significant stenosis in the posterior circulation  3   No stenosis or occlusion of the major vessels in the anterior circulation  I personally discussed this study with Rose Cordero on 2/5/2019 at 12:48 AM  Workstation performed: [id number]     Mri Brain Wo Contrast    Result Date: 2/5/2019  Impression: 1  Acute to early subacute infarcts in the bilateral posterior parietal lobes and in the right cerebellar hemisphere  No hemorrhage or mass effect  2   Signal abnormality within distal cervical and intracranial segments of the left vertebral artery could represent thrombus or dissection  Recommend CT angiogram of the head and neck for further evaluation  I personally discussed this study with Rose Cordero on 2/5/2019 at 12:36 AM  Workstation performed: [id number]     Other Diagnostic Testing: I have personally reviewed pertinent reports     and I have personally reviewed pertinent films in PACS    Active Meds:   Current Facility-Administered Medications   Medication Dose Route Frequency    acetaminophen (TYLENOL) tablet 650 mg  650 mg Oral Q6H PRN    amLODIPine (NORVASC) tablet 10 mg  10 mg Oral Daily    aspirin (ECOTRIN LOW STRENGTH) EC tablet 81 mg  81 mg Oral Daily    atorvastatin (LIPITOR) tablet 80 mg  80 mg Oral Daily With Dinner    dexamethasone (DECADRON) tablet 2 mg  2 mg Oral Q8H PRN    docusate sodium (COLACE) capsule 100 mg  100 mg Oral BID    heparin (porcine) 25,000 units in 250 mL infusion (premix)  3-20 Units/kg/hr (Order-Specific) Intravenous Titrated    hydrALAZINE (APRESOLINE) injection 10 mg  10 mg Intravenous Q6H PRN    ondansetron (ZOFRAN) injection 4 mg  4 mg Intravenous Q8H PRN    senna (SENOKOT) tablet 8 6 mg  1 tablet Oral HS PRN       VTE Pharmacologic Prophylaxis: Heparin  VTE Mechanical Prophylaxis: sequential compression device    VIVIANA Barber     [address] [facility] ([place], [place])  Internal Medicine Resident PGY-2

## 2019-02-08 NOTE — PROGRESS NOTES
Progress Note - Neurosurgery   Charmaine Cantu 50 y o  female MRN: [id number]  Unit/Bed#: [facility] 723-01 Encounter: [id number]       Assessment:  1  Left VA dissection  2  Acute to early subacute bilateral posterior occipital and right cerebellar infarcts  3  Right VA stenosis  4  Left PCA stenosis  5  Cephalgia  6  HTN  7  HLD    · Exam: A&OX3, EOMI, conjugate bilaterally, SF, Ajit, no focal neurological deficits  DTR brisk through out ( her normal)  · Imaging reviewed personally and by attending  Final results as below  · CT head on 01/29/2019 acute intracranial abnormality  · MRI of brain without on 02/05/2019 demonstrate acute to subacute infarcts in the posterior bilateral parietal lobes and right cerebral hemisphere without hemorrhage or mass effect; signal abnormality in the left vertebral artery probable thrombus or dissection  · MRA and or MRV on 02/05/2019 demonstrates absence of signal within the distal cervical and proximal intradural segment of the left vertebral artery possible dissection or thrombus  · CTA of the head and neck on 02/05/2019 demonstrates left VA dissection admitting from C2 extending to vertebrobasilar junction  Multifocal distal intradural segment of right vertebral artery and also left posterior cerebral arteries narrowing  · Repeat CTA of the head and neck 02/10/2019 ordered-pending  · Pain control:  Per primary team  · Continue PT/OT evaluation treatment  · Mobilize:  As tolerated  · DVT PPX:  · SCDs-bilateral legs  · On heparin gtt  · Medical management:  Blood pressure control per primary team  · Blood pressure monitoring-on amlodipine  · Patient is doing fine today, no complaints  Neuro nonfocal   Pending CTA of the head on Sunday (on 02/10/2029)  No neurosurgical procedure is anticipated at this time  Continue with PT/ OT, heparin drips, APTT monitoring until it becomes stable within 50-70  Once stable in conjunction with CTA findings,  possible loading with Plavix  Outpatient P2Y12 be monitoring after starting on Plavix  Contact Dr Jonas Munoz if additional info needed  Call for concern or problem  Subjective/Objective   Chief Complaint: " I am doing fine, no complaints"/progress note    Subjective:  Patient reports she has no issues, denies any headache, nausea, vomiting, blurry vision or diplopia  Patient was up and doing PT/OT walking around without gait instability or tendency to fall  But she claims a little bit feeling lightheadedness  Denies any numbness, weakness, paresthesia and extremities  Has  loss of appetite, but denies bowel or bladder issues  Denies fever, chills, rigors, cough or chest pain  Objective:  Patient is propped up in bed, in no pain distress    I/O       02/06 0701 - 02/07 0700 02/07 0701 - 02/08 0700 02/08 0701 - 02/09 0700    P  O  716 220     I V  (mL/kg) 163 1 (3) 118 3 (2 2)     Total Intake(mL/kg) 879 1 (16 2) 338 3 (6 2)     Urine (mL/kg/hr) 650 (0 5)      Total Output 650        Net +229 1 +338 3             Unmeasured Urine Occurrence  3 x     Unmeasured Stool Occurrence  1 x           Invasive Devices     Peripheral Intravenous Line            Peripheral IV 02/05/19 Left Hand 3 days                Physical Exam:  Vitals: Blood pressure 134/81, pulse 66, temperature 98 4 °F (36 9 °C), temperature source Oral, resp  rate 20, height 4' 11" (1 499 m), weight 54 3 kg (119 lb 11 4 oz), SpO2 97 %  ,Body mass index is 24 18 kg/m²        General appearance: alert, appears stated age, cooperative and no distress  Head: Normocephalic, without obvious abnormality, atraumatic  Eyes: EOMI, conjugate bilateral   Neck: supple, symmetrical, trachea midline and NT  Back: no kyphosis present, no tenderness to percussion or palpation  Lungs: non labored breathing  Heart: regular heart rate  Neurologic:   Mental status: Alert, oriented x3, thought content appropriate  Cranial nerves: grossly intact (Cranial nerves II-XII)  Sensory: normal to light touch throughout  Motor: moving all extremities without focal weakness ; strength is 5/5 throughout  Reflexes: 3+ and symmetric; without clonus and Babinski is negative bilaterally  Coordination: finger to nose normal bilaterally, no drift bilaterally      Lab Results:    Results from last 7 days  Lab Units 02/08/19  0526 02/07/19  0508 02/06/19  0558   WBC Thousand/uL 8 69 5 63 6 08   HEMOGLOBIN g/dL 14 9 14 9 13 9   HEMATOCRIT % 43 9 43 4 41 4   PLATELETS Thousands/uL 224 235 229   NEUTROS PCT % 68 44 51   MONOS PCT % 9 9 9       Results from last 7 days  Lab Units 02/08/19  0526 02/07/19  0508 02/06/19  0558 02/05/19  0439 02/04/19  2029   POTASSIUM mmol/L 3 9 3 6 3 5 3 4* 3 7   CHLORIDE mmol/L 104 103 106 106 99*   CO2 mmol/L 24 25 26 23 29   BUN mg/dL 20 15 12 9 17   CREATININE mg/dL 0 73 0 68 0 68 0 58* 0 78   CALCIUM mg/dL 9 0 8 9 8 4 8 8 9 1   ALK PHOS U/L  --   --   --  82 75   ALT U/L  --   --   --  20 25   AST U/L  --   --   --  17 19       Results from last 7 days  Lab Units 02/07/19  0508 02/06/19  0558 02/05/19  0439   MAGNESIUM mg/dL 2 2 2 4 2 5       Results from last 7 days  Lab Units 02/06/19  0558 02/05/19  0439   PHOSPHORUS mg/dL 3 6 2 3*       Results from last 7 days  Lab Units 02/08/19  0526 02/07/19  0508 02/06/19  0558  02/05/19  1727 02/05/19  0439 02/05/19  0121   INR   --   --   --   --  1 09 1 03 1 15   PTT seconds 63* 77* 74*  < > 47*  --  28   < > = values in this interval not displayed  No results found for: TROPONINT  ABG:No results found for: PHART, HSZ5NQX, PO2ART, VBB3LEF, Y1ZSCPHV, BEART, SOURCE    Imaging Studies: I have personally reviewed pertinent reports  and I have personally reviewed pertinent films in PACS    EKG, Pathology, and Other Studies: I have personally reviewed pertinent reports        VTE Pharmacologic Prophylaxis:  Continue heparin drip  VTE Mechanical Prophylaxis: sequential compression device   note

## 2019-02-09 LAB
ANION GAP SERPL CALCULATED.3IONS-SCNC: 10 MMOL/L (ref 4–13)
APTT PPP: 75 SECONDS (ref 26–38)
BASOPHILS # BLD AUTO: 0.04 THOUSANDS/ΜL (ref 0–0.1)
BASOPHILS NFR BLD AUTO: 1 % (ref 0–1)
BUN SERPL-MCNC: 17 MG/DL (ref 5–25)
CALCIUM SERPL-MCNC: 8.8 MG/DL (ref 8.3–10.1)
CHLORIDE SERPL-SCNC: 104 MMOL/L (ref 100–108)
CO2 SERPL-SCNC: 24 MMOL/L (ref 21–32)
CREAT SERPL-MCNC: 0.72 MG/DL (ref 0.6–1.3)
EOSINOPHIL # BLD AUTO: 0.14 THOUSAND/ΜL (ref 0–0.61)
EOSINOPHIL NFR BLD AUTO: 2 % (ref 0–6)
ERYTHROCYTE [DISTWIDTH] IN BLOOD BY AUTOMATED COUNT: 11.8 % (ref 11.6–15.1)
GFR SERPL CREATININE-BSD FRML MDRD: 99 ML/MIN/1.73SQ M
GLUCOSE SERPL-MCNC: 92 MG/DL (ref 65–140)
HCT VFR BLD AUTO: 43.4 % (ref 34.8–46.1)
HGB BLD-MCNC: 14.6 G/DL (ref 11.5–15.4)
IMM GRANULOCYTES # BLD AUTO: 0.02 THOUSAND/UL (ref 0–0.2)
IMM GRANULOCYTES NFR BLD AUTO: 0 % (ref 0–2)
LYMPHOCYTES # BLD AUTO: 1.93 THOUSANDS/ΜL (ref 0.6–4.47)
LYMPHOCYTES NFR BLD AUTO: 29 % (ref 14–44)
MCH RBC QN AUTO: 31.1 PG (ref 26.8–34.3)
MCHC RBC AUTO-ENTMCNC: 33.6 G/DL (ref 31.4–37.4)
MCV RBC AUTO: 92 FL (ref 82–98)
MONOCYTES # BLD AUTO: 0.59 THOUSAND/ΜL (ref 0.17–1.22)
MONOCYTES NFR BLD AUTO: 9 % (ref 4–12)
NEUTROPHILS # BLD AUTO: 3.84 THOUSANDS/ΜL (ref 1.85–7.62)
NEUTS SEG NFR BLD AUTO: 59 % (ref 43–75)
NRBC BLD AUTO-RTO: 0 /100 WBCS
PLATELET # BLD AUTO: 233 THOUSANDS/UL (ref 149–390)
PMV BLD AUTO: 11.6 FL (ref 8.9–12.7)
POTASSIUM SERPL-SCNC: 3.5 MMOL/L (ref 3.5–5.3)
RBC # BLD AUTO: 4.7 MILLION/UL (ref 3.81–5.12)
SODIUM SERPL-SCNC: 138 MMOL/L (ref 136–145)
WBC # BLD AUTO: 6.56 THOUSAND/UL (ref 4.31–10.16)

## 2019-02-09 PROCEDURE — 85730 THROMBOPLASTIN TIME PARTIAL: CPT | Performed by: INTERNAL MEDICINE

## 2019-02-09 PROCEDURE — 80048 BASIC METABOLIC PNL TOTAL CA: CPT | Performed by: INTERNAL MEDICINE

## 2019-02-09 PROCEDURE — 85025 COMPLETE CBC W/AUTO DIFF WBC: CPT | Performed by: INTERNAL MEDICINE

## 2019-02-09 RX ORDER — HYDROXYZINE HYDROCHLORIDE 25 MG/1
25 TABLET, FILM COATED ORAL
Status: DISCONTINUED | OUTPATIENT
Start: 2019-02-09 | End: 2019-02-11 | Stop reason: HOSPADM

## 2019-02-09 RX ADMIN — HEPARIN SODIUM AND DEXTROSE 14 UNITS/KG/HR: 10000; 5 INJECTION INTRAVENOUS at 15:24

## 2019-02-09 RX ADMIN — ATORVASTATIN CALCIUM 80 MG: 80 TABLET, FILM COATED ORAL at 16:35

## 2019-02-09 RX ADMIN — AMLODIPINE BESYLATE 10 MG: 10 TABLET ORAL at 08:26

## 2019-02-09 RX ADMIN — DOCUSATE SODIUM 100 MG: 100 CAPSULE, LIQUID FILLED ORAL at 08:26

## 2019-02-09 RX ADMIN — ASPIRIN 81 MG: 81 TABLET, COATED ORAL at 08:26

## 2019-02-09 NOTE — PLAN OF CARE
Problem: SAFETY ADULT  Goal: Patient will remain free of falls  INTERVENTIONS:  - Assess patient frequently for physical needs  -  Identify cognitive and physical deficits and behaviors that affect risk of falls  -  [place] fall precautions as indicated by assessment   - Educate patient/family on patient safety including physical limitations  - Instruct patient to call for assistance with activity based on assessment  - Modify environment to reduce risk of injury  - Consider OT/PT consult to assist with strengthening/mobility    Outcome: Progressing      Problem: Potential for Falls  Goal: Patient will remain free of falls  INTERVENTIONS:  - Assess patient frequently for physical needs  -  Identify cognitive and physical deficits and behaviors that affect risk of falls    -  [place] fall precautions as indicated by assessment   - Educate patient/family on patient safety including physical limitations  - Instruct patient to call for assistance with activity based on assessment  - Modify environment to reduce risk of injury  - Consider OT/PT consult to assist with strengthening/mobility    Outcome: Progressing      Problem: DISCHARGE PLANNING - CARE MANAGEMENT  Goal: Discharge to post-acute care or home with appropriate resources  INTERVENTIONS:  - Conduct assessment to determine patient/family and health care team treatment goals, and need for post-acute services based on payer coverage, community resources, and patient preferences, and barriers to discharge  - Address psychosocial, clinical, and financial barriers to discharge as identified in assessment in conjunction with the patient/family and health care team  - Arrange appropriate level of post-acute services according to patient's   needs and preference and payer coverage in collaboration with the physician and health care team  - Communicate with and update the patient/family, physician, and health care team regarding progress on the discharge plan  - Arrange appropriate transportation to post-acute venues  Pt is going home to family when medically clear for dc   Outcome: Progressing

## 2019-02-09 NOTE — PROGRESS NOTES
Called into patients room, states she does not feel well  VS documented in EPIC, Hydralazine given for elevated BP, patient also stated that she felt very anxious  SOD called Atarax ordered          02/08/19 3044   Vital Signs   Temperature (!) 97 4 °F (36 3 °C)   Pulse 86   Blood Pressure 160/96   Oxygen Therapy   SpO2 98 %   O2 Device None (Room air)

## 2019-02-09 NOTE — PROGRESS NOTES
Called into patients room, c/o numbness and tingling of b/l arms  SOD called, BP improved, atarax given          02/08/19 1805   Vital Signs   Pulse (!) 114   Blood Pressure 131/80   BP Location Right arm   BP Method Automatic   Patient Position - Orthostatic VS Sitting

## 2019-02-09 NOTE — PROGRESS NOTES
IM Residency Progress Note   Unit/Bed#: [facility] 723-01   Encounter: [id number]  SOD Team C     Bob Crandall 50 y o  female   MRN: [id number]  Hospital Stay Days: 4    Assessment/Plan:  Principal Problem:    Vertebral artery dissection [facility])  Active Problems:    Acute nonintractable headache    Hypertensive urgency    Essential hypertension    Vision changes    CVA (cerebral vascular accident) ([address] )    1  Ischemic stroke secondary to left vertebral artery dissection  MRI positive for acute to early subacute bilateral posterior parietal lobe and right cerebellar hemisphere infarcts  CTA demonstrated left vertebral artery dissection  Neurologic exam is nonfocal   GCS 15  Neurosurgery and neurology following, appreciate recommendations  Has not required any IV Decadron for headaches  Patient has no focal neurological deficits  Continue heparin drip with aspirin; with plan to repeat CTA 2/10/2019 before initiating dual anti-platelet therapy  Continue statin therapy  Continue neuro checks every 4 hours  Goal SBP less than 160; with p r n  IV hydralazine available  Received x 1 yesterday   Would get stat CT head for any acute change in neurological exam      2  Hypertension  Goal systolic blood pressure normotensive less than 160  Required x 1 PRN overnight  Continue amlodipine 10 mg daily  Give as needed IV hydralazine 10 mg q 6 hours p r n  For SBP greater than 160     3  Hyperlipidemia  Cholesterol 262, triglycerides 115, HDL 58, and    Patient does not smoke  Family history unknown as patient is adopted  Continue Lipitor 80 mg daily  Encourage lifestyle modifications     4   Subclinical hypothyroidism  TSH elevated 5 26 with normal free T4   Will need repeat TSH and free T4 in 6 to 8 weeks as an outpatient      5  Hypokalemia  Potassium 3 5 this morning encourage oral intake  Continue to monitor electrolytes and replete as needed     6   Anxiety   Likely secondary to stress of acute illness  Would be okay giving as needed Atarax     Disposition:  Continue inpatient care, repeat CTA head tomorrow     Subjective:   Patient seen and examined  Yesterday patient started to developed throbbing and burning sensation in right arm which made her very anxious at the at that time patient's blood pressure was slightly elevated with systolic blood pressure in the 160s  Patient was evaluated by resident and no focal neurological deficit likely secondary to pain at IV site that had been bothering her throughout the day  Patient's IV was exchanged and currently she denies any numbness and tingling in all of her extremities  At that time she was given IV hydralazine and Atarax which improved on  She denies any headache, lightheadedness, blurry vision, chest pain, palpitations, nausea, vomiting, or weakness  She slept well and has a good appetite  Vitals: Temp (24hrs), Av 1 °F (36 7 °C), Min:97 4 °F (36 3 °C), Max:98 4 °F (36 9 °C)   Temperature: 98 4 °F (36 9 °C)  Vitals:    19 1523 19 1718 19 1805 19 2208   BP: 149/100 160/96 131/80 112/63   BP Location: Right arm  Right arm Right arm   Pulse: 73 86 (!) 114 84   Resp: 20   18   Temp: 98 2 °F (36 8 °C) (!) 97 4 °F (36 3 °C)  98 4 °F (36 9 °C)   TempSrc: Oral   Oral   SpO2: 99% 98%  96%   Weight:       Height:          Body mass index is 24 18 kg/m²  I/O last 24 hours: In: 80 [P O :580]  Out: -     Physical Exam   Constitutional: She is oriented to person, place, and time  She appears well-developed and well-nourished  No distress  HENT:   Head: Normocephalic and atraumatic  Mouth/Throat: No oropharyngeal exudate  Eyes: Pupils are equal, round, and reactive to light  Conjunctivae are normal  No scleral icterus  Neck: Normal range of motion  Cardiovascular: Normal rate, regular rhythm, normal heart sounds and intact distal pulses  Exam reveals no gallop and no friction rub      No murmur heard   Pulmonary/Chest: Effort normal and breath sounds normal  No respiratory distress  She has no wheezes  She exhibits no tenderness  Abdominal: Soft  Bowel sounds are normal  She exhibits no distension and no mass  There is no tenderness  There is no rebound and no guarding  Musculoskeletal: Normal range of motion  She exhibits no edema or tenderness  Lymphadenopathy:     She has no cervical adenopathy  Neurological: She is alert and oriented to person, place, and time  No cranial nerve deficit  Skin: Skin is warm and dry  She is not diaphoretic  No erythema  Psychiatric: She has a normal mood and affect  Her behavior is normal    Slightly anxious   Vitals reviewed  Invasive Devices     Peripheral Intravenous Line            Peripheral IV 02/08/19 Left Antecubital less than 1 day                Labs: I have personally reviewed pertinent reports     and I have personally reviewed pertinent films in PACS  Recent Results (from the past 24 hour(s))   APTT    Collection Time: 02/08/19  5:26 AM   Result Value Ref Range    PTT 63 (H) 26 - 38 seconds   CBC and differential    Collection Time: 02/08/19  5:26 AM   Result Value Ref Range    WBC 8 69 4 31 - 10 16 Thousand/uL    RBC 4 74 3 81 - 5 12 Million/uL    Hemoglobin 14 9 11 5 - 15 4 g/dL    Hematocrit 43 9 34 8 - 46 1 %    MCV 93 82 - 98 fL    MCH 31 4 26 8 - 34 3 pg    MCHC 33 9 31 4 - 37 4 g/dL    RDW 11 7 11 6 - 15 1 %    MPV 11 5 8 9 - 12 7 fL    Platelets 768 806 - 517 Thousands/uL    nRBC 0 /100 WBCs    Neutrophils Relative 68 43 - 75 %    Immat GRANS % 0 0 - 2 %    Lymphocytes Relative 21 14 - 44 %    Monocytes Relative 9 4 - 12 %    Eosinophils Relative 1 0 - 6 %    Basophils Relative 1 0 - 1 %    Neutrophils Absolute 5 88 1 85 - 7 62 Thousands/µL    Immature Grans Absolute 0 01 0 00 - 0 20 Thousand/uL    Lymphocytes Absolute 1 86 0 60 - 4 47 Thousands/µL    Monocytes Absolute 0 78 0 17 - 1 22 Thousand/µL    Eosinophils Absolute 0 12 0 00 - 0 61 Thousand/µL    Basophils Absolute 0 04 0 00 - 0 10 Thousands/µL   Basic metabolic panel    Collection Time: 02/08/19  5:26 AM   Result Value Ref Range    Sodium 137 136 - 145 mmol/L    Potassium 3 9 3 5 - 5 3 mmol/L    Chloride 104 100 - 108 mmol/L    CO2 24 21 - 32 mmol/L    ANION GAP 9 4 - 13 mmol/L    BUN 20 5 - 25 mg/dL    Creatinine 0 73 0 60 - 1 30 mg/dL    Glucose 98 65 - 140 mg/dL    Calcium 9 0 8 3 - 10 1 mg/dL    eGFR 98 ml/min/1 73sq m   Fingerstick Glucose (POCT)    Collection Time: 02/08/19  5:23 PM   Result Value Ref Range    POC Glucose 135 65 - 140 mg/dl     Radiology Results: I have personally reviewed pertinent reports  and I have personally reviewed pertinent films in PACS  Cta Head And Neck W Wo Contrast    Result Date: 2/5/2019  Impression: 1  Left vertebral artery dissection, 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 and extending just proximal to the vertebrobasilar junction  Neurovascular service consultation recommended  2   Multifocal progressive narrowing of the distal intradural segment of the right vertebral artery and mild narrowing in the proximal basilar artery  3   Multifocal mild to moderate narrowing in the left posterior cerebral artery  I personally discussed this study with Oliva Davis on 2/5/2019 at 1:38 AM  Workstation performed: [id number]     Ct Head Without Contrast    Result Date: 2/5/2019  Impression: Tiny foci of low-attenuation involving the posterior right parietal lobe and right cerebellar hemisphere which correspond to areas of acute ischemia described on a recent MRI brain dated February 4, 2019  No acute intracranial hemorrhage  Workstation performed: [id number]     Ct Head Wo Contrast    Result Date: 2/5/2019  Impression: No acute intracranial abnormality  Workstation performed: [id number]     Mra And Or Mrv Head Wo Contrast    Result Date: 2/5/2019  Impression: 1    Absence of signal within the distal cervical and proximal intradural segments of the left vertebral artery, compatible with findings of signal abnormality on the MRI, concerning for thrombus or dissection  Multifocal irregularity of the distal intradural segment of the right vertebral artery  Recommend CTA of the head and neck to exclude vertebral artery dissection  2   No other evidence of significant stenosis in the posterior circulation  3   No stenosis or occlusion of the major vessels in the anterior circulation  I personally discussed this study with Amanda Little on 2/5/2019 at 12:48 AM  Workstation performed: [id number]     Mri Brain Wo Contrast    Result Date: 2/5/2019  Impression: 1  Acute to early subacute infarcts in the bilateral posterior parietal lobes and in the right cerebellar hemisphere  No hemorrhage or mass effect  2   Signal abnormality within distal cervical and intracranial segments of the left vertebral artery could represent thrombus or dissection  Recommend CT angiogram of the head and neck for further evaluation  I personally discussed this study with Amanda Little on 2/5/2019 at 12:36 AM  Workstation performed: [id number]     Other Diagnostic Testing: I have personally reviewed pertinent reports     and I have personally reviewed pertinent films in PACS    Active Meds:   Current Facility-Administered Medications   Medication Dose Route Frequency    acetaminophen (TYLENOL) tablet 650 mg  650 mg Oral Q6H PRN    amLODIPine (NORVASC) tablet 10 mg  10 mg Oral Daily    aspirin (ECOTRIN LOW STRENGTH) EC tablet 81 mg  81 mg Oral Daily    atorvastatin (LIPITOR) tablet 80 mg  80 mg Oral Daily With Dinner    dexamethasone (DECADRON) tablet 2 mg  2 mg Oral Q8H PRN    docusate sodium (COLACE) capsule 100 mg  100 mg Oral BID    heparin (porcine) 25,000 units in 250 mL infusion (premix)  3-20 Units/kg/hr (Order-Specific) Intravenous Titrated    hydrALAZINE (APRESOLINE) injection 10 mg  10 mg Intravenous Q6H PRN    ondansetron (ZOFRAN) injection 4 mg  4 mg Intravenous Q8H PRN    senna (SENOKOT) tablet 8 6 mg  1 tablet Oral HS PRN       VTE Pharmacologic Prophylaxis: Heparin  VTE Mechanical Prophylaxis: sequential compression device    VIVIANA Cobb     [address] [facility]  Internal Medicine Resident PGY-2

## 2019-02-10 ENCOUNTER — APPOINTMENT (INPATIENT)
Dept: RADIOLOGY | Facility: HOSPITAL | Age: 49
DRG: 299 | End: 2019-02-10
Payer: COMMERCIAL

## 2019-02-10 LAB
ANION GAP SERPL CALCULATED.3IONS-SCNC: 7 MMOL/L (ref 4–13)
APTT PPP: 29 SECONDS (ref 26–38)
APTT PPP: 57 SECONDS (ref 26–38)
BASOPHILS # BLD AUTO: 0.04 THOUSANDS/ΜL (ref 0–0.1)
BASOPHILS NFR BLD AUTO: 1 % (ref 0–1)
BUN SERPL-MCNC: 19 MG/DL (ref 5–25)
CALCIUM SERPL-MCNC: 8.8 MG/DL (ref 8.3–10.1)
CHLORIDE SERPL-SCNC: 104 MMOL/L (ref 100–108)
CO2 SERPL-SCNC: 24 MMOL/L (ref 21–32)
CREAT SERPL-MCNC: 0.71 MG/DL (ref 0.6–1.3)
EOSINOPHIL # BLD AUTO: 0.2 THOUSAND/ΜL (ref 0–0.61)
EOSINOPHIL NFR BLD AUTO: 3 % (ref 0–6)
ERYTHROCYTE [DISTWIDTH] IN BLOOD BY AUTOMATED COUNT: 11.7 % (ref 11.6–15.1)
GFR SERPL CREATININE-BSD FRML MDRD: 101 ML/MIN/1.73SQ M
GLUCOSE SERPL-MCNC: 86 MG/DL (ref 65–140)
HCT VFR BLD AUTO: 43.4 % (ref 34.8–46.1)
HGB BLD-MCNC: 14.6 G/DL (ref 11.5–15.4)
IMM GRANULOCYTES # BLD AUTO: 0.02 THOUSAND/UL (ref 0–0.2)
IMM GRANULOCYTES NFR BLD AUTO: 0 % (ref 0–2)
LYMPHOCYTES # BLD AUTO: 2.26 THOUSANDS/ΜL (ref 0.6–4.47)
LYMPHOCYTES NFR BLD AUTO: 32 % (ref 14–44)
MCH RBC QN AUTO: 31.1 PG (ref 26.8–34.3)
MCHC RBC AUTO-ENTMCNC: 33.6 G/DL (ref 31.4–37.4)
MCV RBC AUTO: 92 FL (ref 82–98)
MONOCYTES # BLD AUTO: 0.72 THOUSAND/ΜL (ref 0.17–1.22)
MONOCYTES NFR BLD AUTO: 10 % (ref 4–12)
NEUTROPHILS # BLD AUTO: 3.88 THOUSANDS/ΜL (ref 1.85–7.62)
NEUTS SEG NFR BLD AUTO: 54 % (ref 43–75)
NRBC BLD AUTO-RTO: 0 /100 WBCS
PLATELET # BLD AUTO: 229 THOUSANDS/UL (ref 149–390)
PMV BLD AUTO: 11.2 FL (ref 8.9–12.7)
POTASSIUM SERPL-SCNC: 3.5 MMOL/L (ref 3.5–5.3)
RBC # BLD AUTO: 4.7 MILLION/UL (ref 3.81–5.12)
SODIUM SERPL-SCNC: 135 MMOL/L (ref 136–145)
WBC # BLD AUTO: 7.12 THOUSAND/UL (ref 4.31–10.16)

## 2019-02-10 PROCEDURE — 85025 COMPLETE CBC W/AUTO DIFF WBC: CPT | Performed by: INTERNAL MEDICINE

## 2019-02-10 PROCEDURE — 85730 THROMBOPLASTIN TIME PARTIAL: CPT | Performed by: INTERNAL MEDICINE

## 2019-02-10 PROCEDURE — 70496 CT ANGIOGRAPHY HEAD: CPT

## 2019-02-10 PROCEDURE — 80048 BASIC METABOLIC PNL TOTAL CA: CPT | Performed by: INTERNAL MEDICINE

## 2019-02-10 PROCEDURE — 99233 SBSQ HOSP IP/OBS HIGH 50: CPT | Performed by: PHYSICIAN ASSISTANT

## 2019-02-10 PROCEDURE — 70498 CT ANGIOGRAPHY NECK: CPT

## 2019-02-10 RX ORDER — CLOPIDOGREL BISULFATE 75 MG/1
300 TABLET ORAL ONCE
Status: COMPLETED | OUTPATIENT
Start: 2019-02-10 | End: 2019-02-10

## 2019-02-10 RX ORDER — POTASSIUM CHLORIDE 20 MEQ/1
40 TABLET, EXTENDED RELEASE ORAL ONCE
Status: COMPLETED | OUTPATIENT
Start: 2019-02-10 | End: 2019-02-10

## 2019-02-10 RX ORDER — CLOPIDOGREL BISULFATE 75 MG/1
75 TABLET ORAL DAILY
Status: DISCONTINUED | OUTPATIENT
Start: 2019-02-11 | End: 2019-02-11 | Stop reason: HOSPADM

## 2019-02-10 RX ORDER — POTASSIUM CHLORIDE 20 MEQ/1
20 TABLET, EXTENDED RELEASE ORAL DAILY
Status: DISCONTINUED | OUTPATIENT
Start: 2019-02-10 | End: 2019-02-11 | Stop reason: HOSPADM

## 2019-02-10 RX ADMIN — AMLODIPINE BESYLATE 10 MG: 10 TABLET ORAL at 08:48

## 2019-02-10 RX ADMIN — POTASSIUM CHLORIDE 40 MEQ: 1500 TABLET, EXTENDED RELEASE ORAL at 08:48

## 2019-02-10 RX ADMIN — IOHEXOL 85 ML: 350 INJECTION, SOLUTION INTRAVENOUS at 07:57

## 2019-02-10 RX ADMIN — CLOPIDOGREL BISULFATE 300 MG: 75 TABLET ORAL at 10:18

## 2019-02-10 RX ADMIN — ACETAMINOPHEN 650 MG: 325 TABLET, FILM COATED ORAL at 17:22

## 2019-02-10 RX ADMIN — ATORVASTATIN CALCIUM 80 MG: 80 TABLET, FILM COATED ORAL at 15:26

## 2019-02-10 RX ADMIN — ASPIRIN 81 MG: 81 TABLET, COATED ORAL at 08:48

## 2019-02-10 RX ADMIN — HYDROXYZINE HYDROCHLORIDE 25 MG: 25 TABLET, FILM COATED ORAL at 17:33

## 2019-02-10 NOTE — PROGRESS NOTES
Progress Note - Neurosurgery   Cathy Kohli 50 y o  female MRN: [id number]  Unit/Bed#: [facility] 723-01 Encounter: [id number]    Assessment:  1  Left VA dissection  2  Acute to early subacute bilateral posterior occipital and right cerebellar infarcts  3  Right VA stenosis  4  Left PCA stenosis  5  Cephalgia  6  HTN  7  HLD      Plan:  -CTA reviewed by Dr Mat Mejía  -will discontinue heparin drip and initiate Plavix loading dose 300 mg now   -the then Plavix 75 mg daily  -continue ASA 81 mg daily  -P2 Y 12 in 4-5 days  -neurosurgery will continue to follow    Subjective/Objective     No complaints      Intake/Output       02/10/19 0701 - 02/11/19 0700      [id number] Total       Intake    P O   180  -- 180    Total Intake 180 -- 180       Output    Total Output -- -- --       Net I/O     180 -- 180          Invasive Devices     Peripheral Intravenous Line            Peripheral IV 02/08/19 Left Antecubital 1 day    Peripheral IV 02/10/19 Proximal;Right Forearm less than 1 day                Physical Exam:    Vitals: Blood pressure 139/91, pulse 72, temperature 98 7 °F (37 1 °C), temperature source Oral, resp  rate 18, height 4' 11" (1 499 m), weight 54 3 kg (119 lb 11 4 oz), SpO2 93 %  ,Body mass index is 24 18 kg/m²  Awake and alert  Moves all extremities  Facial features symmetric  Tongue protrudes in the midline  PERRLA  Strength full x4  Lungs clear bilateral  Heart regular rate  Abdomen soft      Lab Results: I have personally reviewed pertinent results  Imaging Studies: I have personally reviewed pertinent reports          VTE Pharmacologic Prophylaxis: Heparin    VTE Mechanical Prophylaxis: sequential compression device

## 2019-02-10 NOTE — PROGRESS NOTES
IM Residency Progress Note   [facility] Encounter: [id number]  SOD Team C       Mackey Najjar 50 y o  female [id number]    Hospital Stay Days: 5      Assessment/Plan:  Ms Mackey Najjar is a 50 y o  female with past medical history significant for hypertension and gastroesophageal reflux disease  Patient initially presented to the hospital with symptoms of headache, dizziness, nausea and vomiting was found to have an acute to early subacute bilateral posterior parietal lobe and right cerebellar hemisphere infarct likely secondary to left vertebral artery dissection  She is currently receiving IV heparin and being followed by Neurosurgery  Principal Problem:    Vertebral artery dissection [facility])  Active Problems:    Acute nonintractable headache    Hypertensive urgency    Essential hypertension    Vision changes    CVA (cerebral vascular accident) ([facility] [address] )    Ischemic stroke secondary to left vertebral artery dissection  MRI is positive for acute to early subacute bilateral posterior parietal lobe and right cerebellar hemispheric infarcts from February 5, 2019  CTA from 02/05 demonstrated left vertebral artery dissection  No focal neurologic deficits   -neurology and neurosurgery continued to follow and appreciate recommendations   -continue IV heparin and aspirin with plan for repeat CT angiogram of the head today before initiating dual antiplatelet therapy with loading dose   -continue statin therapy   -continue neuro checks q 4   -goal SBP less than 160 mm Hg   -head CT for any acute changes in neurologic examination    Hypertension  Goal systolic blood pressure is normotensive and less than 160 mm Hg   -continue amlodipine 10 mg daily   -continue hydralazine as needed for SBP greater than 160, patient has not required a dosage in the last 24 hours      Hyperlipidemia   -continue atorvastatin 80 mg daily    Subclinical hypothyroidism  TSH 5 26 with normal free T4   -patient require repeat to seizure free T4 in 6-8 weeks as outpatient    Hypokalemia  Potassium 3 5 this morning and stable from yesterday   -replete with 40 mEq potassium chloride with breakfast   -check magnesium tomorrow morning    Anxiety   -continue Atarax as needed    Disposition:   Follow up CTA results  If negative for bleed may start loading dose DAPT and monitor overnight with plan for discharge on 2019       Subjective:   Patient seen and examined in bed this morning  Denied acute overnight events  States that she feels" hazy" as a residual from her stroke, but other than that is feeling well and back to baseline  Denied headache, fever/chills, n/v, abd pain, c/p, SOB, palpitations, diarrhea  Vitals: Temp (24hrs), Av 1 °F (36 7 °C), Min:98 1 °F (36 7 °C), Max:98 1 °F (36 7 °C)  Current: Temperature: 98 1 °F (36 7 °C)  Vitals:    19 0750 19 1240 19 1516 19 2158   BP: 124/88 121/83 137/90 148/89   BP Location: Right arm Right arm Left arm Right arm   Pulse: 65 87 81 84   Resp: 18 18 18 18   Temp: 98 5 °F (36 9 °C)  98 1 °F (36 7 °C) 98 1 °F (36 7 °C)   TempSrc: Oral  Oral Oral   SpO2: 99% 97% 97% 99%   Weight:       Height:        Body mass index is 24 18 kg/m²  I/O last 24 hours:   In: 1080 [P O :1080]  Out: -       Physical Exam: /91 (BP Location: Right arm)   Pulse 72   Temp 98 7 °F (37 1 °C) (Oral)   Resp 18   Ht 4' 11" (1 499 m)   Wt 54 3 kg (119 lb 11 4 oz)   SpO2 93%   BMI 24 18 kg/m²     General Appearance:    Alert, cooperative, no distress, appears stated age   Head:    Normocephalic, without obvious abnormality, atraumatic   Throat:   Lips, mucosa, and tongue normal; teeth and gums normal   Neck:   Supple, symmetrical, trachea midline, no adenopathy   Back:     Symmetric, no curvature, ROM normal   Lungs:     Clear to auscultation bilaterally, respirations unlabored   Chest Wall:    No tenderness or deformity    Heart:    Regular rate and rhythm, S1 and S2 normal Abdomen:     Soft, non-tender, bowel sounds active all four quadrants   Extremities:   Extremities normal, atraumatic, no cyanosis or edema   Pulses:   2+ and symmetric all extremities   Skin:   Skin color, texture, turgor normal, no rashes or lesions   Lymph nodes:   Cervical, supraclavicular, and axillary nodes normal   Neurologic:   CNII-XII grossly intact  Answer questions appropriately, fluent speech, AAO x3          Invasive Devices     Peripheral Intravenous Line            Peripheral IV 02/08/19 Left Antecubital 1 day    Peripheral IV 02/10/19 Proximal;Right Forearm less than 1 day                          Labs:   Recent Results (from the past 24 hour(s))   CBC and differential    Collection Time: 02/10/19  5:09 AM   Result Value Ref Range    WBC 7 12 4 31 - 10 16 Thousand/uL    RBC 4 70 3 81 - 5 12 Million/uL    Hemoglobin 14 6 11 5 - 15 4 g/dL    Hematocrit 43 4 34 8 - 46 1 %    MCV 92 82 - 98 fL    MCH 31 1 26 8 - 34 3 pg    MCHC 33 6 31 4 - 37 4 g/dL    RDW 11 7 11 6 - 15 1 %    MPV 11 2 8 9 - 12 7 fL    Platelets 204 965 - 955 Thousands/uL    nRBC 0 /100 WBCs    Neutrophils Relative 54 43 - 75 %    Immat GRANS % 0 0 - 2 %    Lymphocytes Relative 32 14 - 44 %    Monocytes Relative 10 4 - 12 %    Eosinophils Relative 3 0 - 6 %    Basophils Relative 1 0 - 1 %    Neutrophils Absolute 3 88 1 85 - 7 62 Thousands/µL    Immature Grans Absolute 0 02 0 00 - 0 20 Thousand/uL    Lymphocytes Absolute 2 26 0 60 - 4 47 Thousands/µL    Monocytes Absolute 0 72 0 17 - 1 22 Thousand/µL    Eosinophils Absolute 0 20 0 00 - 0 61 Thousand/µL    Basophils Absolute 0 04 0 00 - 0 10 Thousands/µL   Basic metabolic panel    Collection Time: 02/10/19  5:09 AM   Result Value Ref Range    Sodium 135 (L) 136 - 145 mmol/L    Potassium 3 5 3 5 - 5 3 mmol/L    Chloride 104 100 - 108 mmol/L    CO2 24 21 - 32 mmol/L    ANION GAP 7 4 - 13 mmol/L    BUN 19 5 - 25 mg/dL    Creatinine 0 71 0 60 - 1 30 mg/dL    Glucose 86 65 - 140 mg/dL Calcium 8 8 8 3 - 10 1 mg/dL    eGFR 101 ml/min/1 73sq m   APTT    Collection Time: 02/10/19  6:25 AM   Result Value Ref Range    PTT 57 (H) 26 - 38 seconds       Radiology Results: I have personally reviewed pertinent reports  Other Diagnostic Testing:   I have personally reviewed pertinent reports          Active Meds:   Current Facility-Administered Medications   Medication Dose Route Frequency    acetaminophen (TYLENOL) tablet 650 mg  650 mg Oral Q6H PRN    amLODIPine (NORVASC) tablet 10 mg  10 mg Oral Daily    aspirin (ECOTRIN LOW STRENGTH) EC tablet 81 mg  81 mg Oral Daily    atorvastatin (LIPITOR) tablet 80 mg  80 mg Oral Daily With Dinner    dexamethasone (DECADRON) tablet 2 mg  2 mg Oral Q8H PRN    docusate sodium (COLACE) capsule 100 mg  100 mg Oral BID    heparin (porcine) 25,000 units in 250 mL infusion (premix)  3-20 Units/kg/hr (Order-Specific) Intravenous Titrated    hydrALAZINE (APRESOLINE) injection 10 mg  10 mg Intravenous Q6H PRN    hydrOXYzine HCL (ATARAX) tablet 25 mg  25 mg Oral HS PRN    ondansetron (ZOFRAN) injection 4 mg  4 mg Intravenous Q8H PRN    senna (SENOKOT) tablet 8 6 mg  1 tablet Oral HS PRN         VTE Pharmacologic Prophylaxis: Heparin  VTE Mechanical Prophylaxis: sequential compression device    Eunice Deal MD

## 2019-02-10 NOTE — PLAN OF CARE
Problem: PAIN - ADULT  Goal: Verbalizes/displays adequate comfort level or baseline comfort level  Description  Interventions:  - Encourage patient to monitor pain and request assistance  - Assess pain using appropriate pain scale  - Administer analgesics based on type and severity of pain and evaluate response  - Implement non-pharmacological measures as appropriate and evaluate response  - Consider cultural and social influences on pain and pain management  - Notify physician/advanced practitioner if interventions unsuccessful or patient reports new pain   Outcome: Progressing     Problem: INFECTION - ADULT  Goal: Absence or prevention of progression during hospitalization  Description  INTERVENTIONS:  - Assess and monitor for signs and symptoms of infection  - Monitor lab/diagnostic results  - Monitor all insertion sites, i e  indwelling lines, tubes, and drains  - Monitor endotracheal (as able) and nasal secretions for changes in amount and color  - [place] appropriate cooling/warming therapies per order  - Administer medications as ordered  - Instruct and encourage patient and family to use good hand hygiene technique  - Identify and instruct in appropriate isolation precautions for identified infection/condition   Outcome: Progressing     Problem: SAFETY ADULT  Goal: Patient will remain free of falls  Description  INTERVENTIONS:  - Assess patient frequently for physical needs  -  Identify cognitive and physical deficits and behaviors that affect risk of falls    -  [place] fall precautions as indicated by assessment   - Educate patient/family on patient safety including physical limitations  - Instruct patient to call for assistance with activity based on assessment  - Modify environment to reduce risk of injury  - Consider OT/PT consult to assist with strengthening/mobility    Outcome: Progressing  Goal: Maintain or return to baseline ADL function  Description  INTERVENTIONS:  -  Assess patient's ability to carry out ADLs; assess patient's baseline for ADL function and identify physical deficits which impact ability to perform ADLs (bathing, care of mouth/teeth, toileting, grooming, dressing, etc )  - Assess/evaluate cause of self-care deficits   - Assess range of motion  - Assess patient's mobility; develop plan if impaired  - Assess patient's need for assistive devices and provide as appropriate  - Encourage maximum independence but intervene and supervise when necessary  ¯ Involve family in performance of ADLs  ¯ Assess for home care needs following discharge   ¯ Request OT consult to assist with ADL evaluation and planning for discharge  ¯ Provide patient education as appropriate   Outcome: Progressing  Goal: Maintain or return mobility status to optimal level  Description  INTERVENTIONS:  - Assess patient's baseline mobility status (ambulation, transfers, stairs, etc )    - Identify cognitive and physical deficits and behaviors that affect mobility  - Identify mobility aids required to assist with transfers and/or ambulation (gait belt, sit-to-stand, lift, walker, cane, etc )  - [place] fall precautions as indicated by assessment  - Record patient progress and toleration of activity level on Mobility SBAR; progress patient to next Phase/Stage  - Instruct patient to call for assistance with activity based on assessment  - Request Rehabilitation consult to assist with strengthening/weightbearing, etc    Outcome: Progressing     Problem: DISCHARGE PLANNING  Goal: Discharge to home or other facility with appropriate resources  Description  INTERVENTIONS:  - Identify barriers to discharge w/patient and caregiver  - Arrange for needed discharge resources and transportation as appropriate  - Identify discharge learning needs (meds, wound care, etc )  - Arrange for interpretive services to assist at discharge as needed  - Refer to Case Management Department for coordinating discharge planning if the patient needs post-hospital services based on physician/advanced practitioner order or complex needs related to functional status, cognitive ability, or social support system   Outcome: Progressing     Problem: Knowledge Deficit  Goal: Patient/family/caregiver demonstrates understanding of disease process, treatment plan, medications, and discharge instructions  Description  Complete learning assessment and assess knowledge base  Interventions:  - Provide teaching at level of understanding  - Provide teaching via preferred learning methods   Outcome: Progressing     Problem: Neurological Deficit  Goal: Neurological status is stable or improving  Description  Interventions:  - Monitor and assess patient's level of consciousness, motor function, sensory function, and level of assistance needed for ADLs  - Monitor and report changes from baseline  Collaborate with interdisciplinary team to initiate plan and implement interventions as ordered  - Provide and maintain a safe environment  - Utilize seizure precautions  - Utilize fall precautions  - Utilize aspiration precautions  - Utilize bleeding precautions  Outcome: Progressing     Problem: Activity Intolerance/Impaired Mobility  Goal: Mobility/activity is maintained at optimum level for patient  Description  Interventions:  - Assess and monitor patient  barriers to mobility and need for assistive/adaptive devices  - Assess patient's emotional response to limitations  - Collaborate with interdisciplinary team and initiate plans and interventions as ordered  - Encourage independent activity per ability   - Maintain proper body alignment  - Perform active/passive rom as tolerated/ordered  - Plan activities to conserve energy   - Turn patient   Outcome: Progressing     Problem: Communication Impairment  Goal: Ability to express needs and understand communication  Description  Assess patient's communication skills and ability to understand information    Patient will demonstrate use of effective communication techniques, alternative methods of communication and understanding even if not able to speak  - Encourage communication and provide alternate methods of communication as needed  - Collaborate with case management/ for discharge needs  - Include patient/family/caregiver in decisions related to communication  Outcome: Progressing     Problem: Potential for Aspiration  Goal: Non-ventilated patient's risk of aspiration is minimized  Description  Assess and monitor vital signs, respiratory status, and labs (WBC)  Monitor for signs of aspiration (tachypnea, cough, rales, wheezing, cyanosis, fever)  - Assess and monitor patient's ability to swallow  - Place patient up in chair to eat if possible  - HOB up at 90 degrees to eat if unable to get patient up into chair   - Supervise patient during oral intake  - Instruct patient to take small bites  - Instruct patient to take small single sips when taking liquids  - Follow patient-specific strategies generated by speech pathologist    Outcome: Progressing     Problem: Nutrition  Goal: Nutrition/Hydration status is improving  Description  Monitor and assess patient's nutrition/hydration status for malnutrition (ex- brittle hair, bruises, dry skin, pale skin and conjunctiva, muscle wasting, smooth red tongue, and disorientation)  Collaborate with interdisciplinary team and initiate plan and interventions as ordered  Monitor patient's weight and dietary intake as ordered or per policy  Utilize nutrition screening tool and intervene per policy  Determine patient's food preferences and provide high-protein, high-caloric foods as appropriate  - Assist patient with eating   - Allow adequate time for meals   - Encourage patient to take dietary supplement as ordered  - Collaborate with clinical nutritionist   - Include patient/family/caregiver in decisions related to nutrition     Outcome: Progressing Problem: Nutrition/Hydration-ADULT  Goal: Nutrient/Hydration intake appropriate for improving, restoring or maintaining nutritional needs  Description  Monitor and assess patient's nutrition/hydration status for malnutrition (ex- brittle hair, bruises, dry skin, pale skin and conjunctiva, muscle wasting, smooth red tongue, and disorientation)  Collaborate with interdisciplinary team and initiate plan and interventions as ordered  Monitor patient's weight and dietary intake as ordered or per policy  Utilize nutrition screening tool and intervene per policy  Determine patient's food preferences and provide high-protein, high-caloric foods as appropriate  INTERVENTIONS:  - Monitor oral intake, urinary output, labs, and treatment plans  - Assess nutrition and hydration status and recommend course of action  - Evaluate amount of meals eaten  - Assist patient with eating if necessary   - Allow adequate time for meals  - Recommend/ encourage appropriate diets, oral nutritional supplements, and vitamin/mineral supplements  - Order, calculate, and assess calorie counts as needed  - Recommend, monitor, and adjust tube feedings and TPN/PPN based on assessed needs  - Assess need for intravenous fluids  - Provide specific nutrition/hydration education as appropriate  - Include patient/family/caregiver in decisions related to nutrition   Outcome: Progressing     Problem: NEUROSENSORY - ADULT  Goal: Achieves stable or improved neurological status  Description  INTERVENTIONS  - Monitor and report changes in neurological status  - Initiate measures to prevent increased intracranial pressure  - Maintain blood pressure and fluid volume within ordered parameters to optimize cerebral perfusion  - Monitor temperature, glucose, and sodium or any other associated labs   Initiate appropriate interventions as ordered  - Monitor for seizure activity   - Administer anti-seizure medications as ordered   Outcome: Progressing  Goal: Absence of seizures  Description  INTERVENTIONS  - Monitor for seizure activity  - Administer anti-seizure medications as ordered  - Monitor neurological status   Outcome: Progressing  Goal: Remains free of injury related to seizures activity  Description  INTERVENTIONS  - Maintain airway, patient safety  and administer oxygen as ordered  - Monitor patient for seizure activity, document and report duration and description of seizure to physician/advanced practitioner  - If seizure occurs,  ensure patient safety during seizure  - Reorient patient post seizure  - Seizure pads on all 4 side rails  - Instruct patient/family to notify RN of any seizure activity including if an aura is experienced  - Instruct patient/family to call for assistance with activity based on nursing assessment  - Administer anti-seizure medications as ordered  - Monitor fetal well being   Outcome: Progressing  Goal: Achieves maximal functionality and self care  Description  INTERVENTIONS  - Monitor swallowing and airway patency with patient fatigue and changes in neurological status  - Encourage and assist patient to increase activity and self care with guidance from rehab services  - Encourage visually impaired, hearing impaired and aphasic patients to use assistive/communication devices   Outcome: Progressing     Problem: METABOLIC, FLUID AND ELECTROLYTES - ADULT  Goal: Electrolytes maintained within normal limits  Description  INTERVENTIONS:  - Monitor labs and assess patient for signs and symptoms of electrolyte imbalances  - Administer electrolyte replacement as ordered  - Monitor response to electrolyte replacements, including repeat lab results as appropriate  - Instruct patient on fluid and nutrition as appropriate   Outcome: Progressing  Goal: Fluid balance maintained  Description  INTERVENTIONS:  - Monitor labs and assess for signs and symptoms of volume excess or deficit  - Monitor I/O and WT  - Instruct patient on fluid and nutrition as appropriate   Outcome: Progressing  Goal: Glucose maintained within target range  Description  INTERVENTIONS:  - Monitor Blood Glucose as ordered  - Assess for signs and symptoms of hyperglycemia and hypoglycemia  - Administer ordered medications to maintain glucose within target range  - Assess nutritional intake and initiate nutrition service referral as needed   Outcome: Progressing     Problem: SKIN/TISSUE INTEGRITY - ADULT  Goal: Skin integrity remains intact  Description  INTERVENTIONS  - Identify patients at risk for skin breakdown  - Assess and monitor skin integrity  - Assess and monitor nutrition and hydration status  - Monitor labs (i e  albumin)  - Assess for incontinence   - Turn and reposition patient  - Assist with mobility/ambulation  - Relieve pressure over bony prominences  - Avoid friction and shearing  - Provide appropriate hygiene as needed including keeping skin clean and dry  - Evaluate need for skin moisturizer/barrier cream  - Collaborate with interdisciplinary team (i e  Nutrition, Rehabilitation, etc )   - Patient/family teaching   Outcome: Progressing  Goal: Incision(s), wounds(s) or drain site(s) healing without S/S of infection  Description  INTERVENTIONS  - Assess and document risk factors for skin impairment   - Assess and document dressing, incision, wound bed, drain sites and surrounding tissue  - Initiate Nutrition services consult and/or wound management as needed   Outcome: Progressing  Goal: Oral mucous membranes remain intact  Description  INTERVENTIONS  - Assess oral mucosa and hygiene practices  - Implement preventative oral hygiene regimen  - Implement oral medicated treatments as ordered  - Initiate Nutrition services referral as needed   Outcome: Progressing     Problem: HEMATOLOGIC - ADULT  Goal: Maintains hematologic stability  Description  INTERVENTIONS  - Assess for signs and symptoms of bleeding or hemorrhage  - Monitor labs  - Administer supportive blood products/factors as ordered and appropriate   Outcome: Progressing     Problem: Potential for Falls  Goal: Patient will remain free of falls  Description  INTERVENTIONS:  - Assess patient frequently for physical needs  -  Identify cognitive and physical deficits and behaviors that affect risk of falls    -  [place] fall precautions as indicated by assessment   - Educate patient/family on patient safety including physical limitations  - Instruct patient to call for assistance with activity based on assessment  - Modify environment to reduce risk of injury  - Consider OT/PT consult to assist with strengthening/mobility    Outcome: Progressing     Problem: DISCHARGE PLANNING - CARE MANAGEMENT  Goal: Discharge to post-acute care or home with appropriate resources  Description  INTERVENTIONS:  - Conduct assessment to determine patient/family and health care team treatment goals, and need for post-acute services based on payer coverage, community resources, and patient preferences, and barriers to discharge  - Address psychosocial, clinical, and financial barriers to discharge as identified in assessment in conjunction with the patient/family and health care team  - Arrange appropriate level of post-acute services according to patient's   needs and preference and payer coverage in collaboration with the physician and health care team  - Communicate with and update the patient/family, physician, and health care team regarding progress on the discharge plan  - Arrange appropriate transportation to post-acute venues  Pt is going home to family when medically clear for dc   Outcome: Progressing

## 2019-02-10 NOTE — UTILIZATION REVIEW
INITIAL CLINICAL REVIEW      Inpatient Admission       Standing Status:   Standing       Number of Occurrences:   1       Order Specific Question:   Admitting Physician       Answer:   Colby Sarah [[id number]]       Order Specific Question:   Level of Care       Answer:   Critical Care [15]       Order Specific Question:   Estimated length of stay       Answer:   More than 2 Midnights       Order Specific Question:   Certification       Answer:   I certify that inpatient services are medically necessary for this patient for a duration of greater than two midnights  See H&P and MD Progress Notes for additional information about the patient's course of treatment       2/4/2019 - 2/5/2019 (6 hours)  [facility]  VERTEBRAL ARTERY DISSECTION  Transfer to [facility]    Prior to arrival   Iv mag, iv diazepam, ivf  9% ns 1 L bolus, iv diphenyhydramine,         ED: Date/Time/Mode of Arrival:             ED Arrival Information      Expected Arrival Acuity Means of Arrival Escorted By Service Admission Type     2/5/2019 2/5/2019 02:53 [facility] DEPARTMENT [facility]) Critical Care/ICU Emergency     Arrival Complaint     headache          Chief Complaint:        Chief Complaint   Patient presents with    Headache       Pt transferred from [facility]  for CC evaluation due to abnormal CT findings        History of Illness:     50year old female received from [facility] for further evaluation and management of dizziness and abnormal finding of possible  Vertebral artery dissection      Patient also has severe headache and 2 episodes of kaleidescope vision  Alsointermittent short term memory loss      ED Vital Signs:            02/05/19 0254 02/05/19 0254 02/05/19 0254 02/05/19 0254 02/05/19 0254   98 1 °F (36 7 °C) 59 16 (!) 209/96 100 %           Pain Score           5           02/05/19 54 3 kg (119 lb 11 4 oz)            Pertinent Labs/Diagnostic Test Results:   MRI brain indication dizziness   Acute to early subacute infarcts in the bilateral posterior parietal lobes and in the right cerebellar hemisphere  Signal abnormality within distal cervical and intracranial segments of the left vertebral artery could represent thrombus or dissection  MRA  Brain   Absence of signal within the distal cervical and proximal intradural segments of the left vertebral artery, compatible with findings of signal abnormality on the MRI, concerning for thrombus or dissection   Multifocal irregularity of the distal   intradural segment of the right vertebral artery   Recommend CTA of the head and neck to exclude vertebral artery dissection          Past Medical/Surgical History:     Diagnosis    VAIN I (vaginal intraepithelial neoplasia grade I)    Vulvovaginal condyloma    Acute nonintractable headache    Disorientation    Hypertensive urgency    Essential hypertension    Vision changes            Admitting Diagnosis:    Vertebral artery dissection (HCC) [I77 74]  CVA (cerebral vascular accident) ([facility] [address] ) [I63 9]  Headache [R51]        Age/Sex: 50 y o  female         Assessment/Plan   Vertebral artery dissection  Consult neuro surgery  Aspirin daily  Repeat CT of head   Echo TSH  Lipid panel and A! C  Cardene gtt if sbp is uncontrolled   Goal  140 to 160       Reason for not initiating IV thrombolytic Dissection unknown stroke onset     Admission Orders:   Heparin infusion protocol   PT OT and Speech eval and treat  Neuro checks  q1 hr  Consult neuro surgery  Consult neurology  Npo   Sequential compression device  Stroke education   Sequential compression device  echo  Renal artery complete   Carotid complete study   APTT  Q6 HR  HBA1C    LIPID PANEL      Scheduled Meds:    acetaminophen 650 mg Oral Q6H PRN   amLODIPine 10 mg Oral Daily   aspirin 81 mg Oral Daily   atorvastatin 80 mg Oral Daily With Dinner   heparin (porcine) 3-20 Units/kg/hr (Order-Specific) Intravenous Titrated hydrALAZINE 10 mg Intravenous Q6H PRN   hydrochlorothiazide 12 5 mg Oral Daily   metoclopramide 10 mg Intravenous Q8H PRN   potassium-sodium phosphateS 1 tablet Oral 4x Daily (with meals and at bedtime)           NEURO CONSULT:  Assessment:  Beatriz Cali is a 50 y o  female with past medical history as below who presented with headache, dizziness, N/V with two similar episodes in the past 10 days with MRI positive for bilateral posterior parietal lobes and right cerebellar hemisphere infarcts likely secondary to left vertebral artery dissection originating at approximately C2 extending just proximal to the vertebrobasilar junction identified on CTA head/neck      Plan:  -Heparin gtt and aspirin 81mg   -Lipitor 80mg daily  -Recommend normotension  -Continue telemetry  -PT/OT  -Continue supportive care per primary team  -Continue to monitor and notify with changes  Follow up as outpatient with stroke clinic 2-4 weeks  Appointment requesting      Results:  -MRI brain without contrast revealed acute to early subacute infarcts in the bilateral posterior parietal lobes and the right cerebellar hemisphere  -CTA head/neck revealed 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 level and extending just proximally to the vertebrobasilar junction, multifocal progressive narrowing of the distal intradural segment of the right vertebral artery and mild narrowing in the proximal basilar artery, and multifocal mild to moderate narrowing in the left PCA  -TTE: LVEF 70%, no regional wall abnormalities, no thrombus, atria size normal b/l  -TSH elevated, T4 normal; ; HbA1c 5 3      Ns CONSULT 2/5:  · Patient was started in ASA and given 325mg this AM  Will change to ASA 81mg with plan to start Heparin gtt today with goal aPTT 50-70  viz lower target range conservative  · Recommend repeat CTA in 1 week  · At that time, will consider transition to DAPT     · Recommend normotensive  · Mobilize as tolerated with assistance  · DVT PPX: SCDs and plan to start heparin gtt today  · No neurosurgical intervention anticipated at this juncture

## 2019-02-11 ENCOUNTER — DOCUMENTATION (OUTPATIENT)
Dept: NEUROSURGERY | Facility: CLINIC | Age: 49
End: 2019-02-11

## 2019-02-11 VITALS
BODY MASS INDEX: 24.13 KG/M2 | HEART RATE: 88 BPM | RESPIRATION RATE: 18 BRPM | SYSTOLIC BLOOD PRESSURE: 140 MMHG | HEIGHT: 59 IN | WEIGHT: 119.71 LBS | TEMPERATURE: 99.2 F | OXYGEN SATURATION: 98 % | DIASTOLIC BLOOD PRESSURE: 98 MMHG

## 2019-02-11 LAB
ANION GAP SERPL CALCULATED.3IONS-SCNC: 7 MMOL/L (ref 4–13)
BUN SERPL-MCNC: 15 MG/DL (ref 5–25)
CALCIUM SERPL-MCNC: 9 MG/DL (ref 8.3–10.1)
CHLORIDE SERPL-SCNC: 106 MMOL/L (ref 100–108)
CO2 SERPL-SCNC: 24 MMOL/L (ref 21–32)
CREAT SERPL-MCNC: 0.7 MG/DL (ref 0.6–1.3)
GFR SERPL CREATININE-BSD FRML MDRD: 103 ML/MIN/1.73SQ M
GLUCOSE SERPL-MCNC: 82 MG/DL (ref 65–140)
MAGNESIUM SERPL-MCNC: 2.3 MG/DL (ref 1.6–2.6)
POTASSIUM SERPL-SCNC: 4 MMOL/L (ref 3.5–5.3)
SODIUM SERPL-SCNC: 137 MMOL/L (ref 136–145)

## 2019-02-11 PROCEDURE — 99232 SBSQ HOSP IP/OBS MODERATE 35: CPT | Performed by: NEUROLOGICAL SURGERY

## 2019-02-11 PROCEDURE — 99238 HOSP IP/OBS DSCHRG MGMT 30/<: CPT | Performed by: FAMILY MEDICINE

## 2019-02-11 PROCEDURE — 83735 ASSAY OF MAGNESIUM: CPT | Performed by: INTERNAL MEDICINE

## 2019-02-11 PROCEDURE — 80048 BASIC METABOLIC PNL TOTAL CA: CPT | Performed by: INTERNAL MEDICINE

## 2019-02-11 RX ORDER — ACETAMINOPHEN 325 MG/1
650 TABLET ORAL EVERY 6 HOURS PRN
Qty: 30 TABLET | Refills: 0
Start: 2019-02-11 | End: 2019-05-02

## 2019-02-11 RX ORDER — CLOPIDOGREL BISULFATE 75 MG/1
75 TABLET ORAL DAILY
Qty: 30 TABLET | Refills: 0 | Status: SHIPPED | OUTPATIENT
Start: 2019-02-12 | End: 2019-03-14 | Stop reason: SDUPTHER

## 2019-02-11 RX ORDER — ASPIRIN 81 MG/1
81 TABLET ORAL DAILY
Start: 2019-02-12 | End: 2019-12-17

## 2019-02-11 RX ORDER — ATORVASTATIN CALCIUM 80 MG/1
80 TABLET, FILM COATED ORAL
Qty: 30 TABLET | Refills: 0 | Status: SHIPPED | OUTPATIENT
Start: 2019-02-11 | End: 2019-03-19 | Stop reason: SDUPTHER

## 2019-02-11 RX ADMIN — AMLODIPINE BESYLATE 10 MG: 10 TABLET ORAL at 09:10

## 2019-02-11 RX ADMIN — ASPIRIN 81 MG: 81 TABLET, COATED ORAL at 09:10

## 2019-02-11 RX ADMIN — DOCUSATE SODIUM 100 MG: 100 CAPSULE, LIQUID FILLED ORAL at 09:10

## 2019-02-11 RX ADMIN — CLOPIDOGREL BISULFATE 75 MG: 75 TABLET ORAL at 09:10

## 2019-02-11 RX ADMIN — POTASSIUM CHLORIDE 20 MEQ: 1500 TABLET, EXTENDED RELEASE ORAL at 09:10

## 2019-02-11 NOTE — PROGRESS NOTES
02/11/2019-CHIARA (02/08/2019) ALSO CONSIDER OP CT HEAD  PLEASE CONTACT DR Aurelia Donaldson FOR ADDITIONAL SUGGESTIONS IF THERE IS ANY  DISCUSSED WITH DAYLIN & BEAN, NO F/U NEEDED   IF PT INSISTS OR CALLS OFFICE, CAN SCHEDULE 3 MONTH F/U W/CTA, BUT PT WILL BE FOLLOWING UP WITH NEUROLOGY

## 2019-02-11 NOTE — PROGRESS NOTES
Progress Note - Misa Pleitez 50 y o  female MRN: [id number]    Unit/Bed#: [facility] 723-01 Encounter: [id number]      IM  Progress Note   Unit/Bed#: [facility] 723-01 Encounter: [id number]  SOD Team C       Misa Pleitez 50 y o  female [id number]    Hospital Stay Days: 6      Assessment/Plan:    Principal Problem:    Vertebral artery dissection [facility])  Active Problems:    Acute nonintractable headache    Hypertensive urgency    Essential hypertension    Vision changes    CVA (cerebral vascular accident) ([facility] [address] )    Ischemic stroke secondary to left vertebral artery dissection  MRI  is positive for acute to early subacute bilateral posterior parietal lobe and right cerebellar hemispheric infarcts from February 5, 2019  CTA from 02/05 demonstrated left vertebral artery dissection  No focal neurologic deficits   -neurology and neurosurgery continued to follow and appreciate recommendations- cleared for discharge home today with Plavix   -continue statin therapy- will need outpatient repeat labs in 3-4 months   -continue neuro checks q 4   -goal SBP less than 160 mm Hg   -head CT for any acute changes in neurologic examination   - currently patient is not having a headache  Discussed okay to take Tylenol if required and that headaches may be intermittent   -follow up with Neurology in 2 weeks  No nurse surgical needs at this time    Hypertension  Goal systolic blood pressure is normotensive and less than 160 mm Hg   -continue amlodipine 10 mg daily   -continue hydralazine as needed for SBP greater than 160, patient has not required a dosage in the last 24 hours      Hyperlipidemia   -continue atorvastatin 80 mg daily, repeat labs in 3-4 months as outpatient    Subclinical hypothyroidism  TSH 5 26 with normal free T4   -repeat  free T4 in 6-8 weeks as outpatient    Hypokalemia   -repleted and normal  Anxiety   -continue Atarax as needed, can be adjusted as an outpatient by PCP    Disposition:   Continue with Plavix,  blood pressure medications, and statin  Follow up primary care doctor in 1-2 weeks  Follow up with Neurology in 2-3 weeks  Discharge today  I advised no chiropractic or massage treatment to the neck for now until cleared by Neurology  Increase water intake due to dry mucous membranes  Friend is at bedside and all questions and concerns addressed today to the best my ability        Subjective:   Patient seen and examined in bed this morning  Denied acute overnight events  Feels well in 8 well  Friend is at bedside  Denied headache, fever/chills, n/v, abd pain, c/p, SOB, palpitations, diarrhea  Vitals: Temp (24hrs), Av 5 °F (36 9 °C), Min:98 °F (36 7 °C), Max:99 2 °F (37 3 °C)  Current: Temperature: 99 2 °F (37 3 °C)  Vitals:    02/10/19 0844 02/10/19 1536 02/10/19 2230 19 0802   BP: 139/91 145/90 112/64 140/98   BP Location: Right arm Right arm Left arm Left arm   Pulse: 72 86 71 88   Resp: 18 18 18 18   Temp: 98 7 °F (37 1 °C) 98 2 °F (36 8 °C) 98 °F (36 7 °C) 99 2 °F (37 3 °C)   TempSrc: Oral Oral Oral Oral   SpO2: 93% 99% 99% 98%   Weight:       Height:        Body mass index is 24 18 kg/m²  I/O last 24 hours:   In: 1186 [P O :1186]  Out: -       Physical Exam: /98 (BP Location: Left arm)   Pulse 88   Temp 99 2 °F (37 3 °C) (Oral)   Resp 18   Ht 4' 11" (1 499 m)   Wt 54 3 kg (119 lb 11 4 oz)   SpO2 98%   BMI 24 18 kg/m²     General Appearance:    Alert, cooperative, no distress, appears stated age, pleasant and cheerful   Head:    Normocephalic, without obvious abnormality, atraumatic   Throat:   Lips, dry  mucosa, and tongue normal; teeth and gums normal   Neck:   Supple, symmetrical, trachea midline, no adenopathy   Back:     Symmetric, no curvature, ROM normal   Lungs:     Clear to auscultation bilaterally, respirations unlabored   Chest Wall:    No tenderness or deformity    Heart:    Regular rate and rhythm, S1 and S2 normal   Abdomen:     Soft, non-tender, bowel sounds active all four quadrants   Extremities:   Extremities normal, atraumatic, no cyanosis or edema   Pulses:   2+ and symmetric all extremities   Skin:   Skin color, texture, turgor normal, no rashes or lesions   Lymph nodes:   Cervical, supraclavicular, and axillary nodes normal   Neurologic:   CNII-XII grossly intact  Answer questions appropriately, fluent speech, AAO x3  Invasive Devices     Peripheral Intravenous Line            Peripheral IV 02/10/19 Proximal;Right Forearm 1 day                          Labs:   Recent Results (from the past 24 hour(s))   APTT    Collection Time: 02/10/19  1:17 PM   Result Value Ref Range    PTT 29 26 - 38 seconds   Basic metabolic panel    Collection Time: 02/11/19  6:08 AM   Result Value Ref Range    Sodium 137 136 - 145 mmol/L    Potassium 4 0 3 5 - 5 3 mmol/L    Chloride 106 100 - 108 mmol/L    CO2 24 21 - 32 mmol/L    ANION GAP 7 4 - 13 mmol/L    BUN 15 5 - 25 mg/dL    Creatinine 0 70 0 60 - 1 30 mg/dL    Glucose 82 65 - 140 mg/dL    Calcium 9 0 8 3 - 10 1 mg/dL    eGFR 103 ml/min/1 73sq m   Magnesium    Collection Time: 02/11/19  6:08 AM   Result Value Ref Range    Magnesium 2 3 1 6 - 2 6 mg/dL       Radiology Results: I have personally reviewed pertinent reports  Other Diagnostic Testing:   I have personally reviewed pertinent reports          Active Meds:   Current Facility-Administered Medications   Medication Dose Route Frequency    acetaminophen (TYLENOL) tablet 650 mg  650 mg Oral Q6H PRN    amLODIPine (NORVASC) tablet 10 mg  10 mg Oral Daily    aspirin (ECOTRIN LOW STRENGTH) EC tablet 81 mg  81 mg Oral Daily    atorvastatin (LIPITOR) tablet 80 mg  80 mg Oral Daily With Dinner    clopidogrel (PLAVIX) tablet 75 mg  75 mg Oral Daily    dexamethasone (DECADRON) tablet 2 mg  2 mg Oral Q8H PRN    docusate sodium (COLACE) capsule 100 mg  100 mg Oral BID    hydrALAZINE (APRESOLINE) injection 10 mg  10 mg Intravenous Q6H PRN    hydrOXYzine HCL (ATARAX) tablet 25 mg  25 mg Oral HS PRN    ondansetron (ZOFRAN) injection 4 mg  4 mg Intravenous Q8H PRN    potassium chloride (K-DUR,KLOR-CON) CR tablet 20 mEq  20 mEq Oral Daily    senna (SENOKOT) tablet 8 6 mg  1 tablet Oral HS PRN         VTE Pharmacologic Prophylaxis: Heparin  VTE Mechanical Prophylaxis: sequential compression device    Adia Certain, DO

## 2019-02-11 NOTE — DISCHARGE SUMMARY
Progress Note - Jacklyn Ervin 50 y o  female MRN: [id number]      Unit/Bed#: [facility] 723-01 Encounter: [id number]  SOD Team C       Jacklyn Ervin 50 y o  female [id number]    Hospital Stay Days: 6      Assessment/Plan:    Principal Problem:    Vertebral artery dissection [facility])  Active Problems:    Acute nonintractable headache    Hypertensive urgency    Essential hypertension    Vision changes    CVA (cerebral vascular accident) ([address] )    Ischemic stroke secondary to left vertebral artery dissection  MRI  is positive for acute to early subacute bilateral posterior parietal lobe and right cerebellar hemispheric infarcts from February 5, 2019  CTA from 02/05 demonstrated left vertebral artery dissection  No focal neurologic deficits   -neurology and neurosurgery continued to follow and appreciate recommendations- cleared for discharge home today with Plavix   -continue statin therapy- will need outpatient repeat labs in 3-4 months   -continue neuro checks q 4   -goal SBP less than 160 mm Hg   -head CT for any acute changes in neurologic examination   - currently patient is not having a headache  Discussed okay to take Tylenol if required and that headaches may be intermittent   -follow up with Neurology in 2 weeks  No nurse surgical needs at this time    Hypertension  Goal systolic blood pressure is normotensive and less than 160 mm Hg   -continue amlodipine 10 mg daily   -continue hydralazine as needed for SBP greater than 160, patient has not required a dosage in the last 24 hours  Hyperlipidemia   -continue atorvastatin 80 mg daily, repeat labs in 3-4 months as outpatient    Subclinical hypothyroidism  TSH 5 26 with normal free T4   -repeat  free T4 in 6-8 weeks as outpatient    Hypokalemia   -repleted and normal  Anxiety   -continue Atarax as needed, can be adjusted as an outpatient by PCP    Disposition:   Continue with Plavix,  blood pressure medications, and statin    Follow up primary care doctor in 1-2 weeks  Follow up with Neurology in 2-3 weeks  Discharge today  I advised no chiropractic or massage treatment to the neck for now until cleared by Neurology  Increase water intake due to dry mucous membranes  Friend is at bedside and all questions and concerns addressed today to the best my ability        Subjective:   Patient seen and examined in bed this morning  Denied acute overnight events  Feels well in 8 well  Friend is at bedside  Denied headache, fever/chills, n/v, abd pain, c/p, SOB, palpitations, diarrhea  Vitals: Temp (24hrs), Av 5 °F (36 9 °C), Min:98 °F (36 7 °C), Max:99 2 °F (37 3 °C)  Current: Temperature: 99 2 °F (37 3 °C)  Vitals:    02/10/19 0844 02/10/19 1536 02/10/19 2230 19 0802   BP: 139/91 145/90 112/64 140/98   BP Location: Right arm Right arm Left arm Left arm   Pulse: 72 86 71 88   Resp: 18 18 18 18   Temp: 98 7 °F (37 1 °C) 98 2 °F (36 8 °C) 98 °F (36 7 °C) 99 2 °F (37 3 °C)   TempSrc: Oral Oral Oral Oral   SpO2: 93% 99% 99% 98%   Weight:       Height:        Body mass index is 24 18 kg/m²  I/O last 24 hours:   In: 1186 [P O :1186]  Out: -       Physical Exam: /98 (BP Location: Left arm)   Pulse 88   Temp 99 2 °F (37 3 °C) (Oral)   Resp 18   Ht 4' 11" (1 499 m)   Wt 54 3 kg (119 lb 11 4 oz)   SpO2 98%   BMI 24 18 kg/m²     General Appearance:    Alert, cooperative, no distress, appears stated age, pleasant and cheerful   Head:    Normocephalic, without obvious abnormality, atraumatic   Throat:   Lips, dry  mucosa, and tongue normal; teeth and gums normal   Neck:   Supple, symmetrical, trachea midline, no adenopathy   Back:     Symmetric, no curvature, ROM normal   Lungs:     Clear to auscultation bilaterally, respirations unlabored   Chest Wall:    No tenderness or deformity    Heart:    Regular rate and rhythm, S1 and S2 normal   Abdomen:     Soft, non-tender, bowel sounds active all four quadrants   Extremities:   Extremities normal, atraumatic, no cyanosis or edema   Pulses:   2+ and symmetric all extremities   Skin:   Skin color, texture, turgor normal, no rashes or lesions   Lymph nodes:   Cervical, supraclavicular, and axillary nodes normal   Neurologic:   CNII-XII grossly intact  Answer questions appropriately, fluent speech, AAO x3  Invasive Devices     Peripheral Intravenous Line            Peripheral IV 02/10/19 Proximal;Right Forearm 1 day                          Labs:   Recent Results (from the past 24 hour(s))   APTT    Collection Time: 02/10/19  1:17 PM   Result Value Ref Range    PTT 29 26 - 38 seconds   Basic metabolic panel    Collection Time: 02/11/19  6:08 AM   Result Value Ref Range    Sodium 137 136 - 145 mmol/L    Potassium 4 0 3 5 - 5 3 mmol/L    Chloride 106 100 - 108 mmol/L    CO2 24 21 - 32 mmol/L    ANION GAP 7 4 - 13 mmol/L    BUN 15 5 - 25 mg/dL    Creatinine 0 70 0 60 - 1 30 mg/dL    Glucose 82 65 - 140 mg/dL    Calcium 9 0 8 3 - 10 1 mg/dL    eGFR 103 ml/min/1 73sq m   Magnesium    Collection Time: 02/11/19  6:08 AM   Result Value Ref Range    Magnesium 2 3 1 6 - 2 6 mg/dL       Radiology Results: I have personally reviewed pertinent reports  Other Diagnostic Testing:   I have personally reviewed pertinent reports          Active Meds:   Current Facility-Administered Medications   Medication Dose Route Frequency    acetaminophen (TYLENOL) tablet 650 mg  650 mg Oral Q6H PRN    amLODIPine (NORVASC) tablet 10 mg  10 mg Oral Daily    aspirin (ECOTRIN LOW STRENGTH) EC tablet 81 mg  81 mg Oral Daily    atorvastatin (LIPITOR) tablet 80 mg  80 mg Oral Daily With Dinner    clopidogrel (PLAVIX) tablet 75 mg  75 mg Oral Daily    dexamethasone (DECADRON) tablet 2 mg  2 mg Oral Q8H PRN    docusate sodium (COLACE) capsule 100 mg  100 mg Oral BID    hydrALAZINE (APRESOLINE) injection 10 mg  10 mg Intravenous Q6H PRN    hydrOXYzine HCL (ATARAX) tablet 25 mg  25 mg Oral HS PRN    ondansetron (ZOFRAN) injection 4 mg  4 mg Intravenous Q8H PRN    potassium chloride (K-DUR,KLOR-CON) CR tablet 20 mEq  20 mEq Oral Daily    senna (SENOKOT) tablet 8 6 mg  1 tablet Oral HS PRN       Argos Code, DO

## 2019-02-11 NOTE — PLAN OF CARE
Problem: PAIN - ADULT  Goal: Verbalizes/displays adequate comfort level or baseline comfort level  Description  Interventions:  - Encourage patient to monitor pain and request assistance  - Assess pain using appropriate pain scale  - Administer analgesics based on type and severity of pain and evaluate response  - Implement non-pharmacological measures as appropriate and evaluate response  - Consider cultural and social influences on pain and pain management  - Notify physician/advanced practitioner if interventions unsuccessful or patient reports new pain   Outcome: Progressing     Problem: INFECTION - ADULT  Goal: Absence or prevention of progression during hospitalization  Description  INTERVENTIONS:  - Assess and monitor for signs and symptoms of infection  - Monitor lab/diagnostic results  - Monitor all insertion sites, i e  indwelling lines, tubes, and drains  - Monitor endotracheal (as able) and nasal secretions for changes in amount and color  - [place] appropriate cooling/warming therapies per order  - Administer medications as ordered  - Instruct and encourage patient and family to use good hand hygiene technique  - Identify and instruct in appropriate isolation precautions for identified infection/condition   Outcome: Progressing     Problem: SAFETY ADULT  Goal: Patient will remain free of falls  Description  INTERVENTIONS:  - Assess patient frequently for physical needs  -  Identify cognitive and physical deficits and behaviors that affect risk of falls    -  [place] fall precautions as indicated by assessment   - Educate patient/family on patient safety including physical limitations  - Instruct patient to call for assistance with activity based on assessment  - Modify environment to reduce risk of injury  - Consider OT/PT consult to assist with strengthening/mobility    Outcome: Progressing  Goal: Maintain or return to baseline ADL function  Description  INTERVENTIONS:  -  Assess patient's ability to carry out ADLs; assess patient's baseline for ADL function and identify physical deficits which impact ability to perform ADLs (bathing, care of mouth/teeth, toileting, grooming, dressing, etc )  - Assess/evaluate cause of self-care deficits   - Assess range of motion  - Assess patient's mobility; develop plan if impaired  - Assess patient's need for assistive devices and provide as appropriate  - Encourage maximum independence but intervene and supervise when necessary  ¯ Involve family in performance of ADLs  ¯ Assess for home care needs following discharge   ¯ Request OT consult to assist with ADL evaluation and planning for discharge  ¯ Provide patient education as appropriate   Outcome: Progressing  Goal: Maintain or return mobility status to optimal level  Description  INTERVENTIONS:  - Assess patient's baseline mobility status (ambulation, transfers, stairs, etc )    - Identify cognitive and physical deficits and behaviors that affect mobility  - Identify mobility aids required to assist with transfers and/or ambulation (gait belt, sit-to-stand, lift, walker, cane, etc )  - [place] fall precautions as indicated by assessment  - Record patient progress and toleration of activity level on Mobility SBAR; progress patient to next Phase/Stage  - Instruct patient to call for assistance with activity based on assessment  - Request Rehabilitation consult to assist with strengthening/weightbearing, etc    Outcome: Progressing     Problem: DISCHARGE PLANNING  Goal: Discharge to home or other facility with appropriate resources  Description  INTERVENTIONS:  - Identify barriers to discharge w/patient and caregiver  - Arrange for needed discharge resources and transportation as appropriate  - Identify discharge learning needs (meds, wound care, etc )  - Arrange for interpretive services to assist at discharge as needed  - Refer to Case Management Department for coordinating discharge planning if the patient needs post-hospital services based on physician/advanced practitioner order or complex needs related to functional status, cognitive ability, or social support system   Outcome: Progressing     Problem: Knowledge Deficit  Goal: Patient/family/caregiver demonstrates understanding of disease process, treatment plan, medications, and discharge instructions  Description  Complete learning assessment and assess knowledge base  Interventions:  - Provide teaching at level of understanding  - Provide teaching via preferred learning methods   Outcome: Progressing     Problem: Neurological Deficit  Goal: Neurological status is stable or improving  Description  Interventions:  - Monitor and assess patient's level of consciousness, motor function, sensory function, and level of assistance needed for ADLs  - Monitor and report changes from baseline  Collaborate with interdisciplinary team to initiate plan and implement interventions as ordered  - Provide and maintain a safe environment  - Utilize seizure precautions  - Utilize fall precautions  - Utilize aspiration precautions  - Utilize bleeding precautions  Outcome: Progressing     Problem: Activity Intolerance/Impaired Mobility  Goal: Mobility/activity is maintained at optimum level for patient  Description  Interventions:  - Assess and monitor patient  barriers to mobility and need for assistive/adaptive devices  - Assess patient's emotional response to limitations  - Collaborate with interdisciplinary team and initiate plans and interventions as ordered  - Encourage independent activity per ability   - Maintain proper body alignment  - Perform active/passive rom as tolerated/ordered  - Plan activities to conserve energy   - Turn patient   Outcome: Progressing     Problem: Communication Impairment  Goal: Ability to express needs and understand communication  Description  Assess patient's communication skills and ability to understand information    Patient will demonstrate use of effective communication techniques, alternative methods of communication and understanding even if not able to speak  - Encourage communication and provide alternate methods of communication as needed  - Collaborate with case management/ for discharge needs  - Include patient/family/caregiver in decisions related to communication  Outcome: Progressing     Problem: Potential for Aspiration  Goal: Non-ventilated patient's risk of aspiration is minimized  Description  Assess and monitor vital signs, respiratory status, and labs (WBC)  Monitor for signs of aspiration (tachypnea, cough, rales, wheezing, cyanosis, fever)  - Assess and monitor patient's ability to swallow  - Place patient up in chair to eat if possible  - HOB up at 90 degrees to eat if unable to get patient up into chair   - Supervise patient during oral intake  - Instruct patient to take small bites  - Instruct patient to take small single sips when taking liquids  - Follow patient-specific strategies generated by speech pathologist    Outcome: Progressing     Problem: Nutrition  Goal: Nutrition/Hydration status is improving  Description  Monitor and assess patient's nutrition/hydration status for malnutrition (ex- brittle hair, bruises, dry skin, pale skin and conjunctiva, muscle wasting, smooth red tongue, and disorientation)  Collaborate with interdisciplinary team and initiate plan and interventions as ordered  Monitor patient's weight and dietary intake as ordered or per policy  Utilize nutrition screening tool and intervene per policy  Determine patient's food preferences and provide high-protein, high-caloric foods as appropriate  - Assist patient with eating   - Allow adequate time for meals   - Encourage patient to take dietary supplement as ordered  - Collaborate with clinical nutritionist   - Include patient/family/caregiver in decisions related to nutrition     Outcome: Progressing Problem: Nutrition/Hydration-ADULT  Goal: Nutrient/Hydration intake appropriate for improving, restoring or maintaining nutritional needs  Description  Monitor and assess patient's nutrition/hydration status for malnutrition (ex- brittle hair, bruises, dry skin, pale skin and conjunctiva, muscle wasting, smooth red tongue, and disorientation)  Collaborate with interdisciplinary team and initiate plan and interventions as ordered  Monitor patient's weight and dietary intake as ordered or per policy  Utilize nutrition screening tool and intervene per policy  Determine patient's food preferences and provide high-protein, high-caloric foods as appropriate  INTERVENTIONS:  - Monitor oral intake, urinary output, labs, and treatment plans  - Assess nutrition and hydration status and recommend course of action  - Evaluate amount of meals eaten  - Assist patient with eating if necessary   - Allow adequate time for meals  - Recommend/ encourage appropriate diets, oral nutritional supplements, and vitamin/mineral supplements  - Order, calculate, and assess calorie counts as needed  - Assess need for intravenous fluids  - Provide specific nutrition/hydration education as appropriate  - Include patient/family/caregiver in decisions related to nutrition    Outcome: Progressing     Problem: NEUROSENSORY - ADULT  Goal: Achieves stable or improved neurological status  Description  INTERVENTIONS  - Monitor and report changes in neurological status  - Initiate measures to prevent increased intracranial pressure  - Maintain blood pressure and fluid volume within ordered parameters to optimize cerebral perfusion  - Monitor temperature, glucose, and sodium or any other associated labs   Initiate appropriate interventions as ordered  - Monitor for seizure activity   - Administer anti-seizure medications as ordered   Outcome: Progressing  Goal: Absence of seizures  Description  INTERVENTIONS  - Monitor for seizure activity  - Administer anti-seizure medications as ordered  - Monitor neurological status   Outcome: Progressing  Goal: Remains free of injury related to seizures activity  Description  INTERVENTIONS  - Maintain airway, patient safety  and administer oxygen as ordered  - Monitor patient for seizure activity, document and report duration and description of seizure to physician/advanced practitioner  - If seizure occurs,  ensure patient safety during seizure  - Reorient patient post seizure  - Seizure pads on all 4 side rails  - Instruct patient/family to notify RN of any seizure activity including if an aura is experienced  - Instruct patient/family to call for assistance with activity based on nursing assessment  - Administer anti-seizure medications as ordered  - Monitor fetal well being   Outcome: Progressing  Goal: Achieves maximal functionality and self care  Description  INTERVENTIONS  - Monitor swallowing and airway patency with patient fatigue and changes in neurological status  - Encourage and assist patient to increase activity and self care with guidance from rehab services  - Encourage visually impaired, hearing impaired and aphasic patients to use assistive/communication devices   Outcome: Progressing     Problem: METABOLIC, FLUID AND ELECTROLYTES - ADULT  Goal: Electrolytes maintained within normal limits  Description  INTERVENTIONS:  - Monitor labs and assess patient for signs and symptoms of electrolyte imbalances  - Administer electrolyte replacement as ordered  - Monitor response to electrolyte replacements, including repeat lab results as appropriate  - Instruct patient on fluid and nutrition as appropriate   Outcome: Progressing  Goal: Fluid balance maintained  Description  INTERVENTIONS:  - Monitor labs and assess for signs and symptoms of volume excess or deficit  - Monitor I/O and WT  - Instruct patient on fluid and nutrition as appropriate   Outcome: Progressing  Goal: Glucose maintained within target range  Description  INTERVENTIONS:  - Monitor Blood Glucose as ordered  - Assess for signs and symptoms of hyperglycemia and hypoglycemia  - Administer ordered medications to maintain glucose within target range  - Assess nutritional intake and initiate nutrition service referral as needed   Outcome: Progressing     Problem: SKIN/TISSUE INTEGRITY - ADULT  Goal: Skin integrity remains intact  Description  INTERVENTIONS  - Identify patients at risk for skin breakdown  - Assess and monitor skin integrity  - Assess and monitor nutrition and hydration status  - Monitor labs (i e  albumin)  - Assess for incontinence   - Turn and reposition patient  - Assist with mobility/ambulation  - Relieve pressure over bony prominences  - Avoid friction and shearing  - Provide appropriate hygiene as needed including keeping skin clean and dry  - Evaluate need for skin moisturizer/barrier cream  - Collaborate with interdisciplinary team (i e  Nutrition, Rehabilitation, etc )   - Patient/family teaching   Outcome: Progressing  Goal: Incision(s), wounds(s) or drain site(s) healing without S/S of infection  Description  INTERVENTIONS  - Assess and document risk factors for skin impairment   - Assess and document dressing, incision, wound bed, drain sites and surrounding tissue  - Initiate Nutrition services consult and/or wound management as needed   Outcome: Progressing  Goal: Oral mucous membranes remain intact  Description  INTERVENTIONS  - Assess oral mucosa and hygiene practices  - Implement preventative oral hygiene regimen  - Implement oral medicated treatments as ordered  - Initiate Nutrition services referral as needed   Outcome: Progressing     Problem: HEMATOLOGIC - ADULT  Goal: Maintains hematologic stability  Description  INTERVENTIONS  - Assess for signs and symptoms of bleeding or hemorrhage  - Monitor labs  - Administer supportive blood products/factors as ordered and appropriate   Outcome: Progressing     Problem: Potential for Falls  Goal: Patient will remain free of falls  Description  INTERVENTIONS:  - Assess patient frequently for physical needs  -  Identify cognitive and physical deficits and behaviors that affect risk of falls    -  [facility] fall precautions as indicated by assessment   - Educate patient/family on patient safety including physical limitations  - Instruct patient to call for assistance with activity based on assessment  - Modify environment to reduce risk of injury  - Consider OT/PT consult to assist with strengthening/mobility    Outcome: Progressing     Problem: DISCHARGE PLANNING - CARE MANAGEMENT  Goal: Discharge to post-acute care or home with appropriate resources  Description  INTERVENTIONS:  - Conduct assessment to determine patient/family and health care team treatment goals, and need for post-acute services based on payer coverage, community resources, and patient preferences, and barriers to discharge  - Address psychosocial, clinical, and financial barriers to discharge as identified in assessment in conjunction with the patient/family and health care team  - Arrange appropriate level of post-acute services according to patient's   needs and preference and payer coverage in collaboration with the physician and health care team  - Communicate with and update the patient/family, physician, and health care team regarding progress on the discharge plan  - Arrange appropriate transportation to post-acute venues  Pt is going home to family when medically clear for dc   Outcome: Progressing

## 2019-02-11 NOTE — PROGRESS NOTES
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  3  Right VA stenosis  4  Left PCA stenosis  5  Cephalgia  6  HTN  7  HLD    Plan:  · Exam is grossly non focal  GCS 15  IBARRA FS  VFFC  EOMI without nystagmus  No drift  Accurate FTN  · Continue frequent neurological checks  · Imaging reviewed personally and by attending  Final results as below  · Mri brain wo 2/5/19: acute to early subacute infarcts bilateral posterior parietal/occipital and right cerebellum without hemorrhage  · CTA head/neck 2/5/19: left vertebral artery dissection originating approx  C2 level  Multifocal progressive narrowing distal right VA and mild narrowing proximal basilar artery  Multifocal narrowing left PCA  · CT head wo 2/5/19: No evidence of hemorrhage  · Continue ASA 81mg and Plavix 75mg daily  Recommend P2Y12 lab this week  · Recommend normotensive  · Neurology input appreciated  · Pain control as needed per primary team    · Mobilize with assistance  Cleared by Pt/Ot for home and no needs  · DVT PPX: DAPT and SCDs  · No neurosurgical intervention anticipated at this juncture  · Ongoing medical management  Continue amlodipine and Lipitor  · No surgical intervention indicated  Patient cleared to discharge from neurosurgical standpoint  · Will sign-off and see patient as needed during remainder of hospitalization  Call with questions/concerns  · Patient may follow-up with neurosurgery on an as needed basis  D/w with Neurology who is planning on follow-up in 2-3 weeks  Discussed recommendation for P2Y12 this week  · Patient education and emotional support provided  Encouraged strong compliance with medications and ongoing medical follow-ups as scheduled   Patient looking into nutritional recommendations for cholesterol improvement and plans to start walking with a friend  Subjective/Objective   Chief Complaint: follow-up left VA dissection    Subjective:  Patient admits to intermittent transient headache with associated wavy vision  When symptoms begin she develops anxiety becomes tearful worked up  Symptoms pain improved with medications  Denies any current symptoms at this time  Denies any residual exam findings or symptoms  Admits to concerns regarding what symptoms to watch for while at home to do if they occur  Admits to anxiety and being easily irritable  No CP/SOB/N/V  Ambulating well  Objective: Sitting up in bed  NAD    I/O       02/09 0701 - 02/10 0700 02/10 0701 - 02/11 0700 02/11 0701 - 02/12 0700    P  O  1080 826 360    Total Intake(mL/kg) 1080 (19 9) 826 (15 2) 360 (6 6)    Net +1080 +826 +360                 Invasive Devices     Peripheral Intravenous Line            Peripheral IV 02/10/19 Proximal;Right Forearm 1 day                Physical Exam:  Vitals: Blood pressure 140/98, pulse 88, temperature 99 2 °F (37 3 °C), temperature source Oral, resp  rate 18, height 4' 11" (1 499 m), weight 54 3 kg (119 lb 11 4 oz), SpO2 98 %  ,Body mass index is 24 18 kg/m²      General appearance: alert, appears stated age, cooperative and no distress  Head: Normocephalic, without obvious abnormality, atraumatic  Eyes: EOMI, PERRL, VFFC  Neck: supple, symmetrical, trachea midline   Lungs: non labored breathing  Heart: regular heart rate  Neurologic:   Mental status: Alert, oriented, thought content appropriate  Cranial nerves: grossly intact (Cranial nerves II-XII)  Sensory: normal to LT  Motor: moving all extremities without focal weakness, BUE/BLE 5/5  Coordination: finger to nose normal bilaterally, no drift bilaterally    Lab Results:  Results from last 7 days   Lab Units 02/10/19  0509 02/09/19  0519 02/08/19  0526   WBC Thousand/uL 7 12 6 56 8 69   HEMOGLOBIN g/dL 14 6 14 6 14 9   HEMATOCRIT % 43 4 43 4 43 9 PLATELETS Thousands/uL 229 233 224   NEUTROS PCT % 54 59 68   MONOS PCT % 10 9 9     Results from last 7 days   Lab Units 02/11/19  0608 02/10/19  0509 02/09/19  0519  02/05/19  0439 02/04/19  2029   POTASSIUM mmol/L 4 0 3 5 3 5   < > 3 4* 3 7   CHLORIDE mmol/L 106 104 104   < > 106 99*   CO2 mmol/L 24 24 24   < > 23 29   BUN mg/dL 15 19 17   < > 9 17   CREATININE mg/dL 0 70 0 71 0 72   < > 0 58* 0 78   CALCIUM mg/dL 9 0 8 8 8 8   < > 8 8 9 1   ALK PHOS U/L  --   --   --   --  82 75   ALT U/L  --   --   --   --  20 25   AST U/L  --   --   --   --  17 19    < > = values in this interval not displayed  Results from last 7 days   Lab Units 02/11/19  0608 02/07/19  0508 02/06/19  0558   MAGNESIUM mg/dL 2 3 2 2 2 4     Results from last 7 days   Lab Units 02/06/19  0558 02/05/19  0439   PHOSPHORUS mg/dL 3 6 2 3*     Results from last 7 days   Lab Units 02/10/19  1317 02/10/19  0625 02/09/19  0519  02/05/19  1727 02/05/19  0439 02/05/19  0121   INR   --   --   --   --  1 09 1 03 1 15   PTT seconds 29 57* 75*   < > 47*  --  28    < > = values in this interval not displayed  No results found for: TROPONINT  ABG:No results found for: PHART, VKJ9CGA, PO2ART, UNV7PQX, A8KXEFKM, BEART, SOURCE    Imaging Studies: I have personally reviewed pertinent reports  and I have personally reviewed pertinent films in PACS    Cta Head And Neck W Wo Contrast    Result Date: 2/10/2019  Impression: 1  Mild narrowing at the origin of the right vertebral artery, unchanged from the prior study  Mild residual narrowing in the pre-PICA segment of the right vertebral artery  2   Improved visualization of the left intradural vertebral artery with mild irregularity and luminal narrowing remains  3     Mild narrowing in the proximal left P3 segment of the PCA  4   No cervical or intracranial carotid stenosis  5   Redemonstrated chronic infarctions in the right cerebellum and both posterior parietal cortices    No new areas of intracranial hemorrhage or cortical infarction  Workstation performed: [id number]     Cta Head And Neck W Wo Contrast    Result Date: 2/5/2019  Impression: 1  Left vertebral artery dissection, 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 and extending just proximal to the vertebrobasilar junction  Neurovascular service consultation recommended  2   Multifocal progressive narrowing of the distal intradural segment of the right vertebral artery and mild narrowing in the proximal basilar artery  3   Multifocal mild to moderate narrowing in the left posterior cerebral artery  I personally discussed this study with Danni Blevins on 2/5/2019 at 1:38 AM  Workstation performed: [id number]     Ct Head Without Contrast    Result Date: 2/5/2019  Impression: Tiny foci of low-attenuation involving the posterior right parietal lobe and right cerebellar hemisphere which correspond to areas of acute ischemia described on a recent MRI brain dated February 4, 2019  No acute intracranial hemorrhage  Workstation performed: [id number]     Ct Head Wo Contrast    Result Date: 2/5/2019  Impression: No acute intracranial abnormality  Workstation performed: [id number]     Mra And Or Mrv Head Wo Contrast    Result Date: 2/5/2019  Impression: 1  Absence of signal within the distal cervical and proximal intradural segments of the left vertebral artery, compatible with findings of signal abnormality on the MRI, concerning for thrombus or dissection  Multifocal irregularity of the distal intradural segment of the right vertebral artery  Recommend CTA of the head and neck to exclude vertebral artery dissection  2   No other evidence of significant stenosis in the posterior circulation  3   No stenosis or occlusion of the major vessels in the anterior circulation    I personally discussed this study with Danni Blevins on 2/5/2019 at 12:48 AM  Workstation performed: [id number]     Mri Brain Wo Contrast    Result Date: 2/5/2019  Impression: 1  Acute to early subacute infarcts in the bilateral posterior parietal lobes and in the right cerebellar hemisphere  No hemorrhage or mass effect  2   Signal abnormality within distal cervical and intracranial segments of the left vertebral artery could represent thrombus or dissection  Recommend CT angiogram of the head and neck for further evaluation  I personally discussed this study with Bony Mckeon on 2/5/2019 at 12:36 AM  Workstation performed: [id number]       EKG, Pathology, and Other Studies: I have personally reviewed pertinent reports        VTE Pharmacologic Prophylaxis: DAPT     VTE Mechanical Prophylaxis: sequential compression device

## 2019-02-12 ENCOUNTER — TELEPHONE (OUTPATIENT)
Dept: NEUROLOGY | Facility: CLINIC | Age: 49
End: 2019-02-12

## 2019-02-12 NOTE — TELEPHONE ENCOUNTER
----- Message from [place], PA-C sent at 2/5/2019  1:35 PM EST -----  Regarding: HFU    Diagnosis/Reason for follow-up: Stroke  Subspecialty for follow-up: Stroke  Recommended timing for HFU: 2-4 weeks  Existing neurologist: none  Tests/Labs/Imaging ordered: none  Orders placed electronically: none  Additional notes: none    Thank you!

## 2019-02-14 ENCOUNTER — TELEPHONE (OUTPATIENT)
Dept: NEUROLOGY | Facility: CLINIC | Age: 49
End: 2019-02-14

## 2019-02-14 NOTE — TELEPHONE ENCOUNTER
The purpose of this phone call is to assess patient's general wellbeing or for any assistance needed with follow-up care  Called patient, since discharge, she has not experienced any new or worsening stroke symptoms  She states she feels much better and is getting better with every day  She continues to experience slight memory deficits at times and becomes tired more easily  She performs her own ADLs, ambulates independently, and manages her own medications and affairs  Patient has appointment with PCP next week and is scheduled with GYN oncology 2/12, vascular 2/16, cardiology 3/14, and has her stroke hospital follow up 3/8 with Boris Abreu  I reviewed medications with patient  She had no trouble in obtaining medications  She takes all as prescribed with with no missed doses or medication side effects  Patient uses AVS every day and follows medications closely to be sure everything is taken appropriately  Patient has stroke education binder she received inpatient  I reviewed stroke risk factors and symptoms with her, she verbalizes understanding  She expresses great determination to change modifiable risk factors  She states she is going to meet with a dietitian to improve cholesterol  She states she wants to change and better the life of her 13year-old son  As she regains strength, she is beginning to walk more and wants to walk daily  She monitors her BP, they have improved prior to admission, average is currently 146/100  Patient has no questions at this time and would like to share a compliment stating "I love [id number]! I would highly recommend them to anyone!" "My care at [facility] was just phenomenal   I just cant say enough about everything "

## 2019-02-19 ENCOUNTER — TELEPHONE (OUTPATIENT)
Dept: CARDIOLOGY CLINIC | Facility: CLINIC | Age: 49
End: 2019-02-19

## 2019-02-19 NOTE — TELEPHONE ENCOUNTER
Amber Vaishnavikhadijah was in office on 2/1, then admitted to hospital for 8 days with a stroke  At [address], you ordered an echo and carotid vascular study  Echo done during admission  Had MRI of brain, CTA of head, and others She is asking if testing done in hospital is sufficient  Has neuro f/u on 3/1 and f/u with you on 3/14  Please advise

## 2019-02-21 ENCOUNTER — HOSPITAL ENCOUNTER (INPATIENT)
Facility: HOSPITAL | Age: 49
LOS: 1 days | Discharge: HOME | DRG: 305 | End: 2019-02-22
Attending: EMERGENCY MEDICINE | Admitting: INTERNAL MEDICINE
Payer: COMMERCIAL

## 2019-02-21 ENCOUNTER — APPOINTMENT (EMERGENCY)
Dept: CT IMAGING | Facility: HOSPITAL | Age: 49
DRG: 305 | End: 2019-02-21
Payer: COMMERCIAL

## 2019-02-21 ENCOUNTER — APPOINTMENT (OUTPATIENT)
Dept: MRI IMAGING | Facility: HOSPITAL | Age: 49
DRG: 305 | End: 2019-02-21
Payer: COMMERCIAL

## 2019-02-21 DIAGNOSIS — I10 ESSENTIAL HYPERTENSION: ICD-10-CM

## 2019-02-21 DIAGNOSIS — I16.0 HYPERTENSIVE URGENCY: Primary | ICD-10-CM

## 2019-02-21 DIAGNOSIS — R20.2 PARESTHESIAS: ICD-10-CM

## 2019-02-21 DIAGNOSIS — I77.74 VERTEBRAL ARTERY DISSECTION (HCC): ICD-10-CM

## 2019-02-21 DIAGNOSIS — I63.211 CEREBROVASCULAR ACCIDENT (CVA) DUE TO OCCLUSION OF RIGHT VERTEBRAL ARTERY (HCC): ICD-10-CM

## 2019-02-21 DIAGNOSIS — I10 HYPERTENSION: ICD-10-CM

## 2019-02-21 LAB
ALBUMIN SERPL BCP-MCNC: 4.2 G/DL (ref 3.5–5)
ALP SERPL-CCNC: 97 U/L (ref 46–116)
ALT SERPL W P-5'-P-CCNC: 36 U/L (ref 12–78)
ANION GAP SERPL CALCULATED.3IONS-SCNC: 11 MMOL/L (ref 4–13)
APTT PPP: 31 SECONDS (ref 26–38)
AST SERPL W P-5'-P-CCNC: 19 U/L (ref 5–45)
BASOPHILS # BLD AUTO: 0.06 THOUSANDS/ΜL (ref 0–0.1)
BASOPHILS NFR BLD AUTO: 1 % (ref 0–1)
BILIRUB SERPL-MCNC: 0.8 MG/DL (ref 0.2–1)
BUN SERPL-MCNC: 13 MG/DL (ref 5–25)
CALCIUM SERPL-MCNC: 9.5 MG/DL (ref 8.3–10.1)
CHLORIDE SERPL-SCNC: 100 MMOL/L (ref 100–108)
CO2 SERPL-SCNC: 28 MMOL/L (ref 21–32)
CREAT SERPL-MCNC: 0.88 MG/DL (ref 0.6–1.3)
EOSINOPHIL # BLD AUTO: 0.22 THOUSAND/ΜL (ref 0–0.61)
EOSINOPHIL NFR BLD AUTO: 3 % (ref 0–6)
ERYTHROCYTE [DISTWIDTH] IN BLOOD BY AUTOMATED COUNT: 11.4 % (ref 11.6–15.1)
GFR SERPL CREATININE-BSD FRML MDRD: 78 ML/MIN/1.73SQ M
GLUCOSE SERPL-MCNC: 94 MG/DL (ref 65–140)
HCT VFR BLD AUTO: 42.4 % (ref 34.8–46.1)
HGB BLD-MCNC: 15.1 G/DL (ref 11.5–15.4)
IMM GRANULOCYTES # BLD AUTO: 0.03 THOUSAND/UL (ref 0–0.2)
IMM GRANULOCYTES NFR BLD AUTO: 0 % (ref 0–2)
INR PPP: 1.04 (ref 0.86–1.17)
LYMPHOCYTES # BLD AUTO: 1.15 THOUSANDS/ΜL (ref 0.6–4.47)
LYMPHOCYTES NFR BLD AUTO: 14 % (ref 14–44)
MCH RBC QN AUTO: 31.1 PG (ref 26.8–34.3)
MCHC RBC AUTO-ENTMCNC: 35.6 G/DL (ref 31.4–37.4)
MCV RBC AUTO: 87 FL (ref 82–98)
MONOCYTES # BLD AUTO: 0.73 THOUSAND/ΜL (ref 0.17–1.22)
MONOCYTES NFR BLD AUTO: 9 % (ref 4–12)
NEUTROPHILS # BLD AUTO: 5.94 THOUSANDS/ΜL (ref 1.85–7.62)
NEUTS SEG NFR BLD AUTO: 73 % (ref 43–75)
NRBC BLD AUTO-RTO: 0 /100 WBCS
PLATELET # BLD AUTO: 310 THOUSANDS/UL (ref 149–390)
PLATELET # BLD AUTO: 318 THOUSANDS/UL (ref 149–390)
PMV BLD AUTO: 9.8 FL (ref 8.9–12.7)
PMV BLD AUTO: 9.9 FL (ref 8.9–12.7)
POTASSIUM SERPL-SCNC: 3.3 MMOL/L (ref 3.5–5.3)
PROT SERPL-MCNC: 8.2 G/DL (ref 6.4–8.2)
PROTHROMBIN TIME: 13.3 SECONDS (ref 11.8–14.2)
RBC # BLD AUTO: 4.85 MILLION/UL (ref 3.81–5.12)
SODIUM SERPL-SCNC: 139 MMOL/L (ref 136–145)
WBC # BLD AUTO: 8.13 THOUSAND/UL (ref 4.31–10.16)

## 2019-02-21 PROCEDURE — 99245 OFF/OP CONSLTJ NEW/EST HI 55: CPT | Performed by: NURSE PRACTITIONER

## 2019-02-21 PROCEDURE — 85610 PROTHROMBIN TIME: CPT | Performed by: EMERGENCY MEDICINE

## 2019-02-21 PROCEDURE — 70450 CT HEAD/BRAIN W/O DYE: CPT

## 2019-02-21 PROCEDURE — 80053 COMPREHEN METABOLIC PANEL: CPT | Performed by: EMERGENCY MEDICINE

## 2019-02-21 PROCEDURE — 96374 THER/PROPH/DIAG INJ IV PUSH: CPT

## 2019-02-21 PROCEDURE — 36415 COLL VENOUS BLD VENIPUNCTURE: CPT | Performed by: EMERGENCY MEDICINE

## 2019-02-21 PROCEDURE — 93005 ELECTROCARDIOGRAM TRACING: CPT

## 2019-02-21 PROCEDURE — 85025 COMPLETE CBC W/AUTO DIFF WBC: CPT | Performed by: EMERGENCY MEDICINE

## 2019-02-21 PROCEDURE — 85049 AUTOMATED PLATELET COUNT: CPT | Performed by: FAMILY MEDICINE

## 2019-02-21 PROCEDURE — 70551 MRI BRAIN STEM W/O DYE: CPT

## 2019-02-21 PROCEDURE — 99220 PR INITIAL OBSERVATION CARE/DAY 70 MINUTES: CPT | Performed by: FAMILY MEDICINE

## 2019-02-21 PROCEDURE — 99285 EMERGENCY DEPT VISIT HI MDM: CPT

## 2019-02-21 PROCEDURE — 85730 THROMBOPLASTIN TIME PARTIAL: CPT | Performed by: EMERGENCY MEDICINE

## 2019-02-21 RX ORDER — ASPIRIN 81 MG/1
81 TABLET, CHEWABLE ORAL DAILY
Status: DISCONTINUED | OUTPATIENT
Start: 2019-02-22 | End: 2019-02-22 | Stop reason: HOSPADM

## 2019-02-21 RX ORDER — ALPRAZOLAM 0.5 MG/1
0.5 TABLET ORAL 4 TIMES DAILY PRN
COMMUNITY

## 2019-02-21 RX ORDER — AMLODIPINE BESYLATE 10 MG/1
10 TABLET ORAL DAILY
Status: DISCONTINUED | OUTPATIENT
Start: 2019-02-22 | End: 2019-02-22 | Stop reason: HOSPADM

## 2019-02-21 RX ORDER — CLOPIDOGREL BISULFATE 75 MG/1
75 TABLET ORAL DAILY
Status: DISCONTINUED | OUTPATIENT
Start: 2019-02-22 | End: 2019-02-22 | Stop reason: HOSPADM

## 2019-02-21 RX ORDER — ATORVASTATIN CALCIUM 40 MG/1
80 TABLET, FILM COATED ORAL EVERY EVENING
Status: DISCONTINUED | OUTPATIENT
Start: 2019-02-21 | End: 2019-02-22 | Stop reason: HOSPADM

## 2019-02-21 RX ORDER — HYDROCHLOROTHIAZIDE 25 MG/1
25 TABLET ORAL DAILY
Status: DISCONTINUED | OUTPATIENT
Start: 2019-02-22 | End: 2019-02-22 | Stop reason: HOSPADM

## 2019-02-21 RX ORDER — POTASSIUM CHLORIDE 20 MEQ/1
20 TABLET, EXTENDED RELEASE ORAL ONCE
Status: COMPLETED | OUTPATIENT
Start: 2019-02-21 | End: 2019-02-21

## 2019-02-21 RX ORDER — LABETALOL 20 MG/4 ML (5 MG/ML) INTRAVENOUS SYRINGE
10 ONCE
Status: COMPLETED | OUTPATIENT
Start: 2019-02-21 | End: 2019-02-21

## 2019-02-21 RX ORDER — ASPIRIN 81 MG/1
81 TABLET ORAL DAILY
Status: DISCONTINUED | OUTPATIENT
Start: 2019-02-22 | End: 2019-02-22 | Stop reason: HOSPADM

## 2019-02-21 RX ADMIN — POTASSIUM CHLORIDE 20 MEQ: 1500 TABLET, EXTENDED RELEASE ORAL at 13:04

## 2019-02-21 RX ADMIN — ATORVASTATIN CALCIUM 80 MG: 40 TABLET, FILM COATED ORAL at 17:56

## 2019-02-21 RX ADMIN — LABETALOL 20 MG/4 ML (5 MG/ML) INTRAVENOUS SYRINGE 10 MG: at 12:01

## 2019-02-21 NOTE — H&P
H&P Exam - Jasmeet Rocha 50 y o  female MRN: [id number]    Unit/Bed#: ED 09 Encounter: [id number]    Paresthesias in left hand  Assessment & Plan  Admit for observation on telemetry  Initiate ischemic stroke pathway  CTA head and neck reviewed and reveals area of subacute ischemia from recent CVA secondary to left vertebral artery dissection  Continue dual antiplatelet therapy  Continue high-intensity statin  Will not repeat 2D echocardiogram  Consult Neurology  MRI brain without contrast      Hypertensive urgency  Assessment & Plan  BP improved after 1 time dose of IV labetalol  Resume prior to admission antihypertensive regimen in the form of Norvasc 10 mg p o  Daily, hydrochlorothiazide 25 mg p  O  Daily  P r n  Labetalol for systolic blood pressure greater than 180       History of Present Illness      The patient is a pleasant 22-year-old female with past medical history significant for vertebral artery dissection and bilateral posterior parietal lobe and cerebellar hemispheric infarcts presents to emergency room today with a chief complaint of left arm and left leg paresthesias  Patient states her paresthesias began last evening and are described in her left fingers and left toes  The patient recently thought that this is secondary to cramping or the way she slept  She had no other focal symptoms  Her  did not noted group and he felt that her strength was equal bilaterally  She is familiar with neurologic testing given she had a recent CVA  This morning she called her PCP and described her symptoms and was instructed to present to the emergency room for evaluation  It was noted that her blood pressure was greater than 180 and she received a 1 time dose of IV labetalol with resulting blood pressure improvement  On exam the patient states she still has some residual numbness and tingling in her left toes and left fingers  No other acute complaints and feels well overall      Review of Systems Neurological: Positive for numbness  All other systems reviewed and are negative        Historical Information   Past Medical History:   Diagnosis Date    Cervical dysplasia     CVA (cerebral vascular accident) ([address] )     GERD (gastroesophageal reflux disease)     occasionally    Hypertension     controlling with diet and exercise    Uterine leiomyoma      Past Surgical History:   Procedure Laterality Date    AUGMENTATION MAMMAPLASTY      GYNECOLOGIC CRYOSURGERY      CERVIX    HYSTERECTOMY      LASER ABLATION OF CONDYLOMAS N/A 7/30/2018    Procedure: OMNIGUIDE LASER ABLATION OF VULVA;  Surgeon: Dangelo Farrar MD;  [facility];  Service: Gynecology Oncology    SKIN TAG REMOVAL      EXICISON OF PERIANAL    WISDOM TOOTH EXTRACTION       Social History   Social History     Substance and Sexual Activity   Alcohol Use Yes    Comment: SOCIAL-1-2 drinks per week     Social History     Substance and Sexual Activity   Drug Use No     Social History     Tobacco Use   Smoking Status Never Smoker   Smokeless Tobacco Never Used     Family History: non-contributory    Meds/Allergies   all medications and allergies reviewed  Allergies   Allergen Reactions    Shellfish Allergy Anaphylaxis       Objective   First Vitals:   Blood Pressure: (!) 178/112 (02/21/19 1124)  Pulse: 87 (02/21/19 1124)  Temperature: 97 9 °F (36 6 °C) (02/21/19 1126)  Temp Source: Oral (02/21/19 1126)  Respirations: 18 (02/21/19 1124)  SpO2: 98 % (02/21/19 1124)    Current Vitals:   Blood Pressure: 129/88 (02/21/19 1215)  Pulse: 62 (02/21/19 1215)  Temperature: 97 9 °F (36 6 °C) (02/21/19 1126)  Temp Source: Oral (02/21/19 1126)  Respirations: 16 (02/21/19 1215)  SpO2: 97 % (02/21/19 1215)    No intake or output data in the 24 hours ending 02/21/19 1413    Invasive Devices     Peripheral Intravenous Line            Peripheral IV 02/21/19 Left Antecubital less than 1 day                Physical Exam   Constitutional: She is oriented to person, place, and time  She appears well-developed and well-nourished  HENT:   Head: Normocephalic and atraumatic  Mouth/Throat: No oropharyngeal exudate  Eyes: Pupils are equal, round, and reactive to light  EOM are normal  No scleral icterus  Neck: Normal range of motion  Neck supple  No JVD present  Cardiovascular: Normal rate and regular rhythm  Pulmonary/Chest: Effort normal and breath sounds normal  No respiratory distress  Abdominal: Soft  Bowel sounds are normal  She exhibits no distension  There is no tenderness  There is no guarding  Musculoskeletal: Normal range of motion  She exhibits no edema  Neurological: She is alert and oriented to person, place, and time  She displays normal reflexes  No cranial nerve deficit  She exhibits normal muscle tone  Coordination normal    Skin: Skin is warm and dry  Capillary refill takes less than 2 seconds  She is not diaphoretic  No erythema  Lab Results:   Lab Results   Component Value Date    WBC 8 13 02/21/2019    HGB 15 1 02/21/2019    HCT 42 4 02/21/2019    MCV 87 02/21/2019     02/21/2019         Imaging:   CT head without contrast   Final Result         1  No acute intracranial hemorrhage  2   Subtle areas of evolving cortical gliosis bilateral posterior parietal lobes and right cerebellum correlating to the diffusion restriction on the previous MRI, indicative of subacute to chronic posterior circulation infarctions                    Workstation performed: [id number]             EKG, Pathology, and Other Studies:   NSR    Code Status: Prior  Advance Directive and Living Will:      Power of :    POLST:      Counseling / Coordination of Care:

## 2019-02-21 NOTE — CONSULTS
Neurology Consult- Kendall Alba 1970, 50 y o  female   MRN: [id number] nit/Bed#: -01 Encounter: [id number]      Inpatient consult to Neurology  Consult performed by: JER Bishop  Consult ordered by: César Bhakta MD      Reason for Consult / Principal Problem:  Left arm heaviness   Hx and PE limited by:  None  Review of previous medical records was completed  Family, was not present at the bedside for history and examination  The patient was deemed to be a reliable historian  Heaviness of the left arm  Assessment & Plan   These began last evening per patient  As noted some elsewhere in this patient's chart she was discharged 11 days ago after having a small cerebellar infarct as well as a posterior parietal occipital lobe bilateral infarcts related to what was felt to be a newly discovered vertebral artery dissection  She was noted to have significant hypertension at that time an on discharge was controlled however on presentation again now she was again noted to have a significantly elevated blood pressure  Over concern that this was still vascular related MRI was repeated and it is negative for acute diffusion ischemia  She did not have repeat vascular imaging  Her heaviness of the arm is well resolved today with only a very slight residual of that sensation on the lateral aspect of her upper arm  She has no other symptoms  The suspected etiology of that transient event aside from being vascular, possibly may be that of nerve impingement from which she reports to be her sleep posture the night before  I reassured her that that sensation of heaviness in her arm will dissipate over the next couple of hours  I have discussed with her monitoring for this recurrence of symptoms depending on her sleep posture at home  She will stay on her aspirin and Plavix never the less as well as her antihypertensive regime        Vertebral artery dissection [facility])  Assessment & Plan  Noted on CTA on 2/5/19 at the time of her presentation where she was noted to be significantly elevated with her blood pressure  She was transferred to hospitals, where neurosurgery did not intervene  She Was discharged home on 2/11/2019  She reports she has been absolutely compliant with her aspirin Plavix statin as well as antihypertensive regime  She was due to have a P2Y12 serum assay of her Plavix effectiveness but it had not yet be done  I sent off this morning  CVA (cerebral vascular accident) [facility])  Assessment & Plan  MRI on 2/5 was positive for acute to early subacute bilateral occipital lobe and right cerebellar hemispheric infarcts secondary to left vertebral artery dissection  Continue ASA, clopidogrel and atorvastatin  Her MRI today, last night demonstrates no acute infarct the shadow of her stroke from last week is present  Hypertensive urgency  Assessment & Plan  BP noted to be 178/112 in ED  Given 1 x dose of labetalol IV with improved BP  Overnight her blood pressures appear to be much tighter control  I have discussed with the patient at the bedside a plan for more closely monitoring her blood pressures at home for the next several weeks and/or months until she is clearly stabilized  We discussed the importance of a blood pressure that is controlled had or below 140/80 approximately  I have discussed with her following up with her family physician should her blood pressures on her home monitoring consistently be elevated  She may need in addition to her Norvasc and HCTZ regime  HPI: Ania Grant is a right handed  50 y o  female who  neurology just recently saw in the early part of February when it was discovered she had a vertebral artery dissection after she presented with a malignant blood pressure and of visual intrusion complaint    She was some evaluated briefly here at [facility] she was transferred over to [facility] for neurosurgical evaluation as well she was briefly on heparin drip and DDAVP  She was then transferred to aspirin and Plavix and discharged with a blood pressure regimen  She reports she has been doing well over the last approximate 10 days  She has not been monitoring her blood pressures at home but she reports she has been absolutely compliant with her medications  She reports she is a belly sleeper and she woke up yesterday morning with a sense initially of some numbness or tingling like her arm had fallen asleep on the left side  She also had a sense of heaviness of that left arm as the numbness and tingling dissipated over the 1st hour or 2  Her  did test her  and believed her strength to be equal bilaterally  She called her PCP yesterday a m  About her symptoms and was instructed to present to the ED  She was noted to have an elevated BP again of 178/112, which improved with a 1 x dose of IV labetalol  Overnight she reports that her sense of heaviness in the entire left arm has dissipated it is shrunk to just in area on the lateral aspect of her upper arm  She otherwise reports that she feels fine  ROS: 12 system cued query:  She denies that she has any of those bubbles a in her vision that she had in the early part of February  She denies any other visual distortions  No headaches  That has resolved as well  No facial numbness or tingling no pins or needles she has no pins and needles on either of her upper or lower extremities  She has no lateralizing weakness she feels that that is fine  She has no chest pain or shortness of breath  No no palpitations  No bladder or bowel complaints no other residual symptoms        Historical Information     Past Medical History:   Diagnosis Date    Cervical dysplasia     [address]re[facility] [address]) [address] [address]     GERD (gastroesophageal reflux disease)     occasionally    Hypertension     controlling with diet and exercise    Uterine leiomyoma      Past Surgical History:   Procedure Laterality Date    AUGMENTATION MAMMAPLASTY      GYNECOLOGIC CRYOSURGERY      CERVIX    HYSTERECTOMY      LASER ABLATION OF CONDYLOMAS N/A 7/30/2018    Procedure: Payal Lopez ABLATION OF VULVA;  Surgeon: Mary Romero MD;  [facility];  Service: Gynecology Oncology    SKIN TAG REMOVAL      EXICISON OF PERIANAL    WISDOM TOOTH EXTRACTION         Social History :  She is  lives with her  they have adolescent children  She is a  in a [employer] department  No smoking social or occasional alcohol only and no recreational is  She is active she reports normally more so on the summer than in the winter  Family History: She is adopted she does not know of a family medical history  Allergies   Allergen Reactions    Shellfish Allergy Anaphylaxis     Meds:all current active meds have been reviewed and She has been compliant with all  Scheduled Meds:    Current Facility-Administered Medications:  amLODIPine 10 mg Oral Daily Lita Torres MD   aspirin 81 mg Oral Daily Lita Torres MD   aspirin 81 mg Oral Daily Lita Torres MD   atorvastatin 80 mg Oral QPM Lita Torres MD   clopidogrel 75 mg Oral Daily Lita Torres MD   enoxaparin 40 mg Subcutaneous Daily Addie Clemente MD   hydrochlorothiazide 25 mg Oral Daily Addie Clemente MD     PRN Meds:       Physical Exam:   Objective   Vitals:Blood pressure 122/80, pulse 78, temperature 98 3 °F (36 8 °C), temperature source Oral, resp  rate 18, SpO2 97 %  ,There is no height or weight on file to calculate BMI  Patient was examined in bed she was gaited and return to the bedside were her films were reviewed with her    General: alert, appears stated age and cooperative  Head: Normocephalic, without obvious abnormality, atraumatic  Oral exam: lips, mucosa, and tongue moist;   Neck: no carotid bruit,   Lungs: clear to auscultation ant  bilaterally  Heart: regular rate and rhythm, S1, S2 normal, no murmur appreciated, Abdomen: soft, +BS    Extremities: atraumatic, no cyanosis or edema    Neurologic:   Mental status: Alert, oriented, thought content appropriate, no speech or language dysfunction  CN Exam: RENY although somewhat slowly, EOM's I, VF full, Gaze conjugate No sensory or motor lateralizations (No PP on face), Hearing I B, CNIX-XII I B  Motor: full power, age appropriate x 4 limbs, including her left where she reports the residual sense of heaviness  Sensory: intact  X 4 limbs, 4 mod inc lt, temp, vib and prop, (not PP tested)  Cerebellar: no past pointing or drift from Romberg position, no ataxia w maneuvers, Fine motor & finger taps, age appropriate, WNL  There was a very slight tremor noted in her left 5th finger on maneuvers  DTR's: Age appropriate, WNL; Plantars: downgoing B  Gait: Fluid smooth, no LOB w cadance change  She was able to straight reverse her gait she was able to tandem forward she had 1 loss of balance initially with a reverse tandem but was able to maintain that gait as well for approximately 60 feet  She was also able to maintain a 1 foot it stance bilaterally approximately 7 seconds  Lab Results:   I have personally reviewed pertinent reports    , CBC:   Results from last 7 days   Lab Units 02/22/19  0705 02/21/19  1753 02/21/19  1201   WBC Thousand/uL 6 84  --  8 13   RBC Million/uL 4 58  --  4 85   HEMOGLOBIN g/dL 14 1  --  15 1   HEMATOCRIT % 40 7  --  42 4   MCV fL 89  --  87   PLATELETS Thousands/uL 280 318 310   , BMP/CMP:   Results from last 7 days   Lab Units 02/22/19  0705 02/21/19  1201   SODIUM mmol/L 136 139   POTASSIUM mmol/L 3 3* 3 3*   CHLORIDE mmol/L 100 100   CO2 mmol/L 26 28   BUN mg/dL 14 13   CREATININE mg/dL 0 82 0 88   CALCIUM mg/dL 9 4 9 5   AST U/L 17 19   ALT U/L 30 36   ALK PHOS U/L 86 97   EGFR ml/min/1 73sq m 85 78   , Vitamin B12:   , HgBA1C:   Results from last 7 days   Lab Units 02/22/19  0705   HEMOGLOBIN A1C % 5 3   , TSH:   , Coagulation:   Results from last 7 days   Lab Units 02/21/19  1201   INR  1 04   , Lipid Profile:   Results from last 7 days   Lab Units 02/22/19  0705   HDL mg/dL 43   LDL CALC mg/dL 74   TRIGLYCERIDES mg/dL 79   , I have ordered a AP I as well as a P2Y12  They are pending  Imaging Studies: I have personally reviewed pertinent films in PACS and Repeat films were reviewed personally as well as then with the patient the bedside  The shadow of her of acute infarct from 2 weeks ago is noted on her MRI at this time  There is no new acute ischemia appreciated on my review  She did not have new vascular imaging at this time  Counseling / Coordination of Care  Total time spent today 50 minutes  Greater than 50% of total time was spent with the patient and / or family counseling and / or coordination of care  A description of the counseling / coordination of care: All of the above was discussed with the patient the bedside  Additionally I also discussed with her blood pressure monitoring at home the variables to pay attention to in terms of possible mitigating or influence of factors  She will be following up with both Neurology as well as her family doctor  She had several questions I believe they were all addressed at this time  Dictation voice to text software has been used in the creation of this document  Please consider this in light of any contextual or grammatical errors

## 2019-02-21 NOTE — ASSESSMENT & PLAN NOTE
· Present on admission in patient with recent CVA secondary to left vertebral artery dissection  · Personal discussion with Neurology MRI negative for new acute findings  · Suspect transient event or nerve impingement patient showed symptomatic improvement  · Concern patient came in with hypertensive urgency given her dissection patient discussion regarding tight blood pressure control with a cold a blood pressure 140/80 patient recommended to keep A journal record of her blood pressure and work aggressively with her PCP and obtain control of her blood pressure  · CTA head and neck reviewed and reveals area of subacute ischemia from recent CVA secondary to left vertebral artery dissection MRI as mentioned above  · Continue dual antiplatelet therapy  · Continue high-intensity statin  · Will not repeat 2D echocardiogram

## 2019-02-21 NOTE — ED PROVIDER NOTES
History  Chief Complaint   Patient presents with    Numbness     Pt c/o of left arm numbness that began last night  Pt has hx of CVA in begining of Feb  pt states she has no other symptoms other than the numbness in the left arm  Called PCP told to come to ER for eval       26-year-old female presents to the emergency department for evaluation of left arm tingling in pain  Patient states that she does use a tingling and painful sensation in the left proximal arm last evening between 8 and 9:00 p m     This has been persistent since its onset  She also notes a tingling sensation in pain into the wrist and finger tips  Today patient noted some mild tingling of the left toes  The remainder of the left lower extremity is feeling normal   Patient denies associated weakness  No confusion, headache, nausea or vomiting  No blurred vision  Patient was recently hospitalized and treated for vertebral dissection  At the time of the diagnosis she was having intermittent blurred vision, syncopal episodes, and vomiting  Those symptoms have subsided  She is taking Plavix  Patient denies recent fevers or chills  No trauma  Patient does present with elevated blood pressure  She does admit to feeling anxious  She took a Xanax prior to arrival   Repeat blood pressure was 150/100        History provided by:  Patient and medical records   used: No    CVA/TIA-like Symptoms   Presenting symptoms: sensory loss    Presenting symptoms: no headaches, no visual change and no weakness    Date/time of last known well:  2/20/2019 8:00 PM  Onset quality:  Sudden  Timing:  Constant  Progression:  Unchanged  Similar to previous episodes: yes (Had a similar episode of arm numbness in pain while hospitalized for treatment of her vertebral dissection)    Associated symptoms: paresthesias    Associated symptoms: no trouble swallowing, no dizziness, no fall, no fever, no hearing loss, no nausea, no seizures, no tinnitus, no vertigo and no vomiting        Prior to Admission Medications   Prescriptions Last Dose Informant Patient Reported? Taking? ALPRAZolam (XANAX) 0 5 mg tablet   Yes Yes   Sig: Take 0 5 mg by mouth 4 (four) times a day as needed for anxiety   acetaminophen (TYLENOL) 325 mg tablet   No Yes   Sig: Take 2 tablets (650 mg total) by mouth every 6 (six) hours as needed for mild pain or headaches   amLODIPine (NORVASC) 10 mg tablet  Self No Yes   Sig: Take 1 tablet (10 mg total) by mouth daily for 14 days   aspirin (ECOTRIN LOW STRENGTH) 81 mg EC tablet   No Yes   Sig: Take 1 tablet (81 mg total) by mouth daily   atorvastatin (LIPITOR) 80 mg tablet   No Yes   Sig: Take 1 tablet (80 mg total) by mouth daily with dinner   clopidogrel (PLAVIX) 75 mg tablet   No Yes   Sig: Take 1 tablet (75 mg total) by mouth daily   hydrochlorothiazide (HYDRODIURIL) 12 5 mg tablet   Yes Yes   Sig: Take 25 mg by mouth daily    tetrahydrozoline-zinc (VISINE-AC) 0 05-0 25 % ophthalmic solution  Self Yes Yes   Si drops 3 (three) times a day as needed      Facility-Administered Medications: None       Past Medical History:   Diagnosis Date    Cervical dysplasia     CVA [facility]cerebral vascular accident) ([facility] [address] [facility]     GERD (gastroesophageal reflux disease)     occasionally    Hypertension     controlling with diet and exercise    Uterine leiomyoma        Past Surgical History:   Procedure Laterality Date    AUGMENTATION MAMMAPLASTY      GYNECOLOGIC CRYOSURGERY      CERVIX    HYSTERECTOMY      LASER ABLATION OF CONDYLOMAS N/A 2018    Procedure: Trinda Beady ABLATION OF VULVA;  Surgeon: Lesly Kwan MD;  [facility];  Service: Gynecology Oncology    SKIN TAG REMOVAL      EXICISON OF PERIANAL    WISDOM TOOTH EXTRACTION         Family History   Adopted: Yes     I have reviewed and agree with the history as documented      Social History     Tobacco Use    Smoking status: Never Smoker    Smokeless tobacco: Never Used Substance Use Topics    Alcohol use: Yes     Comment: SOCIAL-1-2 drinks per week    Drug use: No        Review of Systems   Constitutional: Negative for appetite change, fatigue and fever  HENT: Negative for hearing loss, tinnitus and trouble swallowing  Gastrointestinal: Negative for nausea and vomiting  Musculoskeletal: Negative for arthralgias, back pain, gait problem and joint swelling  Neurological: Positive for numbness and paresthesias  Negative for dizziness, vertigo, seizures, weakness and headaches  All other systems reviewed and are negative  Physical Exam  Physical Exam   Constitutional: She is oriented to person, place, and time  She appears well-developed and well-nourished  HENT:   Head: Normocephalic  Nose: Nose normal    Mouth/Throat: Oropharynx is clear and moist  No oropharyngeal exudate  Eyes: Pupils are equal, round, and reactive to light  Conjunctivae and EOM are normal    Neck: Normal range of motion  Neck supple  Cardiovascular: Normal rate, regular rhythm, normal heart sounds and intact distal pulses  Pulmonary/Chest: Effort normal and breath sounds normal    Abdominal: Soft  Bowel sounds are normal  She exhibits no distension  There is no tenderness  There is no rebound and no guarding  Musculoskeletal: Normal range of motion  She exhibits no edema, tenderness or deformity  Lymphadenopathy:     She has no cervical adenopathy  Neurological: She is alert and oriented to person, place, and time  She has normal strength and normal reflexes  She displays no atrophy and no tremor  A sensory deficit is present  No cranial nerve deficit  She exhibits normal muscle tone  She displays no seizure activity  Coordination and gait normal    Left arm tenderness with palpation at proximal humerus, diffuse paresthesia right arm   5/5 strength b/l UEs   Skin: Skin is warm, dry and intact  No rash noted  Psychiatric: She has a normal mood and affect   Her behavior is normal  Judgment and thought content normal    Nursing note and vitals reviewed        Vital Signs  ED Triage Vitals   Temperature Pulse Respirations Blood Pressure SpO2   02/21/19 1126 02/21/19 1124 02/21/19 1124 02/21/19 1124 02/21/19 1124   97 9 °F (36 6 °C) 87 18 (!) 178/112 98 %      Temp Source Heart Rate Source Patient Position - Orthostatic VS BP Location FiO2 (%)   02/21/19 1126 02/21/19 1124 02/21/19 1124 02/21/19 1124 --   Oral Monitor Lying Right arm       Pain Score       02/21/19 1121       No Pain           Vitals:    02/21/19 1630 02/21/19 1730 02/21/19 1830 02/21/19 1930   BP: 122/80 122/68 139/85 121/90   Pulse: 78 78 83 75   Patient Position - Orthostatic VS: Sitting Sitting Sitting Sitting       Visual Acuity  Visual Acuity      Most Recent Value   L Pupil Size (mm)  3   R Pupil Size (mm)  3   L Pupil Shape  Round   R Pupil Shape  Round          ED Medications  Medications   amLODIPine (NORVASC) tablet 10 mg (has no administration in time range)   clopidogrel (PLAVIX) tablet 75 mg (has no administration in time range)   hydrochlorothiazide (HYDRODIURIL) tablet 25 mg (has no administration in time range)   aspirin chewable tablet 81 mg (has no administration in time range)   enoxaparin (LOVENOX) subcutaneous injection 40 mg (has no administration in time range)   atorvastatin (LIPITOR) tablet 80 mg (80 mg Oral Given 2/21/19 1756)   aspirin (ECOTRIN LOW STRENGTH) EC tablet 81 mg (has no administration in time range)   Labetalol HCl (NORMODYNE) injection 10 mg (10 mg Intravenous Given 2/21/19 1201)   potassium chloride (K-DUR,KLOR-CON) CR tablet 20 mEq (20 mEq Oral Given 2/21/19 1304)       Diagnostic Studies  Results Reviewed     Procedure Component Value Units Date/Time    Comprehensive metabolic panel [[id number]]  (Abnormal) Collected:  02/21/19 1201    Lab Status:  Final result Specimen:  Blood from Arm, Left Updated:  02/21/19 1235     Sodium 139 mmol/L      Potassium 3 3 mmol/L      Chloride 100 mmol/L      CO2 28 mmol/L      ANION GAP 11 mmol/L      BUN 13 mg/dL      Creatinine 0 88 mg/dL      Glucose 94 mg/dL      Calcium 9 5 mg/dL      AST 19 U/L      ALT 36 U/L      Alkaline Phosphatase 97 U/L      Total Protein 8 2 g/dL      Albumin 4 2 g/dL      Total Bilirubin 0 80 mg/dL      eGFR 78 ml/min/1 73sq m     Narrative:       National Kidney Disease Education Program recommendations are as follows:  GFR calculation is accurate only with a steady state creatinine  Chronic Kidney disease less than 60 ml/min/1 73 sq  meters  Kidney failure less than 15 ml/min/1 73 sq  meters  Protime-INR [[id number]]  (Normal) Collected:  02/21/19 1201    Lab Status:  Final result Specimen:  Blood from Arm, Left Updated:  02/21/19 1229     Protime 13 3 seconds      INR 1 04    APTT [[id number]]  (Normal) Collected:  02/21/19 1201    Lab Status:  Final result Specimen:  Blood from Arm, Left Updated:  02/21/19 1229     PTT 31 seconds     CBC and differential [[id number]]  (Abnormal) Collected:  02/21/19 1201    Lab Status:  Final result Specimen:  Blood from Arm, Left Updated:  02/21/19 1209     WBC 8 13 Thousand/uL      RBC 4 85 Million/uL      Hemoglobin 15 1 g/dL      Hematocrit 42 4 %      MCV 87 fL      MCH 31 1 pg      MCHC 35 6 g/dL      RDW 11 4 %      MPV 9 9 fL      Platelets 413 Thousands/uL      nRBC 0 /100 WBCs      Neutrophils Relative 73 %      Immat GRANS % 0 %      Lymphocytes Relative 14 %      Monocytes Relative 9 %      Eosinophils Relative 3 %      Basophils Relative 1 %      Neutrophils Absolute 5 94 Thousands/µL      Immature Grans Absolute 0 03 Thousand/uL      Lymphocytes Absolute 1 15 Thousands/µL      Monocytes Absolute 0 73 Thousand/µL      Eosinophils Absolute 0 22 Thousand/µL      Basophils Absolute 0 06 Thousands/µL                  CT head without contrast   Final Result by Priti Serrano MD (02/21 1242)         1  No acute intracranial hemorrhage     2   Subtle areas of evolving cortical gliosis bilateral posterior parietal lobes and right cerebellum correlating to the diffusion restriction on the previous MRI, indicative of subacute to chronic posterior circulation infarctions                    Workstation performed: [id number]         MRI brain wo contrast    (Results Pending)              Procedures  ECG 12 Lead Documentation  Date/Time: 2/21/2019 2:14 PM  Performed by: Benjamín Gaviria DO  Authorized by: Benjamín Gaviria DO     Indications / Diagnosis:  Left arm numbness  ECG reviewed by me, the ED Provider: yes    Patient location:  ED  Previous ECG:     Previous ECG:  Compared to current    Comparison ECG info:  2/4/19    Similarity:  No change  Interpretation:     Interpretation: non-specific    Rate:     ECG rate:  56    ECG rate assessment: bradycardic    Rhythm:     Rhythm: sinus bradycardia    Ectopy:     Ectopy: none    QRS:     QRS axis:  Normal  Conduction:     Conduction: normal    ST segments:     ST segments:  Normal  T waves:     T waves: normal             Phone Contacts  ED Phone Contact    ED Course                               MDM  Number of Diagnoses or Management Options  Hypertension: new and requires workup  Paresthesias: new and requires workup     Amount and/or Complexity of Data Reviewed  Clinical lab tests: ordered and reviewed  Tests in the radiology section of CPT®: ordered and reviewed  Tests in the medicine section of CPT®: ordered and reviewed  Decide to obtain previous medical records or to obtain history from someone other than the patient: yes  Discuss the patient with other providers: yes  Independent visualization of images, tracings, or specimens: yes    Risk of Complications, Morbidity, and/or Mortality  General comments: Case discussed with Dr Nury Miller, he recommends MRI to evaluate for possible new CVA,  BP control    Patient Progress  Patient progress: stable      Disposition  Final diagnoses:   Paresthesias   Hypertension     Time reflects when diagnosis was documented in both MDM as applicable and the Disposition within this note     Time User Action Codes Description Comment    2/21/2019  2:05 PM Monse Marmolejo [I16 0] Hypertensive urgency     2/21/2019  2:05 PM Debbie Rodriguez Modify [I16 0] Hypertensive urgency     2/21/2019  2:08 PM Veronika MICHELLE Add [I10] Essential hypertension     2/21/2019  2:08 PM Debbie Rodriguez Add [I77 74] Vertebral artery dissection ([facility] [address] )     2/21/2019  8:42 PM White, Susy Add [R20 2] Paresthesias     2/21/2019  8:42 PM [facility], Susy Add [I10] Hypertension       ED Disposition     ED Disposition Condition Date/Time Comment    Admit Stable u Feb 21, 2019 12:53 PM Case was discussed with Dr Sonia Thorne and the patient's admission status was agreed to be Admission Status: observation status to the service of Dr Sonia Thorne          Follow-up Information    None         Current Discharge Medication List      CONTINUE these medications which have NOT CHANGED    Details   acetaminophen (TYLENOL) 325 mg tablet Take 2 tablets (650 mg total) by mouth every 6 (six) hours as needed for mild pain or headaches  Qty: 30 tablet, Refills: 0    Associated Diagnoses: CVA (cerebral vascular accident) ([address] )      ALPRAZolam (XANAX) 0 5 mg tablet Take 0 5 mg by mouth 4 (four) times a day as needed for anxiety      amLODIPine (NORVASC) 10 mg tablet Take 1 tablet (10 mg total) by mouth daily for 14 days  Qty: 14 tablet, Refills: 0    Associated Diagnoses: Hypertension      aspirin (ECOTRIN LOW STRENGTH) 81 mg EC tablet Take 1 tablet (81 mg total) by mouth daily    Associated Diagnoses: CVA (cerebral vascular accident) ([address] )      atorvastatin (LIPITOR) 80 mg tablet Take 1 tablet (80 mg total) by mouth daily with dinner  Qty: 30 tablet, Refills: 0    Associated Diagnoses: CVA (cerebral vascular accident) ([address] )      clopidogrel (PLAVIX) 75 mg tablet Take 1 tablet (75 mg total) by mouth daily  Qty: 30 tablet, Refills: 0    Associated Diagnoses: CVA (cerebral vascular accident) ([address] )      hydrochlorothiazide (HYDRODIURIL) 12 5 mg tablet Take 25 mg by mouth daily       tetrahydrozoline-zinc (VISINE-AC) 0 05-0 25 % ophthalmic solution 2 drops 3 (three) times a day as needed           No discharge procedures on file      ED Provider  Electronically Signed by           Kendra Burden DO  02/21/19 7458

## 2019-02-21 NOTE — ASSESSMENT & PLAN NOTE
· BP improved after 1 time dose of IV labetalol  · Continue current regimen of Norvasc 10 mg p o  Daily and hydrochlorothiazide 25 mg p   O  Daily  · Patient recommended to maintain tight blood pressure control below 140/80 and may require additional antihypertensive as an outpatient  · Patient recommended to keep a journal discussed with her PCP on further antihypertensive measures

## 2019-02-22 VITALS
DIASTOLIC BLOOD PRESSURE: 84 MMHG | BODY MASS INDEX: 23.43 KG/M2 | HEART RATE: 82 BPM | OXYGEN SATURATION: 97 % | HEIGHT: 59 IN | RESPIRATION RATE: 16 BRPM | SYSTOLIC BLOOD PRESSURE: 133 MMHG | TEMPERATURE: 98.8 F

## 2019-02-22 PROBLEM — I63.211 CEREBROVASCULAR ACCIDENT (CVA) DUE TO OCCLUSION OF RIGHT VERTEBRAL ARTERY (HCC): Status: ACTIVE | Noted: 2019-02-05

## 2019-02-22 PROBLEM — R29.898 HEAVINESS OF UPPER EXTREMITY: Status: ACTIVE | Noted: 2019-02-21

## 2019-02-22 LAB
ALBUMIN SERPL BCP-MCNC: 3.8 G/DL (ref 3.5–5)
ALP SERPL-CCNC: 86 U/L (ref 46–116)
ALT SERPL W P-5'-P-CCNC: 30 U/L (ref 12–78)
ANION GAP SERPL CALCULATED.3IONS-SCNC: 10 MMOL/L (ref 4–13)
AST SERPL W P-5'-P-CCNC: 17 U/L (ref 5–45)
ATRIAL RATE: 56 BPM
BASOPHILS # BLD AUTO: 0.05 THOUSANDS/ΜL (ref 0–0.1)
BASOPHILS NFR BLD AUTO: 1 % (ref 0–1)
BILIRUB SERPL-MCNC: 0.7 MG/DL (ref 0.2–1)
BUN SERPL-MCNC: 14 MG/DL (ref 5–25)
CALCIUM SERPL-MCNC: 9.4 MG/DL (ref 8.3–10.1)
CHLORIDE SERPL-SCNC: 100 MMOL/L (ref 100–108)
CHOLEST SERPL-MCNC: 133 MG/DL (ref 50–200)
CO2 SERPL-SCNC: 26 MMOL/L (ref 21–32)
CREAT SERPL-MCNC: 0.82 MG/DL (ref 0.6–1.3)
EOSINOPHIL # BLD AUTO: 0.32 THOUSAND/ΜL (ref 0–0.61)
EOSINOPHIL NFR BLD AUTO: 5 % (ref 0–6)
ERYTHROCYTE [DISTWIDTH] IN BLOOD BY AUTOMATED COUNT: 11.6 % (ref 11.6–15.1)
EST. AVERAGE GLUCOSE BLD GHB EST-MCNC: 105 MG/DL
GFR SERPL CREATININE-BSD FRML MDRD: 85 ML/MIN/1.73SQ M
GLUCOSE P FAST SERPL-MCNC: 95 MG/DL (ref 65–99)
GLUCOSE SERPL-MCNC: 95 MG/DL (ref 65–140)
HBA1C MFR BLD: 5.3 % (ref 4.2–6.3)
HCT VFR BLD AUTO: 40.7 % (ref 34.8–46.1)
HDLC SERPL-MCNC: 43 MG/DL (ref 40–60)
HGB BLD-MCNC: 14.1 G/DL (ref 11.5–15.4)
IMM GRANULOCYTES # BLD AUTO: 0.03 THOUSAND/UL (ref 0–0.2)
IMM GRANULOCYTES NFR BLD AUTO: 0 % (ref 0–2)
LDLC SERPL CALC-MCNC: 74 MG/DL (ref 0–100)
LYMPHOCYTES # BLD AUTO: 1.38 THOUSANDS/ΜL (ref 0.6–4.47)
LYMPHOCYTES NFR BLD AUTO: 20 % (ref 14–44)
MCH RBC QN AUTO: 30.8 PG (ref 26.8–34.3)
MCHC RBC AUTO-ENTMCNC: 34.6 G/DL (ref 31.4–37.4)
MCV RBC AUTO: 89 FL (ref 82–98)
MONOCYTES # BLD AUTO: 0.72 THOUSAND/ΜL (ref 0.17–1.22)
MONOCYTES NFR BLD AUTO: 11 % (ref 4–12)
NEUTROPHILS # BLD AUTO: 4.34 THOUSANDS/ΜL (ref 1.85–7.62)
NEUTS SEG NFR BLD AUTO: 63 % (ref 43–75)
NRBC BLD AUTO-RTO: 0 /100 WBCS
P AXIS: 65 DEGREES
PA ADP BLD-ACNC: 350 ARU
PA ADP BLD-ACNC: 60 PRU (ref 194–418)
PLATELET # BLD AUTO: 280 THOUSANDS/UL (ref 149–390)
PMV BLD AUTO: 9.8 FL (ref 8.9–12.7)
POTASSIUM SERPL-SCNC: 3.3 MMOL/L (ref 3.5–5.3)
PR INTERVAL: 160 MS
PROT SERPL-MCNC: 7.4 G/DL (ref 6.4–8.2)
QRS AXIS: 52 DEGREES
QRSD INTERVAL: 92 MS
QT INTERVAL: 430 MS
QTC INTERVAL: 414 MS
RBC # BLD AUTO: 4.58 MILLION/UL (ref 3.81–5.12)
SODIUM SERPL-SCNC: 136 MMOL/L (ref 136–145)
T WAVE AXIS: 73 DEGREES
TRIGL SERPL-MCNC: 79 MG/DL
VENTRICULAR RATE: 56 BPM
WBC # BLD AUTO: 6.84 THOUSAND/UL (ref 4.31–10.16)

## 2019-02-22 PROCEDURE — 85576 BLOOD PLATELET AGGREGATION: CPT | Performed by: NURSE PRACTITIONER

## 2019-02-22 PROCEDURE — 93010 ELECTROCARDIOGRAM REPORT: CPT | Performed by: INTERNAL MEDICINE

## 2019-02-22 PROCEDURE — 99239 HOSP IP/OBS DSCHRG MGMT >30: CPT | Performed by: NURSE PRACTITIONER

## 2019-02-22 PROCEDURE — 83036 HEMOGLOBIN GLYCOSYLATED A1C: CPT | Performed by: FAMILY MEDICINE

## 2019-02-22 PROCEDURE — G8989 SELF CARE D/C STATUS: HCPCS

## 2019-02-22 PROCEDURE — 80061 LIPID PANEL: CPT | Performed by: FAMILY MEDICINE

## 2019-02-22 PROCEDURE — G8987 SELF CARE CURRENT STATUS: HCPCS

## 2019-02-22 PROCEDURE — 85025 COMPLETE CBC W/AUTO DIFF WBC: CPT | Performed by: FAMILY MEDICINE

## 2019-02-22 PROCEDURE — 97166 OT EVAL MOD COMPLEX 45 MIN: CPT

## 2019-02-22 PROCEDURE — 80053 COMPREHEN METABOLIC PANEL: CPT | Performed by: FAMILY MEDICINE

## 2019-02-22 RX ADMIN — ENOXAPARIN SODIUM 40 MG: 40 INJECTION SUBCUTANEOUS at 08:13

## 2019-02-22 RX ADMIN — CLOPIDOGREL BISULFATE 75 MG: 75 TABLET ORAL at 08:13

## 2019-02-22 RX ADMIN — HYDROCHLOROTHIAZIDE 25 MG: 25 TABLET ORAL at 08:13

## 2019-02-22 RX ADMIN — AMLODIPINE BESYLATE 10 MG: 10 TABLET ORAL at 08:13

## 2019-02-22 RX ADMIN — ASPIRIN 81 MG 81 MG: 81 TABLET ORAL at 08:13

## 2019-02-22 RX ADMIN — ATORVASTATIN CALCIUM 80 MG: 40 TABLET, FILM COATED ORAL at 17:22

## 2019-02-22 RX ADMIN — ASPIRIN 81 MG: 81 TABLET, COATED ORAL at 08:13

## 2019-02-22 NOTE — SOCIAL WORK
LOS 0, Not a bundle, is a readmit  CM met with Pt at bedside  Pt states she came to the hospital this admission due to a tingling sensation in her arm and foot, she called her PCP who told Pt to come to the ED immediately  Pt lives with her spouse in a 2 level home  Pt does not have a history of DME, HHC, or STR  Pt is independent with ADL's and ambulation  PT and OT have evaluated Pt and Pt has no needs at discharge  Pt uses [facility] in [place] and has Rx coverage  Pt reports she went to inpatient psych in 2009, she does not see a psychiatrist  Pt has no D&A hsitory  Pt does not have a POA and declines info  Pt drives herself to appointments  DCP: home, no needs

## 2019-02-22 NOTE — SPEECH THERAPY NOTE
Speech Language/Pathology  Speech/Language Pathology  Assessment    Patient Name: Alee Rubalcava  WJUCR'N Date: 2/22/2019     Problem List  Patient Active Problem List   Diagnosis    VAIN I (vaginal intraepithelial neoplasia grade I)    Vulvovaginal condyloma    Acute nonintractable headache    Disorientation    Hypertensive urgency    Essential hypertension    Vision changes    Cerebrovascular accident (CVA) due to occlusion of right vertebral artery (HCC)    Vertebral artery dissection (HCC)    Heaviness of upper extremity     Past Medical History  Past Medical History:   Diagnosis Date    Cervical dysplasia     CVA (cerebral vascular accident) ([address] )     GERD (gastroesophageal reflux disease)     occasionally    Hypertension     controlling with diet and exercise    Uterine leiomyoma      Past Surgical History  Past Surgical History:   Procedure Laterality Date    AUGMENTATION MAMMAPLASTY      GYNECOLOGIC CRYOSURGERY      CERVIX    HYSTERECTOMY      LASER ABLATION OF CONDYLOMAS N/A 7/30/2018    Procedure: Shila Lino ABLATION OF VULVA;  Surgeon: Yaneth Wang MD;  [facility];  Service: Gynecology Oncology    SKIN TAG REMOVAL      EXICISON OF PERIANAL    WISDOM TOOTH EXTRACTION         The patient is a pleasant 66-year-old female with past medical history significant for vertebral artery dissection and bilateral posterior parietal lobe and cerebellar hemispheric infarcts presents to emergency room today with a chief complaint of left arm and left leg paresthesias  Patient states her paresthesias began last evening and are described in her left fingers and left toes  The patient recently thought that this is secondary to cramping or the way she slept  She had no other focal symptoms  Her  did not noted group and he felt that her strength was equal bilaterally  She is familiar with neurologic testing given she had a recent CVA    This morning she called her PCP and described her symptoms and was instructed to present to the emergency room for evaluation  It was noted that her blood pressure was greater than 180 and she received a 1 time dose of IV labetalol with resulting blood pressure improvement  On exam the patient states she still has some residual numbness and tingling in her left toes and left fingers  No other acute complaints and feels well overall  Consult received for speech/swallow eval on stroke pathway  Pt passed nsg swallow screen; tolerating regular diet w/o s/s dysphagia or aspiration  No speech/language deficits reported  NIH score is 1  MRI: 2/21 Late subacute to early chronic infarcts identified within the right cerebellar hemisphere and within the occipital lobes bilaterally with no new ischemia  No need for formal speech/swallow eval at this time  Reconsult if needed    Johanna Valdiiva, SLP

## 2019-02-22 NOTE — UTILIZATION REVIEW
Initial Clinical Review    Admission: Date/Time/Statement: observation 02/21/19 1257 converted to inpatient admission 2/22/2019 1114 due to continued workup of new onset Paresthesias of the Left Hand    02/22/19 1115  Inpatient Admission Once     Transfer Service: General Medicine    Expected Discharge Date: 02/23/19       Question Answer Comment   Admitting Physician Ivette Adam    Level of Care Med Surg    Estimated length of stay More than 2 Midnights    Certification I certify that inpatient services are medically necessary for this patient for a duration of greater than two midnights  See H&P and MD Progress Notes for additional information about the patient's course of treatment  Start Status    02/22/19 1115 Completed Details       02/22/19 1114       ED: Date/Time/Mode of Arrival:   ED Arrival Information     Expected Arrival Acuity Means of Arrival Escorted By Service Admission Type    - 2/21/2019 11:01 Emergent Walk-In Self Hospitalist Emergency    Arrival Complaint    Left Arm Tingling/Numbness        Chief Complaint:   Chief Complaint   Patient presents with    Numbness     Pt c/o of left arm numbness that began last night  Pt has hx of CVA in begining of Feb  pt states she has no other symptoms other than the numbness in the left arm  Called PCP told to come to ER for eval       History of Illness: 25-year-old female with past medical history significant for vertebral artery dissection and bilateral posterior parietal lobe and cerebellar hemispheric infarcts presents to emergency room today with a chief complaint of left arm and left leg paresthesias  Patient states her paresthesias began last evening and are described in her left fingers and left toes  This morning she called her PCP and described her symptoms and was instructed to present to the emergency room for evaluation         ED Vital Signs:   ED Triage Vitals   Temperature Pulse Respirations Blood Pressure SpO2   02/21/19 1126 02/21/19 1124 02/21/19 1124 02/21/19 1124 02/21/19 1124   97 9 °F (36 6 °C) 87 18 (!) 178/112 98 %      Temp Source Heart Rate Source Patient Position - Orthostatic VS BP Location FiO2 (%)   02/21/19 1126 02/21/19 1124 02/21/19 1124 02/21/19 1124 --   Oral Monitor Lying Right arm       Pain Score       02/21/19 1121       No Pain        Wt Readings from Last 1 Encounters:   02/21/19 52 6 kg (116 lb)     Vital Signs (abnormal): 2/21/2019  BP: 178/112    Pertinent Labs/Diagnostic Test Results: 2/21/2019 potassium 3 3,   MRI BRAIN: Late subacute to early chronic infarcts identified within the right cerebellar hemisphere and within the occipital lobes bilaterally with no new ischemia  CT HEAD: Subtle areas of evolving cortical gliosis bilateral posterior parietal lobes and right cerebellum correlating to the diffusion restriction on the previous MRI, indicative of subacute to chronic posterior circulation infarctions  EKG: ECG rate:  56    ECG rate assessment: bradycardic  Rhythm:   Rhythm: sinus bradycardia    Ectopy:   Ectopy: none  QRS: QRS axis:  Normal  Conduction:   Conduction: normal    ST segments:   ST segments:  Normal  T waves:   T waves: normal    ED Treatment:   Medication Administration from 02/21/2019 1101 to 02/21/2019 1602       Date/Time Order Dose Route Action Action by Comments     02/21/2019 1201 Labetalol HCl (NORMODYNE) injection 10 mg 10 mg Intravenous Given Becky Miguel RN      02/21/2019 1304 potassium chloride (K-DUR,KLOR-CON) CR tablet 20 mEq 20 mEq Oral Given Becky Miguel RN         Past Medical/Surgical History:    Active Ambulatory Problems     Diagnosis Date Noted    VAIN I (vaginal intraepithelial neoplasia grade I) 06/12/2018    Vulvovaginal condyloma 06/12/2018    Acute nonintractable headache 01/27/2019    Disorientation 01/27/2019    Hypertensive urgency 01/27/2019    Essential hypertension 02/01/2019    Vision changes 02/01/2019    CVA (cerebral vascular accident) ([address] ) 02/05/2019    Vertebral artery dissection ([facility] ) 02/05/2019       Past Medical History:   Diagnosis Date    Cervical dysplasia     CVA (cerebral vascular accident) ([facility] )     GERD (gastroesophageal reflux disease)     Hypertension     Uterine leiomyoma      Admitting Diagnosis: Numbness [R20 0]  Essential hypertension [I10]  Vertebral artery dissection ([facility] ) [I77 74]  Hypertensive urgency [I16 0]  Age/Sex: 50 y o  female     Assessment/Plan: 2/21/2019 attending note:   Paresthesias in left hand  Assessment & Plan  Admit for observation on telemetry  Initiate ischemic stroke pathway  CTA head and neck reviewed and reveals area of subacute ischemia from recent CVA secondary to left vertebral artery dissection  Continue dual antiplatelet therapy  Continue high-intensity statin  Will not repeat 2D echocardiogram  Consult Neurology  MRI brain without contrast  Hypertensive urgency  Assessment & Plan  BP improved after 1 time dose of IV labetalol  Resume prior to admission antihypertensive regimen in the form of Norvasc 10 mg p o  Daily, hydrochlorothiazide 25 mg p  O  Daily  P r n  Labetalol for systolic blood pressure greater than 180     Admission Orders:  48 hr telemetry  Neuro checks Q 1 hr x4; Q 2hr x4; Q 4 hr x72;  Vitals Q 8 if stable  Nursing dysphagia assess prior to diet  NIH stroke scale on admission & DC  Inpatient to Neurology  OT/PT/speech eval  Peripheral IV  Diet regular    Scheduled Meds:   Current Facility-Administered Medications:  amLODIPine 10 mg Oral Daily   aspirin 81 mg Oral Daily   aspirin 81 mg Oral Daily   atorvastatin 80 mg Oral QPM   clopidogrel 75 mg Oral Daily   enoxaparin 40 mg Subcutaneous Daily   hydrochlorothiazide 25 mg Oral Daily       Network Utilization Review Department  Phone: [phone]; Fax [phone]  [email or web address]  ATTENTION: Please call with any questions or concerns to [phone]  and carefully listen to the prompts so that you are directed to the right person  Send all requests for admission clinical reviews, approved or denied determinations and any other requests to fax [phone]   All voicemails are confidential

## 2019-02-22 NOTE — DISCHARGE SUMMARY
Discharge- Devora Arellano 1970, 50 y o  female MRN: [id number]    Unit/Bed#: -01 Encounter: [id number]    Primary Care Provider: Samuel Larose DO   Date and time admitted to hospital: 2/21/2019 11:18 AM        * Heaviness of upper extremity  Assessment & Plan  · Present on admission in patient with recent CVA secondary to left vertebral artery dissection  · Personal discussion with Neurology MRI negative for new acute findings  · Suspect transient event or nerve impingement patient showed symptomatic improvement  · Concern patient came in with hypertensive urgency given her dissection patient discussion regarding tight blood pressure control with a cold a blood pressure 140/80 patient recommended to keep A journal record of her blood pressure and work aggressively with her PCP and obtain control of her blood pressure  · CTA head and neck reviewed and reveals area of subacute ischemia from recent CVA secondary to left vertebral artery dissection MRI as mentioned above  · Continue dual antiplatelet therapy  · Continue high-intensity statin  · Will not repeat 2D echocardiogram        Cerebrovascular accident (CVA) due to occlusion of right vertebral artery ([address] )  Assessment & Plan  · Plan per principal problem    Hypertensive urgency  Assessment & Plan  · BP improved after 1 time dose of IV labetalol  · Continue current regimen of Norvasc 10 mg p o  Daily and hydrochlorothiazide 25 mg p   O  Daily  · Patient recommended to maintain tight blood pressure control below 140/80 and may require additional antihypertensive as an outpatient  · Patient recommended to keep a journal discussed with her PCP on further antihypertensive measures    Vertebral artery dissection [facility])  Assessment & Plan  · As mentioned above      Discharging Physician / Practitioner: JER Stratton  PCP: Samuel Larose DO  Admission Date:   Admission Orders (From admission, onward)    Ordered        02/22/19 1114  Inpatient Admission  Once     Order ID Start Status   [id number] 02/22/19 1115 Completed          02/21/19 1257  Place in Observation (expected length of stay for this patient is less than two midnights)  Once     Order ID Start Status   [id number] 02/21/19 1255 Completed              Discharge Date: 02/22/19    Resolved Problems  Date Reviewed: 2/22/2019    None          Consultations During Hospital Stay:  · Neurology    Procedures Performed:   · None    Significant Findings / Test Results:   · Heaviness of upper extremities suspected nerve impingement versus transient and vent no evidence to support worsening or new CVA  · History of recent CVA  · Vertebral artery dissection  · Hypertensive urgency    Incidental Findings:   ·       Test Results Pending at Discharge (will require follow up): · None     Outpatient Tests Requested:  · Per Neurology/PCP    Complications:  None    Reason for Admission:  Heaviness of left upper extremity in-patient with recent stroke    Hospital Course:     Swathi Carlson is a 50 y o  female patient who originally presented to the hospital on 2/21/2019 due to symptoms of left upper extremity heaviness/paresthesia in patient with recent significant history of vertebral artery dissection and bilateral posterior parietal lobe in cerebral hemisphere infarcts  Patient reports symptom appeared overnight prior to coming to the ED which she described a 15 blade numbness type feeling in her left fingers and left toes given her recent CVA and dissection patient came to the ED for further evaluation  Patient had CTA of the head and neck showed no acute findings MRI was ordered without contrast that showed no new acute stroke findings  Neurology evaluated the patient felt the symptoms were a transient event or nerve impingement and suspected the symptoms would resolve themselves  Patient did see significant improvement of her symptoms over 24 hour  And had no neurological deficits following day  Patient had incidental finding of hypertensive urgency upon admission at 178/112 concerning given patient's vertebral artery dissection for tighter blood pressure control were discussed patient did respond to a dose of IV below labetalol over it is suspected the patient does not have tight control an outpatient setting  Patient was recommended to keep a blood pressure journal add work with her PCP on tighter aggressive blood pressure control which may require medication above her current management of Norvasc and hydrochlorothiazide  Patient overall given her clinical improvement patient was cleared for discharge to maintain her outpatient follow-up  Please see above list of diagnoses and related plan for additional information  Condition at Discharge: good     Discharge Day Visit / Exam:     Subjective:  Patient reports her symptoms have overall improved she has been walking without difficulty patient denies tingling numbness and reports her strength has overall return  Patient is ready to go understands the need to maintain a blood pressure journal and discussed tighter blood pressure control with her primary care just wants to understand goal not to go over patient was made aware that neurology's recommendation was to maintain her blood pressure below 140/80 patient stated understanding and was discharged home  Vitals: Blood Pressure: 133/84 (02/22/19 1500)  Pulse: 82 (02/22/19 1500)  Temperature: 98 8 °F (37 1 °C) (02/22/19 1500)  Temp Source: Oral (02/22/19 1500)  Respirations: 16 (02/22/19 1500)  Height: 4' 11" (149 9 cm)(2/21 per chart review) (02/22/19 1143)  SpO2: 97 % (02/22/19 1500)  Exam:   Physical Exam   Constitutional: She is oriented to person, place, and time  She appears well-developed and well-nourished  HENT:   Head: Normocephalic and atraumatic  Eyes: Conjunctivae and EOM are normal    Neck: Normal range of motion     Cardiovascular: Normal rate, regular rhythm and normal heart sounds  Pulmonary/Chest: Effort normal and breath sounds normal    Abdominal: Soft  Bowel sounds are normal    Musculoskeletal: Normal range of motion  Neurological: She is alert and oriented to person, place, and time  Skin: Skin is warm and dry  Psychiatric: She has a normal mood and affect  Her behavior is normal          Discharge instructions/Information to patient and family:   See after visit summary for information provided to patient and family  Provisions for Follow-Up Care:  See after visit summary for information related to follow-up care and any pertinent home health orders  Disposition:     Home    For Discharges to [facility] SNF:   · Not Applicable to this Patient - Not Applicable to this Patient    Planned Readmission:  None     Discharge Statement:  I spent 28 minutes discharging the patient  This time was spent on the day of discharge  I had direct contact with the patient on the day of discharge  Greater than 50% of the total time was spent examining patient, answering all patient questions, arranging and discussing plan of care with patient as well as directly providing post-discharge instructions  Additional time then spent on discharge activities  Discharge Medications:  See after visit summary for reconciled discharge medications provided to patient and family        ** Please Note: This note has been constructed using a voice recognition system **

## 2019-02-22 NOTE — OCCUPATIONAL THERAPY NOTE
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)    Vulvovaginal condyloma    Acute nonintractable headache    Disorientation    Hypertensive urgency    Essential hypertension    Vision changes    Cerebrovascular accident (CVA) due to occlusion of right vertebral artery (HCC)    Vertebral artery dissection (HCC)    Heaviness of upper extremity     Past Medical History  Past Medical History:   Diagnosis Date    Cervical dysplasia     CVA (cerebral vascular accident) ([address] )     GERD (gastroesophageal reflux disease)     occasionally    Hypertension     controlling with diet and exercise    Uterine leiomyoma      Past Surgical History  Past Surgical History:   Procedure Laterality Date    AUGMENTATION MAMMAPLASTY      GYNECOLOGIC CRYOSURGERY      CERVIX    HYSTERECTOMY      LASER ABLATION OF CONDYLOMAS N/A 7/30/2018    Procedure: OMNIGUIDE LASER ABLATION OF VULVA;  Surgeon: Becky Hood MD;  Location: [facility];  Service: Gynecology Oncology    SKIN TAG REMOVAL      EXICISON OF PERIANAL    WISDOM TOOTH EXTRACTION           02/22/19 1137   Note Type   Note type Eval only   Restrictions/Precautions   Weight Bearing Precautions Per Order No   Other Precautions Pain   Pain Assessment   Pain Assessment FLACC  (L UE)   Pain Rating: FLACC (Rest) - Face 0   Pain Rating: FLACC (Rest) - Legs 0   Pain Rating: FLACC (Rest) - Activity 0   Pain Rating: FLACC (Rest) - Cry 0   Pain Rating: FLACC (Rest) - Consolability 0   Score: FLACC (Rest) 0   Pain Rating: FLACC (Activity) - Face 1   Pain Rating: FLACC (Activity) - Legs 0   Pain Rating: FLACC (Activity) - Activity 0   Pain Rating: FLACC (Activity) - Cry 0   Pain Rating: FLACC (Activity) - Consolability 0   Score: FLACC (Activity) 1   Home Living   Type of Home House   Home Layout Two level;Performs ADLs on one level; Able to live on main level with bedroom/bathroom   Bathroom Shower/Tub Tub/shower unit   [employer] Raised   Bathroom Equipment Other (Comment)  (no DME)   Bathroom Accessibility Accessible   Prior Function   Level of [place] Independent with ADLs and functional mobility; Needs assistance with IADLs   Lives With Spouse; Son   Bo Help From Family   ADL Assistance Independent   IADLs Independent   Falls in the last 6 months 0   Vocational Full time employment   Comments Pt showered with distant supervision for safety  Pt did not climb stairs without distant supervision  Pt completed light household tasks Mod I with rest breaks  Lifestyle   Autonomy Pt completed ADLs/IADLs (light household tasks) independently   Reciprocal Relationships Pt has supportive spouse and son   Service to Others Pt works full time at [employer] in [employer] (high stress job), and part time at [employer] in [email or web address] (enjoys this job)   Intrinsic Gratification Pt stated she has no free time due to working a lot but she would like to start exercising   Psychosocial   Psychosocial (WDL) WDL   ADL   Where Assessed Standing at sink   Grooming Assistance 6  Modified Independent   Grooming Deficit Increased time to complete   LB Dressing Assistance 6  Modified independent   LB Dressing Deficit Don/doff R sock; Don/doff L sock; Other (Comment)  (while seated)   Transfers   Sit to Stand 6  Modified independent   Additional items Increased time required   Stand to Sit 6  Modified independent   Additional items Increased time required   Functional Mobility   Functional Mobility 6  Modified independent   Additional Comments Increased time required, no AD   Balance   Static Sitting Good   Dynamic Sitting Good   Static Standing Good   Dynamic Standing Fair +   Ambulatory Fair +   Activity Tolerance   Activity Tolerance Patient tolerated treatment well   Nurse Made Aware spoke with MS4 niraj DEUTSCH   [address] [facility] Overall AROM   R Shoulder Flexion WFL   R Shoulder Extension WFL   R Elbow Flexion WFL   R Elbow Extension WFL   RUE Strength   RUE Overall Strength Within Functional Limits - able to perform ADL tasks with strength  (4+/5)   LUE Assessment   LUE Assessment WFL   LUE Overall AROM   L Shoulder Flexion WFL   L Elbow Flexion WFL   L Elbow Extension WFL   LUE Strength   LUE Overall Strength Within Functional Limits - able to perform ADL tasks with strength  (4+/5)   Hand Function   Gross Motor Coordination Functional   Fine Motor Coordination Functional   Sensation   Light Touch No apparent deficits   Proprioception   Proprioception No apparent deficits   Vision-Basic Assessment   Current Vision No visual deficits   Vision - Complex Assessment   Tracking Able to track stimulus in all quads without difficulty   Perception   Inattention/Neglect Appears intact   Spatial Orientation Appears intact   Motor Planning Appears intact   Perseveration Not present   Cognition   Overall Cognitive Status [facility]   Arousal/Participation Alert; Responsive;Arousable; Cooperative   Attention Within functional limits   Orientation Level Oriented X4   Memory Within functional limits   Following Commands Follows all commands and directions without difficulty   Comments Identified by ID bracelet full name and birthdate   Assessment   Assessment Pt is a 50 y o  female seen for OT evaluation s/p admit [facility] on 2/21/2019 w/ Heaviness of upper extremity  Comorbidities affecting pt's functional performance at time of assessment include: hypertensive urgency, recent CVA, vertebral artery dissection  Prior to admission, pt completed ADLs independently (supervision for showers), and IADLs independently (light household tasks, did not climb steps without supervision)  Upon evaluation: pt presents with slight decrease in dynamic standing balance (fair+)  UE strength/ROM [facility]   Pt demonstrated good safety awareness and self-monitoring skills, noting when she needs to take a rest break during functional tasks  Pt completed STS transfers and functional mobility this session without AD with Mod I for extra time  Pt completed grooming task at sink while standing without VCs required for safety, and demonstrated no depth perception, sequencing, or visual deficits  Pt stated she no longer has tingling/numbness of L UE, just slight soreness in proximal aspect of L UE  No visual/perceptual deficits apparent at this time  Pt does not require further OT intervention at this time  Discussed importance of OOB activity when at home to promote overall conditioning to promote functional performance and independence in daily routines  Pt agreeable and stated that she tries to stay active during the day due to fear of blood clots  Discussed pt safety concerns when at home with pt  Pt stated that she has fear of falling in shower  Recommended pt use tub bench to decrease fear and improve performance/independence in daily routine  Pt agreeable to recommendation  From OT standpoint, recommendation at time of d/c would be to home with family support once medically cleared  No OT intervention during acute care stay warranted at this time  Please re-consult OT if further needs arise  D/C OT     Recommendation   OT Discharge Recommendation Home with family support   Equipment Recommended Tub seat with back   OT - OK to Discharge   (once medically stable to home with family support)   Barthel Index   Feeding 10   Bathing 0  (DNT)   Grooming Score 5   Dressing Score 10   Bladder Score 10   Bowels Score 10   Toilet Use Score 10   Transfers (Bed/Chair) Score 15   Mobility (Level Surface) Score 10   Stairs Score 0   Barthel Index Score 80   Modified [place] Scale   Modified [place] Scale 2     Nallely Hanna, OTR/L

## 2019-02-22 NOTE — PLAN OF CARE
Problem: Potential for Falls  Goal: Patient will remain free of falls  Description  INTERVENTIONS:  - Assess patient frequently for physical needs  -  Identify cognitive and physical deficits and behaviors that affect risk of falls  -  [place] fall precautions as indicated by assessment   - Educate patient/family on patient safety including physical limitations  - Instruct patient to call for assistance with activity based on assessment  - Modify environment to reduce risk of injury  - Consider OT/PT consult to assist with strengthening/mobility  Outcome: Completed     Problem: NEUROSENSORY - ADULT  Goal: Achieves stable or improved neurological status  Description  INTERVENTIONS  - Monitor and report changes in neurological status  - Initiate measures to prevent increased intracranial pressure  - Maintain blood pressure and fluid volume within ordered parameters to optimize cerebral perfusion  - Monitor temperature, glucose, and sodium or any other associated labs   Initiate appropriate interventions as ordered  - Monitor for seizure activity   - Administer anti-seizure medications as ordered  Outcome: Completed  Goal: Achieves maximal functionality and self care  Description  INTERVENTIONS  - Monitor swallowing and airway patency with patient fatigue and changes in neurological status  - Encourage and assist patient to increase activity and self care with guidance from rehab services  - Encourage visually impaired, hearing impaired and aphasic patients to use assistive/communication devices  Outcome: Completed     Problem: CARDIOVASCULAR - ADULT  Goal: Maintains optimal cardiac output and hemodynamic stability  Description  INTERVENTIONS:  - Monitor I/O, vital signs and rhythm  - Monitor for S/S and trends of decreased cardiac output i e  bleeding, hypotension  - Administer and titrate ordered vasoactive medications to optimize hemodynamic stability  - Assess quality of pulses, skin color and temperature  - Assess for signs of decreased coronary artery perfusion - ex  Angina  - Instruct patient to report change in severity of symptoms  Outcome: Completed  Goal: Absence of cardiac dysrhythmias or at baseline rhythm  Description  INTERVENTIONS:  - Continuous cardiac monitoring, monitor vital signs, obtain 12 lead EKG if indicated  - Administer antiarrhythmic and heart rate control medications as ordered  - Monitor electrolytes and administer replacement therapy as ordered  Outcome: Completed     Problem: Knowledge Deficit  Goal: Patient/family/caregiver demonstrates understanding of disease process, treatment plan, medications, and discharge instructions  Description  Complete learning assessment and assess knowledge base    Interventions:  - Provide teaching at level of understanding  - Provide teaching via preferred learning methods  Outcome: Completed     Problem: DISCHARGE PLANNING - CARE MANAGEMENT  Goal: Discharge to post-acute care or home with appropriate resources  Description  INTERVENTIONS:  - Conduct assessment to determine patient/family and health care team treatment goals, and need for post-acute services based on payer coverage, community resources, and patient preferences, and barriers to discharge  - Address psychosocial, clinical, and financial barriers to discharge as identified in assessment in conjunction with the patient/family and health care team  - Arrange appropriate level of post-acute services according to patient?s   needs and preference and payer coverage in collaboration with the physician and health care team  - Communicate with and update the patient/family, physician, and health care team regarding progress on the discharge plan  - Arrange appropriate transportation to post-acute venues  Outcome: Completed

## 2019-02-22 NOTE — UTILIZATION REVIEW
Notification of Inpatient Admission/Inpatient Authorization Request  This is a Notification of Inpatient Admission/Request for Inpatient Authorization for our facility [address] [facility]  Be advised that this patient was admitted to our facility under Inpatient Status  Please contact the Utilization Review Department where the patient is receiving care services for additional admission information  Place of Service Code: 24   Place of Service Name: Inpatient Hospital  Presentation Date & Time: 2/21/2019 11:18 AM  Inpatient Admission Date & Time: 2/22/19 1114  Discharge Date & Time: No discharge date for patient encounter  Discharge Disposition (if discharged): Home/Self Care  Attending Physician: Dolores Argueta [[id number]]  Admission Orders (From admission, onward)    Ordered        02/22/19 1114  Inpatient Admission  Once     Order ID Start Status   [id number] 02/22/19 1115 Completed          02/21/19 1257  Place in Observation (expected length of stay for this patient is less than two midnights)  Once     Order ID Start Status   [id number] 02/21/19 1255 Completed                Facility: [place] [address] Utilization Review Department  Phone: [phone]; Fax [phone]  [email or web address]  ATTENTION: Please call with any questions or concerns to [phone]  and carefully listen to the prompts so that you are directed to the right person  Send all requests for admission clinical reviews, approved or denied determinations and any other requests to fax [phone]   All voicemails are confidential

## 2019-02-22 NOTE — PLAN OF CARE
Problem: Potential for Falls  Goal: Patient will remain free of falls  Description  INTERVENTIONS:  - Assess patient frequently for physical needs  -  Identify cognitive and physical deficits and behaviors that affect risk of falls  -  [place] fall precautions as indicated by assessment   - Educate patient/family on patient safety including physical limitations  - Instruct patient to call for assistance with activity based on assessment  - Modify environment to reduce risk of injury  - Consider OT/PT consult to assist with strengthening/mobility  Outcome: Progressing     Problem: NEUROSENSORY - ADULT  Goal: Achieves stable or improved neurological status  Description  INTERVENTIONS  - Monitor and report changes in neurological status  - Initiate measures to prevent increased intracranial pressure  - Maintain blood pressure and fluid volume within ordered parameters to optimize cerebral perfusion  - Monitor temperature, glucose, and sodium or any other associated labs   Initiate appropriate interventions as ordered  - Monitor for seizure activity   - Administer anti-seizure medications as ordered  Outcome: Progressing  Goal: Achieves maximal functionality and self care  Description  INTERVENTIONS  - Monitor swallowing and airway patency with patient fatigue and changes in neurological status  - Encourage and assist patient to increase activity and self care with guidance from rehab services  - Encourage visually impaired, hearing impaired and aphasic patients to use assistive/communication devices  Outcome: Progressing     Problem: CARDIOVASCULAR - ADULT  Goal: Maintains optimal cardiac output and hemodynamic stability  Description  INTERVENTIONS:  - Monitor I/O, vital signs and rhythm  - Monitor for S/S and trends of decreased cardiac output i e  bleeding, hypotension  - Administer and titrate ordered vasoactive medications to optimize hemodynamic stability  - Assess quality of pulses, skin color and temperature  - Assess for signs of decreased coronary artery perfusion - ex  Angina  - Instruct patient to report change in severity of symptoms  Outcome: Progressing  Goal: Absence of cardiac dysrhythmias or at baseline rhythm  Description  INTERVENTIONS:  - Continuous cardiac monitoring, monitor vital signs, obtain 12 lead EKG if indicated  - Administer antiarrhythmic and heart rate control medications as ordered  - Monitor electrolytes and administer replacement therapy as ordered  Outcome: Progressing     Problem: Knowledge Deficit  Goal: Patient/family/caregiver demonstrates understanding of disease process, treatment plan, medications, and discharge instructions  Description  Complete learning assessment and assess knowledge base    Interventions:  - Provide teaching at level of understanding  - Provide teaching via preferred learning methods  Outcome: Progressing

## 2019-02-22 NOTE — PLAN OF CARE
Problem: DISCHARGE PLANNING - CARE MANAGEMENT  Goal: Discharge to post-acute care or home with appropriate resources  Description  INTERVENTIONS:  - Conduct assessment to determine patient/family and health care team treatment goals, and need for post-acute services based on payer coverage, community resources, and patient preferences, and barriers to discharge  - Address psychosocial, clinical, and financial barriers to discharge as identified in assessment in conjunction with the patient/family and health care team  - Arrange appropriate level of post-acute services according to patient?s   needs and preference and payer coverage in collaboration with the physician and health care team  - Communicate with and update the patient/family, physician, and health care team regarding progress on the discharge plan  - Arrange appropriate transportation to post-acute venues  Outcome: Progressing  Note:   LOS 0, Not a bundle, is a readmit  CM met with Pt at bedside  Pt states she came to the hospital this admission due to a tingling sensation in her arm and foot, she called her PCP who told Pt to come to the ED immediately  Pt lives with her spouse in a 2 level home  Pt does not have a history of DME, HHC, or STR  Pt is independent with ADL's and ambulation  PT and OT have evaluated Pt and Pt has no needs at discharge  Pt uses [facility] in [place] and has Rx coverage  Pt reports she went to inpatient psych in 2009, she does not see a psychiatrist  Pt has no D&A hsitory  Pt does not have a POA and declines info  Pt drives herself to appointments  DCP: home, no needs

## 2019-02-22 NOTE — PHYSICAL THERAPY NOTE
Physical Therapy Screen Note    Referral received for PT eval and tx  Chart check performed  Pt states walking w/o difficulty and has no concerns regarding return home from mobility perspective  Inpatient PT is not indicated due to pt's independent mobility status  Pt will benefit from continued independent ambulation to maintain endurance and level of function      Lori Kwan, PT

## 2019-02-22 NOTE — DISCHARGE INSTR - AVS FIRST PAGE
Please stay on your medications as we discussed  It is important that your doctors know how high your blood pressure is at times such as: when you get up in the morning, or when you go to bed at night, because it tells us how high your pressure has been during the night while you have been sleeping  Additionally it is also good for your doctor to know: did your blood pressure medicine control your blood pressure all day? or does your blood pressure medicines begin to wear off? and you may need twice a day medication  Consider adopting a record of your BP reading such as this:   Day of Week Time Reading Other Factors   Monday Upon Awakening     Tuesday Going to bed     Wedbesday Approx 6 or so hrs after taking meds     Thursday Approx dinner or early evening     F/S/S Random       Initially it is important to establish that you are taking your blood pressure correctly so it is often suggested that you take it initially wait a minute and take it again so that you know that you are getting similar readings, particularly for any significant elevated readings  Also generally the left arm is this suggested arm to use for blood pressure checks    Also it is often suggested that you take it twice a day shortly after you make a change in your medications or get out of the hospital etc

## 2019-02-25 RX ORDER — HYDROCHLOROTHIAZIDE 25 MG/1
25 TABLET ORAL DAILY
Refills: 0 | COMMUNITY
Start: 2019-02-19

## 2019-02-26 ENCOUNTER — OFFICE VISIT (OUTPATIENT)
Dept: CARDIOLOGY CLINIC | Facility: CLINIC | Age: 49
End: 2019-02-26
Payer: COMMERCIAL

## 2019-02-26 VITALS
HEART RATE: 80 BPM | DIASTOLIC BLOOD PRESSURE: 78 MMHG | BODY MASS INDEX: 23.83 KG/M2 | SYSTOLIC BLOOD PRESSURE: 120 MMHG | HEIGHT: 59 IN | WEIGHT: 118.2 LBS

## 2019-02-26 DIAGNOSIS — I10 ESSENTIAL HYPERTENSION: Primary | ICD-10-CM

## 2019-02-26 DIAGNOSIS — I63.211 CEREBROVASCULAR ACCIDENT (CVA) DUE TO OCCLUSION OF RIGHT VERTEBRAL ARTERY (HCC): ICD-10-CM

## 2019-02-26 DIAGNOSIS — I16.0 HYPERTENSIVE URGENCY: ICD-10-CM

## 2019-02-26 DIAGNOSIS — I77.74 VERTEBRAL ARTERY DISSECTION (HCC): ICD-10-CM

## 2019-02-26 PROCEDURE — 99212 OFFICE O/P EST SF 10 MIN: CPT | Performed by: INTERNAL MEDICINE

## 2019-02-26 NOTE — PROGRESS NOTES
Cardiology Follow Up    Kendall Alba  1970  [id number]  [address] [facility] [address] [place] [address]  [phone]    1  Essential hypertension     2  Cerebrovascular accident (CVA) due to occlusion of right vertebral artery ([address] )     3  Vertebral artery dissection ([address] )     4  Hypertensive urgency         Discussion/Summary:  Hospital events noted  Vertebral artery dissection  BP today in the office is well controlled  Continue current meds  Renal artery doppler negative  If labile pressure continue can consider secondary workup  Echo reviewed from the hospital       Interval History:  80-year-old female with a history of difficult to control hypertension mostly secondary to medication noncompliance presents for a hospital follow-up  She has had headaches and visual changes with blood pressures over 289 systolic  She was admitted to the hospital overnight started on medications  She has some stressors in her life diet is moderate in sodium  She denies any exertional chest pain or discomfort shortness of breath, palpitations, lower extremity edema, PND, orthopnea  She has had some periods where she states that she was clumsy  She had 2 CT scans of the head which did not show any abnormalities  Following our visit developed vertebral art dissection  Now on DAPT  BP better controlled  Doing well no copmplaints now    No chest pain, SOB, neuro Sx, palps, LE edema      Problem List     VAIN I (vaginal intraepithelial neoplasia grade I)    Vulvovaginal condyloma    Acute nonintractable headache    Disorientation    Hypertensive urgency    Essential hypertension    Vision changes        Past Medical History:   Diagnosis Date    Cervical dysplasia     CVA (cerebral vascular accident) ([address] )     GERD (gastroesophageal reflux disease)     occasionally    Hypertension     controlling with diet and exercise    Uterine leiomyoma      Social History     Socioeconomic History    Marital status: /Civil Union     Spouse name: Not on file    Number of children: Not on file    Years of education: Not on file    Highest education level: Not on file   Occupational History    Not on file   Social Needs    Financial resource strain: Not on file    Food insecurity:     Worry: Not on file     Inability: Not on file    Transportation needs:     Medical: Not on file     Non-medical: Not on file   Tobacco Use    Smoking status: Never Smoker    Smokeless tobacco: Never Used   Substance and Sexual Activity    Alcohol use: Yes     Comment: SOCIAL-1-2 drinks per week    Drug use: No    Sexual activity: Not on file   Lifestyle    Physical activity:     Days per week: Not on file     Minutes per session: Not on file    Stress: Not on file   Relationships    Social connections:     Talks on phone: Not on file     Gets together: Not on file     Attends Muslim service: Not on file     Active member of club or organization: Not on file     Attends meetings of clubs or organizations: Not on file     Relationship status: Not on file    Intimate partner violence:     Fear of current or ex partner: Not on file     Emotionally abused: Not on file     Physically abused: Not on file     Forced sexual activity: Not on file   Other Topics Concern    Not on file   Social History Narrative    PROBLEMS CONCERNING ADOPTED CHILD    CAFFEINE USE    MODERATE EXERCISING LESS THAN 3x WEEK      Family History   Adopted: Yes     Past Surgical History:   Procedure Laterality Date    AUGMENTATION MAMMAPLASTY      GYNECOLOGIC CRYOSURGERY      CERVIX    HYSTERECTOMY      LASER ABLATION OF CONDYLOMAS N/A 7/30/2018    Procedure: [address];  Surgeon: Josh Doan MD;  Location: [facility];  Service: Gynecology Oncology    SKIN TAG REMOVAL      EXICISON OF PERIANAL    WISDOM TOOTH EXTRACTION Current Outpatient Medications:     acetaminophen (TYLENOL) 325 mg tablet, Take 2 tablets (650 mg total) by mouth every 6 (six) hours as needed for mild pain or headaches, Disp: 30 tablet, Rfl: 0    ALPRAZolam (XANAX) 0 5 mg tablet, Take 0 5 mg by mouth 4 (four) times a day as needed for anxiety, Disp: , Rfl:     amLODIPine (NORVASC) 10 mg tablet, Take 1 tablet (10 mg total) by mouth daily for 14 days, Disp: 14 tablet, Rfl: 0    aspirin (ECOTRIN LOW STRENGTH) 81 mg EC tablet, Take 1 tablet (81 mg total) by mouth daily, Disp: , Rfl:     atorvastatin (LIPITOR) 80 mg tablet, Take 1 tablet (80 mg total) by mouth daily with dinner, Disp: 30 tablet, Rfl: 0    clopidogrel (PLAVIX) 75 mg tablet, Take 1 tablet (75 mg total) by mouth daily, Disp: 30 tablet, Rfl: 0    hydrochlorothiazide (HYDRODIURIL) 25 mg tablet, Take 25 mg by mouth daily, Disp: , Rfl: 0    tetrahydrozoline-zinc (VISINE-AC) 0 05-0 25 % ophthalmic solution, 2 drops 3 (three) times a day as needed, Disp: , Rfl:   Allergies   Allergen Reactions    Shellfish Allergy Anaphylaxis       Labs:     Chemistry        Component Value Date/Time    K 3 3 (L) 02/22/2019 0705     02/22/2019 0705    CO2 26 02/22/2019 0705    BUN 14 02/22/2019 0705    CREATININE 0 82 02/22/2019 0705        Component Value Date/Time    CALCIUM 9 4 02/22/2019 0705    ALKPHOS 86 02/22/2019 0705    AST 17 02/22/2019 0705    ALT 30 02/22/2019 0705            No results found for: CHOL  Lab Results   Component Value Date    HDL 43 02/22/2019    HDL 58 02/05/2019     Lab Results   Component Value Date    LDLCALC 74 02/22/2019    LDLCALC 181 (H) 02/05/2019     Lab Results   Component Value Date    TRIG 79 02/22/2019    TRIG 115 02/05/2019     No results found for: CHOLHDL    Imaging: Ct Head Without Contrast    Result Date: 1/29/2019  Narrative: CT BRAIN - WITHOUT CONTRAST INDICATION:   Headache, hypertension   COMPARISON:  January 27, 2019 TECHNIQUE:  CT examination of the brain was performed  In addition to axial images, coronal 2D reformatted images were created and submitted for interpretation  Radiation dose length product (DLP) for this visit:  830 mGy-cm   This examination, like all CT scans performed in the [facility], was performed utilizing techniques to minimize radiation dose exposure, including the use of iterative reconstruction and automated exposure control  IMAGE QUALITY:  Diagnostic  FINDINGS: PARENCHYMA:  No intracranial mass, mass effect or midline shift  No CT signs of acute infarction  No acute parenchymal hemorrhage  VENTRICLES AND EXTRA-AXIAL SPACES:  Normal for the patient's age  VISUALIZED ORBITS AND PARANASAL SINUSES:  Unremarkable  CALVARIUM AND EXTRACRANIAL SOFT TISSUES:  Normal      Impression: No acute intracranial abnormality  Workstation performed: [id number]     Ct Head Without Contrast    Result Date: 1/27/2019  Narrative: CT BRAIN - WITHOUT CONTRAST INDICATION:   headaches, confusion, hypertensive urgency  COMPARISON:  March 22, 2009 TECHNIQUE:  CT examination of the brain was performed  In addition to axial images, coronal 2D reformatted images were created and submitted for interpretation  Radiation dose length product (DLP) for this visit:  923 mGy-cm   This examination, like all CT scans performed in the [facility], was performed utilizing techniques to minimize radiation dose exposure, including the use of iterative reconstruction and automated exposure control  IMAGE QUALITY:  Diagnostic  FINDINGS: PARENCHYMA:  No intracranial mass, mass effect or midline shift  No CT signs of acute infarction  No acute parenchymal hemorrhage  VENTRICLES AND EXTRA-AXIAL SPACES:  Normal for the patient's age  VISUALIZED ORBITS AND PARANASAL SINUSES:  Unremarkable  CALVARIUM AND EXTRACRANIAL SOFT TISSUES:  Normal      Impression: No acute intracranial abnormality   Workstation performed: [id number]       ECG:        Review of Systems   Constitution: Negative  HENT: Negative  Eyes: Negative  Cardiovascular: Negative  Respiratory: Negative  Endocrine: Negative  Hematologic/Lymphatic: Negative  Skin: Negative  Musculoskeletal: Negative  Gastrointestinal: Negative  Genitourinary: Negative  Neurological: Negative  Psychiatric/Behavioral: Negative  Vitals:    02/26/19 1024   BP: 120/78   Pulse: 80     Vitals:    02/26/19 1024   Weight: 53 6 kg (118 lb 3 2 oz)     Height: 4' 11" (149 9 cm)   Body mass index is 23 87 kg/m²      Physical Exam:  General:  Alert and cooperative, appears stated age  HEENT:  PERRLA, EOMI, no scleral icterus, no conjunctival pallor  Neck:  No lymphadenopathy, no thyromegaly, no carotid bruits, no elevated JVP  Heart[de-identified]  Regular rate and rhythm, normal S1/S2, no S3/S4, no murmur  Lungs:  Clear to auscultation bilaterally   Abdomen:  Soft, non-tender, positive bowel sounds, no rebound or guarding,   no organomegaly   Extremities:  No clubbing, cyanosis or edema   Vascular:  2+ pedal pulses  Skin:  No rashes or lesions on exposed skin  Neurologic:  Cranial nerves II-XII grossly intact without focal deficits

## 2019-02-27 ENCOUNTER — APPOINTMENT (OUTPATIENT)
Dept: RADIOLOGY | Facility: MEDICAL CENTER | Age: 49
End: 2019-02-27
Payer: COMMERCIAL

## 2019-02-27 ENCOUNTER — TRANSCRIBE ORDERS (OUTPATIENT)
Dept: ADMINISTRATIVE | Facility: HOSPITAL | Age: 49
End: 2019-02-27

## 2019-02-27 DIAGNOSIS — R05.9 COUGH: ICD-10-CM

## 2019-02-27 DIAGNOSIS — R05.9 COUGH: Primary | ICD-10-CM

## 2019-02-27 PROCEDURE — 71046 X-RAY EXAM CHEST 2 VIEWS: CPT

## 2019-03-07 ENCOUNTER — OFFICE VISIT (OUTPATIENT)
Dept: GYNECOLOGIC ONCOLOGY | Facility: CLINIC | Age: 49
End: 2019-03-07
Payer: COMMERCIAL

## 2019-03-07 VITALS
HEART RATE: 75 BPM | BODY MASS INDEX: 23.79 KG/M2 | SYSTOLIC BLOOD PRESSURE: 118 MMHG | WEIGHT: 118 LBS | HEIGHT: 59 IN | DIASTOLIC BLOOD PRESSURE: 82 MMHG | TEMPERATURE: 98.5 F | RESPIRATION RATE: 14 BRPM

## 2019-03-07 DIAGNOSIS — A63.0 VULVOVAGINAL CONDYLOMA: ICD-10-CM

## 2019-03-07 DIAGNOSIS — N89.0 VAIN I (VAGINAL INTRAEPITHELIAL NEOPLASIA GRADE I): Primary | ICD-10-CM

## 2019-03-07 PROCEDURE — 99212 OFFICE O/P EST SF 10 MIN: CPT | Performed by: OBSTETRICS & GYNECOLOGY

## 2019-03-07 NOTE — PROGRESS NOTES
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) - Primary     Pap smear repeated today  1  Provisional appointment in 1 year for follow-up                 CHIEF COMPLAINT:  Here for follow-up of vulvar condyloma, low-grade Pap smear        Previous therapy:  CO2 laser ablation of vulvovaginal condyloma July 2018    Patient ID: Mackey Najjar is a 50 y o  female  71-year-old returns for follow-up evaluation of vulvovaginal condyloma and low-grade Pap smear  Last Pap was in June of 2018 at that time, she had condyloma present  Condyloma have since been excised  She was recently admitted with hypertensive urgency and had a vertebral artery dissection  She is recovering  No other interval change in her medications or medical history since her last visit  The following portions of the patient's history were reviewed and updated as appropriate: allergies, current medications, past family history, past medical history, past social history, past surgical history and problem list     Review of Systems   Constitutional: Negative for activity change and unexpected weight change  HENT: Negative  Eyes: Negative  Respiratory: Negative  Cardiovascular: Negative  Gastrointestinal: Negative for abdominal distention and abdominal pain  Endocrine: Negative  Genitourinary: Negative for pelvic pain and vaginal bleeding  Musculoskeletal: Positive for gait problem  Skin: Negative  Allergic/Immunologic: Negative  Neurological: Positive for speech difficulty and headaches  Hematological: Negative  Psychiatric/Behavioral: Negative          Current Outpatient Medications   Medication Sig Dispense Refill    acetaminophen (TYLENOL) 325 mg tablet Take 2 tablets (650 mg total) by mouth every 6 (six) hours as needed for mild pain or headaches 30 tablet 0    ALPRAZolam (XANAX) 0 5 mg tablet Take 0 5 mg by mouth 4 (four) times a day as needed for anxiety      aspirin (ECOTRIN LOW STRENGTH) 81 mg EC tablet Take 1 tablet (81 mg total) by mouth daily      atorvastatin (LIPITOR) 80 mg tablet Take 1 tablet (80 mg total) by mouth daily with dinner 30 tablet 0    clopidogrel (PLAVIX) 75 mg tablet Take 1 tablet (75 mg total) by mouth daily 30 tablet 0    hydrochlorothiazide (HYDRODIURIL) 25 mg tablet Take 25 mg by mouth daily  0    tetrahydrozoline-zinc (VISINE-AC) 0 05-0 25 % ophthalmic solution 2 drops 3 (three) times a day as needed      amLODIPine (NORVASC) 10 mg tablet Take 1 tablet (10 mg total) by mouth daily for 14 days 14 tablet 0     No current facility-administered medications for this visit  Objective:    Blood pressure 118/82, pulse 75, temperature 98 5 °F (36 9 °C), temperature source Oral, resp  rate 14, height 4' 11" (1 499 m), weight 53 5 kg (118 lb)  Body mass index is 23 83 kg/m²  Body surface area is 1 47 meters squared  Physical Exam   Constitutional: She is oriented to person, place, and time  She appears well-developed and well-nourished  No distress  HENT:   Head: Normocephalic and atraumatic  Pulmonary/Chest: Effort normal    Genitourinary:   Genitourinary Comments: The external female genitalia is normal  The bartholin's, uretheral and skenes glands are normal  The urethral meatus is normal (midline with no lesions)  Anus without fissure or lesion  Speculum exam reveals a grossly normal vagina  No masses, lesions,discharge or bleeding  Pap smear obtained  No significant cystocele or rectocele noted  Musculoskeletal: She exhibits no edema  Neurological: She is alert and oriented to person, place, and time  Skin: Skin is warm and dry  She is not diaphoretic  Psychiatric: She has a normal mood and affect   Her behavior is normal  Judgment and thought content normal        No results found for:   Lab Results   Component Value Date    K 3 3 (L) 02/22/2019     02/22/2019    CO2 26 02/22/2019    BUN 14 02/22/2019    CREATININE 0 82 02/22/2019    GLUF 95 02/22/2019    CALCIUM 9 4 02/22/2019    AST 17 02/22/2019    ALT 30 02/22/2019    ALKPHOS 86 02/22/2019    EGFR 85 02/22/2019     Lab Results   Component Value Date    WBC 6 84 02/22/2019    HGB 14 1 02/22/2019    HCT 40 7 02/22/2019    MCV 89 02/22/2019     02/22/2019     Lab Results   Component Value Date    NEUTROABS 4 34 02/22/2019

## 2019-03-08 ENCOUNTER — OFFICE VISIT (OUTPATIENT)
Dept: NEUROLOGY | Facility: CLINIC | Age: 49
End: 2019-03-08
Payer: COMMERCIAL

## 2019-03-08 VITALS
HEIGHT: 59 IN | SYSTOLIC BLOOD PRESSURE: 135 MMHG | WEIGHT: 118 LBS | BODY MASS INDEX: 23.79 KG/M2 | HEART RATE: 76 BPM | DIASTOLIC BLOOD PRESSURE: 88 MMHG | RESPIRATION RATE: 16 BRPM

## 2019-03-08 DIAGNOSIS — Z86.73 HISTORY OF CVA (CEREBROVASCULAR ACCIDENT): Primary | ICD-10-CM

## 2019-03-08 DIAGNOSIS — I77.74 VERTEBRAL ARTERY DISSECTION (HCC): ICD-10-CM

## 2019-03-08 DIAGNOSIS — I10 ESSENTIAL HYPERTENSION: ICD-10-CM

## 2019-03-08 PROCEDURE — 88141 CYTOPATH C/V INTERPRET: CPT | Performed by: PATHOLOGY

## 2019-03-08 PROCEDURE — 87624 HPV HI-RISK TYP POOLED RSLT: CPT | Performed by: OBSTETRICS & GYNECOLOGY

## 2019-03-08 PROCEDURE — 88175 CYTOPATH C/V AUTO FLUID REDO: CPT | Performed by: PATHOLOGY

## 2019-03-08 PROCEDURE — 99214 OFFICE O/P EST MOD 30 MIN: CPT | Performed by: PHYSICIAN ASSISTANT

## 2019-03-08 NOTE — PATIENT INSTRUCTIONS
Will continue both aspirin and Plavix at this time, along with statin and anti-hypertensive regimen  Continue to follow with PCP and cardiology for management of blood pressure, cholesterol  Will refer to PT to eval balance and gait  Try not to take Tylenol more than 3x/week for headaches, as this can cause a medication overuse headache  Call if headaches are increasing  Follow up in 4 months with vascular neurology attending    Call for questions or concerns

## 2019-03-08 NOTE — PROGRESS NOTES
Patient ID: Quinn Schwab is a 50 y o  female  Assessment/Plan:    History of CVA (cerebrovascular accident)  Patient presented to the hospital with ongoing complaints of headache and dizziness, had been seen twice prior to hospitalization on 2/4/19  BP noted to be extremely high with each presentation, with systolic in the mid 328L  MRI brain demonstrated acute to early subacute infarcts in the bilateral posterior parietal lobes and the right cerebellar hemisphere  MRA head revealed absence of signal within the distal cervical and proximal intradural segment of the left vertebral artery concerning for thrombus or dissection  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 level extending just proximal of the vertebrobasilar junction, multifocal progressive narrowing of the distal intradural segment of the right vertebral artery and mild narrowing in the proximal basilar artery, and multifocal mild to moderate narrowing in the left PCA  There was no history of trauma, extended valsalva, chiropractic manipulation  Echo with EF 70%, no regional wall abnormalities, no thrombus, atria size normal bilaterally  Renal artery US negative for significant arterial occlusive disease  Patent renal veins bilaterally  A1C 5 3, Lipid panel ,   Patient was eventually transitioned from Heparin to DAPT with ASA 81mg and Plavix 75mg along with Lipitor 80mg  Anti-hypertensive regimen was adjusted  She is currently doing well, denies any new symptoms  She was back in the hospital 10 days after discharge for left arm numbness and tingling  Repeat MRI was negative for new, acute ischemia  She is checking BP regularly at home and lately has been well within normal   She has seen her cardiologist since discharge      Plan:  -at this time, will cont DAPT with ASA 81mg, Plavix 75mg, along with Lipitor 80mg for secondary stroke prevention  -blood pressure control is extremely important  Continue monitoring at home and continue management of medications through PCP and cardiology  -will refer patient to PT for eval of balance and gait, although she is doing extremely well  She may benefit from an eval and some home exercises  -follow up in 4 months with vascular neurology attending  -signs and symptoms of stroke discussed with patient in detail today  Diagnoses and all orders for this visit:    History of CVA (cerebrovascular accident)  -     Ambulatory referral to Physical Therapy; Future    Vertebral artery dissection [facility])    Essential hypertension    Other orders             Subjective:    HPI    Swathi Carlson is a 50 y o  female with PMH of hypertension, who presents today for a hospital follow up  Patient initially presented to [address] [place] [address] ED on 2/4/19 with repeated complaints of headache and dizziness  Patient has recently been to the ED two times previously for similar complaints, the 1st time on 01/27 with a BP of 244/124 requiring short admission and the 2nd on 01/29 with a BP of 232/111  Patient noted to have nonfocal exam  CT head x 2 negative for acute abnormality  Patient re-presented again on 02/4/19 with headache and dizziness associated with nausea and vomiting  BP noted to be 209/96  MRI brain completed, which revealed acute to early subacute infarcts in the bilateral posterior parietal lobes and the right cerebellar hemisphere  MRA head revealed absence of signal within the distal cervical and proximal intradural segment of the left vertebral artery concerning for thrombus or dissection   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 level extending just proximal of the vertebrobasilar junction, multifocal progressive narrowing of the distal intradural segment of the right vertebral artery and mild narrowing in the proximal basilar artery, and multifocal mild to moderate narrowing in the left PCA  Patient transferred to [facility] for further care  Patient denied any recent trauma, extended valsalva, prolonged flex/ext of the neck  No chiropractic maneuvers  Echo with EF 70%, no regional wall abnormalities, no thrombus, atria size normal bilaterally  Renal artery US negative for significant arterial occlusive disease  Patent renal veins bilaterally  A1C 5 3, Lipid panel ,   Patient was started on Heparin drip initially, then transitioned to DAPT with ASA 81mg and Plavix 75mg along with Lipitor 80mg  Patient then presented back to the ED on 2/21/19 (about 10 days after discharge), due to left arm numbness and tingling  BP was 178/112 and then repeated at 150/100  She was admitted and seen by neurology  MRI brain was repeated and no evidence of new, acute ischemia  Late subacute to early chronic infarcts identified within the right cerebellar hemisphere and within the occipital lobes bilaterally  It was discussed that she should maintain excellent BP control and should be monitoring regularly at home (was not monitoring prior to this but reported compliance with medications)  P2Y12 and API were checked and favorable  Today, patient reports she is overall doing well  She denies any new symptoms at this time  She still feels some fatigue, especially later in the day, and just feeling "foggy" at times  She feels her strength is getting better  She is compliant with all of her medications and has been monitoring BP at home  Over the last several days especially, it has been well within normal   She has seen her cardiologist since discharge  Patient says she is not working  She says her PCP is keeping her out due to increased stress and "knows how she is" and feels she needs more time off  She continues to have some occasional headaches, usually relieved by Tylenol      The following portions of the patient's history were reviewed and updated as appropriate: current medications, past family history, past medical history, past social history, past surgical history and problem list          Objective:    Blood pressure 135/88, pulse 76, resp  rate 16, height 4' 11" (1 499 m), weight 53 5 kg (118 lb)  Physical Exam   Constitutional: She appears well-developed and well-nourished  HENT:   Head: Normocephalic and atraumatic  Eyes: Pupils are equal, round, and reactive to light  EOM are normal    Cardiovascular: Intact distal pulses  Neurological: She has normal strength and normal reflexes  Coordination normal    Skin: Skin is warm and dry  Psychiatric: She has a normal mood and affect  Her speech is normal        Neurological Exam  Mental Status   Oriented to person, place, time and situation  Recent and remote memory are intact  Speech is normal  Language is fluent with no aphasia  Attention and concentration are normal     Cranial Nerves  CN II: Visual fields full to confrontation  CN III, IV, VI: Extraocular movements intact bilaterally  Pupils equal round and reactive to light bilaterally  CN V: Facial sensation is normal   CN VII: Full and symmetric facial movement  CN VIII: Hearing is normal   CN IX, X: Palate elevates symmetrically  Normal gag reflex  CN XI: Shoulder shrug strength is normal   CN XII: Tongue midline without atrophy or fasciculations  Motor   Normal muscle tone  Strength is 5/5 throughout all four extremities  Sensory  Sensation is intact to light touch, pinprick, vibration and proprioception in all four extremities  Reflexes  Deep tendon reflexes are 2+ and symmetric in all four extremities with downgoing toes bilaterally  Coordination  Finger-to-nose, rapid alternating movements and heel-to-shin normal bilaterally without dysmetria  Gait  Casual gait is normal including stance, stride, and arm swing  Some difficulty with tandem walk          ROS:    Review of Systems   Constitutional: Positive for unexpected weight change  Negative for appetite change and fever  HENT: Negative  Negative for hearing loss, tinnitus, trouble swallowing and voice change  Eyes: Negative  Negative for photophobia and pain  Respiratory: Negative  Negative for shortness of breath  Cardiovascular: Negative  Negative for palpitations  Gastrointestinal: Negative  Negative for nausea and vomiting  Bowel habit changes     Endocrine: Negative  Negative for cold intolerance and heat intolerance  Genitourinary: Positive for frequency  Negative for dysuria and urgency  Musculoskeletal: Positive for gait problem and neck pain  Negative for myalgias  Skin: Negative  Negative for rash  Neurological: Positive for dizziness, light-headedness and headaches  Negative for tremors, seizures, syncope, facial asymmetry, speech difficulty, weakness and numbness  Memory problems  Tingling  Clumsiness     Hematological: Bruises/bleeds easily  Psychiatric/Behavioral: Positive for confusion and sleep disturbance  Negative for hallucinations  The patient is nervous/anxious           Depression  Mood swings       I personally reviewed and updated the ROS as appropriate

## 2019-03-11 NOTE — ASSESSMENT & PLAN NOTE
Patient presented to the hospital with ongoing complaints of headache and dizziness, had been seen twice prior to hospitalization on 2/4/19  BP noted to be extremely high with each presentation, with systolic in the mid 934G  MRI brain demonstrated acute to early subacute infarcts in the bilateral posterior parietal lobes and the right cerebellar hemisphere  MRA head revealed absence of signal within the distal cervical and proximal intradural segment of the left vertebral artery concerning for thrombus or dissection  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 level extending just proximal of the vertebrobasilar junction, multifocal progressive narrowing of the distal intradural segment of the right vertebral artery and mild narrowing in the proximal basilar artery, and multifocal mild to moderate narrowing in the left PCA  There was no history of trauma, extended valsalva, chiropractic manipulation  Echo with EF 70%, no regional wall abnormalities, no thrombus, atria size normal bilaterally  Renal artery US negative for significant arterial occlusive disease  Patent renal veins bilaterally  A1C 5 3, Lipid panel ,   Patient was eventually transitioned from Heparin to DAPT with ASA 81mg and Plavix 75mg along with Lipitor 80mg  Anti-hypertensive regimen was adjusted  She is currently doing well, denies any new symptoms  She was back in the hospital 10 days after discharge for left arm numbness and tingling  Repeat MRI was negative for new, acute ischemia  She is checking BP regularly at home and lately has been well within normal   She has seen her cardiologist since discharge  Plan:  -at this time, will cont DAPT with ASA 81mg, Plavix 75mg, along with Lipitor 80mg for secondary stroke prevention  -blood pressure control is extremely important    Continue monitoring at home and continue management of medications through PCP and cardiology  -will refer patient to PT for eval of balance and gait, although she is doing extremely well  She may benefit from an eval and some home exercises  -follow up in 4 months with vascular neurology attending  -signs and symptoms of stroke discussed with patient in detail today

## 2019-03-14 ENCOUNTER — OFFICE VISIT (OUTPATIENT)
Dept: CARDIOLOGY CLINIC | Facility: CLINIC | Age: 49
End: 2019-03-14
Payer: COMMERCIAL

## 2019-03-14 VITALS
SYSTOLIC BLOOD PRESSURE: 108 MMHG | OXYGEN SATURATION: 98 % | HEIGHT: 59 IN | DIASTOLIC BLOOD PRESSURE: 76 MMHG | BODY MASS INDEX: 23.47 KG/M2 | WEIGHT: 116.4 LBS | HEART RATE: 93 BPM

## 2019-03-14 DIAGNOSIS — I16.0 HYPERTENSIVE URGENCY: ICD-10-CM

## 2019-03-14 DIAGNOSIS — I63.9 CVA (CEREBRAL VASCULAR ACCIDENT) (HCC): ICD-10-CM

## 2019-03-14 DIAGNOSIS — Z86.73 HISTORY OF CVA (CEREBROVASCULAR ACCIDENT): ICD-10-CM

## 2019-03-14 DIAGNOSIS — I77.74 VERTEBRAL ARTERY DISSECTION (HCC): ICD-10-CM

## 2019-03-14 DIAGNOSIS — I10 ESSENTIAL HYPERTENSION: Primary | ICD-10-CM

## 2019-03-14 LAB
HPV HR 12 DNA CVX QL NAA+PROBE: NEGATIVE
HPV16 DNA CVX QL NAA+PROBE: NEGATIVE
HPV18 DNA CVX QL NAA+PROBE: NEGATIVE

## 2019-03-14 PROCEDURE — 99214 OFFICE O/P EST MOD 30 MIN: CPT | Performed by: INTERNAL MEDICINE

## 2019-03-14 RX ORDER — RANITIDINE 150 MG/1
150 TABLET ORAL EVERY OTHER DAY
COMMUNITY
End: 2019-07-11 | Stop reason: ALTCHOICE

## 2019-03-14 RX ORDER — CLOPIDOGREL BISULFATE 75 MG/1
75 TABLET ORAL DAILY
Qty: 90 TABLET | Refills: 3 | Status: SHIPPED | OUTPATIENT
Start: 2019-03-14 | End: 2019-07-11

## 2019-03-14 NOTE — PROGRESS NOTES
Cardiology Follow Up    Misa Pleitez  1970  [id number]  [address] [facility]ICK[facility]RI[facility]  [address] [place] [address] [address] [address]  [phone]    1  Essential hypertension     2  Hypertensive urgency     3  Vertebral artery dissection ([facility] )     4  History of CVA (cerebrovascular accident)     5  CVA (cerebral vascular accident) ([facility] )  clopidogrel (PLAVIX) 75 mg tablet       Discussion/Summary:  She has been doing much better now blood pressures are well controlled  I would continue her current medical regimen without change  She is not due for any cardiac testing  Follow-up in 4-6 months  Interval History:  51-year-old female with a history of difficult to control hypertension mostly secondary to medication noncompliance presents for a hospital follow-up  She has had headaches and visual changes with blood pressures over 734 systolic  She was admitted to the hospital overnight started on medications  She has some stressors in her life diet is moderate in sodium  She denies any exertional chest pain or discomfort shortness of breath, palpitations, lower extremity edema, PND, orthopnea  She has had some periods where she states that she was clumsy  She had 2 CT scans of the head which did not show any abnormalities  Overall she has been doing well was recently seen by Neurology  Continue current medical regimen without change  She has been feeling well energy level has improved  Denies any chest pain, shortness of breath, lightheadedness, dizziness, or syncope      Problem List     VAIN I (vaginal intraepithelial neoplasia grade I)    Vulvovaginal condyloma    Acute nonintractable headache    Disorientation    Hypertensive urgency    Essential hypertension    Vision changes        Past Medical History:   Diagnosis Date    CVA (cerebral vascular accident) ([facility] )     GERD (gastroesophageal reflux disease) occasionally    Hypertension     controlling with diet and exercise     Social History     Socioeconomic History    Marital status: /Civil Union     Spouse name: Not on file    Number of children: Not on file    Years of education: Not on file    Highest education level: Not on file   Occupational History    Not on file   Social Needs    Financial resource strain: Not on file    Food insecurity:     Worry: Not on file     Inability: Not on file    Transportation needs:     Medical: Not on file     Non-medical: Not on file   Tobacco Use    Smoking status: Never Smoker    Smokeless tobacco: Never Used   Substance and Sexual Activity    Alcohol use: Yes     Comment: SOCIAL-1-2 drinks per week    Drug use: No    Sexual activity: Not on file   Lifestyle    Physical activity:     Days per week: Not on file     Minutes per session: Not on file    Stress: Not on file   Relationships    Social connections:     Talks on phone: Not on file     Gets together: Not on file     Attends [employer] service: Not on file     Active member of club or organization: Not on file     Attends meetings of clubs or organizations: Not on file     Relationship status: Not on file    Intimate partner violence:     Fear of current or ex partner: Not on file     Emotionally abused: Not on file     Physically abused: Not on file     Forced sexual activity: Not on file   Other Topics Concern    Not on file   Social History Narrative    PROBLEMS CONCERNING ADOPTED CHILD    CAFFEINE USE    MODERATE EXERCISING LESS THAN 3x WEEK      Family History   Adopted: Yes   Problem Relation Age of Onset    No Known Problems Mother     No Known Problems Father     No Known Problems Sister     No Known Problems Brother     No Known Problems Maternal Aunt     No Known Problems Maternal Uncle     No Known Problems Paternal Aunt     No Known Problems Paternal Uncle     No Known Problems Maternal Grandmother     No Known Problems Maternal Grandfather     No Known Problems Paternal Grandmother     No Known Problems Paternal Grandfather      Past Surgical History:   Procedure Laterality Date    AUGMENTATION MAMMAPLASTY      GYNECOLOGIC CRYOSURGERY      CERVIX    HYSTERECTOMY      LASER ABLATION OF CONDYLOMAS N/A 7/30/2018    Procedure: Shayne Lin LASER ABLATION OF VULVA;  Surgeon: Laurie Pickard MD;  [facility];  Service: Gynecology Oncology    SKIN TAG REMOVAL      EXICISON OF PERIANAL    WISDOM TOOTH EXTRACTION         Current Outpatient Medications:     acetaminophen (TYLENOL) 325 mg tablet, Take 2 tablets (650 mg total) by mouth every 6 (six) hours as needed for mild pain or headaches, Disp: 30 tablet, Rfl: 0    ALPRAZolam (XANAX) 0 5 mg tablet, Take 0 5 mg by mouth 4 (four) times a day as needed for anxiety, Disp: , Rfl:     amLODIPine (NORVASC) 10 mg tablet, Take 1 tablet (10 mg total) by mouth daily for 14 days, Disp: 14 tablet, Rfl: 0    aspirin (ECOTRIN LOW STRENGTH) 81 mg EC tablet, Take 1 tablet (81 mg total) by mouth daily, Disp: , Rfl:     atorvastatin (LIPITOR) 80 mg tablet, Take 1 tablet (80 mg total) by mouth daily with dinner, Disp: 30 tablet, Rfl: 0    clopidogrel (PLAVIX) 75 mg tablet, Take 1 tablet (75 mg total) by mouth daily, Disp: 90 tablet, Rfl: 3    Docusate Calcium (STOOL SOFTENER PO), Take 1 tablet by mouth every other day, Disp: , Rfl:     hydrochlorothiazide (HYDRODIURIL) 25 mg tablet, Take 25 mg by mouth daily, Disp: , Rfl: 0    ranitidine (ZANTAC) 150 mg tablet, Take 150 mg by mouth every other day, Disp: , Rfl:     tetrahydrozoline-zinc (VISINE-AC) 0 05-0 25 % ophthalmic solution, 2 drops 3 (three) times a day as needed, Disp: , Rfl:   Allergies   Allergen Reactions    Shellfish Allergy Anaphylaxis       Labs:     Chemistry        Component Value Date/Time    K 3 3 (L) 02/22/2019 0705     02/22/2019 0705    CO2 26 02/22/2019 0705    BUN 14 02/22/2019 0705    CREATININE 0 82 02/22/2019 0705        Component Value Date/Time    CALCIUM 9 4 02/22/2019 0705    ALKPHOS 86 02/22/2019 0705    AST 17 02/22/2019 0705    ALT 30 02/22/2019 0705            No results found for: CHOL  Lab Results   Component Value Date    HDL 43 02/22/2019    HDL 58 02/05/2019     Lab Results   Component Value Date    LDLCALC 74 02/22/2019    LDLCALC 181 (H) 02/05/2019     Lab Results   Component Value Date    TRIG 79 02/22/2019    TRIG 115 02/05/2019     No results found for: CHOLHDL    Imaging: Ct Head Without Contrast    Result Date: 1/29/2019  Narrative: CT BRAIN - WITHOUT CONTRAST INDICATION:   Headache, hypertension  COMPARISON:  January 27, 2019 TECHNIQUE:  CT examination of the brain was performed  In addition to axial images, coronal 2D reformatted images were created and submitted for interpretation  Radiation dose length product (DLP) for this visit:  830 mGy-cm   This examination, like all CT scans performed in the [facility], was performed utilizing techniques to minimize radiation dose exposure, including the use of iterative reconstruction and automated exposure control  IMAGE QUALITY:  Diagnostic  FINDINGS: PARENCHYMA:  No intracranial mass, mass effect or midline shift  No CT signs of acute infarction  No acute parenchymal hemorrhage  VENTRICLES AND EXTRA-AXIAL SPACES:  Normal for the patient's age  VISUALIZED ORBITS AND PARANASAL SINUSES:  Unremarkable  CALVARIUM AND EXTRACRANIAL SOFT TISSUES:  Normal      Impression: No acute intracranial abnormality  Workstation performed: [id number]     Ct Head Without Contrast    Result Date: 1/27/2019  Narrative: CT BRAIN - WITHOUT CONTRAST INDICATION:   headaches, confusion, hypertensive urgency  COMPARISON:  March 22, 2009 TECHNIQUE:  CT examination of the brain was performed  In addition to axial images, coronal 2D reformatted images were created and submitted for interpretation    Radiation dose length product (DLP) for this visit: 923 mGy-cm   This examination, like all CT scans performed in the [facility], was performed utilizing techniques to minimize radiation dose exposure, including the use of iterative reconstruction and automated exposure control  IMAGE QUALITY:  Diagnostic  FINDINGS: PARENCHYMA:  No intracranial mass, mass effect or midline shift  No CT signs of acute infarction  No acute parenchymal hemorrhage  VENTRICLES AND EXTRA-AXIAL SPACES:  Normal for the patient's age  VISUALIZED ORBITS AND PARANASAL SINUSES:  Unremarkable  CALVARIUM AND EXTRACRANIAL SOFT TISSUES:  Normal      Impression: No acute intracranial abnormality  Workstation performed: [id number]       ECG:        Review of Systems   Constitution: Negative  HENT: Negative  Eyes: Negative  Cardiovascular: Negative  Respiratory: Negative  Endocrine: Negative  Hematologic/Lymphatic: Negative  Skin: Negative  Musculoskeletal: Negative  Gastrointestinal: Negative  Genitourinary: Negative  Neurological: Negative  Psychiatric/Behavioral: Negative  Vitals:    03/14/19 1420   BP: 108/76   Pulse: 93   SpO2: 98%     Vitals:    03/14/19 1420   Weight: 52 8 kg (116 lb 6 4 oz)     Height: 4' 11" (149 9 cm)   Body mass index is 23 51 kg/m²      Physical Exam:  General:  Alert and cooperative, appears stated age  HEENT:  PERRLA, EOMI, no scleral icterus, no conjunctival pallor  Neck:  No lymphadenopathy, no thyromegaly, no carotid bruits, no elevated JVP  Heart[de-identified]  Regular rate and rhythm, normal S1/S2, no S3/S4, no murmur  Lungs:  Clear to auscultation bilaterally   Abdomen:  Soft, non-tender, positive bowel sounds, no rebound or guarding,   no organomegaly   Extremities:  No clubbing, cyanosis or edema   Vascular:  2+ pedal pulses  Skin:  No rashes or lesions on exposed skin  Neurologic:  Cranial nerves II-XII grossly intact without focal deficits

## 2019-03-15 LAB
LAB AP GYN PRIMARY INTERPRETATION: ABNORMAL
Lab: ABNORMAL
PATH INTERP SPEC-IMP: ABNORMAL

## 2019-03-19 DIAGNOSIS — I63.9 CVA (CEREBRAL VASCULAR ACCIDENT) (HCC): ICD-10-CM

## 2019-03-19 RX ORDER — ATORVASTATIN CALCIUM 80 MG/1
80 TABLET, FILM COATED ORAL
Qty: 30 TABLET | Refills: 11 | Status: SHIPPED | OUTPATIENT
Start: 2019-03-19 | End: 2020-04-06

## 2019-03-19 NOTE — TELEPHONE ENCOUNTER
From  Devora Arellano To  Cardiology Shital  3/18/2019  8:58 PM   ----- Message from [address] [place] [address] [address], Generic sent at 3/18/2019  8:58 PM EDT -----     Hello,   I need to request a refill for my Lipitor  I believe it was prescribed to me during one of my hospital visits  I am not sure if you would prescribe this, or if I should request a refill from my PCP? ATORVASTATIN 80 MG TABLET   Take 1 tablet by mouth once daily with dinner  Disp for Lipitor   No refills left       [facility]   [address], [place]   [phone]     Thank Pedro Pablo Brand   [phone]

## 2019-03-22 ENCOUNTER — EVALUATION (OUTPATIENT)
Dept: PHYSICAL THERAPY | Facility: MEDICAL CENTER | Age: 49
End: 2019-03-22
Payer: COMMERCIAL

## 2019-03-22 ENCOUNTER — TELEPHONE (OUTPATIENT)
Dept: GYNECOLOGIC ONCOLOGY | Facility: CLINIC | Age: 49
End: 2019-03-22

## 2019-03-22 DIAGNOSIS — R26.9 GAIT ABNORMALITY: Primary | ICD-10-CM

## 2019-03-22 DIAGNOSIS — Z86.73 HISTORY OF CVA (CEREBROVASCULAR ACCIDENT): ICD-10-CM

## 2019-03-22 PROCEDURE — 97112 NEUROMUSCULAR REEDUCATION: CPT | Performed by: PHYSICAL THERAPIST

## 2019-03-22 PROCEDURE — 97161 PT EVAL LOW COMPLEX 20 MIN: CPT | Performed by: PHYSICAL THERAPIST

## 2019-03-22 PROCEDURE — G8979 MOBILITY GOAL STATUS: HCPCS | Performed by: PHYSICAL THERAPIST

## 2019-03-22 PROCEDURE — G8978 MOBILITY CURRENT STATUS: HCPCS | Performed by: PHYSICAL THERAPIST

## 2019-03-22 NOTE — PROGRESS NOTES
PT Evaluation     Today's date: 3/22/2019  Patient name: Bob Crandall  : 1970  MRN: [id number]  Referring provider: Lexie Bah PA-C  Dx:   Encounter Diagnosis     ICD-10-CM    1  Gait abnormality R26 9    2  History of CVA (cerebrovascular accident) Z86 73 Ambulatory referral to Physical Therapy                  Assessment  Assessment details: Pt is an alert and oriented 51 yo female, with h/o CVA on   Pt states in [place] she started to notice HA's and visual changes, and on  started vomitting  Pt had cerebellar CVA, and states she has L sided muscle weakness and feels unsteady with gait and stairs  Pt is very high functioning, however states she feels unbalanced and is fearful of falling  Pt denies HA's and visual changes at this time, but does report feeling more fatigued since CVA  Upon examination, pt demonstrates minimal gait and balance deficits, and mild L LE weakness  Pt will benefit from skilled PT to address the deficits listed above  Thank you for your referral   Impairments: abnormal gait, impaired balance, impaired physical strength and lacks appropriate home exercise program    Goals  ST  Will improve gait on solid surfaces in 4 weeks  2  Pt will improve balance on noncompliant surface with EC for 30sec within 4 weeks  3  Pt will demonstrate independence with HEP in 4 weeks  LT  Improve gait with varying surfaces in 8 weeks  2  Pt will improve balance on noncompliant surface with EC for 30 sec with head movement in 8 weeks  3  Pt will demonstrate independence with HEP in 8 weeks      Plan  Patient would benefit from: skilled physical therapy  Planned therapy interventions: neuromuscular re-education, patient education, therapeutic activities, therapeutic exercise, therapeutic training and home exercise program  Frequency: 2x week  Duration in visits: 8  Duration in weeks: 16  Plan of Care beginning date: 3/22/2019  Plan of Care expiration date: 5/17/2019  Treatment plan discussed with: patient        Subjective Evaluation    Pain  No pain reported    Social Support  Steps to enter house: no  Stairs in house: no   Lives in: [place]  Lives with: spouse    Patient Goals  Patient goals for therapy: improved balance and return to sport/leisure activities          Objective     Observations     Additional Observation Details  Observation:  Pt is generally conditioned, goo seated posturing observed, and positive disposition  Gait:  Pt amb with slowed ya, but step length appears equal and symmetrical, heel strike present b/l, and arm swing and trunk rot present  Tandem gait:  Slowed ya, pt felt unsteady but no LOB observed    Balance:  MCTSIB; increased sway trials 2 and 4, trial 3 performed with perts which pt had slowed reaction time but able to recover without fall  Tandem stance:                   SLS: 30 sec b/l                   Stairs: without UE support, mod difficulty with descending stairs and used visual cues to see foot placement                     Neurological Testing     Sensation     Hip   Left Hip   Intact: light touch    Right Hip   Intact: light touch    Knee   Left Knee   Intact: light touch    Right Knee   Intact: light touch     Reflexes   Left   Patellar (L4): normal (2+)  Achilles (S1): normal (2+)  Clonus sign: negative    Right   Patellar (L4): normal (2+)  Achilles (S1): normal (2+)    Additional Neurological Details  (-) Hoffmans' b/l    Pt reports paresthesias into L UE since CVA      Strength/Myotome Testing     Left Hip   Planes of Motion   Flexion: 4  Extension: 4  Abduction: 4  Adduction: 4    Right Hip   Normal muscle strength    Left Knee   Flexion: 4-  Extension: 4-    Right Knee   Normal strength    Left Ankle/Foot   Dorsiflexion: 3+  Plantar flexion: 3+  Inversion: 3+  Eversion: 3+    Right Ankle/Foot   Normal strength          Precautions: CVA 2/4,  HTN    Daily Treatment Diary     Manual                                                                                   Exercise Diary  3/22            Biodex nv            Tandem gait fwd/bwd EO x4laps            Marching fwd/bwd EO x4laps            Stair training without UE support x2laps            rockerboard static /dynamic nv            Foam balance EC FA/FT nv            SLS on foam             Tandem gait on foam                                                                                                                                                                             Modalities

## 2019-03-29 ENCOUNTER — OFFICE VISIT (OUTPATIENT)
Dept: PHYSICAL THERAPY | Facility: MEDICAL CENTER | Age: 49
End: 2019-03-29
Payer: COMMERCIAL

## 2019-03-29 DIAGNOSIS — R26.9 GAIT ABNORMALITY: Primary | ICD-10-CM

## 2019-03-29 DIAGNOSIS — Z86.73 HISTORY OF CVA (CEREBROVASCULAR ACCIDENT): ICD-10-CM

## 2019-03-29 PROCEDURE — 97112 NEUROMUSCULAR REEDUCATION: CPT | Performed by: PHYSICAL THERAPIST

## 2019-03-29 NOTE — PROGRESS NOTES
Daily Note     Today's date: 3/29/2019  Patient name: Jessica Kulkarni  : 1970  MRN: [id number]  Referring provider: Jeffery Maldonado PA-C  Dx:   Encounter Diagnosis     ICD-10-CM    1  Gait abnormality R26 9    2  History of CVA (cerebrovascular accident) Z86 73                   Subjective: Pt states she was not feeling well the past 2 days, with dizziness and lightheadedness  Pt feels better today, but states that was how sx's of CVA started last time  Pt instructed to call MD if she is concerned  Pt states she has been performing HEP every day, except for last 2 days  Objective: See treatment diary below  Exercise Diary  3/22  3/29                   Biodex assessment nv  performed                   Tandem gait fwd/bwd EO x4laps  x2laps                   Marching fwd/bwd EO x4laps  x2laps                   Stair training without UE support x2laps                     rockerboard static /dynamic a/p, m/l nv  x30"x2, x20ea                   Foam balance EC FA/FT nv                     SLS on foam    20"x4ea                   Tandem gait on foam                        cone touches    x2laps                    marching on foam EO/EC    x20ea                    head turns on foam EO/EC    x20ea                    ball toss, b/l, u/l    x10ea                    ball pass m-l    x10ea                                                                                                                                                                                                 Modalities                                                                     Assessment: Tolerated treatment well  Patient demonstrated fatigue post treatment, exhibited good technique with therapeutic exercises and would benefit from continued PT  Biodex assessment:  LOS testin/65, Fall Risk Testing: Normal, mCTSIB:  Abnormal trial 3 only    Pt states feeling good with balance ex that make her move and reach outside of her ABHIJEET, which she states she hasn't been doing much of because she has been afraid  Plan: Continue per plan of care  Discussed with pt placing on hold while she begins resuming activities she has been too afraid to perform (ie housework, walking)  Pt in agreement with being placed on hold for 1-2 weeks, and will call to schedule if needed

## 2019-04-19 ENCOUNTER — EVALUATION (OUTPATIENT)
Dept: PHYSICAL THERAPY | Facility: MEDICAL CENTER | Age: 49
End: 2019-04-19
Payer: COMMERCIAL

## 2019-04-19 DIAGNOSIS — Z86.73 HISTORY OF CVA (CEREBROVASCULAR ACCIDENT): ICD-10-CM

## 2019-04-19 DIAGNOSIS — R53.1 GENERALIZED WEAKNESS: Primary | ICD-10-CM

## 2019-04-19 DIAGNOSIS — R26.9 GAIT ABNORMALITY: ICD-10-CM

## 2019-04-19 DIAGNOSIS — I63.9 IMPENDING CEREBROVASCULAR ACCIDENT (HCC): ICD-10-CM

## 2019-04-19 PROCEDURE — 97110 THERAPEUTIC EXERCISES: CPT | Performed by: PHYSICAL THERAPIST

## 2019-04-19 PROCEDURE — 97112 NEUROMUSCULAR REEDUCATION: CPT | Performed by: PHYSICAL THERAPIST

## 2019-04-19 PROCEDURE — G8979 MOBILITY GOAL STATUS: HCPCS | Performed by: PHYSICAL THERAPIST

## 2019-04-19 PROCEDURE — G8978 MOBILITY CURRENT STATUS: HCPCS | Performed by: PHYSICAL THERAPIST

## 2019-04-22 ENCOUNTER — TRANSCRIBE ORDERS (OUTPATIENT)
Dept: PHYSICAL THERAPY | Facility: MEDICAL CENTER | Age: 49
End: 2019-04-22

## 2019-04-22 DIAGNOSIS — Z86.73 HISTORY OF CVA (CEREBROVASCULAR ACCIDENT): Primary | ICD-10-CM

## 2019-04-26 ENCOUNTER — OFFICE VISIT (OUTPATIENT)
Dept: PHYSICAL THERAPY | Facility: MEDICAL CENTER | Age: 49
End: 2019-04-26
Payer: COMMERCIAL

## 2019-04-26 DIAGNOSIS — Z86.73 HISTORY OF CVA (CEREBROVASCULAR ACCIDENT): ICD-10-CM

## 2019-04-26 DIAGNOSIS — R26.9 GAIT ABNORMALITY: ICD-10-CM

## 2019-04-26 DIAGNOSIS — R53.1 GENERALIZED WEAKNESS: Primary | ICD-10-CM

## 2019-04-26 PROCEDURE — 97112 NEUROMUSCULAR REEDUCATION: CPT | Performed by: PHYSICAL THERAPIST

## 2019-04-26 PROCEDURE — 97110 THERAPEUTIC EXERCISES: CPT | Performed by: PHYSICAL THERAPIST

## 2019-05-02 ENCOUNTER — APPOINTMENT (EMERGENCY)
Dept: RADIOLOGY | Facility: HOSPITAL | Age: 49
End: 2019-05-02
Payer: COMMERCIAL

## 2019-05-02 ENCOUNTER — OFFICE VISIT (OUTPATIENT)
Dept: URGENT CARE | Facility: MEDICAL CENTER | Age: 49
End: 2019-05-02
Payer: COMMERCIAL

## 2019-05-02 ENCOUNTER — HOSPITAL ENCOUNTER (EMERGENCY)
Facility: HOSPITAL | Age: 49
Discharge: HOME | End: 2019-05-02
Attending: EMERGENCY MEDICINE | Admitting: EMERGENCY MEDICINE
Payer: COMMERCIAL

## 2019-05-02 VITALS
WEIGHT: 118.6 LBS | HEART RATE: 63 BPM | SYSTOLIC BLOOD PRESSURE: 124 MMHG | TEMPERATURE: 98.4 F | OXYGEN SATURATION: 100 % | RESPIRATION RATE: 18 BRPM | DIASTOLIC BLOOD PRESSURE: 86 MMHG | BODY MASS INDEX: 23.91 KG/M2 | HEIGHT: 59 IN

## 2019-05-02 VITALS
DIASTOLIC BLOOD PRESSURE: 69 MMHG | RESPIRATION RATE: 18 BRPM | HEART RATE: 60 BPM | SYSTOLIC BLOOD PRESSURE: 109 MMHG | TEMPERATURE: 98.2 F | OXYGEN SATURATION: 98 %

## 2019-05-02 DIAGNOSIS — R07.9 CHEST PAIN, UNSPECIFIED TYPE: Primary | ICD-10-CM

## 2019-05-02 DIAGNOSIS — R07.9 CHEST PAIN: Primary | ICD-10-CM

## 2019-05-02 LAB
ALBUMIN SERPL BCP-MCNC: 4.5 G/DL (ref 3.5–5)
ALP SERPL-CCNC: 88 U/L (ref 46–116)
ALT SERPL W P-5'-P-CCNC: 38 U/L (ref 12–78)
ANION GAP SERPL CALCULATED.3IONS-SCNC: 12 MMOL/L (ref 4–13)
APTT PPP: 31 SECONDS (ref 26–38)
AST SERPL W P-5'-P-CCNC: 24 U/L (ref 5–45)
BASOPHILS # BLD AUTO: 0.03 THOUSANDS/ΜL (ref 0–0.1)
BASOPHILS NFR BLD AUTO: 1 % (ref 0–1)
BILIRUB SERPL-MCNC: 0.7 MG/DL (ref 0.2–1)
BUN SERPL-MCNC: 15 MG/DL (ref 5–25)
CALCIUM SERPL-MCNC: 9.6 MG/DL (ref 8.3–10.1)
CHLORIDE SERPL-SCNC: 101 MMOL/L (ref 100–108)
CO2 SERPL-SCNC: 28 MMOL/L (ref 21–32)
CREAT SERPL-MCNC: 0.81 MG/DL (ref 0.6–1.3)
EOSINOPHIL # BLD AUTO: 0.13 THOUSAND/ΜL (ref 0–0.61)
EOSINOPHIL NFR BLD AUTO: 2 % (ref 0–6)
ERYTHROCYTE [DISTWIDTH] IN BLOOD BY AUTOMATED COUNT: 12 % (ref 11.6–15.1)
GFR SERPL CREATININE-BSD FRML MDRD: 86 ML/MIN/1.73SQ M
GLUCOSE SERPL-MCNC: 85 MG/DL (ref 65–140)
HCT VFR BLD AUTO: 40.9 % (ref 34.8–46.1)
HGB BLD-MCNC: 14.5 G/DL (ref 11.5–15.4)
IMM GRANULOCYTES # BLD AUTO: 0.02 THOUSAND/UL (ref 0–0.2)
IMM GRANULOCYTES NFR BLD AUTO: 0 % (ref 0–2)
INR PPP: 0.99 (ref 0.86–1.17)
LYMPHOCYTES # BLD AUTO: 1.99 THOUSANDS/ΜL (ref 0.6–4.47)
LYMPHOCYTES NFR BLD AUTO: 32 % (ref 14–44)
MCH RBC QN AUTO: 31.9 PG (ref 26.8–34.3)
MCHC RBC AUTO-ENTMCNC: 35.5 G/DL (ref 31.4–37.4)
MCV RBC AUTO: 90 FL (ref 82–98)
MONOCYTES # BLD AUTO: 0.55 THOUSAND/ΜL (ref 0.17–1.22)
MONOCYTES NFR BLD AUTO: 9 % (ref 4–12)
NEUTROPHILS # BLD AUTO: 3.59 THOUSANDS/ΜL (ref 1.85–7.62)
NEUTS SEG NFR BLD AUTO: 56 % (ref 43–75)
NRBC BLD AUTO-RTO: 0 /100 WBCS
PLATELET # BLD AUTO: 282 THOUSANDS/UL (ref 149–390)
PMV BLD AUTO: 9.7 FL (ref 8.9–12.7)
POTASSIUM SERPL-SCNC: 2.8 MMOL/L (ref 3.5–5.3)
PROT SERPL-MCNC: 8.2 G/DL (ref 6.4–8.2)
PROTHROMBIN TIME: 12.8 SECONDS (ref 11.8–14.2)
RBC # BLD AUTO: 4.54 MILLION/UL (ref 3.81–5.12)
SODIUM SERPL-SCNC: 141 MMOL/L (ref 136–145)
TROPONIN I SERPL-MCNC: <0.02 NG/ML
TROPONIN I SERPL-MCNC: <0.02 NG/ML
WBC # BLD AUTO: 6.31 THOUSAND/UL (ref 4.31–10.16)

## 2019-05-02 PROCEDURE — 99284 EMERGENCY DEPT VISIT MOD MDM: CPT | Performed by: EMERGENCY MEDICINE

## 2019-05-02 PROCEDURE — 80053 COMPREHEN METABOLIC PANEL: CPT | Performed by: EMERGENCY MEDICINE

## 2019-05-02 PROCEDURE — 93005 ELECTROCARDIOGRAM TRACING: CPT | Performed by: PHYSICIAN ASSISTANT

## 2019-05-02 PROCEDURE — 93005 ELECTROCARDIOGRAM TRACING: CPT

## 2019-05-02 PROCEDURE — 85730 THROMBOPLASTIN TIME PARTIAL: CPT | Performed by: EMERGENCY MEDICINE

## 2019-05-02 PROCEDURE — 85025 COMPLETE CBC W/AUTO DIFF WBC: CPT | Performed by: EMERGENCY MEDICINE

## 2019-05-02 PROCEDURE — 96365 THER/PROPH/DIAG IV INF INIT: CPT

## 2019-05-02 PROCEDURE — 85610 PROTHROMBIN TIME: CPT | Performed by: EMERGENCY MEDICINE

## 2019-05-02 PROCEDURE — 99285 EMERGENCY DEPT VISIT HI MDM: CPT

## 2019-05-02 PROCEDURE — 36415 COLL VENOUS BLD VENIPUNCTURE: CPT | Performed by: EMERGENCY MEDICINE

## 2019-05-02 PROCEDURE — 96366 THER/PROPH/DIAG IV INF ADDON: CPT

## 2019-05-02 PROCEDURE — 99213 OFFICE O/P EST LOW 20 MIN: CPT | Performed by: PHYSICIAN ASSISTANT

## 2019-05-02 PROCEDURE — 84484 ASSAY OF TROPONIN QUANT: CPT | Performed by: EMERGENCY MEDICINE

## 2019-05-02 PROCEDURE — 71046 X-RAY EXAM CHEST 2 VIEWS: CPT

## 2019-05-02 RX ORDER — NITROGLYCERIN 0.4 MG/1
0.4 TABLET SUBLINGUAL ONCE
Status: DISCONTINUED | OUTPATIENT
Start: 2019-05-02 | End: 2019-05-03 | Stop reason: HOSPADM

## 2019-05-02 RX ORDER — POTASSIUM CHLORIDE 14.9 MG/ML
20 INJECTION INTRAVENOUS ONCE
Status: COMPLETED | OUTPATIENT
Start: 2019-05-02 | End: 2019-05-02

## 2019-05-02 RX ORDER — POTASSIUM CHLORIDE 1500 MG/1
TABLET, FILM COATED, EXTENDED RELEASE ORAL
Refills: 0 | COMMUNITY
Start: 2019-03-19 | End: 2021-09-05 | Stop reason: SDUPTHER

## 2019-05-02 RX ORDER — POTASSIUM CHLORIDE 20 MEQ/1
40 TABLET, EXTENDED RELEASE ORAL ONCE
Status: COMPLETED | OUTPATIENT
Start: 2019-05-02 | End: 2019-05-02

## 2019-05-02 RX ADMIN — POTASSIUM CHLORIDE 40 MEQ: 1500 TABLET, EXTENDED RELEASE ORAL at 19:06

## 2019-05-02 RX ADMIN — POTASSIUM CHLORIDE 20 MEQ: 14.9 INJECTION, SOLUTION INTRAVENOUS at 19:15

## 2019-05-03 LAB
ATRIAL RATE: 56 BPM
ATRIAL RATE: 58 BPM
ATRIAL RATE: 60 BPM
P AXIS: 72 DEGREES
P AXIS: 77 DEGREES
P AXIS: 77 DEGREES
PR INTERVAL: 148 MS
PR INTERVAL: 160 MS
PR INTERVAL: 162 MS
QRS AXIS: 53 DEGREES
QRS AXIS: 57 DEGREES
QRS AXIS: 60 DEGREES
QRSD INTERVAL: 78 MS
QRSD INTERVAL: 80 MS
QRSD INTERVAL: 82 MS
QT INTERVAL: 438 MS
QT INTERVAL: 446 MS
QT INTERVAL: 458 MS
QTC INTERVAL: 416 MS
QTC INTERVAL: 431 MS
QTC INTERVAL: 458 MS
T WAVE AXIS: 56 DEGREES
T WAVE AXIS: 57 DEGREES
T WAVE AXIS: 67 DEGREES
VENTRICULAR RATE: 54 BPM
VENTRICULAR RATE: 56 BPM
VENTRICULAR RATE: 60 BPM

## 2019-05-03 PROCEDURE — 93010 ELECTROCARDIOGRAM REPORT: CPT | Performed by: INTERNAL MEDICINE

## 2019-05-10 ENCOUNTER — TRANSCRIBE ORDERS (OUTPATIENT)
Dept: ADMINISTRATIVE | Facility: HOSPITAL | Age: 49
End: 2019-05-10

## 2019-05-10 DIAGNOSIS — I63.9 IMPENDING CEREBROVASCULAR ACCIDENT (HCC): ICD-10-CM

## 2019-05-10 DIAGNOSIS — R94.31 NONSPECIFIC ABNORMAL ELECTROCARDIOGRAM (ECG) (EKG): ICD-10-CM

## 2019-05-10 DIAGNOSIS — R53.83 OTHER FATIGUE: ICD-10-CM

## 2019-05-10 DIAGNOSIS — R07.9 CHEST PAIN, UNSPECIFIED TYPE: Primary | ICD-10-CM

## 2019-05-17 ENCOUNTER — HOSPITAL ENCOUNTER (OUTPATIENT)
Dept: NON INVASIVE DIAGNOSTICS | Facility: HOSPITAL | Age: 49
Discharge: HOME | End: 2019-05-17
Payer: COMMERCIAL

## 2019-05-17 VITALS — DIASTOLIC BLOOD PRESSURE: 80 MMHG | SYSTOLIC BLOOD PRESSURE: 118 MMHG

## 2019-05-17 DIAGNOSIS — I63.9 IMPENDING CEREBROVASCULAR ACCIDENT (HCC): ICD-10-CM

## 2019-05-17 DIAGNOSIS — R53.83 OTHER FATIGUE: ICD-10-CM

## 2019-05-17 DIAGNOSIS — R07.9 CHEST PAIN, UNSPECIFIED TYPE: ICD-10-CM

## 2019-05-17 DIAGNOSIS — R94.31 NONSPECIFIC ABNORMAL ELECTROCARDIOGRAM (ECG) (EKG): ICD-10-CM

## 2019-05-17 PROCEDURE — 93350 STRESS TTE ONLY: CPT

## 2019-05-17 PROCEDURE — 93351 STRESS TTE COMPLETE: CPT | Performed by: INTERNAL MEDICINE

## 2019-05-20 LAB
CHEST PAIN STATEMENT: NORMAL
MAX DIASTOLIC BP: 84 MMHG
MAX HEART RATE: 164 BPM
MAX PREDICTED HEART RATE: 172 BPM
MAX. SYSTOLIC BP: 170 MMHG
PROTOCOL NAME: NORMAL
REASON FOR TERMINATION: NORMAL
TARGET HR FORMULA: NORMAL
TIME IN EXERCISE PHASE: NORMAL

## 2019-05-28 ENCOUNTER — TELEPHONE (OUTPATIENT)
Dept: NEUROLOGY | Facility: CLINIC | Age: 49
End: 2019-05-28

## 2019-06-12 ENCOUNTER — DOCUMENTATION (OUTPATIENT)
Dept: NEUROSURGERY | Facility: CLINIC | Age: 49
End: 2019-06-12

## 2019-07-11 ENCOUNTER — OFFICE VISIT (OUTPATIENT)
Dept: NEUROLOGY | Facility: CLINIC | Age: 49
End: 2019-07-11
Payer: COMMERCIAL

## 2019-07-11 VITALS
BODY MASS INDEX: 25.06 KG/M2 | DIASTOLIC BLOOD PRESSURE: 80 MMHG | SYSTOLIC BLOOD PRESSURE: 110 MMHG | HEART RATE: 72 BPM | WEIGHT: 124.3 LBS | HEIGHT: 59 IN

## 2019-07-11 DIAGNOSIS — I10 ESSENTIAL HYPERTENSION: ICD-10-CM

## 2019-07-11 DIAGNOSIS — Z86.73 HISTORY OF CVA (CEREBROVASCULAR ACCIDENT): Primary | ICD-10-CM

## 2019-07-11 DIAGNOSIS — H53.9 VISION CHANGES: ICD-10-CM

## 2019-07-11 PROCEDURE — 99214 OFFICE O/P EST MOD 30 MIN: CPT | Performed by: PSYCHIATRY & NEUROLOGY

## 2019-07-11 RX ORDER — OMEPRAZOLE 40 MG/1
40 CAPSULE, DELAYED RELEASE ORAL DAILY
Refills: 0 | COMMUNITY
Start: 2019-07-08 | End: 2021-05-18

## 2019-07-11 NOTE — PATIENT INSTRUCTIONS
Stroke:  Pascual Wells presents for a follow-up evaluation with regard to her recent embolic stroke which is in the posterior circulation, likely related to a left vertebral artery dissection complicated by hypertension  In the interval since her last visit to the office she has had some vision changes described below, but otherwise no symptoms concerning for recurrent TIA or stroke  She is taking her medications  Her blood pressure is now in range  She did not describe any severe bleeding or bruising issues  -for ongoing stroke prevention she should continue her current combination of aspirin and appropriate blood pressure control  -we will defer to the good judgment of her primary care and cardiology teams for monitoring of her cholesterol panel, blood sugar numbers, and blood pressure  I would suggest targeting a blood pressure of less than 130/80 on a regular basis  -at this point she is 5 months from her initial stroke  Her vertebral artery dissection appears to have appropriately recanalized  There is no current indication for dual anti-platelet therapy  She can discontinue the use of Plavix at this point in time  She does not have any indication for a statin medication because she does not have significant atherosclerotic disease and this stroke is not related to intracranial lipohyalinosis  -considering that her family history is relatively unknown I would suggest that a 1 time thrombosis panel would be reasonable  I will provide her with that prescription  Visual disturbances:  She reports that she has had 4-5 episodes of vision changes which are reasonably stereotyped  We will begin with wavy lines and then subsequently she will have loss of vision coming down from above affecting both eyes    She had these episodes are at times accompanied by headache and nausea and will typically last for approximately 10 minutes, but they are also accompanied by flushing, sweating, and potentially some rebound hypertension   -ultimately, these are not consistent with seizures, and most likely represent migraine equivalents  That having been said, because of the flushing and sweating accompanying these episodes it would be reasonable to rule out any type of accompanying arrhythmia or cardiac change  If she has any additional episodes I would like for her to review this with her cardiologist and consideration should be given to 2-4 weeks of Holter monitoring in order to capture an event of possible  I will plan for her to return to the office to see us in 6 months time but we would be happy to see her sooner if the need should arise  If she has any symptoms concerning for TIA or stroke such as sudden painless vision loss that is unlike which she is already experienced, difficulty speaking or swallowing, vertigo/room spinning that does not quickly resolve, or weakness/numbness affecting 1 side of the face or body she should proceed by ambulance to the nearest emergency room immediately

## 2019-07-25 ENCOUNTER — HOSPITAL ENCOUNTER (EMERGENCY)
Facility: HOSPITAL | Age: 49
End: 2019-07-25
Attending: EMERGENCY MEDICINE | Admitting: EMERGENCY MEDICINE
Payer: COMMERCIAL

## 2019-07-25 ENCOUNTER — TELEPHONE (OUTPATIENT)
Dept: VASCULAR SURGERY | Facility: CLINIC | Age: 49
End: 2019-07-25

## 2019-07-25 ENCOUNTER — HOSPITAL ENCOUNTER (INPATIENT)
Facility: HOSPITAL | Age: 49
LOS: 1 days | Discharge: HOME | DRG: 301 | End: 2019-07-26
Attending: INTERNAL MEDICINE | Admitting: INTERNAL MEDICINE
Payer: COMMERCIAL

## 2019-07-25 ENCOUNTER — TELEPHONE (OUTPATIENT)
Dept: NEUROLOGY | Facility: CLINIC | Age: 49
End: 2019-07-25

## 2019-07-25 ENCOUNTER — APPOINTMENT (EMERGENCY)
Dept: CT IMAGING | Facility: HOSPITAL | Age: 49
End: 2019-07-25
Payer: COMMERCIAL

## 2019-07-25 VITALS
HEART RATE: 59 BPM | WEIGHT: 123.9 LBS | HEIGHT: 59 IN | SYSTOLIC BLOOD PRESSURE: 114 MMHG | DIASTOLIC BLOOD PRESSURE: 73 MMHG | TEMPERATURE: 98.1 F | RESPIRATION RATE: 20 BRPM | BODY MASS INDEX: 24.98 KG/M2 | OXYGEN SATURATION: 96 %

## 2019-07-25 DIAGNOSIS — Z86.73 HISTORY OF CVA (CEREBROVASCULAR ACCIDENT): Primary | ICD-10-CM

## 2019-07-25 DIAGNOSIS — I77.74 VERTEBRAL ARTERY DISSECTION (HCC): ICD-10-CM

## 2019-07-25 DIAGNOSIS — I77.74 VERTEBRAL ARTERY DISSECTION (HCC): Primary | ICD-10-CM

## 2019-07-25 LAB
ANION GAP SERPL CALCULATED.3IONS-SCNC: 10 MMOL/L (ref 4–13)
APTT PPP: 28 SECONDS (ref 23–37)
BASOPHILS # BLD AUTO: 0.04 THOUSANDS/ΜL (ref 0–0.1)
BASOPHILS NFR BLD AUTO: 1 % (ref 0–1)
BUN SERPL-MCNC: 17 MG/DL (ref 5–25)
CALCIUM SERPL-MCNC: 9.1 MG/DL (ref 8.3–10.1)
CHLORIDE SERPL-SCNC: 103 MMOL/L (ref 100–108)
CO2 SERPL-SCNC: 29 MMOL/L (ref 21–32)
CREAT SERPL-MCNC: 0.78 MG/DL (ref 0.6–1.3)
EOSINOPHIL # BLD AUTO: 0.11 THOUSAND/ΜL (ref 0–0.61)
EOSINOPHIL NFR BLD AUTO: 2 % (ref 0–6)
ERYTHROCYTE [DISTWIDTH] IN BLOOD BY AUTOMATED COUNT: 11.5 % (ref 11.6–15.1)
GFR SERPL CREATININE-BSD FRML MDRD: 90 ML/MIN/1.73SQ M
GLUCOSE SERPL-MCNC: 88 MG/DL (ref 65–140)
HCT VFR BLD AUTO: 40.1 % (ref 34.8–46.1)
HGB BLD-MCNC: 13.6 G/DL (ref 11.5–15.4)
IMM GRANULOCYTES # BLD AUTO: 0.02 THOUSAND/UL (ref 0–0.2)
IMM GRANULOCYTES NFR BLD AUTO: 0 % (ref 0–2)
INR PPP: 0.99 (ref 0.84–1.19)
LYMPHOCYTES # BLD AUTO: 2.04 THOUSANDS/ΜL (ref 0.6–4.47)
LYMPHOCYTES NFR BLD AUTO: 33 % (ref 14–44)
MAGNESIUM SERPL-MCNC: 1.9 MG/DL (ref 1.6–2.6)
MCH RBC QN AUTO: 31 PG (ref 26.8–34.3)
MCHC RBC AUTO-ENTMCNC: 33.9 G/DL (ref 31.4–37.4)
MCV RBC AUTO: 91 FL (ref 82–98)
MONOCYTES # BLD AUTO: 0.52 THOUSAND/ΜL (ref 0.17–1.22)
MONOCYTES NFR BLD AUTO: 8 % (ref 4–12)
NEUTROPHILS # BLD AUTO: 3.54 THOUSANDS/ΜL (ref 1.85–7.62)
NEUTS SEG NFR BLD AUTO: 56 % (ref 43–75)
NRBC BLD AUTO-RTO: 0 /100 WBCS
PLATELET # BLD AUTO: 242 THOUSANDS/UL (ref 149–390)
PMV BLD AUTO: 9.9 FL (ref 8.9–12.7)
POTASSIUM SERPL-SCNC: 3.9 MMOL/L (ref 3.5–5.3)
PROTHROMBIN TIME: 12.5 SECONDS (ref 11.6–14.5)
RBC # BLD AUTO: 4.39 MILLION/UL (ref 3.81–5.12)
SODIUM SERPL-SCNC: 142 MMOL/L (ref 136–145)
TROPONIN I SERPL-MCNC: <0.02 NG/ML
WBC # BLD AUTO: 6.27 THOUSAND/UL (ref 4.31–10.16)

## 2019-07-25 PROCEDURE — 99285 EMERGENCY DEPT VISIT HI MDM: CPT

## 2019-07-25 PROCEDURE — 84484 ASSAY OF TROPONIN QUANT: CPT | Performed by: PHYSICIAN ASSISTANT

## 2019-07-25 PROCEDURE — 96375 TX/PRO/DX INJ NEW DRUG ADDON: CPT

## 2019-07-25 PROCEDURE — 85730 THROMBOPLASTIN TIME PARTIAL: CPT | Performed by: PHYSICIAN ASSISTANT

## 2019-07-25 PROCEDURE — 96361 HYDRATE IV INFUSION ADD-ON: CPT

## 2019-07-25 PROCEDURE — 99253 IP/OBS CNSLTJ NEW/EST LOW 45: CPT | Performed by: EMERGENCY MEDICINE

## 2019-07-25 PROCEDURE — 96365 THER/PROPH/DIAG IV INF INIT: CPT

## 2019-07-25 PROCEDURE — 96366 THER/PROPH/DIAG IV INF ADDON: CPT

## 2019-07-25 PROCEDURE — 70496 CT ANGIOGRAPHY HEAD: CPT

## 2019-07-25 PROCEDURE — 99285 EMERGENCY DEPT VISIT HI MDM: CPT | Performed by: PHYSICIAN ASSISTANT

## 2019-07-25 PROCEDURE — 93005 ELECTROCARDIOGRAM TRACING: CPT

## 2019-07-25 PROCEDURE — 36415 COLL VENOUS BLD VENIPUNCTURE: CPT | Performed by: PHYSICIAN ASSISTANT

## 2019-07-25 PROCEDURE — 70498 CT ANGIOGRAPHY NECK: CPT

## 2019-07-25 PROCEDURE — 85610 PROTHROMBIN TIME: CPT | Performed by: PHYSICIAN ASSISTANT

## 2019-07-25 PROCEDURE — 85025 COMPLETE CBC W/AUTO DIFF WBC: CPT | Performed by: PHYSICIAN ASSISTANT

## 2019-07-25 PROCEDURE — 80048 BASIC METABOLIC PNL TOTAL CA: CPT | Performed by: PHYSICIAN ASSISTANT

## 2019-07-25 PROCEDURE — 83735 ASSAY OF MAGNESIUM: CPT | Performed by: PHYSICIAN ASSISTANT

## 2019-07-25 RX ORDER — HEPARIN SODIUM 10000 [USP'U]/100ML
3-30 INJECTION, SOLUTION INTRAVENOUS
Status: DISCONTINUED | OUTPATIENT
Start: 2019-07-25 | End: 2019-07-25

## 2019-07-25 RX ORDER — HEPARIN SODIUM 1000 [USP'U]/ML
3300 INJECTION, SOLUTION INTRAVENOUS; SUBCUTANEOUS ONCE
Status: COMPLETED | OUTPATIENT
Start: 2019-07-25 | End: 2019-07-25

## 2019-07-25 RX ORDER — HEPARIN SODIUM 1000 [USP'U]/ML
4400 INJECTION, SOLUTION INTRAVENOUS; SUBCUTANEOUS AS NEEDED
Status: DISCONTINUED | OUTPATIENT
Start: 2019-07-25 | End: 2019-07-25

## 2019-07-25 RX ORDER — MAGNESIUM HYDROXIDE/ALUMINUM HYDROXICE/SIMETHICONE 120; 1200; 1200 MG/30ML; MG/30ML; MG/30ML
30 SUSPENSION ORAL EVERY 6 HOURS PRN
Status: DISCONTINUED | OUTPATIENT
Start: 2019-07-25 | End: 2019-07-26 | Stop reason: HOSPADM

## 2019-07-25 RX ORDER — HEPARIN SODIUM 1000 [USP'U]/ML
1650 INJECTION, SOLUTION INTRAVENOUS; SUBCUTANEOUS AS NEEDED
Status: DISCONTINUED | OUTPATIENT
Start: 2019-07-25 | End: 2019-07-26

## 2019-07-25 RX ORDER — HEPARIN SODIUM 1000 [USP'U]/ML
3300 INJECTION, SOLUTION INTRAVENOUS; SUBCUTANEOUS AS NEEDED
Status: DISCONTINUED | OUTPATIENT
Start: 2019-07-25 | End: 2019-07-25 | Stop reason: HOSPADM

## 2019-07-25 RX ORDER — SODIUM CHLORIDE 9 MG/ML
125 INJECTION, SOLUTION INTRAVENOUS CONTINUOUS
Status: DISCONTINUED | OUTPATIENT
Start: 2019-07-25 | End: 2019-07-25 | Stop reason: HOSPADM

## 2019-07-25 RX ORDER — ATORVASTATIN CALCIUM 40 MG/1
40 TABLET, FILM COATED ORAL EVERY EVENING
Status: DISCONTINUED | OUTPATIENT
Start: 2019-07-25 | End: 2019-07-25

## 2019-07-25 RX ORDER — HEPARIN SODIUM 1000 [USP'U]/ML
3300 INJECTION, SOLUTION INTRAVENOUS; SUBCUTANEOUS AS NEEDED
Status: DISCONTINUED | OUTPATIENT
Start: 2019-07-25 | End: 2019-07-26

## 2019-07-25 RX ORDER — HEPARIN SODIUM 1000 [USP'U]/ML
1650 INJECTION, SOLUTION INTRAVENOUS; SUBCUTANEOUS AS NEEDED
Status: DISCONTINUED | OUTPATIENT
Start: 2019-07-25 | End: 2019-07-25 | Stop reason: HOSPADM

## 2019-07-25 RX ORDER — POTASSIUM CHLORIDE 1500 MG/1
20 TABLET, FILM COATED, EXTENDED RELEASE ORAL DAILY
Status: DISCONTINUED | OUTPATIENT
Start: 2019-07-26 | End: 2019-07-25

## 2019-07-25 RX ORDER — HEPARIN SODIUM 10000 [USP'U]/100ML
3-20 INJECTION, SOLUTION INTRAVENOUS
Status: DISCONTINUED | OUTPATIENT
Start: 2019-07-25 | End: 2019-07-25 | Stop reason: HOSPADM

## 2019-07-25 RX ORDER — HEPARIN SODIUM 10000 [USP'U]/100ML
3-20 INJECTION, SOLUTION INTRAVENOUS
Status: DISCONTINUED | OUTPATIENT
Start: 2019-07-25 | End: 2019-07-26

## 2019-07-25 RX ORDER — ALPRAZOLAM 0.5 MG/1
0.5 TABLET ORAL 4 TIMES DAILY PRN
Status: DISCONTINUED | OUTPATIENT
Start: 2019-07-25 | End: 2019-07-26 | Stop reason: HOSPADM

## 2019-07-25 RX ORDER — PANTOPRAZOLE SODIUM 40 MG/1
40 TABLET, DELAYED RELEASE ORAL
Status: DISCONTINUED | OUTPATIENT
Start: 2019-07-26 | End: 2019-07-26 | Stop reason: HOSPADM

## 2019-07-25 RX ORDER — ASPIRIN 325 MG
325 TABLET ORAL DAILY
Status: DISCONTINUED | OUTPATIENT
Start: 2019-07-26 | End: 2019-07-26

## 2019-07-25 RX ORDER — HEPARIN SODIUM 1000 [USP'U]/ML
2200 INJECTION, SOLUTION INTRAVENOUS; SUBCUTANEOUS AS NEEDED
Status: DISCONTINUED | OUTPATIENT
Start: 2019-07-25 | End: 2019-07-25

## 2019-07-25 RX ORDER — ONDANSETRON 2 MG/ML
4 INJECTION INTRAMUSCULAR; INTRAVENOUS EVERY 6 HOURS PRN
Status: DISCONTINUED | OUTPATIENT
Start: 2019-07-25 | End: 2019-07-26 | Stop reason: HOSPADM

## 2019-07-25 RX ORDER — ATORVASTATIN CALCIUM 80 MG/1
80 TABLET, FILM COATED ORAL
Status: DISCONTINUED | OUTPATIENT
Start: 2019-07-26 | End: 2019-07-26 | Stop reason: HOSPADM

## 2019-07-25 RX ORDER — POTASSIUM CHLORIDE 20 MEQ/1
20 TABLET, EXTENDED RELEASE ORAL DAILY
Status: DISCONTINUED | OUTPATIENT
Start: 2019-07-26 | End: 2019-07-26 | Stop reason: HOSPADM

## 2019-07-25 RX ORDER — DOCUSATE SODIUM 100 MG/1
100 CAPSULE, LIQUID FILLED ORAL 2 TIMES DAILY PRN
Status: DISCONTINUED | OUTPATIENT
Start: 2019-07-25 | End: 2019-07-26 | Stop reason: HOSPADM

## 2019-07-25 RX ORDER — AMLODIPINE BESYLATE 10 MG/1
10 TABLET ORAL DAILY
Status: DISCONTINUED | OUTPATIENT
Start: 2019-07-26 | End: 2019-07-26 | Stop reason: HOSPADM

## 2019-07-25 RX ORDER — HYDROCHLOROTHIAZIDE 25 MG/1
25 TABLET ORAL DAILY
Status: DISCONTINUED | OUTPATIENT
Start: 2019-07-26 | End: 2019-07-26 | Stop reason: HOSPADM

## 2019-07-25 RX ORDER — ACETAMINOPHEN 325 MG/1
650 TABLET ORAL EVERY 6 HOURS PRN
Status: DISCONTINUED | OUTPATIENT
Start: 2019-07-25 | End: 2019-07-26 | Stop reason: HOSPADM

## 2019-07-25 RX ADMIN — HEPARIN SODIUM AND DEXTROSE 12 UNITS/KG/HR: 10000; 5 INJECTION INTRAVENOUS at 20:25

## 2019-07-25 RX ADMIN — HEPARIN SODIUM AND DEXTROSE 12 UNITS/KG/HR: 10000; 5 INJECTION INTRAVENOUS at 17:38

## 2019-07-25 RX ADMIN — HEPARIN SODIUM 3300 UNITS: 1000 INJECTION INTRAVENOUS; SUBCUTANEOUS at 17:33

## 2019-07-25 RX ADMIN — IOHEXOL 85 ML: 350 INJECTION, SOLUTION INTRAVENOUS at 13:29

## 2019-07-25 RX ADMIN — SODIUM CHLORIDE 125 ML/HR: 0.9 INJECTION, SOLUTION INTRAVENOUS at 12:50

## 2019-07-25 NOTE — TELEPHONE ENCOUNTER
Discussed with Dr Beto Locke  Pt should go to the ER  She has a history of CVA and the nature of the dizziness is concerning for a cerebellar infarct  Thanks

## 2019-07-25 NOTE — ED NOTES
Patient transfer to 4th floor of B room 408  Dr Kelsie Tilley accepting   Report called to [phone]   at 9400 by [facility] Dinah Clayton RN  07/25/19 0002

## 2019-07-25 NOTE — TELEPHONE ENCOUNTER
Pt made aware  She will go to the [facility] ER  Order entered for ER    I cancelled appt for today

## 2019-07-25 NOTE — ED PROVIDER NOTES
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  She states that her vertebral artery dissection presented as vision loss for less than a minute  She was seen and evaluated in the hospital   She had an MRI that demonstrated the defect  She has been on aspirin and Plavix since the diagnosis  She states for the past week she has not been feeling well  She felt like she was unsteady on her feet  She complains of an episode of vision loss less than 1 minute which occurred earlier in the week and then resolved  She was seen by her neurologist on July 11th and he   did a full neurological exam, reviewed her chart, and he  did not feel that there was a neurological cause of her problem  He was concerned that it may be related to cardiac  He wanted her to have a 2-3 week hOLTER MONITOR  Kashif Carpenter She was unable to set herself up with her cardiologist until  September  He then decided that since she could not get a Holter monitor placed prior to September, he would order  a short Holter monitor  Patient denies any chest pain or palpitations  She denies any shortness of breath  She has not been recently ill  She denies any double or blurred vision  She states  that usually the episodes of the vision loss are related to a generalized headache that she gets prior to the onset  There was concerned that maybe this was an ocular migraine  Dr Pamela Farrell, from Neurology,  does not feel that these are ocular migraines per the patient    Patient has been having her mother drive her back and forth to work due to the fact that the the neurologist asked her not to drive due to these symptoms  Patient states that her head just feels cloudy like there is a pressure within it  She denies any nausea or vomiting  She describes the dizziness as true vertigo where the room is spinning and only last like a few seconds  She had an episode today getting out of the shower where the room was spinning for few seconds  She complained of nausea but no true vomiting  She called the neurologist's office in the asked her to come to the emergency room for evaluation  Patient states she is compliant with her medications  Past medical history is positive for CVA, GERD, hypertension, hypercholesterolemia  History provided by:  Patient  Dizziness   Quality:  Vertigo  Severity:  Moderate  Onset quality:  Sudden  Duration: few seconds  Timing:  Intermittent  Progression:  Resolved  Chronicity:  Recurrent  Context: standing up    Context: not when bending over, not with bowel movement, not with ear pain, not with eye movement, not with head movement, not with inactivity, not with loss of consciousness, not with medication and not with physical activity    Relieved by:  None tried  Worsened by:  Nothing  Ineffective treatments:  None tried  Associated symptoms: no chest pain, no diarrhea, no nausea, no palpitations, no shortness of breath and no vomiting    Risk factors: hx of stroke and multiple medications    Risk factors: no anemia, no heart disease, no hx of vertigo, no Meniere's disease and no new medications        Prior to Admission Medications   Prescriptions Last Dose Informant Patient Reported? Taking?    ALPRAZolam (XANAX) 0 5 mg tablet 7/24/2019 at Unknown time Self Yes Yes   Sig: Take 0 5 mg by mouth 4 (four) times a day as needed for anxiety (Patient takes one at night)    Docusate Calcium (STOOL SOFTENER PO) Unknown at Unknown time Self Yes No   Sig: Take 1 tablet by mouth as needed (once a week prn)    Potassium Chloride ER 20 MEQ TBCR 7/25/2019 at Unknown time  Yes Yes   Sig: take 1 tablet by mouth twice a day take with food   amLODIPine (NORVASC) 10 mg tablet 7/25/2019 at Unknown time Self No Yes   Sig: Take 1 tablet (10 mg total) by mouth daily for 14 days   aspirin (ECOTRIN LOW STRENGTH) 81 mg EC tablet 7/25/2019 at Unknown time Self No Yes   Sig: Take 1 tablet (81 mg total) by mouth daily   atorvastatin (LIPITOR) 80 mg tablet 7/24/2019 at Unknown time Self No Yes   Sig: Take 1 tablet (80 mg total) by mouth daily with dinner   hydrochlorothiazide (HYDRODIURIL) 25 mg tablet 7/25/2019 at Unknown time Self Yes Yes   Sig: Take 25 mg by mouth daily   omeprazole (PriLOSEC) 40 MG capsule 7/24/2019 at Unknown time  Yes Yes   Sig: Take 40 mg by mouth daily      Facility-Administered Medications: None       Past Medical History:   Diagnosis Date    CVA (cerebral vascular accident) ([address] )     GERD (gastroesophageal reflux disease)     occasionally    Hypertension     controlling with diet and exercise       Past Surgical History:   Procedure Laterality Date    AUGMENTATION MAMMAPLASTY      GYNECOLOGIC CRYOSURGERY      CERVIX    HYSTERECTOMY      LASER ABLATION OF CONDYLOMAS N/A 7/30/2018    Procedure: Dede Oxford LASER ABLATION OF VULVA;  Surgeon: Josh Lee MD;  [facility];  Service: Gynecology Oncology    SKIN TAG REMOVAL      EXICISON OF PERIANAL    WISDOM TOOTH EXTRACTION         Family History   Adopted: Yes   Problem Relation Age of Onset    No Known Problems Mother     No Known Problems Father     No Known Problems Sister     No Known Problems Brother     No Known Problems Maternal Aunt     No Known Problems Maternal Uncle     No Known Problems Paternal Aunt     No Known Problems Paternal Uncle     No Known Problems Maternal Grandmother     No Known Problems Maternal Grandfather     No Known Problems Paternal Grandmother     No Known Problems Paternal Grandfather      I have reviewed and agree with the history as documented  Social History     Tobacco Use    Smoking status: Never Smoker    Smokeless tobacco: Never Used   Substance Use Topics    Alcohol use: Yes     Comment: SOCIAL-1-2 drinks per week    Drug use: No        Review of Systems   Constitutional: Negative for activity change, appetite change, chills and fever  HENT: Negative for congestion, dental problem, ear discharge, ear pain, mouth sores, postnasal drip, rhinorrhea and sore throat  Eyes: Positive for visual disturbance  Negative for photophobia, pain, discharge and itching  Respiratory: Negative for cough, chest tightness and shortness of breath  Cardiovascular: Negative for chest pain and palpitations  Gastrointestinal: Negative for abdominal pain, diarrhea, nausea and vomiting  Endocrine: Negative for cold intolerance, heat intolerance, polydipsia, polyphagia and polyuria  Genitourinary: Negative for difficulty urinating, dysuria, flank pain, frequency and urgency  Musculoskeletal: Positive for gait problem  Skin: Negative for color change, pallor, rash and wound  Neurological: Positive for dizziness  Psychiatric/Behavioral: Negative for confusion  All other systems reviewed and are negative  Physical Exam  Physical Exam   Constitutional: She is oriented to person, place, and time  She appears well-developed and well-nourished  No distress  HENT:   Head: Normocephalic  Right Ear: External ear normal    Left Ear: External ear normal    Nose: Nose normal    Mouth/Throat: Oropharynx is clear and moist    Eyes: Pupils are equal, round, and reactive to light  Conjunctivae and EOM are normal  Right eye exhibits no discharge  Left eye exhibits no discharge  Neck: Neck supple  No JVD present  No tracheal deviation present  No thyromegaly present  Cardiovascular: Normal rate, regular rhythm, normal heart sounds and intact distal pulses  Exam reveals no gallop and no friction rub     No murmur heard   Pulmonary/Chest: Effort normal and breath sounds normal  No stridor  No respiratory distress  She has no wheezes  She has no rales  Abdominal: Soft  She exhibits no distension  There is no tenderness  There is no rebound and no guarding  Musculoskeletal: She exhibits no edema  Lymphadenopathy:     She has no cervical adenopathy  Neurological: She is alert and oriented to person, place, and time  No cranial nerve deficit  Coordination normal    Negative pronator drift  No sensory deficit  Motor is 5/5 and symmetric in the upper lower extremities  Normal gait  Skin: Skin is warm  Capillary refill takes less than 2 seconds  She is not diaphoretic  Psychiatric: She has a normal mood and affect  Her behavior is normal  Judgment and thought content normal    Nursing note and vitals reviewed        Vital Signs  ED Triage Vitals [07/25/19 1046]   Temperature Pulse Respirations Blood Pressure SpO2   98 1 °F (36 7 °C) 74 16 (!) 176/95 98 %      Temp Source Heart Rate Source Patient Position - Orthostatic VS BP Location FiO2 (%)   Oral Monitor Sitting Right arm --      Pain Score       5           Vitals:    07/25/19 1046 07/25/19 1251 07/25/19 1450 07/25/19 1627   BP: (!) 176/95 145/84 118/78 116/80   Pulse: 74 58 68 65   Patient Position - Orthostatic VS: Sitting Lying Lying Lying         Visual Acuity      ED Medications  Medications   sodium chloride 0 9 % infusion (125 mL/hr Intravenous New Bag 7/25/19 1250)   heparin (porcine) 25,000 units in 250 mL infusion (premix) (12 Units/kg/hr × 55 kg (Order-Specific) Intravenous New Bag 7/25/19 1738)   heparin (porcine) injection 3,300 Units (has no administration in time range)   heparin (porcine) injection 1,650 Units (has no administration in time range)   iohexol (OMNIPAQUE) 350 MG/ML injection (SINGLE-DOSE) 85 mL (85 mL Intravenous Given 7/25/19 1329)   heparin (porcine) injection 3,300 Units (3,300 Units Intravenous Given 7/25/19 1733)       Diagnostic Studies  Results Reviewed     Procedure Component Value Units Date/Time    APTT six (6) hours after Heparin bolus/drip initiation or dosing change [[id number]]     Lab Status:  No result Specimen:  Blood     Troponin I [[id number]]  (Normal) Collected:  07/25/19 1247    Lab Status:  Final result Specimen:  Blood from Arm, Right Updated:  07/25/19 1312     Troponin I <0 02 ng/mL     Protime-INR [[id number]]  (Normal) Collected:  07/25/19 1247    Lab Status:  Final result Specimen:  Blood from Arm, Right Updated:  07/25/19 1306     Protime 12 5 seconds      INR 0 99    APTT [[id number]]  (Normal) Collected:  07/25/19 1247    Lab Status:  Final result Specimen:  Blood from Arm, Right Updated:  07/25/19 1306     PTT 28 seconds     Basic metabolic panel [[id number]] Collected:  07/25/19 1247    Lab Status:  Final result Specimen:  Blood from Arm, Right Updated:  07/25/19 1303     Sodium 142 mmol/L      Potassium 3 9 mmol/L      Chloride 103 mmol/L      CO2 29 mmol/L      ANION GAP 10 mmol/L      BUN 17 mg/dL      Creatinine 0 78 mg/dL      Glucose 88 mg/dL      Calcium 9 1 mg/dL      eGFR 90 ml/min/1 73sq m     Narrative:       [place] guidelines for Chronic Kidney Disease (CKD):     Stage 1 with normal or high GFR (GFR > 90 mL/min/1 73 square meters)    Stage 2 Mild CKD (GFR = 60-89 mL/min/1 73 square meters)    Stage 3A Moderate CKD (GFR = 45-59 mL/min/1 73 square meters)    Stage 3B Moderate CKD (GFR = 30-44 mL/min/1 73 square meters)    Stage 4 Severe CKD (GFR = 15-29 mL/min/1 73 square meters)    Stage 5 End Stage CKD (GFR <15 mL/min/1 73 square meters)  Note: GFR calculation is accurate only with a steady state creatinine    Magnesium [[id number]]  (Normal) Collected:  07/25/19 1247    Lab Status:  Final result Specimen:  Blood from Arm, Right Updated:  07/25/19 1303     Magnesium 1 9 mg/dL     CBC and differential [[id number]]  (Abnormal) Collected:  07/25/19 1247    Lab Status:  Final result Specimen:  Blood from Arm, Right Updated:  07/25/19 1256     WBC 6 27 Thousand/uL      RBC 4 39 Million/uL      Hemoglobin 13 6 g/dL      Hematocrit 40 1 %      MCV 91 fL      MCH 31 0 pg      MCHC 33 9 g/dL      RDW 11 5 %      MPV 9 9 fL      Platelets 593 Thousands/uL      nRBC 0 /100 WBCs      Neutrophils Relative 56 %      Immat GRANS % 0 %      Lymphocytes Relative 33 %      Monocytes Relative 8 %      Eosinophils Relative 2 %      Basophils Relative 1 %      Neutrophils Absolute 3 54 Thousands/µL      Immature Grans Absolute 0 02 Thousand/uL      Lymphocytes Absolute 2 04 Thousands/µL      Monocytes Absolute 0 52 Thousand/µL      Eosinophils Absolute 0 11 Thousand/µL      Basophils Absolute 0 04 Thousands/µL                  CTA head and neck with and without contrast   ED Interpretation by Jamie Coleman PA-C (07/25 1709)      Patient has a known history of left vertebral dissection  Diffusely diminished luminal caliber of the left vertebral artery extending from the level of the origin to the V3 segment with there is subsequent more abrupt luminal narrowing  Subsequent    occlusion of the distal left V3 and proximal V4 segments is noted with reconstitution at the level of the posterior inferior cerebellar artery  Stable appearance of the right vertebral artery with mild to moderate luminal narrowing noted of the right V4 segment  Small chronic right cerebellar infarcts as well as bilateral parietal lobes  No definite CT evidence for large acute vascular distribution infarct on the current examination  I personally discussed this study with Skyler Lujan on 7/25/2019 at 2:32 PM                      Workstation performed: [id number]         Final Result by Micki Fernández MD (07/25 1492)      Patient has a known history of left vertebral dissection    Diffusely diminished luminal caliber of the left vertebral artery extending from the level of the origin to the V3 segment with there is subsequent more abrupt luminal narrowing  Subsequent    occlusion of the distal left V3 and proximal V4 segments is noted with reconstitution at the level of the posterior inferior cerebellar artery  Stable appearance of the right vertebral artery with mild to moderate luminal narrowing noted of the right V4 segment  Small chronic right cerebellar infarcts as well as bilateral parietal lobes  No definite CT evidence for large acute vascular distribution infarct on the current examination  I personally discussed this study with Skyler Lujan on 7/25/2019 at 2:32 PM                      Workstation performed: [id number]                    Procedures  ECG 12 Lead Documentation Only  Date/Time: 7/25/2019 12:49 PM  Performed by: Jamie Coleman PA-C  Authorized by: Jamie Coleman PA-C     Indications / Diagnosis:  Dizziness  ECG reviewed by me, the ED Provider: yes    Patient location:  ED  Previous ECG:     Previous ECG:  Unavailable    Comparison to cardiac monitor: Yes    Interpretation:     Interpretation: non-specific    Rate:     ECG rate:  57    ECG rate assessment: bradycardic    Rhythm:     Rhythm: sinus bradycardia    Ectopy:     Ectopy: none    QRS:     QRS axis:  Normal    QRS intervals:  Normal  Conduction:     Conduction: normal    ST segments:     ST segments:  Normal  T waves:     T waves: normal    Comments:      No signs of acute ischemia    Independently interpreted by me           ED Course  ED Course as of Jul 25 1820   Thu Jul 25, 2019   1714 I spoke to Dr Adore Crain from Neurology  He evaluated the CTA of the head and neck and recommended that the patient be sent to [facility]  STEP DOWN 1  He was her to have frequent neuro checks  Dr Adore Crain spoke to Dr Ely Wylie from Neuroradiology and he is aware of the patient  He will see her in consultation                                    MDM  Number of Diagnoses or Management Options  Vertebral artery dissection ([facility] [address] ): new and requires workup     Amount and/or Complexity of Data Reviewed  Clinical lab tests: reviewed and ordered  Tests in the radiology section of CPT®: ordered and reviewed  Tests in the medicine section of CPT®: ordered and reviewed    Risk of Complications, Morbidity, and/or Mortality  Presenting problems: high  Diagnostic procedures: high  Management options: high  General comments: Patient presents emergency room with complaints of of vision loss that was transient earlier in the week  She was complaining of some vertiginous symptoms today  She felt like there was something wrong  She was seen and evaluated  She has a history of having a vertebral artery dissection that was diagnosed in February of 2019  She was hospitalized worked up at [facility] for this condition  She was discharged home on Plavix and aspirin and has been compliant with her medications  She was current concerned that something was else was going on  She presented with no active symptoms  She was seen and examined  A CTA of her head and was repeated  It demonstrated worsening of her vertebral artery dissections  They are present bilaterally  There is evidence of  occlusion of the the knee and V4 segments on the left  I reviewed the case with Vergil Fret from Neurology  He recommended that the patient be transferred to palpable him and placed on step-down 1 for frequent neurological exams  A consult for neurovascular will be placed with Dr Bar Leon  He is aware of the patient  The patient was started on a heparin drip per neurology's request   The patient was reviewed with Dr Becker from [facility]  A consult will be placed to Dr Nayla Curtis from critical care  Patient's laboratory tests were reviewed and were within normal limits  The patient's EKG showed a sinus bradycardia with no evidence of ischemia      Patient Progress  Patient progress: stable      Disposition  Final diagnoses:   Vertebral artery dissection ([address] ) - Bilateral     Time reflects when diagnosis was documented in both MDM as applicable and the Disposition within this note     Time User Action Codes Description Comment    7/25/2019  6:04 PM Marcel Fisher Add [I77 74] Vertebral artery dissection ([facility] [address] )     7/25/2019  6:05 PM Marcel Fisher Modify [I77 74] Vertebral artery dissection [facility]) Bilateral      ED Disposition     ED Disposition Condition Date/Time Comment    Transfer to Another Facility-In Network  Thu Jul 25, 2019  6:09 PM Vale Tan should be transferred out to [facility]        MD Documentation      Most Recent Value   Patient Condition  The patient has been stabilized such that within reasonable medical probability, no material deterioration of the patient condition or the condition of the unborn child(kaz) is likely to result from the transfer   Reason for Transfer  Level of Care needed not available at this facility   Benefits of Transfer  Specialized equipment and/or services available at the receiving facility (Include comment)________________________ [facility] Hidden care]   Risks of Transfer  Potential for delay in receiving treatment, Potential deterioration of medical condition, Loss of IV, Increased discomfort during transfer, Possible worsening of condition or death during transfer   Accepting Physician  Dr Lorena Fermin Name, [address] [facility]    (Name & Tel number)  Annmarie Ashford   Transported by (Company and Unit #)  [facility]   Sending MD David Barrett PA-C/Cody KENNEDY   Provider Certification  General risk, such as traffic hazards, adverse weather conditions, rough terrain or turbulence, possible failure of equipment (including vehicle or aircraft), or consequences of actions of persons outside the control of the transport personnel, Unanticipated needs of medical equipment and personnel during transport, Risk of worsening condition, The possibility of a transport vehicle being unavailable      RN Documentation      Most Recent Value   Accepting Facility Name, [facility] (Name & Tel number)  Vernell Leyden by Jenise and Unit #)  [facility]      Follow-up Information    None         Patient's Medications   Discharge Prescriptions    No medications on file     No discharge procedures on file      ED Provider  Electronically Signed by           Orquidea Motley PA-C  07/25/19 6844

## 2019-07-25 NOTE — ED NOTES
PT awake and alert, no distress noted  No other questions upon transfer       April Nasra Benoit RN  07/25/19 8412

## 2019-07-25 NOTE — ED NOTES
Pt ambulated to restroom, no reports of dizziness or gait disturbances       Erwin Duarte  07/25/19 1318

## 2019-07-25 NOTE — TELEPHONE ENCOUNTER
Trinidad Tatum PA-C [facility] called at 4:10 pmregard patient vertebral dissection refused triage spoke to Dr Robert Olmedo 4:15pm will call her at [phone]

## 2019-07-25 NOTE — TELEPHONE ENCOUNTER
pt transfered from scheduling who scheduled her to see you today  pt states that she has been having vision loss here and there  saw pcp and they are sending her for additional testing  this am she was so dizzy that she is having to hold onto walls to walk  she is still dizzy, dizziness comes and goes  states that she gets headaches every time she gets dizzy  dizziness lasts 5-15 mins  headaches continue after the dizziness resolves  had headaches that come and go all week long   4-5/10   still having vision loss on and off, states that she had this 3 times since seeing dr Kirt Choi on 7/11  she states that dr Kirt Choi did not feel that vision loss was neuro related  pcp questioned if this was occular migraine and she states that dr Kirt Choi did not feel that it was  No other signs of stroke      [phone]

## 2019-07-25 NOTE — ED NOTES
Attempts as securing an IV were unsuccessful, [facility] as well  Requested assistance from another RN       Lorie Miramontes RN  07/25/19 1655

## 2019-07-25 NOTE — EMTALA/ACUTE CARE TRANSFER
[address] [place] [address]  Dept: [phone]      EMTALA TRANSFER CONSENT    NAME Samuel Georges                                         1970                              MRN [id number]    I have been informed of my rights regarding examination, treatment, and transfer   by Dr Gelacio Goldman: Specialized equipment and/or services available at the receiving facility (Include comment)________________________(Neurovascular care)    Risks: Potential for delay in receiving treatment, Potential deterioration of medical condition, Loss of IV, Increased discomfort during transfer, Possible worsening of condition or death during transfer      Consent for Transfer:  I acknowledge that my medical condition has been evaluated and explained to me by the emergency department physician or other qualified medical person and/or my attending physician, who has recommended that I be transferred to the service of  Accepting Physician: Dr Efe Mackey  at [address] [facility] Name, [address] : [facility]  The above potential benefits of such transfer, the potential risks associated with such transfer, and the probable risks of not being transferred have been explained to me, and I fully understand them  The doctor has explained that, in my case, the benefits of transfer outweigh the risks  I agree to be transferred  I authorize the performance of emergency medical procedures and treatments upon me in both transit and upon arrival at the receiving facility  Additionally, I authorize the release of any and all medical records to the receiving facility and request they be transported with me, if possible  I understand that the safest mode of transportation during a medical emergency is an ambulance and that the Hospital advocates the use of this mode of transport   Risks of traveling to the receiving facility by car, including absence of medical control, life sustaining equipment, such as oxygen, and medical personnel has been explained to me and I fully understand them  (JESSI CORRECT BOX BELOW)  [  ]  I consent to the stated transfer and to be transported by ambulance/helicopter  [  ]  I consent to the stated transfer, but refuse transportation by ambulance and accept full responsibility for my transportation by car  I understand the risks of non-ambulance transfers and I exonerate the Hospital and its staff from any deterioration in my condition that results from this refusal     X___________________________________________    DATE  19  TIME________  Signature of patient or legally responsible individual signing on patient behalf           RELATIONSHIP TO PATIENT_________________________          Provider Certification    NAME Ilan Carrillo                                         1970                              MRN [id number]    A medical screening exam was performed on the above named patient  Based on the examination:    Condition Necessitating Transfer The encounter diagnosis was Vertebral artery dissection ([facility] [address] )      Patient Condition: The patient has been stabilized such that within reasonable medical probability, no material deterioration of the patient condition or the condition of the unborn child(kaz) is likely to result from the transfer    Reason for Transfer: Level of Care needed not available at this facility    Transfer Requirements: [address] [place] [address]   · Space available and qualified personnel available for treatment as acknowledged by Dg Concepcion  · Agreed to accept transfer and to provide appropriate medical treatment as acknowledged by       Dr Yesenia Suárez   · Appropriate medical records of the examination and treatment of the patient are provided at the time of transfer   [address], [address] _______  · Transfer will be performed by qualified personnel from [facility]  and [facility] transfer equipment as required, including the use of necessary and appropriate life support measures  Provider Certification: I have examined the patient and explained the following risks and benefits of being transferred/refusing transfer to the patient/family:  General risk, such as traffic hazards, adverse weather conditions, rough terrain or turbulence, possible failure of equipment (including vehicle or aircraft), or consequences of actions of persons outside the control of the transport personnel, Unanticipated needs of medical equipment and personnel during transport, Risk of worsening condition, The possibility of a transport vehicle being unavailable      Based on these reasonable risks and benefits to the patient and/or the unborn child(kaz), and based upon the information available at the time of the patients examination, I certify that the medical benefits reasonably to be expected from the provision of appropriate medical treatments at another medical facility outweigh the increasing risks, if any, to the individuals medical condition, and in the case of labor to the unborn child, from effecting the transfer      X____________________________________________ DATE 07/25/19        TIME_______      ORIGINAL - SEND TO MEDICAL RECORDS   COPY - SEND WITH PATIENT DURING TRANSFER

## 2019-07-26 ENCOUNTER — APPOINTMENT (INPATIENT)
Dept: NON INVASIVE DIAGNOSTICS | Facility: HOSPITAL | Age: 49
DRG: 301 | End: 2019-07-26
Payer: COMMERCIAL

## 2019-07-26 VITALS
WEIGHT: 120.59 LBS | TEMPERATURE: 98.4 F | HEIGHT: 59 IN | BODY MASS INDEX: 24.31 KG/M2 | RESPIRATION RATE: 19 BRPM | DIASTOLIC BLOOD PRESSURE: 76 MMHG | OXYGEN SATURATION: 98 % | SYSTOLIC BLOOD PRESSURE: 111 MMHG | HEART RATE: 64 BPM

## 2019-07-26 LAB
ALBUMIN SERPL BCP-MCNC: 3.8 G/DL (ref 3.5–5)
ALP SERPL-CCNC: 76 U/L (ref 46–116)
ALT SERPL W P-5'-P-CCNC: 28 U/L (ref 12–78)
ANION GAP SERPL CALCULATED.3IONS-SCNC: 5 MMOL/L (ref 4–13)
APTT PPP: 65 SECONDS (ref 23–37)
APTT PPP: 79 SECONDS (ref 23–37)
AST SERPL W P-5'-P-CCNC: 16 U/L (ref 5–45)
BASOPHILS # BLD AUTO: 0.05 THOUSANDS/ΜL (ref 0–0.1)
BASOPHILS NFR BLD AUTO: 1 % (ref 0–1)
BILIRUB SERPL-MCNC: 0.57 MG/DL (ref 0.2–1)
BUN SERPL-MCNC: 13 MG/DL (ref 5–25)
CALCIUM SERPL-MCNC: 8.7 MG/DL (ref 8.3–10.1)
CHLORIDE SERPL-SCNC: 103 MMOL/L (ref 100–108)
CHOLEST SERPL-MCNC: 131 MG/DL (ref 50–200)
CO2 SERPL-SCNC: 30 MMOL/L (ref 21–32)
CREAT SERPL-MCNC: 0.75 MG/DL (ref 0.6–1.3)
EOSINOPHIL # BLD AUTO: 0.18 THOUSAND/ΜL (ref 0–0.61)
EOSINOPHIL NFR BLD AUTO: 4 % (ref 0–6)
ERYTHROCYTE [DISTWIDTH] IN BLOOD BY AUTOMATED COUNT: 11.6 % (ref 11.6–15.1)
EST. AVERAGE GLUCOSE BLD GHB EST-MCNC: 105 MG/DL
GFR SERPL CREATININE-BSD FRML MDRD: 95 ML/MIN/1.73SQ M
GLUCOSE SERPL-MCNC: 77 MG/DL (ref 65–140)
HBA1C MFR BLD: 5.3 % (ref 4.2–6.3)
HCT VFR BLD AUTO: 40 % (ref 34.8–46.1)
HCYS SERPL-SCNC: 7.4 UMOL/L (ref 3.7–11.2)
HDLC SERPL-MCNC: 50 MG/DL (ref 40–60)
HGB BLD-MCNC: 13.3 G/DL (ref 11.5–15.4)
IMM GRANULOCYTES # BLD AUTO: 0.02 THOUSAND/UL (ref 0–0.2)
IMM GRANULOCYTES NFR BLD AUTO: 0 % (ref 0–2)
INR PPP: 1.1 (ref 0.84–1.19)
LDLC SERPL CALC-MCNC: 67 MG/DL (ref 0–100)
LYMPHOCYTES # BLD AUTO: 1.8 THOUSANDS/ΜL (ref 0.6–4.47)
LYMPHOCYTES NFR BLD AUTO: 35 % (ref 14–44)
MAGNESIUM SERPL-MCNC: 1.9 MG/DL (ref 1.6–2.6)
MCH RBC QN AUTO: 30.6 PG (ref 26.8–34.3)
MCHC RBC AUTO-ENTMCNC: 33.3 G/DL (ref 31.4–37.4)
MCV RBC AUTO: 92 FL (ref 82–98)
MONOCYTES # BLD AUTO: 0.47 THOUSAND/ΜL (ref 0.17–1.22)
MONOCYTES NFR BLD AUTO: 9 % (ref 4–12)
NEUTROPHILS # BLD AUTO: 2.65 THOUSANDS/ΜL (ref 1.85–7.62)
NEUTS SEG NFR BLD AUTO: 51 % (ref 43–75)
NRBC BLD AUTO-RTO: 0 /100 WBCS
PLATELET # BLD AUTO: 227 THOUSANDS/UL (ref 149–390)
PMV BLD AUTO: 10.7 FL (ref 8.9–12.7)
POTASSIUM SERPL-SCNC: 3.1 MMOL/L (ref 3.5–5.3)
PROT SERPL-MCNC: 7.1 G/DL (ref 6.4–8.2)
PROTHROMBIN TIME: 13.8 SECONDS (ref 11.6–14.5)
RBC # BLD AUTO: 4.34 MILLION/UL (ref 3.81–5.12)
SODIUM SERPL-SCNC: 138 MMOL/L (ref 136–145)
TRIGL SERPL-MCNC: 70 MG/DL
TSH SERPL DL<=0.05 MIU/L-ACNC: 5.6 UIU/ML (ref 0.36–3.74)
WBC # BLD AUTO: 5.17 THOUSAND/UL (ref 4.31–10.16)

## 2019-07-26 PROCEDURE — 85300 ANTITHROMBIN III ACTIVITY: CPT | Performed by: PSYCHIATRY & NEUROLOGY

## 2019-07-26 PROCEDURE — 86162 COMPLEMENT TOTAL (CH50): CPT | Performed by: PSYCHIATRY & NEUROLOGY

## 2019-07-26 PROCEDURE — 86235 NUCLEAR ANTIGEN ANTIBODY: CPT | Performed by: PSYCHIATRY & NEUROLOGY

## 2019-07-26 PROCEDURE — 99232 SBSQ HOSP IP/OBS MODERATE 35: CPT | Performed by: PSYCHIATRY & NEUROLOGY

## 2019-07-26 PROCEDURE — 97162 PT EVAL MOD COMPLEX 30 MIN: CPT

## 2019-07-26 PROCEDURE — 93325 DOPPLER ECHO COLOR FLOW MAPG: CPT | Performed by: INTERNAL MEDICINE

## 2019-07-26 PROCEDURE — 85305 CLOT INHIBIT PROT S TOTAL: CPT | Performed by: PSYCHIATRY & NEUROLOGY

## 2019-07-26 PROCEDURE — 86147 CARDIOLIPIN ANTIBODY EA IG: CPT | Performed by: PSYCHIATRY & NEUROLOGY

## 2019-07-26 PROCEDURE — 83520 IMMUNOASSAY QUANT NOS NONAB: CPT | Performed by: PSYCHIATRY & NEUROLOGY

## 2019-07-26 PROCEDURE — 85730 THROMBOPLASTIN TIME PARTIAL: CPT | Performed by: INTERNAL MEDICINE

## 2019-07-26 PROCEDURE — 80061 LIPID PANEL: CPT | Performed by: INTERNAL MEDICINE

## 2019-07-26 PROCEDURE — NC001 PR NO CHARGE: Performed by: INTERNAL MEDICINE

## 2019-07-26 PROCEDURE — G8989 SELF CARE D/C STATUS: HCPCS

## 2019-07-26 PROCEDURE — 85610 PROTHROMBIN TIME: CPT | Performed by: INTERNAL MEDICINE

## 2019-07-26 PROCEDURE — 97166 OT EVAL MOD COMPLEX 45 MIN: CPT

## 2019-07-26 PROCEDURE — G8979 MOBILITY GOAL STATUS: HCPCS

## 2019-07-26 PROCEDURE — 85670 THROMBIN TIME PLASMA: CPT | Performed by: PSYCHIATRY & NEUROLOGY

## 2019-07-26 PROCEDURE — 83036 HEMOGLOBIN GLYCOSYLATED A1C: CPT | Performed by: INTERNAL MEDICINE

## 2019-07-26 PROCEDURE — 83090 ASSAY OF HOMOCYSTEINE: CPT | Performed by: PSYCHIATRY & NEUROLOGY

## 2019-07-26 PROCEDURE — 85025 COMPLETE CBC W/AUTO DIFF WBC: CPT | Performed by: INTERNAL MEDICINE

## 2019-07-26 PROCEDURE — 86146 BETA-2 GLYCOPROTEIN ANTIBODY: CPT | Performed by: PSYCHIATRY & NEUROLOGY

## 2019-07-26 PROCEDURE — G8980 MOBILITY D/C STATUS: HCPCS

## 2019-07-26 PROCEDURE — 80053 COMPREHEN METABOLIC PANEL: CPT | Performed by: INTERNAL MEDICINE

## 2019-07-26 PROCEDURE — 93308 TTE F-UP OR LMTD: CPT | Performed by: INTERNAL MEDICINE

## 2019-07-26 PROCEDURE — 85705 THROMBOPLASTIN INHIBITION: CPT | Performed by: PSYCHIATRY & NEUROLOGY

## 2019-07-26 PROCEDURE — 81240 F2 GENE: CPT | Performed by: PSYCHIATRY & NEUROLOGY

## 2019-07-26 PROCEDURE — 93308 TTE F-UP OR LMTD: CPT

## 2019-07-26 PROCEDURE — 85303 CLOT INHIBIT PROT C ACTIVITY: CPT | Performed by: PSYCHIATRY & NEUROLOGY

## 2019-07-26 PROCEDURE — G8978 MOBILITY CURRENT STATUS: HCPCS

## 2019-07-26 PROCEDURE — 84443 ASSAY THYROID STIM HORMONE: CPT | Performed by: INTERNAL MEDICINE

## 2019-07-26 PROCEDURE — G8988 SELF CARE GOAL STATUS: HCPCS

## 2019-07-26 PROCEDURE — 85306 CLOT INHIBIT PROT S FREE: CPT | Performed by: PSYCHIATRY & NEUROLOGY

## 2019-07-26 PROCEDURE — 81241 F5 GENE: CPT | Performed by: PSYCHIATRY & NEUROLOGY

## 2019-07-26 PROCEDURE — 83735 ASSAY OF MAGNESIUM: CPT | Performed by: INTERNAL MEDICINE

## 2019-07-26 PROCEDURE — 85613 RUSSELL VIPER VENOM DILUTED: CPT | Performed by: PSYCHIATRY & NEUROLOGY

## 2019-07-26 PROCEDURE — 99232 SBSQ HOSP IP/OBS MODERATE 35: CPT | Performed by: PHYSICIAN ASSISTANT

## 2019-07-26 PROCEDURE — 85732 THROMBOPLASTIN TIME PARTIAL: CPT | Performed by: PSYCHIATRY & NEUROLOGY

## 2019-07-26 PROCEDURE — 99254 IP/OBS CNSLTJ NEW/EST MOD 60: CPT | Performed by: SURGERY

## 2019-07-26 PROCEDURE — G8987 SELF CARE CURRENT STATUS: HCPCS

## 2019-07-26 PROCEDURE — 93321 DOPPLER ECHO F-UP/LMTD STD: CPT | Performed by: INTERNAL MEDICINE

## 2019-07-26 RX ORDER — CLOPIDOGREL BISULFATE 75 MG/1
75 TABLET ORAL DAILY
Qty: 60 TABLET | Refills: 0 | Status: SHIPPED | OUTPATIENT
Start: 2019-07-27 | End: 2020-03-22

## 2019-07-26 RX ORDER — CLOPIDOGREL BISULFATE 75 MG/1
75 TABLET ORAL DAILY
Status: DISCONTINUED | OUTPATIENT
Start: 2019-07-27 | End: 2019-07-26 | Stop reason: HOSPADM

## 2019-07-26 RX ORDER — ASPIRIN 81 MG/1
81 TABLET ORAL DAILY
Status: DISCONTINUED | OUTPATIENT
Start: 2019-07-27 | End: 2019-07-26 | Stop reason: HOSPADM

## 2019-07-26 RX ADMIN — ALPRAZOLAM 0.5 MG: 0.5 TABLET ORAL at 00:23

## 2019-07-26 RX ADMIN — POTASSIUM CHLORIDE 20 MEQ: 1500 TABLET, EXTENDED RELEASE ORAL at 08:05

## 2019-07-26 RX ADMIN — ATORVASTATIN CALCIUM 80 MG: 80 TABLET, FILM COATED ORAL at 17:23

## 2019-07-26 RX ADMIN — ASPIRIN 325 MG ORAL TABLET 325 MG: 325 PILL ORAL at 08:05

## 2019-07-26 RX ADMIN — PANTOPRAZOLE SODIUM 40 MG: 40 TABLET, DELAYED RELEASE ORAL at 05:22

## 2019-07-26 NOTE — UTILIZATION REVIEW
Initial Clinical Review    Admission: Date/Time/Statement: 7/25/19 @ 2025     Orders Placed This Encounter   Procedures    Inpatient Admission     Standing Status:   Standing     Number of Occurrences:   1     Order Specific Question:   Admitting Physician     Answer:   Henry Sarah [[id number]]     Order Specific Question:   Level of Care     Answer:   Level 2 Stepdown / HOT [14]     Order Specific Question:   Estimated length of stay     Answer:   More than 2 Midnights     Order Specific Question:   Certification     Answer:   I certify that inpatient services are medically necessary for this patient for a duration of greater than two midnights  See H&P and MD Progress Notes for additional information about the patient's course of treatment  ED Arrival Information  Tx form [address] [place] [address] ED    Patient not seen in ED                     Chief Complaint:   Dizziness with blurriness of vision  Assessment/Plan:  50 y o  female with PMhx of vertebral artery dissection (currently stable right-sided vertebral artery dissection with the left somewhat progressing) presented to [address] [place] [address] ED with c/o increasing dizziness, unsteadiness and blurred vision as well as headaches that she cannot really qualify  She takes aspirin and Plavix  [facility] a CTA was done with result listed below  Hep gtt was started and transferred to [place] [address] for vascular surgery eval & tx  Admit to Critical Care unit inpatient status with vertebral artery dissection, q1h neuro checks, continue Hep gtt asa/plavix, f/u MRI, consult Vascular surgery, neuro and NSx     Vascular surgery consult 7/26 --   Assessment:  50 y o  F with 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 (February 2019) treated medically with no residual deficits, presents today with recurrent symptoms    Bilateral carotids on CT head/neck show no evidence of disease   L vertebral artery with increased luminal narrowing    Plan:  -No vascular surgical intervention at this time  -Recommend neurosurgical vs neuro IR consultation       ED Triage Vitals   Temperature Pulse Respirations Blood Pressure SpO2   07/25/19 2040 07/25/19 2040 07/25/19 2040 07/25/19 2040 07/25/19 2040   98 1 °F (36 7 °C) 56 20 141/90 98 %      Temp Source Heart Rate Source Patient Position - Orthostatic VS BP Location FiO2 (%)   07/25/19 2040 07/25/19 2040 07/25/19 2040 07/25/19 2040 --   Oral Monitor Sitting Right arm       Pain Score       07/25/19 2228       No Pain        Wt Readings from Last 1 Encounters:   07/26/19 54 7 kg (120 lb 9 5 oz)     Additional Vital Signs:   Date/Time Temp Pulse Resp BP MAP (mmHg) SpO2 O2 Device   07/26/19 1100 98 °F (36 7 °C) 75 20 91/65 76 98 %    07/26/19 0700 99 2 °F (37 3 °C) 70 20 99/63 74 98 % None (Room air)   07/26/19 0635    99/70 84     07/26/19 0600    83/67Abnormal  82     07/26/19 0528    100/66 78     07/26/19 0400    83/67Abnormal  74     07/26/19 0300 98 2 °F (36 8 °C) 71 16 98/63  98 % None (Room air)   07/25/19 2300 98 1 °F (36 7 °C) 70 21 113/75 90 99 % None (Room air)   07/25/19 2200  70 20 143/94 110 98 %    07/25/19 2141  58 15 153/82 112 98 %    07/25/19 2126  75 20 153/101Abnormal  121 99 %    07/25/19 2040 98 1 °F (36 7 °C) 56 20 141/90 110 98 % None (Room air)       Pertinent Labs/Diagnostic Test Results:   Results from last 7 days   Lab Units 07/26/19  0612 07/25/19  1247   WBC Thousand/uL 5 17 6 27   HEMOGLOBIN g/dL 13 3 13 6   HEMATOCRIT % 40 0 40 1   PLATELETS Thousands/uL 227 242   NEUTROS ABS Thousands/µL 2 65 3 54     Results from last 7 days   Lab Units 07/26/19  0612 07/25/19  1247   SODIUM mmol/L 138 142   POTASSIUM mmol/L 3 1* 3 9   CHLORIDE mmol/L 103 103   CO2 mmol/L 30 29   ANION GAP mmol/L 5 10   BUN mg/dL 13 17   CREATININE mg/dL 0 75 0 78   EGFR ml/min/1 73sq m 95 90   CALCIUM mg/dL 8 7 9 1   MAGNESIUM mg/dL 1 9 1 9     Results from last 7 days Lab Units 07/26/19  0612   AST U/L 16   ALT U/L 28   ALK PHOS U/L 76   TOTAL PROTEIN g/dL 7 1   ALBUMIN g/dL 3 8   TOTAL BILIRUBIN mg/dL 0 57     Results from last 7 days   Lab Units 07/26/19  0612 07/25/19  1247   GLUCOSE RANDOM mg/dL 77 88     Results from last 7 days   Lab Units 07/26/19  0612   HEMOGLOBIN A1C % 5 3   EAG mg/dl 105     Results from last 7 days   Lab Units 07/25/19  1247   TROPONIN I ng/mL <0 02     Results from last 7 days   Lab Units 07/26/19  0612 07/26/19  0004 07/25/19  1247   PROTIME seconds  --  13 8 12 5   INR   --  1 10 0 99   PTT seconds 65* 79* 28     CTA head & neck 7/25 -- Patient has a known history of left vertebral dissection  Diffusely diminished luminal caliber of the left vertebral artery extending from the level of the origin to the V3 segment with there is subsequent more abrupt luminal narrowing  Subsequent occlusion of the distal left V3 and proximal V4 segments is noted with reconstitution at the level of the posterior inferior cerebellar artery  Stable appearance of the right vertebral artery with mild to moderate luminal narrowing noted of the right V4 segment  Small chronic right cerebellar infarcts as well as bilateral parietal lobes  No definite CT evidence for large acute vascular distribution infarct on the current examination      ED Treatment:   Medication Administration - No Administrations Displayed (No Start Event Found)     None        Past Medical History:   Diagnosis Date    CVA (cerebral vascular accident) ([facility] [address] )     GERD (gastroesophageal reflux disease)     occasionally    Hypertension     controlling with diet and exercise     Present on Admission:   Vertebral artery dissection (HCC)   Disorientation   Essential hypertension      Admitting Diagnosis: Vertebral artery dissection (HCC) [I77 74]  Age/Sex: 50 y o  female  Admission Orders:  Tele  Neuro checks Every 1 hour x 4 hours, then every 2 hours x 8 hours, then every 4 hours x 72 hours  SCD's  Cardiac diet  IO  SCD's    Current Facility-Administered Medications:  acetaminophen 650 mg Oral Q6H PRN   ALPRAZolam 0 5 mg Oral 4x Daily PRN   aluminum-magnesium hydroxide-simethicone 30 mL Oral Q6H PRN   amLODIPine 10 mg Oral Daily   aspirin 325 mg Oral Daily   atorvastatin 80 mg Oral Daily With Dinner   docusate sodium 100 mg Oral BID PRN   heparin (porcine) 3-20 Units/kg/hr (Order-Specific) Intravenous Titrated   heparin (porcine) 1,650 Units Intravenous PRN   heparin (porcine) 3,300 Units Intravenous PRN   hydrochlorothiazide 25 mg Oral Daily   ondansetron 4 mg Intravenous Q6H PRN   pantoprazole 40 mg Oral Early Morning   potassium chloride 20 mEq Oral Daily       IP CONSULT TO NEUROLOGY  IP CONSULT TO PHYSICAL MEDICINE REHAB  IP CONSULT TO CASE MANAGEMENT  IP CONSULT TO NUTRITION SERVICES  IP CONSULT TO MEDICAL CRITICAL CARE  IP CONSULT TO VASCULAR SURGERY    Network Utilization Review Department  Phone: [phone]; Fax [phone]  [email or web address]  ATTENTION: Please call with any questions or concerns to [phone]  and carefully listen to the prompts so that you are directed to the right person  Send all requests for admission clinical reviews, approved or denied determinations and any other requests to fax [phone]   All voicemails are confidential

## 2019-07-26 NOTE — PROGRESS NOTES
IM Residency Progress Note   Unit/Bed#: [facility] 408-01 Encounter: [id number]  SOD Team C       Cricket Melton 50 y o  female [phone]    Hospital Stay Days: 1      Assessment/Plan:    Vertebral artery dissection  Assessment/Plan:  Patient has transient vision loss and dizziness  Patient in currently on the stroke pathway  CTA on 7/25/19 showed diffusely diminished luminal caliber of the L vertebral artery and occlusion of the L V3 and proximal V4 segments with reconstitution at the level of PICA and a small chronic R cerebellar infarct and b/l chronic parietal infarcts  There was no evidence of large acute vascular infarct  This is likely secondary to prior vertebral artery dissection with thrombosis to the vessel wall  MRI is ordered  Echo showed a normal EF of 58% with only mild mitral annular calcification  Vascular surgery saw her and recommended neurosurgical intervention  Neuro is also following  Continue her on heparin, but stop if needed for intervention  Continue statin and ASA  HTN  Assessment/Plan:  Patient came in with BP of 153/101, but her BP has dropped as low as 83/67 throughout her stay here  Continue to monitor BP carefully  Continue amlodipine and HCTZ  Principal Problem:    Vertebral artery dissection [facility])  Active Problems:    Disorientation    Essential hypertension        Disposition: stroke pathway       Subjective:  Saritha Patiño is a 49 yo female whose chief complaint is dizziness and transient vision loss  PMH is significant for vertebral artery dissection in Feb 2019  She reports feeling off since last Tuesday (about 1 week) and felt unsteady on her feet  She had an episode of transient vision loss 15-30 seconds on Tuesday  She has gotten these episodes in the past and they are usually accompanied by a prodrome of headache, a feeling of warmth, nausea, and lightheadedness  On Saturday she experienced the prodrome without the vision loss   She is following with neuro outpatient and they suggested a Holter monitor for her because they were thinking it was more cardiac in nature, but the patient could not get an appoint until September so they ordered a stress echo which was normal  Her PCP has also been following and seemed to think she has ocular migraines, but per chart neuro seems to disagree  The patient also mentioned her PCP thinking her symptoms were stress related, as the patient has a very stressful job as an   The patient felt as though it was not just stress  Yesterday (19) she said she felt like she was going to pass out upon exiting the shower  She called her neurologist and he told her to go to the ED  In the ED she had headaches that were occipital in nature, which she describes as similar to a muscle ache  She notes that the pain radiated down into her neck  She also had true vertigo symptoms of dizziness as it the room was spinning  In terms of medical history, she just has HTN which is well controlled  In terms of social history she does have stress associated with her job, but she says she started going to the gym 2 months ago as a way to cope and enjoys spending time with her family  She drinks 1-2 beers per week, has never smoked cigarettes, but did use marijuana for 20 years  She stopped using marijuana after her hospital stay in February  Vitals: Temp (24hrs), Av 3 °F (36 8 °C), Min:98 °F (36 7 °C), Max:99 2 °F (37 3 °C)  Current: Temperature: 98 4 °F (36 9 °C)  Vitals:    19 0635 19 0700 19 1100 19 1531   BP: 99/70 99/63 91/65 111/76   BP Location:  Right arm Right arm Right arm   Pulse:  70 75 64   Resp:  20 20 19   Temp:  99 2 °F (37 3 °C) 98 °F (36 7 °C) 98 4 °F (36 9 °C)   TempSrc:  Oral Oral Oral   SpO2:  98% 98% 97%   Weight:       Height:        Body mass index is 24 36 kg/m²  I/O last 24 hours: In: 1396 5 [P O :320;  I V :1076 5]  Out: 900 [Urine:900]      Physical Exam:   Physical Exam Constitutional: She is oriented to person, place, and time  She appears well-developed and well-nourished  HENT:   Head: Normocephalic and atraumatic  Eyes: Pupils are equal, round, and reactive to light  EOM are normal    Neck: Normal range of motion  Cardiovascular: Normal rate, regular rhythm, normal heart sounds and intact distal pulses  Pulmonary/Chest: Effort normal and breath sounds normal    Neurological: She is alert and oriented to person, place, and time  She has normal strength  No sensory deficit  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 that is residual from the vertebral dissection on 2/19   Skin: Skin is warm and dry  Capillary refill takes less than 2 seconds       Invasive Devices     Peripheral Intravenous Line            Peripheral IV 07/25/19 Left Forearm 1 day                          Labs:   Recent Results (from the past 24 hour(s))   Protime-INR    Collection Time: 07/26/19 12:04 AM   Result Value Ref Range    Protime 13 8 11 6 - 14 5 seconds    INR 1 10 0 84 - 1 19   APTT    Collection Time: 07/26/19 12:04 AM   Result Value Ref Range    PTT 79 (H) 23 - 37 seconds   Comprehensive metabolic panel    Collection Time: 07/26/19  6:12 AM   Result Value Ref Range    Sodium 138 136 - 145 mmol/L    Potassium 3 1 (L) 3 5 - 5 3 mmol/L    Chloride 103 100 - 108 mmol/L    CO2 30 21 - 32 mmol/L    ANION GAP 5 4 - 13 mmol/L    BUN 13 5 - 25 mg/dL    Creatinine 0 75 0 60 - 1 30 mg/dL    Glucose 77 65 - 140 mg/dL    Calcium 8 7 8 3 - 10 1 mg/dL    AST 16 5 - 45 U/L    ALT 28 12 - 78 U/L    Alkaline Phosphatase 76 46 - 116 U/L    Total Protein 7 1 6 4 - 8 2 g/dL    Albumin 3 8 3 5 - 5 0 g/dL    Total Bilirubin 0 57 0 20 - 1 00 mg/dL    eGFR 95 ml/min/1 73sq m   TSH, 3rd generation    Collection Time: 07/26/19  6:12 AM   Result Value Ref Range    TSH 3RD GENERATON 5 600 (H) 0 358 - 3 740 uIU/mL   Magnesium    Collection Time: 07/26/19  6:12 AM   Result Value Ref Range    Magnesium 1 9 1 6 - 2 6 mg/dL   CBC and differential    Collection Time: 07/26/19  6:12 AM   Result Value Ref Range    WBC 5 17 4 31 - 10 16 Thousand/uL    RBC 4 34 3 81 - 5 12 Million/uL    Hemoglobin 13 3 11 5 - 15 4 g/dL    Hematocrit 40 0 34 8 - 46 1 %    MCV 92 82 - 98 fL    MCH 30 6 26 8 - 34 3 pg    MCHC 33 3 31 4 - 37 4 g/dL    RDW 11 6 11 6 - 15 1 %    MPV 10 7 8 9 - 12 7 fL    Platelets 545 280 - 963 Thousands/uL    nRBC 0 /100 WBCs    Neutrophils Relative 51 43 - 75 %    Immat GRANS % 0 0 - 2 %    Lymphocytes Relative 35 14 - 44 %    Monocytes Relative 9 4 - 12 %    Eosinophils Relative 4 0 - 6 %    Basophils Relative 1 0 - 1 %    Neutrophils Absolute 2 65 1 85 - 7 62 Thousands/µL    Immature Grans Absolute 0 02 0 00 - 0 20 Thousand/uL    Lymphocytes Absolute 1 80 0 60 - 4 47 Thousands/µL    Monocytes Absolute 0 47 0 17 - 1 22 Thousand/µL    Eosinophils Absolute 0 18 0 00 - 0 61 Thousand/µL    Basophils Absolute 0 05 0 00 - 0 10 Thousands/µL   Lipid Panel with Direct LDL reflex    Collection Time: 07/26/19  6:12 AM   Result Value Ref Range    Cholesterol 131 50 - 200 mg/dL    Triglycerides 70 <=150 mg/dL    HDL, Direct 50 40 - 60 mg/dL    LDL Calculated 67 0 - 100 mg/dL   Hemoglobin A1c w/EAG Estimation    Collection Time: 07/26/19  6:12 AM   Result Value Ref Range    Hemoglobin A1C 5 3 4 2 - 6 3 %     mg/dl   APTT    Collection Time: 07/26/19  6:12 AM   Result Value Ref Range    PTT 65 (H) 23 - 37 seconds   Homocysteine, serum    Collection Time: 07/26/19  6:12 AM   Result Value Ref Range    Homocyst(e)ine, P/S 7 4 3 7 - 11 2 umol/L       Radiology Results: I have personally reviewed pertinent reports  Other Diagnostic Testing:   I have personally reviewed pertinent reports          Active Meds:   Current Facility-Administered Medications   Medication Dose Route Frequency    acetaminophen (TYLENOL) tablet 650 mg  650 mg Oral Q6H PRN    ALPRAZolam (XANAX) tablet 0 5 mg  0 5 mg Oral 4x Daily PRN    aluminum-magnesium hydroxide-simethicone (MYLANTA) 200-200-20 mg/5 mL oral suspension 30 mL  30 mL Oral Q6H PRN    amLODIPine (NORVASC) tablet 10 mg  10 mg Oral Daily    [START ON 7/27/2019] aspirin (ECOTRIN LOW STRENGTH) EC tablet 81 mg  81 mg Oral Daily    atorvastatin (LIPITOR) tablet 80 mg  80 mg Oral Daily With Dinner    [START ON 7/27/2019] clopidogrel (PLAVIX) tablet 75 mg  75 mg Oral Daily    docusate sodium (COLACE) capsule 100 mg  100 mg Oral BID PRN    hydrochlorothiazide (HYDRODIURIL) tablet 25 mg  25 mg Oral Daily    ondansetron (ZOFRAN) injection 4 mg  4 mg Intravenous Q6H PRN    pantoprazole (PROTONIX) EC tablet 40 mg  40 mg Oral Early Morning    potassium chloride (K-DUR,KLOR-CON) CR tablet 20 mEq  20 mEq Oral Daily         VTE Pharmacologic Prophylaxis: Heparin  VTE Mechanical Prophylaxis: sequential compression device    Charlie Freeman

## 2019-07-26 NOTE — PHYSICAL THERAPY NOTE
Physical Therapy Evaluation    Patient's Name: Martha Hutchinson    Admitting Diagnosis  Vertebral artery dissection ([facility] [address] ) [I77 [id number]]    Problem List  Patient Active Problem List   Diagnosis    VAIN I (vaginal intraepithelial neoplasia grade I)    Vulvovaginal condyloma    Acute nonintractable headache    Disorientation    Hypertensive urgency    Essential hypertension    Vision changes    History of CVA (cerebrovascular accident)    Vertebral artery dissection (HCC)    Heaviness of upper extremity       Past Medical History  Past Medical History:   Diagnosis Date    CVA (cerebral vascular accident) ([facility] [address] )     GERD (gastroesophageal reflux disease)     occasionally    Hypertension     controlling with diet and exercise       Past Surgical History  Past Surgical History:   Procedure Laterality Date    AUGMENTATION MAMMAPLASTY      GYNECOLOGIC CRYOSURGERY      CERVIX    HYSTERECTOMY      LASER ABLATION OF CONDYLOMAS N/A 7/30/2018    Procedure: Cristy Sinner ABLATION OF VULVA;  Surgeon: Shirin Marquez MD;  [facility];  Service: Gynecology Oncology    SKIN TAG REMOVAL      EXICISON OF PERIANAL    WISDOM TOOTH EXTRACTION            07/26/19 0800   Note Type   Note type Eval only   Pain Assessment   Pain Assessment No/denies pain   Pain Score No Pain   Home Living   Type of Home House   Additional Comments Resides w/ son,  in 1 story home, 0 CARLOS  Indep, works FT in [place]    ambulates w/ out device   Prior Function   Level of [place] Independent with ADLs and functional mobility   Falls in the last 6 months 0   Restrictions/Precautions   Weight Bearing Precautions Per Order No   Other Precautions Multiple lines;Telemetry  (DASH used for mobility)   General   Family/Caregiver Present No   Cognition   Overall Cognitive Status WFL   Arousal/Participation Alert   Orientation Level Oriented X4   Memory Unable to assess   Following Commands Follows all commands and directions without difficulty   RLE Assessment   RLE Assessment   (strength grossly 4+/5)   LLE Assessment   LLE Assessment   (strength grossly 4+/5)   Coordination   Movements are Fluid and Coordinated 1   Bed Mobility   Supine to Sit 7  Independent   Additional items Assist x 1   Transfers   Sit to Stand 5  Supervision   Additional items Assist x 1   Stand to Sit 5  Supervision   Additional items Assist x 1   Ambulation/Elevation   Gait pattern   (no LOB or deviation)   Gait Assistance 5  Supervision   Additional items Assist x 1   Assistive Device None   Distance 360'   Balance   Static Sitting Normal   Dynamic Sitting Good   Static Standing Good   Dynamic Standing Good   Ambulatory Good   Endurance Deficit   Endurance Deficit No   Activity Tolerance   Activity Tolerance Patient tolerated treatment well   Nurse Made Aware yes   Assessment   Prognosis Good   Assessment Pt seen for moderate complexity physical therapy evaluation  Pt is a 51 y/o female w/ known vertebral artery dissection, CVA, GERD, HTN, who is now admitted w/ dizziness, decreased balance, loss of vision  Attributes to vertebral artery dissection  Due to acute medical issues, unclear medical dx, note evolving clinical picture  PT consulted to assess mobility, d/c needs  At present time, note pt to be functioning close to or at mobility baseline  No continued skilled acute care PT needs identified  will sign off  Pt may mobilize ad daniel while here w/ nursing/restorative or family S, and may d/c home when stable       Plan   PT Frequency   (d/c PT)   Recommendation   PT - OK to Discharge Yes   Modified Waverly Scale   Modified Waverly Scale 1   Barthel Index   Feeding 10   Bathing 5   Grooming Score 5   Dressing Score 10   Bladder Score 10   Bowels Score 10   Toilet Use Score 10   Transfers (Bed/Chair) Score 15   Mobility (Level Surface) Score 15   Stairs Score 5   Barthel Index Score 95         Valerio Simmons PT, DPT, CSRS

## 2019-07-26 NOTE — ASSESSMENT & PLAN NOTE
-continue ASA/plavix/heparin drip  -MRI pending  -Appreciate neurology/neurosurgery recommendations  -We will continue to follow with you while patient requiring Q1hr NC

## 2019-07-26 NOTE — PROGRESS NOTES
Progress Note - Neurology   Bhavik Saravia 50 y o  female MRN: [id number]  Unit/Bed#: [facility] 408-01 Encounter: [id number]    Assessment:  51 yo female with history of vertebral artery dissection presenting with vision loss and lightheadedness  Plan:  Vasculitis Labs: Anti-DNA antibody, double-stranded  APTT  Sedimentation rate    Stop heparin  Start on Aspirin 81mg and Plavix 75mg daily    Schedule outpatient 4 vessel angiogram     Ok to discharge from Neuro    Subjective:   Patient experiencing vision changes for approximately month and half  Patient states that she will begin to experience horizontal wavy vision followed by curtain coming down and black out vision loss  Patient stated experiencing lightheadedness and nausea during episodes  Episodes would last approximately 15-30 seconds  Patient stated that after vision returned she would experience nausea, heatflash, and headache  Headaches would be bandlike in distribution around the circumference of her head at the level of the brow with a thickness of approximately 3 inches  Patient stated that first instance of this occurring she was driving and she was able to pull over to shoulder prior to her complete vision loss, she rested in car for 10-15 minutes before she felt better and drove home  Afterwards she would rest in bed as any rotational head movement resulted in lightheadedness  Patient states that in one instance after she came home she tested her blood pressure and it was ~180/90s  Patient had experienced these episodes in January prior to her first stroke  Patient had vomitted in early 2019 after these episodes but she did not vomit during these most recent episodes  Patient denies any LOC, or stomach pain during these episodes  Patient denies experiencing any facial droop or dysarthria during episodes  Patient also denies experiencing any photophobia, phonophobia, or auras prior to or during episodes   Patient also denies presence of any recent pat  Patient states that she has not experienced any of the vision loss or nausea episodes and has been feeling better since starting the heparin drip  ROS:  Review of Systems   Constitutional: Negative for chills, fatigue and fever  HENT: Negative for hearing loss  Eyes: Positive for visual disturbance  Negative for pain  Respiratory: Negative for apnea, chest tightness and shortness of breath  Cardiovascular: Negative for chest pain and palpitations  Gastrointestinal: Positive for nausea  Negative for abdominal pain, constipation, diarrhea and vomiting  Endocrine: Negative for polydipsia, polyphagia and polyuria  Musculoskeletal: Negative for arthralgias, back pain, myalgias, neck pain and neck stiffness  Skin: Negative for pallor  Neurological: Positive for dizziness, light-headedness and headaches  Negative for syncope, facial asymmetry, speech difficulty, weakness and numbness  Psychiatric/Behavioral: Negative for agitation, confusion and decreased concentration  Objective:  Vitals: Blood pressure 99/63, pulse 70, temperature 99 2 °F (37 3 °C), temperature source Oral, resp  rate 20, height 4' 11" (1 499 m), weight 54 7 kg (120 lb 9 5 oz), SpO2 98 %  ,Body mass index is 24 36 kg/m²  Physical Exam:   Physical Exam   Constitutional: She is oriented to person, place, and time  She appears well-developed and well-nourished  No distress  HENT:   Head: Normocephalic and atraumatic  Eyes: Pupils are equal, round, and reactive to light  Conjunctivae and EOM are normal    Neck: Normal range of motion  Cardiovascular: Normal rate, regular rhythm, normal heart sounds and intact distal pulses  Exam reveals no gallop and no friction rub  No murmur heard  Pulmonary/Chest: Effort normal and breath sounds normal  No stridor  No respiratory distress  She has no wheezes  She has no rales  Abdominal: Soft  Bowel sounds are normal  She exhibits no distension   There is no tenderness  There is no guarding  Musculoskeletal: Normal range of motion  She exhibits no edema or tenderness  Neurological: She is alert and oriented to person, place, and time  She displays abnormal reflex  No cranial nerve deficit or sensory deficit  She exhibits normal muscle tone  She has a normal Finger-Nose-Finger Test, a normal Romberg Test and a normal Tandem Gait Test  Gait normal  Coordination normal    Skin: Skin is warm and dry  Capillary refill takes less than 2 seconds  She is not diaphoretic  Psychiatric: She has a normal mood and affect  Her speech is normal and behavior is normal  Judgment and thought content normal      Neurologic Exam     Mental Status   Oriented to person, place, and time  Attention: normal    Speech: speech is normal   Level of consciousness: alert    Cranial Nerves     CN II   Visual fields full to confrontation  Right visual field deficit: none  Left visual field deficit: none     CN III, IV, VI   Pupils are equal, round, and reactive to light  Extraocular motions are normal    Right pupil: Shape: regular  Reactivity: brisk  Consensual response: intact  Accommodation: intact  Left pupil: Shape: regular  Reactivity: brisk  Consensual response: intact  Accommodation: intact  CN III: no CN III palsy  CN VI: no CN VI palsy  Nystagmus: none   Conjugate gaze: present  Vestibulo-ocular reflex: present    CN V   Facial sensation intact  CN VII   Facial expression full, symmetric       CN VIII   Hearing: intact    CN IX, X   Palate: symmetric    CN XI   Right sternocleidomastoid strength: normal  Left sternocleidomastoid strength: normal  Right trapezius strength: normal  Left trapezius strength: normal    CN XII   Tongue: not atrophic  Fasciculations: absent  Tongue deviation: none    Motor Exam   Muscle bulk: normal  Overall muscle tone: normal  Right arm tone: normal  Left arm tone: normal  Right arm pronator drift: absent  Left arm pronator drift: absent  Right leg tone: normal  Left leg tone: normal    Strength   Right neck flexion: 5/5  Left neck flexion: 5/5  Right neck extension: 5/5  Left neck extension: 5/5  Right deltoid: 5/5  Left deltoid: 5/5  Right biceps: 5/5  Left biceps: 5/5  Right triceps: 5/5  Left triceps: 5/5  Right wrist flexion: 5/5  Left wrist flexion: 5/5  Right wrist extension: 5/5  Left wrist extension: 5/5  Right interossei: 5/5  Left interossei: 5/5  Right abdominals: 5/5  Left abdominals: 5/5  Right iliopsoas: 5/5  Left iliopsoas: 5/5  Right quadriceps: 5/5  Left quadriceps: 5/5  Right hamstrin/5  Left hamstrin/5  Right glutei: 5/5  Left glutei: 5/5  Right anterior tibial: 5/5  Left anterior tibial: 5/5  Right posterior tibial: 5/5  Left posterior tibial: 5/5  Right peroneal: 5/5  Left peroneal: 5/5  Right gastroc: 5/5  Left gastroc: 5/5    Sensory Exam   Light touch normal    Right leg vibration: normal  Left leg vibration: normal    Gait, Coordination, and Reflexes     Gait  Gait: normal    Coordination   Romberg: negative  Finger to nose coordination: normal  Tandem walking coordination: normal    Tremor   Resting tremor: absent  Intention tremor: absent  Action tremor: absent  Negative head impulse and nystagmus test         Labs:   Results from last 7 days   Lab Units 19  0619  1247   WBC Thousand/uL 5 17 6 27   HEMOGLOBIN g/dL 13 3 13 6   HEMATOCRIT % 40 0 40 1   PLATELETS Thousands/uL 227 242   NEUTROS PCT % 51 56   MONOS PCT % 9 8     Results from last 7 days   Lab Units 19  0612 19  1247   SODIUM mmol/L 138 142   POTASSIUM mmol/L 3 1* 3 9   CHLORIDE mmol/L 103 103   CO2 mmol/L 30 29   ANION GAP mmol/L 5 10   BUN mg/dL 13 17   CREATININE mg/dL 0 75 0 78   CALCIUM mg/dL 8 7 9 1   ALT U/L 28  --    AST U/L 16  --    ALK PHOS U/L 76  --    ALBUMIN g/dL 3 8  --    TOTAL BILIRUBIN mg/dL 0 57  --      Results from last 7 days   Lab Units 19  0619  1247   MAGNESIUM mg/dL 1 9 1 9      Results from last 7 days   Lab Units 07/26/19  0612 07/26/19  0004 07/25/19  1247   INR   --  1 10 0 99   PTT seconds 65* 79* 28       Imaging:  MRI Inpatient Order    (Results Pending)        VTE Prophylaxis: Sequential compression device (Venodyne)  and Heparin    Counseling / Coordination of Care  Total time spent today 30 minutes  Greater than 50% of total time was spent with the patient and / or family counseling and / or coordination of care  A description of the counseling / coordination of care: physical and neurological exam and explanation of next steps for care      Cherry Cowan, MS-3  Neurology

## 2019-07-26 NOTE — SPEECH THERAPY NOTE
Order received and chart reviewed  Spoke with RN, who reports no noted deficits in communication or swallowing  She passed RN dysphagia screen, and is eating a cardiovascular/regular diet and thin liquids  Also spoke with patient, who confirms no deficits in communication or swallowing  Assessment is not warranted  Screen only

## 2019-07-26 NOTE — CONSULTS
Consult- Richard Cameron 1970, 50 y o  female MRN: [id number]    Unit/Bed#: [facility] 408-01 Encounter: [id number]    Primary Care Provider: Jacqui Tireney DO   Date and time admitted to hospital: 7/25/2019  8:25 PM      Inpatient consult to Anabela Pena performed by: Leidy Mayes PA-C  Consult ordered by: Pb Kelley MD        * Vertebral artery dissection [facility])  Assessment & Plan  -continue ASA/plavix/heparin drip  -MRI pending  -Appreciate neurology/neurosurgery recommendations  -We will continue to follow with you while patient requiring Q1hr NC        Physician Requesting Consult: Irving Damian MD    Reason for Consultation / Chief Complaint: B/L vertebral artery dissection     History of Present Illness:  Richard Cameron is a 50 y o  female w/ PMHx significant for B/L vertebral artery dissection, CVA, and HPTN who presents with headache and transient vision loss thought to be 2/2 worsened L vertebral artery dissection  The patient initially presented to neurology in 02/2019 with complaints of memory loss and transient vision loss  She was found to have old parietal CVA and B/L vertebral artery dissections at that time  She was placed on ASA and was being worked up by neurology as an outpatient  Over the last several months she has been experiencing more frequent episodes of vision loss and migraine  Her neurologist recommended Holter monitoring to r/o syncope, however she was unable to get an appointment with cardiology so she underwent stress ECHO which was negative in 05/2019  Her PCP then thought that the episodes could be ocular migraine; however, neurology did not feel as though that was the etiology of her symptoms  This AM the patient was getting ready for work when she experienced and episode of vision loss lasting <1min and vertigo   She called her neurologist who sent her to the ED for eval  In the ED a CTA was significant for increased luminal narrowing of her L vertebral artery  The patient was placed on a heparin drip and transferred here for Q1hr NC, MRI, and neurosurgical evaluation  Upon my evaluation she is a GCS 15 with no focal neurological deficits  History obtained from chart review and the patient  Past Medical History:  Past Medical History:   Diagnosis Date    CVA (cerebral vascular accident) ([address] )     GERD (gastroesophageal reflux disease)     occasionally    Hypertension     controlling with diet and exercise       Past Surgical History:  Past Surgical History:   Procedure Laterality Date    AUGMENTATION MAMMAPLASTY      GYNECOLOGIC CRYOSURGERY      CERVIX    HYSTERECTOMY      LASER ABLATION OF CONDYLOMAS N/A 7/30/2018    Procedure: Mildred Bueno LASER ABLATION OF VULVA;  Surgeon: Josh Gautam MD;  [facility];  Service: Gynecology Oncology    SKIN TAG REMOVAL      EXICISON OF PERIANAL    WISDOM TOOTH EXTRACTION         Past Family History:  Family History   Adopted: Yes   Problem Relation Age of Onset    No Known Problems Mother     No Known Problems Father     No Known Problems Sister     No Known Problems Brother     No Known Problems Maternal Aunt     No Known Problems Maternal Uncle     No Known Problems Paternal Aunt     No Known Problems Paternal Uncle     No Known Problems Maternal Grandmother     No Known Problems Maternal Grandfather     No Known Problems Paternal Grandmother     No Known Problems Paternal Grandfather        Social History:  Social History     Tobacco Use   Smoking Status Never Smoker   Smokeless Tobacco Never Used     Social History     Substance and Sexual Activity   Alcohol Use Yes    Comment: SOCIAL-1-2 drinks per week     Social History     Substance and Sexual Activity   Drug Use No     Marital Status: /Civil Union    Home Medications:   Prior to Admission medications    Medication Sig Start Date End Date Taking?  Authorizing Provider   ALPRAZochrista Crawley) 0 5 mg tablet Take 0 5 mg by mouth 4 (four) times a day as needed for anxiety (Patient takes one at night)     Historical Provider, MD   amLODIPine (NORVASC) 10 mg tablet Take 1 tablet (10 mg total) by mouth daily for 14 days 1/29/19 7/25/19  Aleena Cruz MD   aspirin (ECOTRIN LOW STRENGTH) 81 mg EC tablet Take 1 tablet (81 mg total) by mouth daily 2/12/19   Cullen Livingston DO   atorvastatin (LIPITOR) 80 mg tablet Take 1 tablet (80 mg total) by mouth daily with dinner 3/19/19   Soledad Contreras DO   Docusate Calcium (STOOL SOFTENER PO) Take 1 tablet by mouth as needed (once a week prn)     Historical Provider, MD   hydrochlorothiazide (HYDRODIURIL) 25 mg tablet Take 25 mg by mouth daily 2/19/19   Historical Provider, MD   omeprazole (PriLOSEC) 40 MG capsule Take 40 mg by mouth daily 7/8/19   Historical Provider, MD   Potassium Chloride ER 20 MEQ TBCR take 1 tablet by mouth twice a day take with food 3/19/19   Historical Provider, MD       Inpatient Medications:  Scheduled Meds:  Current Facility-Administered Medications:  acetaminophen 650 mg Oral Q6H PRN Svetlana Staples MD    ALPRAZolam 0 5 mg Oral 4x Daily PRN Svetlana Staples MD    aluminum-magnesium hydroxide-simethicone 30 mL Oral Q6H PRN Svetlana Staples MD    [START ON 7/26/2019] amLODIPine 10 mg Oral Daily Svetlana Staples MD    [START ON 7/26/2019] aspirin 325 mg Oral Daily Svetlana Staples MD    [START ON 7/26/2019] atorvastatin 80 mg Oral Daily With Gerri Plaza MD    docusate sodium 100 mg Oral BID PRN Svetlana Staples MD    heparin (porcine) 3-20 Units/kg/hr (Order-Specific) Intravenous Titrated Svetlana Staples MD Last Rate: 12 Units/kg/hr (07/25/19 2025)   heparin (porcine) 1,650 Units Intravenous PRN Svetlana Staples MD    heparin (porcine) 3,300 Units Intravenous PRN Svetlana Staples MD    [START ON 7/26/2019] hydrochlorothiazide 25 mg Oral Daily Svetlana Staples MD    ondansetron 4 mg Intravenous Q6H PRN Svetlana Staples MD    [START ON 7/26/2019] pantoprazole 40 mg Oral Early Morning David Woods MD    [START ON 2019] potassium chloride 20 mEq Oral Daily David Woods MD      Continuous Infusions:  heparin (porcine) 3-20 Units/kg/hr (Order-Specific) Last Rate: 12 Units/kg/hr (19)     PRN Meds:    acetaminophen 650 mg Q6H PRN   ALPRAZolam 0 5 mg 4x Daily PRN   aluminum-magnesium hydroxide-simethicone 30 mL Q6H PRN   docusate sodium 100 mg BID PRN   heparin (porcine) 1,650 Units PRN   heparin (porcine) 3,300 Units PRN   ondansetron 4 mg Q6H PRN       Allergies: Allergies   Allergen Reactions    Shellfish Allergy Anaphylaxis       ROS:   Review of Systems   Constitutional: Positive for fatigue  Neurological: Positive for dizziness, light-headedness and headaches  Negative for syncope, facial asymmetry, speech difficulty, weakness and numbness  Vitals:  Vitals:    19 2040 19 2126 19 2141   BP: 141/90 (!) 153/101 153/82   BP Location: Right arm     Pulse: 56 75 58   Resp: 20 20 15   Temp: 98 1 °F (36 7 °C)     TempSrc: Oral     SpO2: 98% 99% 98%   Weight: 56 4 kg (124 lb 4 8 oz)     Height: 4' 11" (1 499 m)       Temperature:   Temp (24hrs), Av 1 °F (36 7 °C), Min:98 1 °F (36 7 °C), Max:98 1 °F (36 7 °C)    Current Temperature: 98 1 °F (36 7 °C)    Weights:   IBW: 43 2 kg  Body mass index is 25 11 kg/m²  Hemodynamic Monitoring:  N/A     Non-Invasive/Invasive Ventilation Settings:  Respiratory    Lab Data (Last 4 hours)    None         O2/Vent Data (Last 4 hours)    None              No results found for: PHART, AZE7YDR, PO2ART, BXZ8IRT, K0TTPHIL, BEART, SOURCE  SpO2: SpO2: 98 %     Physical Exam:  Physical Exam   Constitutional: She is oriented to person, place, and time  She appears well-developed and well-nourished  No distress  HENT:   Head: Normocephalic and atraumatic  Mouth/Throat: No oropharyngeal exudate  Eyes: Pupils are equal, round, and reactive to light  EOM are normal  No scleral icterus     Neck: Normal range of motion  Neck supple  No JVD present  No tracheal deviation present  Cardiovascular: Normal rate, regular rhythm and normal heart sounds  Pulmonary/Chest: Effort normal and breath sounds normal  No respiratory distress  Abdominal: Soft  Bowel sounds are normal  She exhibits no distension  There is no tenderness  Musculoskeletal: Normal range of motion  She exhibits no edema  Neurological: She is alert and oriented to person, place, and time  No cranial nerve deficit  Skin: Capillary refill takes less than 2 seconds  Labs:  Results from last 7 days   Lab Units 07/25/19  1247   WBC Thousand/uL 6 27   HEMOGLOBIN g/dL 13 6   HEMATOCRIT % 40 1   PLATELETS Thousands/uL 242   NEUTROS PCT % 56   MONOS PCT % 8    Results from last 7 days   Lab Units 07/25/19  1247   POTASSIUM mmol/L 3 9   CHLORIDE mmol/L 103   CO2 mmol/L 29   BUN mg/dL 17   CREATININE mg/dL 0 78   CALCIUM mg/dL 9 1     Results from last 7 days   Lab Units 07/25/19  1247   MAGNESIUM mg/dL 1 9          Results from last 7 days   Lab Units 07/25/19  1247   INR  0 99   PTT seconds 28         0   Lab Value Date/Time    TROPONINI <0 02 07/25/2019 1247    TROPONINI <0 02 05/02/2019 2051    TROPONINI <0 02 05/02/2019 1714    TROPONINI <0 02 02/05/2019 0121    TROPONINI <0 02 01/29/2019 1218    TROPONINI <0 02 01/27/2019 1827       Imaging:   CTA Head/Neck: Patient has a known history of left vertebral dissection  Diffusely diminished luminal caliber of the left vertebral artery extending from the level of the origin to the V3 segment with there is subsequent more abrupt luminal narrowing    Subsequent   occlusion of the distal left V3 and proximal V4 segments is noted with reconstitution at the level of the posterior inferior cerebellar artery      Stable appearance of the right vertebral artery with mild to moderate luminal narrowing noted of the right V4 segment      Small chronic right cerebellar infarcts as well as bilateral parietal lobes  No definite CT evidence for large acute vascular distribution infarct on the current examination  I have personally reviewed pertinent reports  and I have personally reviewed pertinent films in PACS  EKG: NSR This was personally reviewed by myself  Micro:  No results found for: Erica Byrd    ______________________________________________________________________    VTE Pharmacologic Prophylaxis: Heparin Drip  VTE Mechanical Prophylaxis: sequential compression device    Invasive lines and devices: Invasive Devices     Peripheral Intravenous Line            Peripheral IV 07/25/19 Left Forearm less than 1 day                Code Status: Level 1 - Full Code  POA:    POLST:      Portions of the record may have been created with voice recognition software  Occasional wrong word or "sound a like" substitutions may have occurred due to the inherent limitations of voice recognition software  Read the chart carefully and recognize, using context, where substitutions have occurred        Keyla Sierra PA-C

## 2019-07-26 NOTE — PLAN OF CARE
Problem: Potential for Falls  Goal: Patient will remain free of falls  Description  INTERVENTIONS:  - Assess patient frequently for physical needs  -  Identify cognitive and physical deficits and behaviors that affect risk of falls  -  [place] fall precautions as indicated by assessment   - Educate patient/family on patient safety including physical limitations  - Instruct patient to call for assistance with activity based on assessment  - Modify environment to reduce risk of injury  - Consider OT/PT consult to assist with strengthening/mobility  Outcome: Progressing     Problem: NEUROSENSORY - ADULT  Goal: Achieves stable or improved neurological status  Description  INTERVENTIONS  - Monitor and report changes in neurological status  - Initiate measures to prevent increased intracranial pressure  - Maintain blood pressure and fluid volume within ordered parameters to optimize cerebral perfusion  - Monitor temperature, glucose, and sodium or any other associated labs   Initiate appropriate interventions as ordered  - Monitor for seizure activity   - Administer anti-seizure medications as ordered  Outcome: Progressing  Goal: Absence of seizures  Description  INTERVENTIONS  - Monitor for seizure activity  - Administer anti-seizure medications as ordered  - Monitor neurological status  Outcome: Progressing  Goal: Remains free of injury related to seizures activity  Description  INTERVENTIONS  - Maintain airway, patient safety  and administer oxygen as ordered  - Monitor patient for seizure activity, document and report duration and description of seizure to physician/advanced practitioner  - If seizure occurs,  ensure patient safety during seizure  - Reorient patient post seizure  - Seizure pads on all 4 side rails  - Instruct patient/family to notify RN of any seizure activity including if an aura is experienced  - Instruct patient/family to call for assistance with activity based on nursing assessment  - Administer anti-seizure medications as ordered  - Monitor fetal well being  Outcome: Progressing  Goal: Achieves maximal functionality and self care  Description  INTERVENTIONS  - Monitor swallowing and airway patency with patient fatigue and changes in neurological status  - Encourage and assist patient to increase activity and self care with guidance from rehab services  - Encourage visually impaired, hearing impaired and aphasic patients to use assistive/communication devices  Outcome: Progressing     Problem: HEMATOLOGIC - ADULT  Goal: Maintains hematologic stability  Description  INTERVENTIONS  - Assess for signs and symptoms of bleeding or hemorrhage  - Monitor labs  - Administer supportive blood products/factors as ordered and appropriate  Outcome: Progressing

## 2019-07-26 NOTE — PROGRESS NOTES
Progress Note - Critical Care   Martha Hutchinson 50 y o  female MRN: [id number]  Unit/Bed#: [facility] 408-01 Encounter: [id number]    Assessment:   Principal Problem:    Vertebral artery dissection ([address] )  Active Problems:    Disorientation    Essential hypertension  Resolved Problems:    * No resolved hospital problems  *    ______________________________________________________________________  Chief Complaint: Vertebral artery dissection      HPI/24hr events: No significant events reported  Patient is alert and oriented x3  Sitting up in bed on room air watching TV  No acute distress was noted  No focal deficits  MRI scheduled for 2019  Heparin gtt still being infused  Medical management per primary team   Critical Care will continue to follow while patient is SD1       ______________________________________________________________________  Temperature:   Temp (24hrs), Av 3 °F (36 8 °C), Min:98 °F (36 7 °C), Max:99 2 °F (37 3 °C)    Current Temperature: 98 4 °F (36 9 °C)    Vitals:    19 0635 19 0700 19 1100 19 1531   BP: 99/70 99/63 91/65 111/76   BP Location:  Right arm Right arm Right arm   Pulse:  70 75 64   Resp:  20 20 19   Temp:  99 2 °F (37 3 °C) 98 °F (36 7 °C) 98 4 °F (36 9 °C)   TempSrc:  Oral Oral Oral   SpO2:  98% 98% 97%   Weight:       Height:                  Height: 4' 11" (149 9 cm)  SpO2: SpO2: 97 %  ______________________________________________________________________  ______________________________________________________________________    Counseling / Coordination of Care  Total Critical Care time spent 0 minutes excluding procedures, teaching and family updates  Code Status: Level 1 - Full Code    Portions of the record may have been created with voice recognition software  Occasional wrong word or "sound a like" substitutions may have occurred due to the inherent limitations of voice recognition software    Read the chart carefully and recognize, using context, where substitutions have occurred      Yesenia Melo PA-C

## 2019-07-26 NOTE — OCCUPATIONAL THERAPY NOTE
[address]  Evaluation     Patient Name: Jass Valentin  OXPWZ'K Date: 7/26/2019  Problem List  Patient Active Problem List   Diagnosis    VAIN I (vaginal intraepithelial neoplasia grade I)    Vulvovaginal condyloma    Acute nonintractable headache    Disorientation    Hypertensive urgency    Essential hypertension    Vision changes    History of CVA (cerebrovascular accident)    Vertebral artery dissection (HCC)    Heaviness of upper extremity     Past Medical History  Past Medical History:   Diagnosis Date    CVA (cerebral vascular accident) ([address] )     GERD (gastroesophageal reflux disease)     occasionally    Hypertension     controlling with diet and exercise     Past Surgical History  Past Surgical History:   Procedure Laterality Date    AUGMENTATION MAMMAPLASTY      GYNECOLOGIC CRYOSURGERY      CERVIX    HYSTERECTOMY      LASER ABLATION OF CONDYLOMAS N/A 7/30/2018    Procedure: Domo Paddy ABLATION OF VULVA;  Surgeon: Loco Rajput MD;  [facility];  Service: Gynecology Oncology    SKIN TAG REMOVAL      EXICISON OF PERIANAL    WISDOM TOOTH EXTRACTION           07/26/19 0805   Note Type   Note type Eval only   Restrictions/Precautions   Weight Bearing Precautions Per Order No   Other Precautions Multiple lines;Telemetry  (DASH)   Pain Assessment   Pain Assessment No/denies pain   Pain Score No Pain   Home Living   Type of [address] [place] [address] One level; Laundry in basement   [employer]   Prior Function   Level of [place] Independent with ADLs and functional mobility   Lives With Spouse; Son   Bo Help From Family   ADL Assistance Independent   IADLs Independent   Falls in the last 6 months 0   Vocational Full time employment   Lifestyle   [employer] PTA PT REPORTS I IN ADLS/IADLS/FUNCTIONAL MOBILITY   Reciprocal Relationships SUPPORTIVE SPOUSE AND SON   Service to Others FT HR EMPLOYEE   Intrinsic Gratification ENJOYS SPENDING TIME W/ 15 YO SON   Psychosocial   Psychosocial (WDL) WDL   Subjective   Subjective "ALL OF MY SYMPTOMS HAVE GONE"   ADL   Where Assessed Chair   Eating Assistance 5  Supervision/Setup   Grooming Assistance 5  Supervision/Setup   UB Bathing Assistance 5  Supervision/Setup   LB Bathing Assistance 5  Supervision/Setup   UB Dressing Assistance 5  Supervision/Setup   LB Dressing Assistance 5  Supervision/Setup   Bed Mobility   Supine to Sit 7  Independent   Transfers   Sit to Stand 5  Supervision   Additional items Increased time required   Stand to Sit 5  Supervision   Additional items Increased time required   Functional Mobility   Functional Mobility 5  Supervision   Balance   Static Sitting Normal   Dynamic Sitting Good   Static Standing Fair +   Dynamic Standing Fair +   Ambulatory Fair +   Activity Tolerance   Activity Tolerance Patient tolerated treatment well   Medical Staff Made Aware PT DANE   Nurse Made Aware OKAY TO SEE PER RN   RUE Assessment   RUE Assessment WFL   LUE Assessment   LUE Assessment WFL   Hand Function   Gross Motor Coordination Functional   Fine Motor Coordination Functional   Sensation   Light Touch No apparent deficits   Vision - Complex Assessment   Ocular Range of Motion WFL   Head Position WDL   Tracking Able to track stimulus in all quads without difficulty   Cognition   Overall Cognitive Status WFL   Arousal/Participation Cooperative   Attention Within functional limits   Orientation Level Oriented X4   Memory Within functional limits   Following Commands Follows all commands and directions without difficulty   Assessment   Limitation Decreased high-level ADLs; Decreased Safe judgement during ADL   Prognosis Good   Assessment Pt is a 49 YO  Female admitted to [facility] on 7/25/19 w/ L VERTEBRAL ARTERY DISSECTION  Comorbidities include a 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[birth year]), GERD, HTN, AND HYSTERECTOMY   Pt with active OT orders and up and OOB as tolerated orders  Pt resides in a [address] [place] [address] with [address]  Pt was I w/  ADLS and IADLS, (+) drove, & required no use of DME PTA  Currently pt is SUPERVISION FOR ADLS/IADLS/FUNCTIONAL MOBILITY  Pt is limited at this time 2*: endurance, activity tolerance and decreased safety awareness  The following Occupational Performance Areas to address include: functional mobility, household maintenance and job performance/volunteering  Pt scored overall  85/100 on the Barthel Index  Based on the aforementioned OT evaluation, functional performance deficits, and assessments, pt has been identified as a moderate complexity evaluation  From OT standpoint, anticipate d/c home with family support  Recommend continued participation in ADLs and functional mobility w/ staff  No further acute OT needs, d/c OT      Goals   Patient Goals GO HOME   Recommendation   OT Discharge Recommendation Home with family support   OT - OK to Discharge Yes   Barthel Index   Feeding 10   Bathing 5   Grooming Score 5   Dressing Score 10   Bladder Score 10   Bowels Score 10   Toilet Use Score 10   Transfers (Bed/Chair) Score 15   Mobility (Level Surface) Score 10   Stairs Score 0   Barthel Index Score 85   Modified [place] Scale   Modified [place] Scale 2     Manisha Kc MS, OTR/L

## 2019-07-26 NOTE — H&P
H&P- Meli Mantilla 1970, 50 y o  female MRN: [id number]    Unit/Bed#: [facility] 408-01 Encounter: [id number]    Primary Care Provider: Edwina Yousif DO   Date and time admitted to hospital: 7/25/2019  8:25 PM        * Vertebral artery dissection [facility])  Assessment & Plan  Patient with history of right-sided vertebral artery dissection currently latest imaging shows to be on the left side  Vascular surgery consult  Heparin started low-dose and currently it seems that this has helped patient's symptoms  Will continue  Continue PT INR/PTT monitoring  Will also consult Critical Care as patient is on step-down level 1  Continue Neurology follow-up  Continue Lipitor  Check lipid profile tomorrow  Essential hypertension  Assessment & Plan  Continue amlodipine and hydrochlorothiazide  Disorientation  Assessment & Plan  Patient states that her feeling of lightheadedness and disorientation is much better after starting heparin  Continue monitoring neurologic function  This is as per stroke protocol  Will also add OT PT evaluation  Case management  Neurologic checks  PMR consult  VTE Prophylaxis: Heparin Drip  / sequential compression device   Code Status: Level 1 - Full Code as discussed with patient  POLST: There is no POLST form on file for this patient (pre-hospital)    Anticipated Length of Stay:  Patient will be admitted on an Inpatient basis with an anticipated length of stay of  greater than 2 midnights  Justification for Hospital Stay: Please see detailed plans noted above  Chief Complaint:     Dizziness with blurriness of vision  History of Present Illness:  Meli Mantilla is a 50 y o  female who essentially has a past medical history of vertebral artery dissection  However the patient has currently a stable right-sided vertebral artery dissection with the left somewhat progressing    However noted was that the patient was first diagnosed with the vertebral artery occlusion approximately February of this year when she presented with visual loss for about a minute  That said, the patient has been placed on aspirin with Plavix and she felt much better  Within the past week or so since July 11th, the patient has been complaining of increasing dizziness and somewhat unsteadiness of gait  Blurriness of vision  There is somewhat also complaints of headaches that she cannot really qualify  Because she was concerned about it, the patient went to the emergency room to be evaluated and a CTA was done  Report showed the following:  Patient has a known history of left vertebral dissection  Diffusely diminished luminal caliber of the left vertebral artery extending from the level of the origin to the V3 segment with there is subsequent more abrupt luminal narrowing  Subsequent   occlusion of the distal left V3 and proximal V4 segments is noted with reconstitution at the level of the posterior inferior cerebellar artery      Stable appearance of the right vertebral artery with mild to moderate luminal narrowing noted of the right V4 segment      Small chronic right cerebellar infarcts as well as bilateral parietal lobes  No definite CT evidence for large acute vascular distribution infarct on the current examination  Currently, patient is feeling much better  In fact, the patient states that after she was started on heparin it seems that her dizziness and the headaches are more less gone  There is increased well-being  She denies any numbness  Denies any dizziness  She also denies any dysarthria or any difficulty of speaking or visual loss  She has intact visual fields      Review of Systems:    Constitutional:  Denies fever or chills   Eyes:  Currently denies any visual problem  HENT:  Denies nasal congestion or sore throat   Respiratory:  Denies cough or shortness of breath   Cardiovascular:  Denies chest pain or edema   GI:  Denies abdominal pain, nausea, vomiting, bloody stools or diarrhea   :  Denies dysuria   Musculoskeletal:  Denies back pain or joint pain   Integument:  Denies rash   Neurologic:  Currently without headache, focal weakness or sensory changes   Endocrine:  Denies polyuria or polydipsia   Lymphatic:  Denies swollen glands   Psychiatric:  Denies depression or anxiety     Past Medical and Surgical History:   Past Medical History:   Diagnosis Date    CVA (cerebral vascular accident) ([address] )     GERD (gastroesophageal reflux disease)     occasionally    Hypertension     controlling with diet and exercise     Past Surgical History:   Procedure Laterality Date    AUGMENTATION MAMMAPLASTY      GYNECOLOGIC CRYOSURGERY      CERVIX    HYSTERECTOMY      LASER ABLATION OF CONDYLOMAS N/A 7/30/2018    Procedure: OMNIGUIDE LASER ABLATION OF VULVA;  Surgeon: Declan Melara MD;  [facility];  Service: Gynecology Oncology    SKIN TAG REMOVAL      EXICISON OF PERIANAL    WISDOM TOOTH EXTRACTION         Meds/Allergies:  Medications Prior to Admission   Medication    ALPRAZolam (XANAX) 0 5 mg tablet    amLODIPine (NORVASC) 10 mg tablet    aspirin (ECOTRIN LOW STRENGTH) 81 mg EC tablet    atorvastatin (LIPITOR) 80 mg tablet    Docusate Calcium (STOOL SOFTENER PO)    hydrochlorothiazide (HYDRODIURIL) 25 mg tablet    omeprazole (PriLOSEC) 40 MG capsule    Potassium Chloride ER 20 MEQ TBCR       Allergies:    Allergies   Allergen Reactions    Shellfish Allergy Anaphylaxis     History:  Marital Status: /Civil Union   Occupation:  Part of Human resources in [employer]  Patient Pre-hospital Living Situation:  Lives at home with 13year-old son  Patient Pre-hospital Level of Mobility:  Ambulatory  Patient Pre-hospital Diet Restrictions:  Regular diet  Substance Use History:   Social History     Substance and Sexual Activity   Alcohol Use Yes    Comment: SOCIAL-1-2 drinks per week     Social History     Tobacco Use   Smoking Status Never Smoker   Smokeless Tobacco Never Used     Social History     Substance and Sexual Activity   Drug Use No       Family History:  Family History   Adopted: Yes   Problem Relation Age of Onset    No Known Problems Mother     No Known Problems Father     No Known Problems Sister     No Known Problems Brother     No Known Problems Maternal Aunt     No Known Problems Maternal Uncle     No Known Problems Paternal Aunt     No Known Problems Paternal Uncle     No Known Problems Maternal Grandmother     No Known Problems Maternal Grandfather     No Known Problems Paternal Grandmother     No Known Problems Paternal Grandfather        Physical Exam:     Vitals:   Blood Pressure: 153/82 (07/25/19 2141)  Pulse: 58 (07/25/19 2141)  Temperature: 98 1 °F (36 7 °C) (07/25/19 2040)  Temp Source: Oral (07/25/19 2040)  Respirations: 15 (07/25/19 2141)  Height: 4' 11" (149 9 cm) (07/25/19 2040)  Weight - Scale: 56 4 kg (124 lb 4 8 oz) (07/25/19 2040)  SpO2: 98 % (07/25/19 2141)    Constitutional:  Well developed, well nourished, no acute distress, non-toxic appearance   Eyes:  PERRL, conjunctiva normal   HENT:  Atraumatic, external ears normal, nose normal, oropharynx moist, no pharyngeal exudates  Neck- normal range of motion, no tenderness, supple   Respiratory:  No respiratory distress, normal breath sounds, no rales, no wheezing   Cardiovascular:  Normal rate, normal rhythm, no murmurs, no gallops, no rubs   GI:  Soft, nondistended, normal bowel sounds, nontender, no organomegaly, no mass, no rebound, no guarding   :  No costovertebral angle tenderness   Musculoskeletal:  No edema, no tenderness, no deformities   Back- no tenderness  Integument:  Well hydrated, no rash   Lymphatic:  No lymphadenopathy noted   Neurologic:  Alert &awake, communicative, CN 2-12 normal, normal motor function, normal sensory function, no focal deficits noted   Psychiatric:  Speech and behavior appropriate       Lab Results: I have personally reviewed pertinent reports  Results from last 7 days   Lab Units 07/25/19  1247   WBC Thousand/uL 6 27   HEMOGLOBIN g/dL 13 6   HEMATOCRIT % 40 1   PLATELETS Thousands/uL 242   NEUTROS PCT % 56   LYMPHS PCT % 33   MONOS PCT % 8   EOS PCT % 2     Results from last 7 days   Lab Units 07/25/19  1247   POTASSIUM mmol/L 3 9   CHLORIDE mmol/L 103   CO2 mmol/L 29   BUN mg/dL 17   CREATININE mg/dL 0 78   CALCIUM mg/dL 9 1     Results from last 7 days   Lab Units 07/25/19  1247   INR  0 99           Imaging: I have personally reviewed pertinent reports  Cta Head And Neck With And Without Contrast    Result Date: 7/25/2019  Narrative: CTA NECK AND BRAIN WITH AND WITHOUT CONTRAST INDICATION: worsening dizziness, vision loss, history of vertebral artery dissection and cerebellar infarcts COMPARISON:   CT angiogram from 2/5/2019 and prior MRI from 2/4/2019 TECHNIQUE:  Routine CT imaging of the Brain without contrast   Post contrast imaging was performed after administration of iodinated contrast through the neck and brain  Post contrast axial 0 625 mm images timed to opacify the arterial system  3D rendering was performed on an independent workstation  MIP reconstructions performed  Coronal reconstructions were performed of the noncontrast portion of the brain  Radiation dose length product (DLP) for this visit:  1455 mGy-cm   This examination, like all CT scans performed in the [facility], was performed utilizing techniques to minimize radiation dose exposure, including the use of iterative reconstruction and automated exposure control  IV Contrast:  85 mL of iohexol (OMNIPAQUE)  IMAGE QUALITY:   Diagnostic FINDINGS: NONCONTRAST BRAIN PARENCHYMA:  There is no definite CT evidence for large acute vascular distribution infarct  There is a chronic right cerebellar small infarct, which has evolved since the prior MRI    There is subtle areas of encephalomalacia in the bilateral parietal lobes, evolved since the prior MRI   There is no acute intracranial hemorrhage  VENTRICLES AND EXTRA-AXIAL SPACES:  Normal for the patient's age  VISUALIZED ORBITS AND PARANASAL SINUSES:  Unremarkable  CERVICAL VASCULATURE AORTIC ARCH AND GREAT VESSELS:  Normal aortic arch and great vessel origins  Normal visualized subclavian vessels  RIGHT VERTEBRAL ARTERY CERVICAL SEGMENT: Normal origin  The right vertebral artery is grossly stable in caliber throughout the right neck in comparison to the prior examination  There is an osteophyte at C5-C6 which results in extrinsic mass effect on the right vertebral artery, stable  LEFT VERTEBRAL ARTERY CERVICAL SEGMENT: The origin of the left vertebral artery is patent  The left vertebral artery is diffusely diminished in caliber as compared to the prior examination from the level of V1 through the level of V3   RIGHT EXTRACRANIAL CAROTID SEGMENT:  Normal caliber common carotid artery  Normal bifurcation and cervical internal carotid artery  No stenosis or dissection  LEFT EXTRACRANIAL CAROTID SEGMENT:  Normal caliber common carotid artery  Normal bifurcation and cervical internal carotid artery  No stenosis or dissection  There is mild calcified plaque at the left carotid bulb  NASCET criteria was used to determine the degree of internal carotid artery diameter stenosis  INTRACRANIAL VASCULATURE INTERNAL CAROTID ARTERIES:  Normal enhancement of the intracranial portions of the internal carotid arteries  Normal ophthalmic artery origins  Normal ICA terminus  ANTERIOR CIRCULATION:  Symmetric A1 segments and anterior cerebral arteries with normal enhancement  Normal anterior communicating artery  MIDDLE CEREBRAL ARTERY CIRCULATION:  M1 segment and middle cerebral artery branches demonstrate normal enhancement bilaterally  Mild irregularity of the left M1 segment is stable   DISTAL VERTEBRAL ARTERIES:  There is more focal abrupt luminal narrowing involving the left P3 segment which is attenuated and subsequently becomes occluded at the level of the distal left V3/ proximal left V4 segments  The left posterior inferior cerebellar artery is patent and there is reconstitution of the distal left vertebral artery at this level which is also attenuated in appearance as compared to the prior exam   There is mild irregularity and luminal narrowing of the right V4 segment, which is overall similar to the prior examination  The right-sided posterior inferior cerebellar artery is patent  BASILAR ARTERY:  Basilar artery is normal in caliber  Normal superior cerebellar arteries  POSTERIOR CEREBRAL ARTERIES: Both posterior cerebral arteries arises from the basilar tip  Both arteries demonstrate normal enhancement  Normal posterior communicating arteries  DURAL VENOUS SINUSES:  Normal  NON VASCULAR ANATOMY BONY STRUCTURES:  There is cervical spondylosis  SOFT TISSUES OF THE NECK:  Normal  THORACIC INLET:  Unremarkable  Impression: Patient has a known history of left vertebral dissection  Diffusely diminished luminal caliber of the left vertebral artery extending from the level of the origin to the V3 segment with there is subsequent more abrupt luminal narrowing  Subsequent occlusion of the distal left V3 and proximal V4 segments is noted with reconstitution at the level of the posterior inferior cerebellar artery  Stable appearance of the right vertebral artery with mild to moderate luminal narrowing noted of the right V4 segment  Small chronic right cerebellar infarcts as well as bilateral parietal lobes  No definite CT evidence for large acute vascular distribution infarct on the current examination  I personally discussed this study with Petra Obrien on 7/25/2019 at 2:32 PM  Workstation performed: [id number]         ** Please Note: Dragon 360 Dictation voice to text software was used in the creation of this document   **

## 2019-07-26 NOTE — SOCIAL WORK
CM obtained all the following info about the pt  HOME: Pt resides in 1-story house w/ 12 steps down to basement and 1 step to enter house at front door  LIVES W/:  and teenaged son  : Pt's  Amador Solorzano / c: [phone]  INDEPENDENCE: Pt reported she is fully independent at baseline w/ ambulating and performing her ADLS  DME: None reported  HHC: No hx reported  I/P REHAB: No hx reported  MENTAL HEALTH ISSUES: No hx reported  D&A ISSUES: No hx reported  PCP: Dr Arnold Mcgrath through [facility]  PHARMACY: [facility]  INCOME SOURCE: Full-time employment  INSURANCE: [facility] [phone] through her employer  MEDICAL POA: No one is pre-designated /  by legal default  TRANSPORTATION AT D/C: Pt's   CM reviewed d/c planning process including the following: identifying help at home, patient preference for d/c planning needs, Discharge Lounge, [facility] Meds to Bed program, availability of treatment team to discuss questions or concerns patient and/or family may have regarding understanding medications and recognizing signs and symptoms once discharged  CM also encouraged patient to follow up with all recommended appointments after discharge  Patient advised of importance for patient and family to participate in managing patients medical well being  Patient/caregiver received discharge checklist  Content reviewed  Patient/caregiver encouraged to participate in discharge plan of care prior to discharge home

## 2019-07-26 NOTE — CONSULTS
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) treated medically with no residual deficits, presents today with recurrent symptoms    Bilateral carotids on CT head/neck show no evidence of disease  L vertebral artery with increased luminal narrowing  Plan:  -No vascular surgical intervention at this time  -Recommend neurosurgical vs neuro IR consultation     History of Present Illness   Physician Requesting Consult: Sherly Smith DO  Reason for Consult / Principal Problem: stroke like symptoms with hx of vertebral artery dissection    HPI: Ashleigh Nolan is a 50y o  year old female with hx of left vertebral artery dissection in February of this year, which was managed medically without baseline deficits, presents today with one week of recurrent symptoms  States she has amaurosis fugax like visual symptoms where it seems like a curtain drops over bilateral eye fields for 15-30 seconds, followed by nausea, vomiting, flushing, and headaches  These symptoms resolve after a few minutes  She is an otherwise healthy and active woman and denies any other complaints at this time  Inpatient consult to Vascular Surgery     Performed by  Mignon Andrade DO     Authorized by Jonathan Zuluaga MD              Review of Systems   Constitutional: Negative for chills, fatigue and fever  HENT: Negative  Eyes: Positive for visual disturbance  Respiratory: Negative for cough, chest tightness and shortness of breath  Cardiovascular: Negative for chest pain and palpitations  Gastrointestinal: Positive for nausea and vomiting  Negative for abdominal pain, blood in stool, constipation and diarrhea  Endocrine: Negative  Genitourinary: Negative  Negative for difficulty urinating  Musculoskeletal: Negative  Negative for arthralgias  Skin: Negative  Negative for rash  Allergic/Immunologic: Negative  Neurological: Positive for light-headedness and headaches  Hematological: Negative  Psychiatric/Behavioral: Negative  Historical Information   Past Medical History:   Diagnosis Date    CVA (cerebral vascular accident) ([address] )     GERD (gastroesophageal reflux disease)     occasionally    Hypertension     controlling with diet and exercise     Past Surgical History:   Procedure Laterality Date    AUGMENTATION MAMMAPLASTY      GYNECOLOGIC CRYOSURGERY      CERVIX    HYSTERECTOMY      LASER ABLATION OF CONDYLOMAS N/A 7/30/2018    Procedure: Rodrigo Tena;  Surgeon: Delphine Dunn MD;  Location: [facility];  Service: Gynecology Oncology    SKIN TAG REMOVAL      EXICISON OF PERIANAL    WISDOM TOOTH EXTRACTION       Social History   Social History     Substance and Sexual Activity   Alcohol Use Yes    Comment: SOCIAL-1-2 drinks per week     Social History     Substance and Sexual Activity   Drug Use No     Social History     Tobacco Use   Smoking Status Never Smoker   Smokeless Tobacco Never Used     Family History: non-contributory}    Meds/Allergies   all current active meds have been reviewed  Allergies   Allergen Reactions    Shellfish Allergy Anaphylaxis       Objective   Vitals: Blood pressure 113/75, pulse 70, temperature 98 1 °F (36 7 °C), temperature source Oral, resp  rate 21, height 4' 11" (1 499 m), weight 56 4 kg (124 lb 4 8 oz), SpO2 99 %  ,Body mass index is 25 11 kg/m²  Intake/Output Summary (Last 24 hours) at 7/26/2019 0121  Last data filed at 7/26/2019 0000  Gross per 24 hour   Intake 1023 65 ml   Output 500 ml   Net 523 65 ml     Invasive Devices     Peripheral Intravenous Line            Peripheral IV 07/25/19 Left Forearm less than 1 day                Physical Exam   Constitutional: She is oriented to person, place, and time   She appears well-developed and well-nourished  HENT:   Head: Normocephalic and atraumatic  Eyes: Pupils are equal, round, and reactive to light  Conjunctivae and EOM are normal    Neck: Normal range of motion  Cardiovascular: Normal rate, regular rhythm, normal heart sounds and intact distal pulses  Pulmonary/Chest: Effort normal and breath sounds normal  No respiratory distress  Abdominal: Soft  She exhibits no distension  There is no tenderness  Musculoskeletal: Normal range of motion  She exhibits no edema  Neurological: She is alert and oriented to person, place, and time  She displays normal reflexes  No cranial nerve deficit or sensory deficit  Coordination normal    Skin: Skin is warm and dry  Capillary refill takes less than 2 seconds  Psychiatric: She has a normal mood and affect  Her behavior is normal    Nursing note and vitals reviewed  Lab Results:   CBC with diff:   Lab Results   Component Value Date    WBC 6 27 07/25/2019    HGB 13 6 07/25/2019    HCT 40 1 07/25/2019    MCV 91 07/25/2019     07/25/2019    MCH 31 0 07/25/2019    MCHC 33 9 07/25/2019    RDW 11 5 (L) 07/25/2019    MPV 9 9 07/25/2019    NRBC 0 07/25/2019   , BMP/CMP:   Lab Results   Component Value Date    SODIUM 142 07/25/2019    K 3 9 07/25/2019     07/25/2019    CO2 29 07/25/2019    BUN 17 07/25/2019    CREATININE 0 78 07/25/2019    CALCIUM 9 1 07/25/2019    EGFR 90 07/25/2019   , Coags:   Lab Results   Component Value Date    PTT 79 (H) 07/26/2019    INR 1 10 07/26/2019     Imaging Studies:  7/25 CTA head and neck:   IMPRESSION:  Patient has a known history of left vertebral dissection  Diffusely diminished luminal caliber of the left vertebral artery extending from the level of the origin to the V3 segment with there is subsequent more abrupt luminal narrowing    Subsequent   occlusion of the distal left V3 and proximal V4 segments is noted with reconstitution at the level of the posterior inferior cerebellar artery      Stable appearance of the right vertebral artery with mild to moderate luminal narrowing noted of the right V4 segment      Small chronic right cerebellar infarcts as well as bilateral parietal lobes  No definite CT evidence for large acute vascular distribution infarct on the current examination  EKG, Pathology, and Other Studies: I have personally reviewed pertinent reports      VTE Prophylaxis: Sequential compression device (Venodyne)      Code Status: Level 1 - Full Code

## 2019-07-26 NOTE — ASSESSMENT & PLAN NOTE
Patient states that her feeling of lightheadedness and disorientation is much better after starting heparin  Continue monitoring neurologic function  This is as per stroke protocol  Will also add OT PT evaluation  Case management  Neurologic checks  PMR consult

## 2019-07-26 NOTE — ASSESSMENT & PLAN NOTE
Patient with history of right-sided vertebral artery dissection currently latest imaging shows to be on the left side  Vascular surgery consult  Heparin started low-dose and currently it seems that this has helped patient's symptoms  Will continue  Continue PT INR/PTT monitoring  Will also consult Critical Care as patient is on step-down level 1  Continue Neurology follow-up  Continue Lipitor  Check lipid profile tomorrow

## 2019-07-27 LAB
CARDIOLIPIN IGA SER IA-ACNC: <9 APL U/ML (ref 0–11)
CARDIOLIPIN IGG SER IA-ACNC: <9 GPL U/ML (ref 0–14)
CARDIOLIPIN IGM SER IA-ACNC: 14 MPL U/ML (ref 0–12)
DEPRECATED AT III PPP: 88 % OF NORMAL (ref 92–136)
ENA SCL70 AB SER-ACNC: <0.2 AI (ref 0–0.9)
ENA SS-A AB SER-ACNC: <0.2 AI (ref 0–0.9)
ENA SS-B AB SER-ACNC: <0.2 AI (ref 0–0.9)

## 2019-07-27 NOTE — PROGRESS NOTES
Pt  Called 7/27 approx 1030 in regards to discharge instructions and future plan of care  Stated she was discharged "quickly and suddenly" and today while sitting at home drinking her coffee in the morning was concerned about her diagnosis and feels that she was discharged too early  Paged sod-c for patient and stated I would call her back  Waiting to speak with SOD and inform them pt  Has concerns and questions and would like to be called at home  Will follow up post speaking with attending

## 2019-07-27 NOTE — DISCHARGE SUMMARY
[facility] CENTRAL Discharge Summary - Medical Breonna Briceno 50 y o  female MRN: [id number]    [address] [facility]  Room / Bed: [facility] 408/[facility] 875-61 Encounter: [id number]    BRIEF OVERVIEW    Admitting Provider: Rajiv Pro DO  Discharge Provider: No att  providers found  Primary Care Physician at Discharge: [phone]   Admission Date: 7/25/2019     Discharge Date: 7/26/2019  8:00 [address] is a 28-year-old female with past medical history of hypertension, embolic CVA in February 1708, known left vertebral artery dissection who presented to the ED 07/25/2019 with dizziness, vertigo, ataxia for 1 week  Patient was originally admitted to [facility] and then transferred to [facility]  In the ED, patient received a CTA, which revealed interval diminished luminal caliber of the left vertebral artery when compared to previous study; stable appearance of the right vertebral artery; small chronic right cerebellar infarcts  Patient was initiated on heparin and reported feeling much better, virtually asymptomatic  She still reported some mild temporal visual field loss which has been ongoing since her initial CVA  Patient admitted for stroke-like workup  Patient seen by vascular surgery team, who recommended evaluation by either Neurosurgery or Neuro Interventional Radiology  Patient was seen by PT/OT, who felt patient was at reported baseline and safe to be discharged home  Patient was also evaluated by Neurology team, who reviewed the patient's past and present images and found that her current picture was concerning for a connective tissue disorder or vasculitis and not a stroke  Follow-up MRI deemed unnecessary at this time, heparin was stopped, patient placed back on Plavix and aspirin    Patient was deemed stable for discharge from a neurologic standpoint with close outpatient follow-up, vasculitis labs, and 4 vessel cerebral angiogram     At time of discharge, patient was asymptomatic, specifically denying any chest pain, palpitations, shortness of breath, cough, wheezing, abdominal pain, nausea, vomiting, diarrhea, constipation, joint aches or pains, fevers, chills, headaches, weakness, dizziness, dysarthria, loss of sensation  Presenting Problem/History of Present Illness  Principal Problem:    Vertebral artery dissection [facility])  Active Problems:    Disorientation    Essential hypertension  Resolved Problems:    * No resolved hospital problems  *    Physical Exam   Constitutional: She is oriented to person, place, and time  She appears well-developed and well-nourished  No distress  HENT:   Head: Normocephalic and atraumatic  Eyes: Pupils are equal, round, and reactive to light  Conjunctivae and EOM are normal  No scleral icterus  Neck: Normal range of motion  Neck supple  No thyromegaly present  Cardiovascular: Normal rate, regular rhythm, normal heart sounds and intact distal pulses  Exam reveals no gallop and no friction rub  No murmur heard  Pulmonary/Chest: Effort normal and breath sounds normal  No respiratory distress  She has no wheezes  She has no rales  Abdominal: Soft  Bowel sounds are normal  She exhibits no distension and no mass  There is no tenderness  There is no guarding  Musculoskeletal: Normal range of motion  She exhibits no edema, tenderness or deformity  Lymphadenopathy:     She has no cervical adenopathy  Neurological: She is alert and oriented to person, place, and time  No cranial nerve deficit  Skin: Skin is warm and dry  Capillary refill takes less than 2 seconds  No rash noted  She is not diaphoretic  No pallor  Psychiatric: She has a normal mood and affect   Her behavior is normal          Diagnostic Procedures Performed  Imaging Studies:  CTA head and neck results discussed above    Pertinent Labs:  BMP's WNL, negative troponins, lipids WNL, CBC unremarkable, A1c 5 3   TSH was elevated to 5 600, however no free T4 was checked and this elevation could be secondary to being acutely symptomatic; would recommend re-evaluation as an outpatient  Scleroderma, Ro, La antibodies all negative      Therapeutic Procedures Performed  None    Test Results Pending at Discharge:  None     Medications     Medication List to be Continued at Discharge  Discharge Medication List as of 7/26/2019  6:36 PM      CONTINUE these medications which have NOT CHANGED    Details   ALPRAZolam (XANAX) 0 5 mg tablet Take 0 5 mg by mouth 4 (four) times a day as needed for anxiety (Patient takes one at night) , Historical Med      amLODIPine (NORVASC) 10 mg tablet Take 1 tablet (10 mg total) by mouth daily for 14 days, Starting Tue 1/29/2019, Until Thu 7/25/2019, Print      aspirin (ECOTRIN LOW STRENGTH) 81 mg EC tablet Take 1 tablet (81 mg total) by mouth daily, Starting Tue 2/12/2019, No Print      atorvastatin (LIPITOR) 80 mg tablet Take 1 tablet (80 mg total) by mouth daily with dinner, Starting Tue 3/19/2019, Normal      hydrochlorothiazide (HYDRODIURIL) 25 mg tablet Take 25 mg by mouth daily, Starting Tue 2/19/2019, Historical Med      omeprazole (PriLOSEC) 40 MG capsule Take 40 mg by mouth daily, Starting Mon 7/8/2019, Historical Med      Potassium Chloride ER 20 MEQ TBCR take 1 tablet by mouth twice a day take with food, Historical Med      Docusate Calcium (STOOL SOFTENER PO) Take 1 tablet by mouth as needed (once a week prn) , Historical Med           Discharge Medication List as of 7/26/2019  6:36 PM      START taking these medications    Details   clopidogrel (PLAVIX) 75 mg tablet Take 1 tablet (75 mg total) by mouth daily for 60 days, Starting Sat 7/27/2019, Until Wed 9/25/2019, Print           Discharge Medication List as of 7/26/2019  6:36 PM          Allergies  Allergies   Allergen Reactions    Shellfish Allergy Anaphylaxis     Discharge Diet: regular diet  Activity restrictions: none  Discharge Condition: good  Discharged With Lines: no    Discharge Disposition: [address] / Family Member Name: Cuco Coyne  Phone Number: [phone]     Outpatient Follow-Up  Patient is advised to follow up with her primary care provider within 1 week of discharge, obtain the imaging studies as advised by Neurology, and follow up very closely with her neurologist       Code Status: Prior  Advance Directive and Living Will: <no information>  Power of :    POLST:      Discharge  Statement   I spent 30 minutes minutes discharging the patient  This time was spent on the day of discharge  I had direct contact with the patient on the day of discharge  Additional documentation is required if more than 30 minutes were spent on discharge     Alva Brumfield DO

## 2019-07-28 LAB
ATRIAL RATE: 57 BPM
P AXIS: 69 DEGREES
PR INTERVAL: 164 MS
PROT S ACT/NOR PPP: 84 % (ref 63–140)
QRS AXIS: 57 DEGREES
QRSD INTERVAL: 82 MS
QT INTERVAL: 428 MS
QTC INTERVAL: 416 MS
T WAVE AXIS: 65 DEGREES
VENTRICULAR RATE: 57 BPM

## 2019-07-28 PROCEDURE — 93010 ELECTROCARDIOGRAM REPORT: CPT | Performed by: INTERNAL MEDICINE

## 2019-07-28 NOTE — PROGRESS NOTES
Page sod-c x2 7/27 with no response as per pt  Request    Spoke with pt  Before leaving for the day and stated since I was unable to get a hold of her discharge physician that if she still had any symptoms to please go back to ED for evaluation  Pt  States she is stable and asymptomatic at that time and was resting all day  Pt  Did state if she begins to feel worse she would then go to emergency department  Suggested also if symptoms became urgent to call 911 promptly if did not already go to ED  Pt  Verbalized understanding

## 2019-07-29 ENCOUNTER — TELEPHONE (OUTPATIENT)
Dept: NEUROSURGERY | Facility: CLINIC | Age: 49
End: 2019-07-29

## 2019-07-29 DIAGNOSIS — I77.74 VERTEBRAL ARTERY DISSECTION (HCC): Primary | ICD-10-CM

## 2019-07-29 DIAGNOSIS — I63.9 CEREBROVASCULAR ACCIDENT (CVA), UNSPECIFIED MECHANISM (HCC): ICD-10-CM

## 2019-07-29 LAB
PROT C AG ACT/NOR PPP IA: 106 % OF NORMAL (ref 60–150)
PROT S ACT/NOR PPP: 86 % (ref 57–157)
PROT S PPP-ACNC: 83 % (ref 60–150)

## 2019-07-29 NOTE — TELEPHONE ENCOUNTER
Contacted Frances to discussed scheduling her OV and CTA to be completed prior to considering angio  Placed order for CTA as follow up to the recent on 7/25/2019 - assisted to schedule this on 7/12/2019 and set up OV to follow on 7/12/2019  She expressed concern about neck and occipital head pain - she has not taken any medication for this has been on ASA and Plavix since her original vertebral artery dissection diagnosis - she denies n/v, visual disturbance, confusion, etc  - Suggested she take tylenol and see if this provides any relief - advised that if her symptoms worsen or fail to improve to report to the ER  She was appreciative of the assistance

## 2019-07-30 DIAGNOSIS — I77.74 VERTEBRAL ARTERY DISSECTION (HCC): Primary | ICD-10-CM

## 2019-07-30 DIAGNOSIS — Z86.73 HISTORY OF CVA (CEREBROVASCULAR ACCIDENT): ICD-10-CM

## 2019-07-30 LAB
APTT SCREEN TO CONFIRM RATIO: 0.79 RATIO (ref 0–1.4)
B2 GLYCOPROT1 IGA SER-ACNC: <9 GPI IGA UNITS (ref 0–25)
B2 GLYCOPROT1 IGG SER-ACNC: 23 GPI IGG UNITS (ref 0–20)
B2 GLYCOPROT1 IGM SER-ACNC: <9 GPI IGM UNITS (ref 0–32)
CH50 SERPL-ACNC: 59 U/ML (ref 42–999999)
CONFIRM APTT/NORMAL: 38.4 SEC (ref 0–55)
LA PPP-IMP: ABNORMAL
SCREEN APTT: 35 SEC (ref 0–51.9)
SCREEN DRVVT: 32.2 SEC (ref 0–47)
THROMBIN TIME: 89 SEC (ref 0–23)
TT IMM NP PPP: 40.2 SEC (ref 0–23)
TT P HPASE PPP: 21.1 SEC (ref 0–23)

## 2019-07-31 ENCOUNTER — HOSPITAL ENCOUNTER (EMERGENCY)
Facility: HOSPITAL | Age: 49
Discharge: HOME | End: 2019-07-31
Attending: EMERGENCY MEDICINE
Payer: COMMERCIAL

## 2019-07-31 ENCOUNTER — TELEPHONE (OUTPATIENT)
Dept: NEUROLOGY | Facility: CLINIC | Age: 49
End: 2019-07-31

## 2019-07-31 ENCOUNTER — APPOINTMENT (EMERGENCY)
Dept: RADIOLOGY | Facility: HOSPITAL | Age: 49
End: 2019-07-31
Payer: COMMERCIAL

## 2019-07-31 VITALS
SYSTOLIC BLOOD PRESSURE: 126 MMHG | WEIGHT: 122.14 LBS | OXYGEN SATURATION: 99 % | HEIGHT: 59 IN | DIASTOLIC BLOOD PRESSURE: 83 MMHG | TEMPERATURE: 98 F | BODY MASS INDEX: 24.62 KG/M2 | RESPIRATION RATE: 16 BRPM | HEART RATE: 67 BPM

## 2019-07-31 DIAGNOSIS — R51.9 HEADACHE: Primary | ICD-10-CM

## 2019-07-31 DIAGNOSIS — I63.9 CEREBROVASCULAR ACCIDENT (CVA), UNSPECIFIED MECHANISM (HCC): ICD-10-CM

## 2019-07-31 DIAGNOSIS — R51.9 INTRACTABLE HEADACHE, UNSPECIFIED CHRONICITY PATTERN, UNSPECIFIED HEADACHE TYPE: ICD-10-CM

## 2019-07-31 DIAGNOSIS — I77.74 VERTEBRAL ARTERY DISSECTION (HCC): Primary | ICD-10-CM

## 2019-07-31 PROCEDURE — 99284 EMERGENCY DEPT VISIT MOD MDM: CPT | Performed by: EMERGENCY MEDICINE

## 2019-07-31 PROCEDURE — 70498 CT ANGIOGRAPHY NECK: CPT

## 2019-07-31 PROCEDURE — 99284 EMERGENCY DEPT VISIT MOD MDM: CPT

## 2019-07-31 PROCEDURE — 70496 CT ANGIOGRAPHY HEAD: CPT

## 2019-07-31 RX ORDER — SENNOSIDES 8.6 MG
1300 CAPSULE ORAL EVERY 8 HOURS PRN
Qty: 30 TABLET | Refills: 0 | Status: SHIPPED | OUTPATIENT
Start: 2019-07-31 | End: 2022-01-24 | Stop reason: ALTCHOICE

## 2019-07-31 RX ADMIN — IOHEXOL 80 ML: 350 INJECTION, SOLUTION INTRAVENOUS at 14:59

## 2019-07-31 NOTE — ED ATTENDING ATTESTATION
Bhavana Heart MD, saw and evaluated the patient  I have discussed the patient with the resident/non-physician practitioner and agree with the resident's/non-physician practitioner's findings, Plan of Care, and MDM as documented in the resident's/non-physician practitioner's note, except where noted  All available labs and Radiology studies were reviewed  I was present for key portions of any procedure(s) performed by the resident/non-physician practitioner and I was immediately available to provide assistance  At this point I agree with the current assessment done in the Emergency Department    I have conducted an independent evaluation of this patient a history and physical is as follows:  Here with GRANADOS     Had history of Vertebral artery dissection   Admitted last week on   25   Had CTA  Started on heparin   Seen by neurosurgery and neurology     sent home on plavix     HA  Returned last night  At 4 am    No new focal neuro c/o     No fever        exam the patient is in no acute distress HEENT she has a visual field cut that is chronic pupils equal reactive extraocular muscles are intact no nystagmus neck no JVD lungs clear heart regular abdomen soft nontender neurologic exam cranial nerves intact other than the field cut motor 5/5 sensory grossly intact cerebellar finger-nose heel-shin intact   patient declines pain medications  Critical Care Time  Procedures

## 2019-07-31 NOTE — TELEPHONE ENCOUNTER
Received a call from [facility] freddy/Eneida  He requested our fax number   Will be sending us a records request

## 2019-07-31 NOTE — ED PROVIDER NOTES
History  Chief Complaint   Patient presents with    Headache     Pt with left sided neck pain x 2 days, has been taking tylenol, has known vertebral artery dissection that was noted 7/25/19  Patient is a 80-year-old female with a history of left vertebral artery dissection with recent admission (7/25-26) for ataxia and intermittent vision loss, found to have interval expansion of vertebral artery dissection, who presents for left-sided headache  Headache started yesterday while at rest and has gradually worsened  The patient woke from sleep due to he last night  Pain is dull and radiates from the left neck the left face area improved with Tylenol  No exacerbating factors  No associated numbness/tingling weakness, blurry or double vision, trouble speaking, swallowing, unsteady gait, fever/chills, nausea/vomiting  Prior to Admission Medications   Prescriptions Last Dose Informant Patient Reported? Taking?    ALPRAZolam (XANAX) 0 5 mg tablet  Self Yes No   Sig: Take 0 5 mg by mouth 4 (four) times a day as needed for anxiety (Patient takes one at night)    Docusate Calcium (STOOL SOFTENER PO)  Self Yes No   Sig: Take 1 tablet by mouth as needed (once a week prn)    Potassium Chloride ER 20 MEQ TBCR   Yes No   Sig: take 1 tablet by mouth twice a day take with food   amLODIPine (NORVASC) 10 mg tablet  Self No No   Sig: Take 1 tablet (10 mg total) by mouth daily for 14 days   aspirin (ECOTRIN LOW STRENGTH) 81 mg EC tablet  Self No No   Sig: Take 1 tablet (81 mg total) by mouth daily   atorvastatin (LIPITOR) 80 mg tablet  Self No No   Sig: Take 1 tablet (80 mg total) by mouth daily with dinner   clopidogrel (PLAVIX) 75 mg tablet   No No   Sig: Take 1 tablet (75 mg total) by mouth daily for 60 days   hydrochlorothiazide (HYDRODIURIL) 25 mg tablet  Self Yes No   Sig: Take 25 mg by mouth daily   omeprazole (PriLOSEC) 40 MG capsule   Yes No   Sig: Take 40 mg by mouth daily      Facility-Administered Medications: None       Past Medical History:   Diagnosis Date    CVA (cerebral vascular accident) ([address] )     GERD (gastroesophageal reflux disease)     occasionally    Hypertension     controlling with diet and exercise       Past Surgical History:   Procedure Laterality Date    AUGMENTATION MAMMAPLASTY      GYNECOLOGIC CRYOSURGERY      CERVIX    HYSTERECTOMY      LASER ABLATION OF CONDYLOMAS N/A 7/30/2018    Procedure: OMNIGUIDE LASER ABLATION OF VULVA;  Surgeon: Romeo Cotto MD;  [facility];  Service: Gynecology Oncology    SKIN TAG REMOVAL      EXICISON OF PERIANAL    WISDOM TOOTH EXTRACTION         Family History   Adopted: Yes   Problem Relation Age of Onset    No Known Problems Mother     No Known Problems Father     No Known Problems Sister     No Known Problems Brother     No Known Problems Maternal Aunt     No Known Problems Maternal Uncle     No Known Problems Paternal Aunt     No Known Problems Paternal Uncle     No Known Problems Maternal Grandmother     No Known Problems Maternal Grandfather     No Known Problems Paternal Grandmother     No Known Problems Paternal Grandfather      I have reviewed and agree with the history as documented  Social History     Tobacco Use    Smoking status: Never Smoker    Smokeless tobacco: Never Used   Substance Use Topics    Alcohol use: Yes     Comment: SOCIAL-1-2 drinks per week    Drug use: No        Review of Systems   Constitutional: Negative for chills, fatigue and fever  HENT: Negative for congestion and sore throat  Eyes: Positive for visual disturbance  Respiratory: Negative for cough and shortness of breath  Cardiovascular: Negative for chest pain  Gastrointestinal: Negative for abdominal pain, diarrhea, nausea and vomiting  Endocrine: Negative for polyuria  Genitourinary: Negative for difficulty urinating and dysuria  Musculoskeletal: Positive for neck pain  Negative for arthralgias     Skin: Negative for rash  Neurological: Positive for headaches  Negative for dizziness, facial asymmetry, speech difficulty, weakness, light-headedness and numbness  All other systems reviewed and are negative  Physical Exam  ED Triage Vitals [07/31/19 1319]   Temperature Pulse Respirations Blood Pressure SpO2   98 °F (36 7 °C) 88 16 139/89 97 %      Temp Source Heart Rate Source Patient Position - Orthostatic VS BP Location FiO2 (%)   Oral Monitor Sitting Right arm --      Pain Score       4             Orthostatic Vital Signs  Vitals:    07/31/19 1330 07/31/19 1412 07/31/19 1538 07/31/19 1600   BP: 139/89 130/90 115/75 126/83   Pulse: 78 65 76 67   Patient Position - Orthostatic VS:  Sitting Sitting Sitting       Physical Exam   Constitutional: She is oriented to person, place, and time  She appears well-developed and well-nourished  No distress  HENT:   Head: Normocephalic and atraumatic  Eyes: Pupils are equal, round, and reactive to light  EOM are normal    Neck: Normal range of motion  Neck supple  Cardiovascular: Normal rate, regular rhythm and normal heart sounds  Pulmonary/Chest: Effort normal and breath sounds normal  No respiratory distress  Abdominal: Soft  Bowel sounds are normal  There is no tenderness  Musculoskeletal: Normal range of motion  Neurological: She is alert and oriented to person, place, and time  She has normal strength  No cranial nerve deficit or sensory deficit  GCS eye subscore is 4  GCS verbal subscore is 5  GCS motor subscore is 6  R upper quadrantopia, 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   Skin: Skin is warm and dry  Psychiatric: She has a normal mood and affect  Nursing note and vitals reviewed        ED Medications  Medications   iohexol (OMNIPAQUE) 350 MG/ML injection (MULTI-DOSE) 80 mL (80 mL Intravenous Given 7/31/19 1459)       Diagnostic Studies  Results Reviewed     None                 CTA head and neck with and without contrast   Final Result by Ana Laura Ruiz MD (07/31 [id number])         1  Stable focal segmental occlusion of the distal left vertebral artery at the craniocervical junction likely related to chronic thrombosed dissection  2   Stable reconstitution of the left posterior inferior cerebellar artery possibly from faint wisp-like occipital collaterals and/or retrograde filling from the right vertebral artery  3   Mild ectasia measuring 3 6 cm of the ascending aorta is unchanged from February 2019  Underlying aortic stenosis should be excluded  Further characterization with nonemergent echocardiography recommended if not previously performed  4   No interval change the appearance of the major arteries of the neck or brain without new intra-arterial stenosis, new dissection or new focal occlusion  5   Small chronic right cerebellar infarction and small cortical parietal lobe infarctions bilaterally are stable without new large territorial infarction or acute intracranial hemorrhage  Workstation performed: [id number]               Procedures  Procedures        ED Course                               MDM  Number of Diagnoses or Management Options  Headache:   Diagnosis management comments: Patient is a 51-year-old female with a history of left vertebral artery dissection with recent admission (7/25-26) for ataxia and intermittent vision loss, found to have interval expansion of vertebral artery dissection, who presents for left-sided headache  Exam shows a well-appearing female with right upper quadrantopia, 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 by patient; otherwise normal neurologic and physical exam   Case discussed with Neurosurgery team, who recommended CTA  Repeat CT shows stable dissection, no acute findings compared to prior  Per neurosurgery recommendations, will treat symptomatically and discharge with Neurosurgery follow-up, strict return precautions        Disposition  Final diagnoses:   Headache     Time reflects when diagnosis was documented in both MDM as applicable and the Disposition within this note     Time User Action Codes Description Comment    7/31/2019  4:07 PM Olena Henry Add [R51] Headache       ED Disposition     ED Disposition Condition Date/Time Comment    Discharge Stable Wed Jul 31, 2019  4:07  [facility] to [facility]/self care              Follow-up Information     Follow up With Specialties Details Why Contact Info Additional [address] Neurosurgery  Keep your scheduled follow up appointment [address] [facility] [id number]  [address], [address], [place], [place], [address] [facility] Emergency Department Emergency Medicine  As needed, If symptoms worsen [address]  [phone]  ED, [address], [place], [place], [facility] [place], [address] Call in 1 day  [address],[place] [address] [place] [facility]  [phone]             Discharge Medication List as of 7/31/2019  4:11 PM      START taking these medications    Details   acetaminophen (TYLENOL) 650 mg CR tablet Take 2 tablets (1,300 mg total) by mouth every 8 (eight) hours as needed for headaches, Starting Wed 7/31/2019, Normal         CONTINUE these medications which have NOT CHANGED    Details   ALPRAZolam (XANAX) 0 5 mg tablet Take 0 5 mg by mouth 4 (four) times a day as needed for anxiety (Patient takes one at night) , Historical Med      amLODIPine (NORVASC) 10 mg tablet Take 1 tablet (10 mg total) by mouth daily for 14 days, Starting Tue 1/29/2019, Until Thu 7/25/2019, Print      aspirin (ECOTRIN LOW STRENGTH) 81 mg EC tablet Take 1 tablet (81 mg total) by mouth daily, Starting Tue 2/12/2019, No Print      atorvastatin (LIPITOR) 80 mg tablet Take 1 tablet (80 mg total) by mouth daily with dinner, Starting Tue 3/19/2019, Normal      clopidogrel (PLAVIX) 75 mg tablet Take 1 tablet (75 mg total) by mouth daily for 60 days, Starting Sat 7/27/2019, Until Wed 9/25/2019, Print      Docusate Calcium (STOOL SOFTENER PO) Take 1 tablet by mouth as needed (once a week prn) , Historical Med      hydrochlorothiazide (HYDRODIURIL) 25 mg tablet Take 25 mg by mouth daily, Starting Tue 2/19/2019, Historical Med      omeprazole (PriLOSEC) 40 MG capsule Take 40 mg by mouth daily, Starting Mon 7/8/2019, Historical Med      Potassium Chloride ER 20 MEQ TBCR take 1 tablet by mouth twice a day take with food, Historical Med           No discharge procedures on file  ED Provider  Attending physically available and evaluated [address]  I managed the patient along with the ED Attending      Electronically Signed by         Vivek Lawrence MD  08/02/19 3197

## 2019-07-31 NOTE — PROGRESS NOTES
Received call from Bogdan Winter reporting she was becoming concerned about her symptoms  Said she has been taking the tylenol as previously suggested but overnight last night woke up in tears from the pain in the back of her head and neck  The pain has also begun to radiate to her left ear and jaw, and neck stiffness has developed  Without Tylenol her headaches are almost unbearable  Given her recent vertebral artery dissection and current symptoms directed her to ER for further evaluation  Was advised by ER staff to place order for ADT-21 which has been completed  Patient will be coming to [facility]

## 2019-08-01 ENCOUNTER — TELEPHONE (OUTPATIENT)
Dept: NEUROSURGERY | Facility: CLINIC | Age: 49
End: 2019-08-01

## 2019-08-01 NOTE — TELEPHONE ENCOUNTER
Contacted patient to let her know another CTA prior to her visit was not necessary since she just had one on 7/31  Left message for her to call back to discuss

## 2019-08-02 LAB
F2 GENE MUT ANL BLD/T: NORMAL
F5 GENE MUT ANL BLD/T: NORMAL

## 2019-08-06 LAB — RNA AB SER-ACNC: 10.7 EU/ML

## 2019-08-08 ENCOUNTER — HOSPITAL ENCOUNTER (OUTPATIENT)
Dept: RADIOLOGY | Age: 49
Discharge: HOME | End: 2019-08-08
Payer: COMMERCIAL

## 2019-08-08 DIAGNOSIS — Z86.73 HISTORY OF CVA (CEREBROVASCULAR ACCIDENT): ICD-10-CM

## 2019-08-08 DIAGNOSIS — I77.74 VERTEBRAL ARTERY DISSECTION (HCC): ICD-10-CM

## 2019-08-08 PROCEDURE — 70551 MRI BRAIN STEM W/O DYE: CPT

## 2019-08-12 ENCOUNTER — OFFICE VISIT (OUTPATIENT)
Dept: NEUROSURGERY | Facility: CLINIC | Age: 49
End: 2019-08-12
Payer: COMMERCIAL

## 2019-08-12 VITALS
RESPIRATION RATE: 16 BRPM | WEIGHT: 123 LBS | TEMPERATURE: 98.8 F | BODY MASS INDEX: 24.8 KG/M2 | DIASTOLIC BLOOD PRESSURE: 76 MMHG | SYSTOLIC BLOOD PRESSURE: 139 MMHG | HEART RATE: 71 BPM | HEIGHT: 59 IN

## 2019-08-12 DIAGNOSIS — I77.74 VERTEBRAL ARTERY DISSECTION (HCC): Primary | ICD-10-CM

## 2019-08-12 PROCEDURE — 99214 OFFICE O/P EST MOD 30 MIN: CPT | Performed by: RADIOLOGY

## 2019-08-12 NOTE — PROGRESS NOTES
Assessment/Plan:     Diagnoses and all orders for this visit:    Vertebral artery dissection ([address] )  -     CTA head and neck w wo contrast; Future  -     Ambulatory referral to Neurology        Discussion Summary:   Ms Miguel Jones unfortunately has sustained a repeat left vertebral artery occlusion  It is most likely in the setting of underlying dissection rather than embolic thrombus  Additionally, my suspicion is low for vasculitis at this time as my review of the CTA was only significant for the left vertebral artery occlusion  I would like to obtain a repeat CTA in 6 weeks then possibly plan for angiography  If she clinically worsens then we can perform the angiography earlier  I have asked her to schedule follow-up with Dr Maria E Simental sooner than January  Unless he states otherwise, she will be maintained on lifelong Plavix and will transition to monotherapy after either angiography or 3 month CTA, whichever comes first        Chief Complaint: Follow-up (Vertebral artery dissection)      Patient ID: Ashleigh Nolan is a 50 y o  female    HPI    Ms Miguel Jones presents for evaluation of left vertebral artery occlusion  She is known to our service from prior left vertebral artery dissection with associated thrombus in February 2019  She was treated with heparin gtt initially with rapid clearing of the thrombus  She was then transitioned to DAPT and eventually transitioned off her Plavix in July  Shortly thereafter developed acute vision changes as well as headaches and dizziness  She proceeded to the ER where left vertebral artery occlusion/dissection was again seen  She was restarted on Plavix and discharged with follow-up with our office for possible vasculitis  She currently states her headaches and dizziness are improving but persistent  Unable to drive  No double vision  No vertigo  Neck pain also improving  No bleeding issues while on Plavix  Review of Systems   Constitutional: Positive for fatigue   Negative for chills and fever  HENT: Negative  Eyes: Negative  Endocrine: Negative  Musculoskeletal: Positive for gait problem (when dizzy) and neck pain  Negative for back pain, myalgias and neck stiffness  Allergic/Immunologic: Negative  Neurological: Positive for dizziness, light-headedness and headaches  Negative for tremors, seizures, speech difficulty, weakness and numbness  Hematological: Bruises/bleeds easily (medication)  Psychiatric/Behavioral: Negative  I have personally reviewed the MA's review of systems and made changes as necessary      The following portions of the patient's history were reviewed and updated as appropriate: allergies, current medications, past family history, past medical history, past social history, past surgical history and problem list       Active Ambulatory Problems     Diagnosis Date Noted    VAIN I (vaginal intraepithelial neoplasia grade I) 06/12/2018    Vulvovaginal condyloma 06/12/2018    Acute nonintractable headache 01/27/2019    Disorientation 01/27/2019    Hypertensive urgency 01/27/2019    Essential hypertension 02/01/2019    Vision changes 02/01/2019    History of CVA (cerebrovascular accident) 02/05/2019    Vertebral artery dissection ([address] ) 02/05/2019    Heaviness of upper extremity 02/21/2019     Resolved Ambulatory Problems     Diagnosis Date Noted    No Resolved Ambulatory Problems     Past Medical History:   Diagnosis Date    CVA (cerebral vascular accident) ([address] )     GERD (gastroesophageal reflux disease)     Hypertension        Past Surgical History:   Procedure Laterality Date    AUGMENTATION MAMMAPLASTY      GYNECOLOGIC CRYOSURGERY      CERVIX    HYSTERECTOMY      LASER ABLATION OF CONDYLOMAS N/A 7/30/2018    Procedure: Braydon Chain;  Surgeon: Loco Rajput MD;  [facility];  Service: Gynecology Oncology    SKIN TAG REMOVAL      EXICISON OF PERIANAL    WISDOM TOOTH EXTRACTION Vitals:    08/12/19 1130   BP: 139/76   Pulse: 71   Resp: 16   Temp: 98 8 °F (37 1 °C)         Objective:    Physical Exam   Constitutional: She is oriented to person, place, and time  She appears well-developed and well-nourished  HENT:   Head: Normocephalic and atraumatic  Eyes: Pupils are equal, round, and reactive to light  EOM are normal    Cardiovascular: Normal rate  Pulmonary/Chest: Effort normal    Abdominal: Soft  Musculoskeletal: Normal range of motion  She exhibits no edema, tenderness or deformity  Neurological: She is alert and oriented to person, place, and time  No cranial nerve deficit or sensory deficit  She exhibits normal muscle tone  Coordination normal    Skin: Skin is warm and dry  Psychiatric: She has a normal mood and affect  Her behavior is normal  Judgment and thought content normal      Neurologic Exam     Mental Status   Oriented to person, place, and time  Cranial Nerves     CN III, IV, VI   Pupils are equal, round, and reactive to light  Extraocular motions are normal        Results/Data:  I have reviewed the results and images from the CTA in detail with the patient

## 2019-08-13 ENCOUNTER — TELEPHONE (OUTPATIENT)
Dept: NEUROLOGY | Facility: CLINIC | Age: 49
End: 2019-08-13

## 2019-08-13 NOTE — TELEPHONE ENCOUNTER
pt made aware  she states that she is now on plavix  since starting plavix she is feeling better but has still occassional lightheadedness and dizziness  occurs 1-2 times per week  she notices more dizziness when she is a passenger in the car    she saw dr Luanne Solorzano yesterday and they want her to repeat CTA in 6 weeks  she has an appointment to see you on 9/30  dr Luanne Solorzano wanted pt seen prior to pt returning to see him on 9/23 but there was no availaility until 9/30    Please advise if you are able to see pt prior to appt with dr Luanne Solorzano  [phone]

## 2019-08-13 NOTE — TELEPHONE ENCOUNTER
----- Message from Nena Wright MD sent at 8/9/2019  2:11 PM EDT -----  Regarding: MRI  Please give Travis Jacobson a call  I look at her MRI today  I do not see any obvious acute strokes or changes compared to her prior MRI  The official radiology reading is not available yet, so there is always the possibility that they will  on something that I didn't see  Have her symptoms remained resolved (she sent an epic message before in regard to significant dizziness and headaches)?

## 2019-08-20 NOTE — TELEPHONE ENCOUNTER
Garima Fernandez and Melinda, can you please see if we can find a spot for Frances for the week of the 26th    There should be a few 30 minute slots but not at [address]    Either corey or Paige

## 2019-08-27 ENCOUNTER — OFFICE VISIT (OUTPATIENT)
Dept: NEUROLOGY | Facility: CLINIC | Age: 49
End: 2019-08-27
Payer: COMMERCIAL

## 2019-08-27 VITALS
DIASTOLIC BLOOD PRESSURE: 84 MMHG | HEART RATE: 70 BPM | SYSTOLIC BLOOD PRESSURE: 132 MMHG | BODY MASS INDEX: 24.84 KG/M2 | WEIGHT: 123 LBS

## 2019-08-27 DIAGNOSIS — Z86.73 HISTORY OF CVA (CEREBROVASCULAR ACCIDENT): ICD-10-CM

## 2019-08-27 DIAGNOSIS — I10 ESSENTIAL HYPERTENSION: Primary | ICD-10-CM

## 2019-08-27 DIAGNOSIS — G44.319 ACUTE POST-TRAUMATIC HEADACHE, NOT INTRACTABLE: ICD-10-CM

## 2019-08-27 DIAGNOSIS — F41.9 ANXIETY DISORDER, UNSPECIFIED TYPE: ICD-10-CM

## 2019-08-27 DIAGNOSIS — I77.74 VERTEBRAL ARTERY DISSECTION (HCC): ICD-10-CM

## 2019-08-27 PROCEDURE — 99215 OFFICE O/P EST HI 40 MIN: CPT | Performed by: PSYCHIATRY & NEUROLOGY

## 2019-08-27 PROCEDURE — 3075F SYST BP GE 130 - 139MM HG: CPT | Performed by: PSYCHIATRY & NEUROLOGY

## 2019-08-27 RX ORDER — ESCITALOPRAM OXALATE 10 MG/1
10 TABLET ORAL DAILY
Qty: 30 TABLET | Refills: 3 | Status: SHIPPED | OUTPATIENT
Start: 2019-08-27 | End: 2020-03-23

## 2019-08-27 NOTE — PROGRESS NOTES
Patient ID: Sonja Ann is a 50 y o  female  Assessment/Plan:    No problem-specific Assessment & Plan notes found for this encounter  Diagnoses and all orders for this visit:    Essential hypertension    Vertebral artery dissection (HCC)    Acute post-traumatic headache, not intractable    History of CVA (cerebrovascular accident)  -     Antithrombin III Activity; Future  -     Cardiolipin antibody; Future  -     Beta-2 glycoprotein antibodies; Future  -     escitalopram (LEXAPRO) 10 mg tablet; Take 1 tablet (10 mg total) by mouth daily  -     Ambulatory referral to Physical Therapy; Future    Anxiety disorder, unspecified type  -     escitalopram (LEXAPRO) 10 mg tablet; Take 1 tablet (10 mg total) by mouth daily       Patient Instructions   Stroke:  Pedro Luis Pruitt presents for follow-up with regard to her prior cerebellar strokes and more recent episode of vision loss  Upon repeat imaging she has not had any new stroke events/damage, however her CT angiogram does show that her vertebral artery has been most recently occluded  Her thrombosis panel showed a few protein levels that are somewhat outside of the normal range but very barely so, and there is no specific evidence at this point that she has any type of a genetic propensity for abnormal clotting  She does not report any new stroke events in the office today beyond what we were already aware of   -for ongoing stroke prevention for the time being she should continue her combination of aspirin, Plavix, Lipitor, and appropriate blood pressure and glycemic control  -we will continue to defer to the good judgment of her primary care and cardiology team for monitoring of her cholesterol panel and blood sugar numbers    We would continue to suggest targeting a blood pressure of less than 130/80 on a regular basis   -she continues to abstain from smoking which is an excellent step in the right direction towards lowering her stroke risk  -she is experiencing relatively significant anxiety  She is receiving weekly counseling which is helpful, but I would suggest that the anxiety is still really exacerbating her symptoms to a degree  We will plan to begin Lexapro dose of 10 mg taken once daily in the morning  If she notes significant tiredness she can take it at bedtime, if she feels overmedicated initially she could also split the tablet in half and take 5 mg for the 1st week or 2   -I do think that we should repeat a few components of the thrombosis panel that were mildly positive as I have a strong suspicion that these are related just to protein fluctuations  We will request an anti cardiolipin antibody test, beta 2 glycoprotein test, and antithrombin 3 level  The should be done in October or November of 2019   -I am In agreement with our interventional neurosurgery team   We will plan for her repeat CT angiogram   I would suggest that if her vertebral artery remains occluded, or if it is wide open with no significant scarring then it is unlikely that a cerebral angiogram will provide any useful data  If the vertebral artery opens only partially then it may be reasonable to perform more conventional angiography  Subsequent to either her CT angiogram or conventional angiogram, provided that her artery stabilizes, I would agree that she should transition to anti-platelet monotherapy and Plavix is a very reasonable choice in that regard     -at this point she continues to require time off from work and I agree that that is reasonable considering her most recent symptoms  I do think that she would benefit from vestibular rehabilitation in addition to treatment for the post stroke anxiety  My hope is that with this combination she may be able to return to work in the foreseeable future  We will continue to evaluate this on an ongoing basis      I will plan for her to return to the office to see us in 4 months time but I would be happy to see her sooner if the need should arise  If she has any symptoms concerning for stroke such as sudden painless loss of vision or double vision, difficulty speaking or swallowing, vertigo/room spinning that does not quickly resolve and is quite unusual, or weakness/numbness affecting 1 side of the face or body she should proceed by ambulance to the nearest emergency room immediately  Subjective:    ALEJANDRA    Shiva Garcia presents for follow-up with regard to her prior stroke  In the interval since her last visit to the office she had an episode of significant vision loss and some severe dizziness  She was seen in the hospital   She did have a repeat MRI which shows no new strokes  While following up with the Interventional Neurosurgery team it was determined that her vertebral artery has now once again occluded  This is likely related to her prior dissection rather than a cardiac thrombus  Since the time of her most recent event she reports feeling some depersonalization in relatively significant anxiety as well as frustration  She denies any other new symptoms concerning for TIA or stroke  She is now in counseling once per week  I reviewed her thrombosis panel which revealed some lengthening of the dilute Bharath viper venom time but this is likely related to heparinization of the tube or heparin being present in the sample  She does have very minimal elevations in anticardiolipin and beta 2 glycoprotein antibodies, only 1/3 antibodies in each set, and very minimal decrease in antithrombin 3  Would suggest that these protein levels are most likely just fluctuating, and particularly in the setting in which the labs were drawn  We will plan to repeat that in few months  We had a long discussion with regard to treatment for her anxiety  She opts to begin a medication at this point in time    We also discussed the ongoing vestibular issues including having difficulty with looking from a screen down to the keyboard while working on a computer, trouble with changes in position and issues with walking  I think she would clearly benefit from vestibular rehabilitation  She is not driving currently and notes that when she is in the car she may feel car sick but that is improved when she looks down at the floor rather than out the window      Past Medical History:   Diagnosis Date    CVA [facility]cerebral vascular accident) ([facility] [address] )     GERD (gastroesophageal reflux disease)     occasionally    Hypertension     controlling with diet and exercise       Social History     Socioeconomic History    Marital status: /Civil Union     Spouse name: None    Number of children: None    Years of education: None    Highest education level: None   Occupational History    None   Social Needs    Financial resource strain: None    Food insecurity:     Worry: None     Inability: None    Transportation needs:     Medical: None     Non-medical: None   Tobacco Use    Smoking status: Never Smoker    Smokeless tobacco: Never Used   Substance and Sexual Activity    Alcohol use: Yes     Comment: SOCIAL-1-2 drinks per week    Drug use: No    Sexual activity: None   Lifestyle    Physical activity:     Days per week: None     Minutes per session: None    Stress: None   Relationships    Social connections:     Talks on phone: None     Gets together: None     Attends Mu-ism service: None     Active member of club or organization: None     Attends meetings of clubs or organizations: None     Relationship status: None    Intimate partner violence:     Fear of current or ex partner: None     Emotionally abused: None     Physically abused: None     Forced sexual activity: None   Other Topics Concern    None   Social History Narrative    PROBLEMS CONCERNING ADOPTED CHILD    CAFFEINE USE    MODERATE EXERCISING LESS THAN 3x WEEK       Family History   Adopted: Yes   Problem Relation Age of Onset    No Known Problems Mother     No Known Problems Father     No Known Problems Sister     No Known Problems Brother     No Known Problems Maternal Aunt     No Known Problems Maternal Uncle     No Known Problems Paternal Aunt     No Known Problems Paternal Uncle     No Known Problems Maternal Grandmother     No Known Problems Maternal Grandfather     No Known Problems Paternal Grandmother     No Known Problems Paternal Grandfather          Current Outpatient Medications:     acetaminophen (TYLENOL) 650 mg CR tablet, Take 2 tablets (1,300 mg total) by mouth every 8 (eight) hours as needed for headaches, Disp: 30 tablet, Rfl: 0    ALPRAZolam (XANAX) 0 5 mg tablet, Take 0 5 mg by mouth 4 (four) times a day as needed for anxiety (Patient takes one at night) , Disp: , Rfl:     aspirin (ECOTRIN LOW STRENGTH) 81 mg EC tablet, Take 1 tablet (81 mg total) by mouth daily, Disp: , Rfl:     atorvastatin (LIPITOR) 80 mg tablet, Take 1 tablet (80 mg total) by mouth daily with dinner, Disp: 30 tablet, Rfl: 11    clopidogrel (PLAVIX) 75 mg tablet, Take 1 tablet (75 mg total) by mouth daily for 60 days, Disp: 60 tablet, Rfl: 0    Docusate Calcium (STOOL SOFTENER PO), Take 1 tablet by mouth as needed (once a week prn) , Disp: , Rfl:     hydrochlorothiazide (HYDRODIURIL) 25 mg tablet, Take 25 mg by mouth daily, Disp: , Rfl: 0    omeprazole (PriLOSEC) 40 MG capsule, Take 40 mg by mouth daily, Disp: , Rfl: 0    Potassium Chloride ER 20 MEQ TBCR, take 1 tablet by mouth twice a day take with food, Disp: , Rfl: 0    amLODIPine (NORVASC) 10 mg tablet, Take 1 tablet (10 mg total) by mouth daily for 14 days, Disp: 14 tablet, Rfl: 0    escitalopram (LEXAPRO) 10 mg tablet, Take 1 tablet (10 mg total) by mouth daily, Disp: 30 tablet, Rfl: 3      The following portions of the patient's history were reviewed: allergies, current medications, past family history, past medical history, past social history and problem list            Objective:    Blood pressure 132/84, pulse 70, weight 55 8 kg (123 lb)  Physical Exam    Neurological Exam    At the time of my evaluation she was awake, alert, and in no distress  There are no obvious cranial neuropathies or lateralizing signs  She is reasonably good historian  There is no dysarthria or aphasia  ROS:    Review of Systems   Constitutional: Negative  Negative for appetite change and fever  HENT: Negative  Negative for hearing loss, tinnitus, trouble swallowing and voice change  Eyes: Positive for visual disturbance (better in the last month)  Negative for photophobia and pain  Respiratory: Negative  Negative for shortness of breath  Cardiovascular: Negative  Negative for palpitations  Gastrointestinal: Positive for nausea  Negative for vomiting  Endocrine: Negative  Negative for cold intolerance and heat intolerance  Genitourinary: Negative  Negative for dysuria, frequency and urgency  Musculoskeletal: Negative  Negative for myalgias and neck pain  Skin: Negative  Negative for rash  Neurological: Positive for light-headedness  Negative for dizziness (in the car 9( car sick)), tremors, seizures, syncope, facial asymmetry, speech difficulty, weakness and numbness  Hematological: Negative  Does not bruise/bleed easily  Psychiatric/Behavioral: Negative  Negative for confusion (disconnected), hallucinations and sleep disturbance  Reviewed ROS as entered by medical assistant  I have spent 45 minutes with Patient  today in which greater than 50% of this time was spent in counseling/coordination of care regarding Diagnostic results, Risks and benefits of tx options and Intructions for management

## 2019-08-27 NOTE — PATIENT INSTRUCTIONS
Stroke:  Wallace Lindquist presents for follow-up with regard to her prior cerebellar strokes and more recent episode of vision loss  Upon repeat imaging she has not had any new stroke events/damage, however her CT angiogram does show that her vertebral artery has been most recently occluded  Her thrombosis panel showed a few protein levels that are somewhat outside of the normal range but very barely so, and there is no specific evidence at this point that she has any type of a genetic propensity for abnormal clotting  She does not report any new stroke events in the office today beyond what we were already aware of   -for ongoing stroke prevention for the time being she should continue her combination of aspirin, Plavix, Lipitor, and appropriate blood pressure and glycemic control  -we will continue to defer to the good judgment of her primary care and cardiology team for monitoring of her cholesterol panel and blood sugar numbers  We would continue to suggest targeting a blood pressure of less than 130/80 on a regular basis   -she continues to abstain from smoking which is an excellent step in the right direction towards lowering her stroke risk  -she is experiencing relatively significant anxiety  She is receiving weekly counseling which is helpful, but I would suggest that the anxiety is still really exacerbating her symptoms to a degree  We will plan to begin Lexapro dose of 10 mg taken once daily in the morning  If she notes significant tiredness she can take it at bedtime, if she feels overmedicated initially she could also split the tablet in half and take 5 mg for the 1st week or 2   -I do think that we should repeat a few components of the thrombosis panel that were mildly positive as I have a strong suspicion that these are related just to protein fluctuations  We will request an anti cardiolipin antibody test, beta 2 glycoprotein test, and antithrombin 3 level    The should be done in October or November of 2019   -I am In agreement with our interventional neurosurgery team   We will plan for her repeat CT angiogram   I would suggest that if her vertebral artery remains occluded, or if it is wide open with no significant scarring then it is unlikely that a cerebral angiogram will provide any useful data  If the vertebral artery opens only partially then it may be reasonable to perform more conventional angiography  Subsequent to either her CT angiogram or conventional angiogram, provided that her artery stabilizes, I would agree that she should transition to anti-platelet monotherapy and Plavix is a very reasonable choice in that regard     -at this point she continues to require time off from work and I agree that that is reasonable considering her most recent symptoms  I do think that she would benefit from vestibular rehabilitation in addition to treatment for the post stroke anxiety  My hope is that with this combination she may be able to return to work in the foreseeable future  We will continue to evaluate this on an ongoing basis  I will plan for her to return to the office to see us in 4 months time but I would be happy to see her sooner if the need should arise  If she has any symptoms concerning for stroke such as sudden painless loss of vision or double vision, difficulty speaking or swallowing, vertigo/room spinning that does not quickly resolve and is quite unusual, or weakness/numbness affecting 1 side of the face or body she should proceed by ambulance to the nearest emergency room immediately

## 2019-08-28 ENCOUNTER — TELEPHONE (OUTPATIENT)
Dept: NEUROLOGY | Facility: CLINIC | Age: 49
End: 2019-08-28

## 2019-08-28 NOTE — TELEPHONE ENCOUNTER
Mary Sanchez with [facility] questioning if Dr Pillo Louis is placing any work restrictions on the patient  I did inform her that Dr Pillo Louis did include in his office note that the patient requires time off from work but may be able to return to work in the forseeable future  She is questioning if Dr Pillo Louis can advise on work restrictions  Please advise  She would like a call back with Dr Julio Carvajal response   May leave a confidential voice mail @ [phone]      Faxed 8/27/19 office note to her @ [phone]

## 2019-08-29 NOTE — TELEPHONE ENCOUNTER
Yes, at the moment she is not cleared to return to work in any capacity, full restriction  With the combination of treatment for her post stroke anxiety and vestibular rehabilitation I hope to be able to clear her to return to work in the next few months but we will need to re-evaluate this on an ongoing basis so I don't have a specific date yet  If they require a POSSIBLE date you can give them January 1 2020 but I hope that we can get her back sooner than that

## 2019-08-29 NOTE — TELEPHONE ENCOUNTER
Left detailed message on Mili's confidential vm that patient is not cleared to return to work in any capacity, full restrictions  And if a date is needed, a possible date is Jan 2020

## 2019-09-05 ENCOUNTER — OFFICE VISIT (OUTPATIENT)
Dept: CARDIOLOGY CLINIC | Facility: CLINIC | Age: 49
End: 2019-09-05
Payer: COMMERCIAL

## 2019-09-05 VITALS
DIASTOLIC BLOOD PRESSURE: 72 MMHG | OXYGEN SATURATION: 98 % | WEIGHT: 124.7 LBS | SYSTOLIC BLOOD PRESSURE: 110 MMHG | HEART RATE: 64 BPM | BODY MASS INDEX: 25.14 KG/M2 | HEIGHT: 59 IN

## 2019-09-05 DIAGNOSIS — Z86.73 HISTORY OF CVA (CEREBROVASCULAR ACCIDENT): ICD-10-CM

## 2019-09-05 DIAGNOSIS — I10 ESSENTIAL HYPERTENSION: Primary | ICD-10-CM

## 2019-09-05 DIAGNOSIS — I77.74 VERTEBRAL ARTERY DISSECTION (HCC): ICD-10-CM

## 2019-09-05 PROCEDURE — 3078F DIAST BP <80 MM HG: CPT | Performed by: INTERNAL MEDICINE

## 2019-09-05 PROCEDURE — 99214 OFFICE O/P EST MOD 30 MIN: CPT | Performed by: INTERNAL MEDICINE

## 2019-09-05 PROCEDURE — 3074F SYST BP LT 130 MM HG: CPT | Performed by: INTERNAL MEDICINE

## 2019-09-05 NOTE — PROGRESS NOTES
Cardiology Follow Up    Ashleigh Nolan  1970  [id number]  [address] [facility]  [address] [place] [address] [address] [address]  [phone]    1  Essential hypertension     2  Vertebral artery dissection ([address] )     3  History of CVA (cerebrovascular accident)         Discussion/Summary:  From a cardiac standpoint she has been stable  I reviewed the recent hospital records she had distal occlusion of her known dissected vertebral artery  No new CVA was noted  She is currently being evaluated by Neurosurgery and Neurology  From a cardiac standpoint blood pressure is well controlled  Lipids are doing well on high-intensity statin  I have made no other changes she can see me back in 6-8 months  Interval History:  30-year-old female with a history of difficult to control hypertension mostly secondary to medication noncompliance presents for a hospital follow-up  She has had headaches and visual changes with blood pressures over 399 systolic  She was admitted to the hospital overnight started on medications  She has some stressors in her life diet is moderate in sodium  She denies any exertional chest pain or discomfort shortness of breath, palpitations, lower extremity edema, PND, orthopnea  She has had some periods where she states that she was clumsy  She had 2 CT scans of the head which did not show any abnormalities  Following a last visit she had developed an episode of vision change in was found to have an occluded vertebral with a known dissection was  She underwent evaluation by Neurosurgery repeat CTA is being ordered I think this will likely remain occluded at this point time  Will follow-up on studies  She denies any chest pain, shortness of breath, palpitations, lightheadedness, dizziness, or syncope  His been no lower extremity edema, PND, orthopnea      Problem List     VAIN I (vaginal intraepithelial neoplasia grade I)    Vulvovaginal condyloma    Acute nonintractable headache    Disorientation    Hypertensive urgency    Essential hypertension    Vision changes        Past Medical History:   Diagnosis Date    CVA (cerebral vascular accident) ([address] )     GERD (gastroesophageal reflux disease)     occasionally    Hypertension     controlling with diet and exercise     Social History     Socioeconomic History    Marital status: /Civil Union     Spouse name: Not on file    Number of children: Not on file    Years of education: Not on file    Highest education level: Not on file   Occupational History    Not on file   Social Needs    Financial resource strain: Not on file    Food insecurity:     Worry: Not on file     Inability: Not on file    Transportation needs:     Medical: Not on file     Non-medical: Not on file   Tobacco Use    Smoking status: Never Smoker    Smokeless tobacco: Never Used   Substance and Sexual Activity    Alcohol use: Yes     Comment: SOCIAL-1-2 drinks per week    Drug use: No    Sexual activity: Not on file   Lifestyle    Physical activity:     Days per week: Not on file     Minutes per session: Not on file    Stress: Not on file   Relationships    Social connections:     Talks on phone: Not on file     Gets together: Not on file     Attends Voodoo service: Not on file     Active member of club or organization: Not on file     Attends meetings of clubs or organizations: Not on file     Relationship status: Not on file    Intimate partner violence:     Fear of current or ex partner: Not on file     Emotionally abused: Not on file     Physically abused: Not on file     Forced sexual activity: Not on file   Other Topics Concern    Not on file   Social History Narrative    PROBLEMS CONCERNING ADOPTED CHILD    CAFFEINE USE    MODERATE EXERCISING LESS THAN 3x WEEK      Family History   Adopted: Yes   Problem Relation Age of Onset    No Known Problems Mother     No Known Problems Father     No Known Problems Sister     No Known Problems Brother     No Known Problems Maternal Aunt     No Known Problems Maternal Uncle     No Known Problems Paternal Aunt     No Known Problems Paternal Uncle     No Known Problems Maternal Grandmother     No Known Problems Maternal Grandfather     No Known Problems Paternal Grandmother     No Known Problems Paternal Grandfather      Past Surgical History:   Procedure Laterality Date    AUGMENTATION MAMMAPLASTY      GYNECOLOGIC CRYOSURGERY      CERVIX    HYSTERECTOMY      LASER ABLATION OF CONDYLOMAS N/A 7/30/2018    Procedure: OMNIGUIDE LASER ABLATION OF VULVA;  Surgeon: Prieto Mariano MD;  [facility];  Service: Gynecology Oncology    SKIN TAG REMOVAL      EXICISON OF PERIANAL    WISDOM TOOTH EXTRACTION         Current Outpatient Medications:     acetaminophen (TYLENOL) 650 mg CR tablet, Take 2 tablets (1,300 mg total) by mouth every 8 (eight) hours as needed for headaches, Disp: 30 tablet, Rfl: 0    ALPRAZolam (XANAX) 0 5 mg tablet, Take 0 5 mg by mouth 4 (four) times a day as needed for anxiety (Patient takes one at night) , Disp: , Rfl:     amLODIPine (NORVASC) 10 mg tablet, Take 1 tablet (10 mg total) by mouth daily for 14 days, Disp: 14 tablet, Rfl: 0    aspirin (ECOTRIN LOW STRENGTH) 81 mg EC tablet, Take 1 tablet (81 mg total) by mouth daily, Disp: , Rfl:     atorvastatin (LIPITOR) 80 mg tablet, Take 1 tablet (80 mg total) by mouth daily with dinner, Disp: 30 tablet, Rfl: 11    clopidogrel (PLAVIX) 75 mg tablet, Take 1 tablet (75 mg total) by mouth daily for 60 days, Disp: 60 tablet, Rfl: 0    Docusate Calcium (STOOL SOFTENER PO), Take 1 tablet by mouth as needed (once a week prn) , Disp: , Rfl:     escitalopram (LEXAPRO) 10 mg tablet, Take 1 tablet (10 mg total) by mouth daily, Disp: 30 tablet, Rfl: 3    hydrochlorothiazide (HYDRODIURIL) 25 mg tablet, Take 25 mg by mouth daily, Disp: , Rfl: 0   omeprazole (PriLOSEC) 40 MG capsule, Take 40 mg by mouth daily, Disp: , Rfl: 0    Potassium Chloride ER 20 MEQ TBCR, take 1 tablet by mouth twice a day take with food, Disp: , Rfl: 0  Allergies   Allergen Reactions    Shellfish Allergy Anaphylaxis       Labs:     Chemistry        Component Value Date/Time    K 3 1 (L) 07/26/2019 0612     07/26/2019 0612    CO2 30 07/26/2019 0612    BUN 13 07/26/2019 0612    CREATININE 0 75 07/26/2019 0612        Component Value Date/Time    CALCIUM 8 7 07/26/2019 0612    ALKPHOS 76 07/26/2019 0612    AST 16 07/26/2019 0612    ALT 28 07/26/2019 0612            No results found for: CHOL  Lab Results   Component Value Date    HDL 50 07/26/2019    HDL 43 02/22/2019    HDL 58 02/05/2019     Lab Results   Component Value Date    LDLCALC 67 07/26/2019    LDLCALC 74 02/22/2019    LDLCALC 181 (H) 02/05/2019     Lab Results   Component Value Date    TRIG 70 07/26/2019    TRIG 79 02/22/2019    TRIG 115 02/05/2019     No results found for: CHOLHDL    Imaging: Ct Head Without Contrast    Result Date: 1/29/2019  Narrative: CT BRAIN - WITHOUT CONTRAST INDICATION:   Headache, hypertension  COMPARISON:  January 27, 2019 TECHNIQUE:  CT examination of the brain was performed  In addition to axial images, coronal 2D reformatted images were created and submitted for interpretation  Radiation dose length product (DLP) for this visit:  830 mGy-cm   This examination, like all CT scans performed in the [facility], was performed utilizing techniques to minimize radiation dose exposure, including the use of iterative reconstruction and automated exposure control  IMAGE QUALITY:  Diagnostic  FINDINGS: PARENCHYMA:  No intracranial mass, mass effect or midline shift  No CT signs of acute infarction  No acute parenchymal hemorrhage  VENTRICLES AND EXTRA-AXIAL SPACES:  Normal for the patient's age  VISUALIZED ORBITS AND PARANASAL SINUSES:  Unremarkable   CALVARIUM AND EXTRACRANIAL SOFT TISSUES:  Normal      Impression: No acute intracranial abnormality  Workstation performed: [id number]     Ct Head Without Contrast    Result Date: 1/27/2019  Narrative: CT BRAIN - WITHOUT CONTRAST INDICATION:   headaches, confusion, hypertensive urgency  COMPARISON:  March 22, 2009 TECHNIQUE:  CT examination of the brain was performed  In addition to axial images, coronal 2D reformatted images were created and submitted for interpretation  Radiation dose length product (DLP) for this visit:  923 mGy-cm   This examination, like all CT scans performed in the [facility], was performed utilizing techniques to minimize radiation dose exposure, including the use of iterative reconstruction and automated exposure control  IMAGE QUALITY:  Diagnostic  FINDINGS: PARENCHYMA:  No intracranial mass, mass effect or midline shift  No CT signs of acute infarction  No acute parenchymal hemorrhage  VENTRICLES AND EXTRA-AXIAL SPACES:  Normal for the patient's age  VISUALIZED ORBITS AND PARANASAL SINUSES:  Unremarkable  CALVARIUM AND EXTRACRANIAL SOFT TISSUES:  Normal      Impression: No acute intracranial abnormality  Workstation performed: [id number]       ECG:        Review of Systems   Constitution: Negative  HENT: Negative  Eyes: Negative  Cardiovascular: Negative  Respiratory: Negative  Endocrine: Negative  Hematologic/Lymphatic: Negative  Skin: Negative  Musculoskeletal: Negative  Gastrointestinal: Negative  Genitourinary: Negative  Neurological: Negative  Psychiatric/Behavioral: Negative  Vitals:    09/05/19 0750   BP: 110/72   Pulse: 64   SpO2: 98%     Vitals:    09/05/19 0750   Weight: 56 6 kg (124 lb 11 2 oz)     Height: 4' 11" (149 9 cm)   Body mass index is 25 19 kg/m²      Physical Exam:  General:  Alert and cooperative, appears stated age  HEENT:  PERRLA, EOMI, no scleral icterus, no conjunctival pallor  Neck:  No lymphadenopathy, no thyromegaly, no carotid bruits, no elevated JVP  Heart:  Regular rate and rhythm, normal S1/S2, no S3/S4, no murmur  Lungs:  Clear to auscultation bilaterally   Abdomen:  Soft, non-tender, positive bowel sounds, no rebound or guarding,   no organomegaly   Extremities:  No clubbing, cyanosis or edema   Vascular:  2+ pedal pulses  Skin:  No rashes or lesions on exposed skin  Neurologic:  Cranial nerves II-XII grossly intact without focal deficits

## 2019-09-18 ENCOUNTER — EVALUATION (OUTPATIENT)
Dept: PHYSICAL THERAPY | Facility: CLINIC | Age: 49
End: 2019-09-18
Payer: COMMERCIAL

## 2019-09-18 VITALS — HEART RATE: 112 BPM | DIASTOLIC BLOOD PRESSURE: 93 MMHG | SYSTOLIC BLOOD PRESSURE: 146 MMHG

## 2019-09-18 DIAGNOSIS — R42 DIZZINESS: Primary | ICD-10-CM

## 2019-09-18 DIAGNOSIS — Z86.73 HISTORY OF CVA (CEREBROVASCULAR ACCIDENT): ICD-10-CM

## 2019-09-18 PROCEDURE — 97162 PT EVAL MOD COMPLEX 30 MIN: CPT | Performed by: PHYSICAL THERAPIST

## 2019-09-18 NOTE — PROGRESS NOTES
PT Evaluation     Today's date: 2019  Patient name: Breonna Briceno  : 1970  MRN: [id number]  Referring provider: Alek Nolasco MD  Dx:   Encounter Diagnosis     ICD-10-CM    1  Dizziness R42    2  History of CVA (cerebrovascular accident) Z86 73 Ambulatory referral to Physical Therapy                  Assessment  Assessment details: Pt is a 52year old female referred with dizziness and history of CVA  Pt presented today with abnormal VOMs testing, abnormal DVA testing, and balance deficits per FGA testing all which limit her functionally with returning to work and driving  Pt will benefit from PT services needed to address above deficits  Impairments: impaired balance and lacks appropriate home exercise program  Other impairment: increased dizziness  Understanding of Dx/Px/POC: good   Prognosis: good    Goals  STGs:  1  Pt will reduce HA rating to at worst a 2/10 within 4 weeks  2  Pt will reduce dizziness rating to at worst a 3/10 within 4 weeks  3  Pt will improve ABC score by at least 10 points within 4 weeks  4  Pt will demonstrate independence with HEP within 4 weeks  LTGs:  1  Pt will reduce HA rating to at worst a 1/10 within 8 weeks  2  Pt will reduce dizziness rating to at worst a 1/10 within 8 weeks  3  Pt will improve FGA score to at least 28/30 needed to reduce fall risk within 8 weeks  4  Pt will demonstrate independence with HEP within 8 weeks      Plan  Patient would benefit from: skilled physical therapy  Planned therapy interventions: patient education, balance, therapeutic activities, therapeutic exercise, home exercise program, gait training and neuromuscular re-education  Frequency: 2x week  Duration in weeks: 8  Plan of Care beginning date: 2019  Plan of Care expiration date: 11/15/2019  Treatment plan discussed with: patient        Subjective Evaluation    History of Present Illness  Mechanism of injury: Had 4 strokes in Feb, fully recovered from a slight L hemiparesis  In July had issues with vision loss, ever since then has not felt right since then, feels disconnected  Does have an occluded vertebral artery  Not driving for the most part, does have problems in the car with things coming at, gets dizziness, disconnected, nauseous  Denies falls or near falls, but when gets dizzy doesn't get out of bed  Describes as lightheaded/off balance  Recently laid off from work, currently not working, on long term disability  Pain  No pain reported    Social Support  Steps to enter house: yes  Stairs in house: yes   L[facility] in: [facility]  Lives with: spouse and young children      Diagnostic Tests  Abnormal CT scan: scheduled tomorrow  Patient Goals  Patient goals for therapy: improved balance  Patient goal: reduce dizziness        Objective     Concurrent Complaints  Positive for headaches  Neuro Exam:     Dizziness  Positive for motion sickness and light-headedness  Negative for diplopia  Symptoms   Intensity at best: 3/10  Intensity at worst: 6/10    Headaches   Patient reports headaches: Yes     Frequency: 3-4 times a week  Duration: maybe an hour at a time, comes and goes throughout the day  Intensity at best: 0/10  Intensity at worst: 5/10  Location: top ,sides of her head    VOMS (Vestibular/Ocular Motor Screen)     Smooth pursuit Normal     Saccades (horizontal) Abnormal, slowed, hypometric     Saccades (vertical)  Abnormal, slowed, hypometric     Convergence (near point)--Normal trial 1: 4 cm     VOR (horizontal) Normal, increased dizziness/disconnected     VOR (vertical) Normal     Visual Motion Sensitivity Abnormal, increased dizziness    Modified Clinical Test of Sensory Interaction on Balance  30 eyes open firm surface  30 eyes closed firm surface  30 eyes open foam surface  30 eyes closed foam surface     FGA: 25/30    DVA: abnormal, lost 3 lines    ABC: 71%    Gaze stabilization (room light): normal all directions       Precautions: no lifting, no strain, no cardio work, keep /80 (if DBP >90 pt goes to ER)  Short Term Goal Expiration Date:(10/18/19)  Long Term Goal Expiration Date: (11/15/19)  POC Expiration Date: (11/15/19)      Manual                                                     Exercise Diary                                                                                                                                                                             Modalities

## 2019-09-19 ENCOUNTER — HOSPITAL ENCOUNTER (OUTPATIENT)
Dept: CT IMAGING | Facility: HOSPITAL | Age: 49
Discharge: HOME | End: 2019-09-19
Attending: RADIOLOGY
Payer: COMMERCIAL

## 2019-09-19 DIAGNOSIS — I77.74 VERTEBRAL ARTERY DISSECTION (HCC): ICD-10-CM

## 2019-09-19 PROCEDURE — 70496 CT ANGIOGRAPHY HEAD: CPT

## 2019-09-19 PROCEDURE — 70498 CT ANGIOGRAPHY NECK: CPT

## 2019-09-19 RX ADMIN — IOHEXOL 90 ML: 350 INJECTION, SOLUTION INTRAVENOUS at 13:13

## 2019-09-23 ENCOUNTER — OFFICE VISIT (OUTPATIENT)
Dept: NEUROSURGERY | Facility: CLINIC | Age: 49
End: 2019-09-23
Payer: COMMERCIAL

## 2019-09-23 ENCOUNTER — APPOINTMENT (OUTPATIENT)
Dept: PHYSICAL THERAPY | Facility: CLINIC | Age: 49
End: 2019-09-23
Payer: COMMERCIAL

## 2019-09-23 ENCOUNTER — DOCUMENTATION (OUTPATIENT)
Dept: NEUROSURGERY | Facility: CLINIC | Age: 49
End: 2019-09-23

## 2019-09-23 VITALS
HEIGHT: 59 IN | DIASTOLIC BLOOD PRESSURE: 90 MMHG | BODY MASS INDEX: 25.2 KG/M2 | TEMPERATURE: 98.5 F | HEART RATE: 62 BPM | RESPIRATION RATE: 18 BRPM | SYSTOLIC BLOOD PRESSURE: 142 MMHG | WEIGHT: 125 LBS

## 2019-09-23 DIAGNOSIS — I77.74 VERTEBRAL ARTERY DISSECTION (HCC): Primary | ICD-10-CM

## 2019-09-23 PROCEDURE — 99213 OFFICE O/P EST LOW 20 MIN: CPT | Performed by: RADIOLOGY

## 2019-09-23 NOTE — PROGRESS NOTES
Assessment/Plan:     Diagnoses and all orders for this visit:    Vertebral artery dissection [facility])          Discussion Summary:   Ms  Melvin Lino has stable occlusion of her left vertebral artery  I agree with Dr Mirtha Lopez assessment and recommendation that an angiogram will not likely  or her diagnosis for that matter  I have recommended she stay on DAPT for now and can stop that at Dr Mirtha Lopez recommendation  From my perspective, she has no lifting or activity restrictions however I strongly recommend against neck massage or any other manipulation  She can undergo gentle ROM and stretching exercises  Chief Complaint: Follow-up (6 week follow up )      Patient ID: Laina Eldridge is a 52 y o  female    HPI    Ms  Melvin Lino presents for follow-up of her left vertebral artery occlusion  She continues to experience lightheadedness and mild headaches and gait instability  No vision changes  No new stroke-like symptoms  No significant bleeding events on her aspirin and Plavix  Review of Systems   Constitutional: Negative  HENT: Negative  Eyes: Negative  Respiratory: Negative  Cardiovascular: Negative  Gastrointestinal: Negative  Endocrine: Negative  Genitourinary: Negative  Musculoskeletal: Positive for gait problem (has been unsteady, depends on the day)  Negative for back pain, myalgias, neck pain and neck stiffness  Skin: Negative  Allergic/Immunologic: Negative  Neurological: Positive for dizziness, light-headedness and headaches  Negative for tremors, seizures, speech difficulty, weakness and numbness  Hematological: Bruises/bleeds easily (medication)  Psychiatric/Behavioral: Negative  I have personally reviewed the MA's review of systems and made changes as necessary      The following portions of the patient's history were reviewed and updated as appropriate: allergies, current medications, past family history, past medical history, past social history, past surgical history and problem list       Active Ambulatory Problems     Diagnosis Date Noted    VAIN I (vaginal intraepithelial neoplasia grade I) 06/12/2018    Vulvovaginal condyloma 06/12/2018    Acute nonintractable headache 01/27/2019    Disorientation 01/27/2019    Hypertensive urgency 01/27/2019    Essential hypertension 02/01/2019    Vision changes 02/01/2019    History of CVA (cerebrovascular accident) 02/05/2019    Vertebral artery dissection ([facility] [address] ) 02/05/2019    Heaviness of upper extremity 02/21/2019     Resolved Ambulatory Problems     Diagnosis Date Noted    No Resolved Ambulatory Problems     Past Medical History:   Diagnosis Date    CVA (cerebral vascular accident) ([facility] [address] )     GERD (gastroesophageal reflux disease)     Hypertension        Past Surgical History:   Procedure Laterality Date    AUGMENTATION MAMMAPLASTY      GYNECOLOGIC CRYOSURGERY      CERVIX    HYSTERECTOMY      LASER ABLATION OF CONDYLOMAS N/A 7/30/2018    Procedure: OMNIGUIDE LASER ABLATION OF VULVA;  Surgeon: Quita Wheeler MD;  [facility];  Service: Gynecology Oncology    SKIN TAG REMOVAL      EXICISON OF PERIANAL    WISDOM TOOTH EXTRACTION           Vitals:    09/23/19 1232   BP: 142/90   Pulse: 62   Resp: 18   Temp: 98 5 °F (36 9 °C)         Objective:    Physical Exam  Neurologic Exam    Results/Data:  I have reviewed the results and images from the CTA in detail with the patient

## 2019-09-25 ENCOUNTER — OFFICE VISIT (OUTPATIENT)
Dept: PHYSICAL THERAPY | Facility: CLINIC | Age: 49
End: 2019-09-25
Payer: COMMERCIAL

## 2019-09-25 DIAGNOSIS — Z86.73 HISTORY OF CVA (CEREBROVASCULAR ACCIDENT): Primary | ICD-10-CM

## 2019-09-25 DIAGNOSIS — R42 DIZZINESS: ICD-10-CM

## 2019-09-25 PROCEDURE — 97112 NEUROMUSCULAR REEDUCATION: CPT | Performed by: PHYSICAL THERAPIST

## 2019-09-25 NOTE — PROGRESS NOTES
Daily Note     Today's date: 2019  Patient name: David Garcia  : 1970  MRN: [id number]  Referring provider: Daniela Pickard MD  Dx:   Encounter Diagnosis     ICD-10-CM    1  History of CVA (cerebrovascular accident) Z86 73    2  Dizziness R42                   Subjective: No new complaints  No dizziness upon arrival   Saw neurosurgeon which stated everything was stable and he didn't need to see her again  Objective: See treatment diary below      Assessment:  Initiated POC addressing vestibular deficits identified on eval   Tolerated treatment well as seen by no increased symptoms and no rest breaks  Issued and reviewed HEP, pt communicated understanding  Most difficulty with tandem gait EC due to decreased balance  Patient would benefit from continued PT      Plan: Add foam HTs NV and review HEP as needed  Precautions: no lifting, no strain, no cardio work, keep /80 (if DBP >90 pt goes to ER)  Short Term Goal Expiration Date:(10/18/19)  Long Term Goal Expiration Date: (11/15/19)  POC Expiration Date: (11/15/19)      Manual                                                     Exercise Diary         Treadmill 1 8 mph no UE  With HTs H/V 2x30s ea;  VORcx H/V 2x30s ea       VORx1, H/V  standing Plain  2x30s ea       Walking with turns 360 EO  4x40'       Saccades, H/V  standing Plain  2x30s ea       Foam EC semitandem  4x30s       Foam HTs, H/V  EC        Tandem gait EC In yun  CGA  2x40'                                                                                                                   Modalities

## 2019-09-27 DIAGNOSIS — R41.0 DISORIENTATION: Primary | ICD-10-CM

## 2019-09-27 DIAGNOSIS — Z86.73 HISTORY OF CVA (CEREBROVASCULAR ACCIDENT): ICD-10-CM

## 2019-10-02 ENCOUNTER — APPOINTMENT (OUTPATIENT)
Dept: PHYSICAL THERAPY | Facility: CLINIC | Age: 49
End: 2019-10-02
Payer: COMMERCIAL

## 2019-10-04 ENCOUNTER — OFFICE VISIT (OUTPATIENT)
Dept: PHYSICAL THERAPY | Facility: CLINIC | Age: 49
End: 2019-10-04
Payer: COMMERCIAL

## 2019-10-04 DIAGNOSIS — R42 DIZZINESS: ICD-10-CM

## 2019-10-04 DIAGNOSIS — Z86.73 HISTORY OF CVA (CEREBROVASCULAR ACCIDENT): Primary | ICD-10-CM

## 2019-10-04 PROCEDURE — 97112 NEUROMUSCULAR REEDUCATION: CPT | Performed by: PHYSICAL THERAPIST

## 2019-10-04 PROCEDURE — 97150 GROUP THERAPEUTIC PROCEDURES: CPT | Performed by: PHYSICAL THERAPIST

## 2019-10-04 NOTE — PROGRESS NOTES
Daily Note     Today's date: 10/4/2019  Patient name: Sara Clemens  : 1970  MRN: [id number]  Referring provider: Pop Vargehse MD  Dx:   Encounter Diagnosis     ICD-10-CM    1  History of CVA (cerebrovascular accident) Z86 73    2  Dizziness R42                   Subjective: Not feeling dizzy today  No HA either  Objective: See treatment diary below      Assessment: Tolerated treatment well  No c/o dizziness with any of the exercises this session  Patient would benefit from continued PT    9:40-9:50 group  9:50-10:00 one on one    Plan: Progress treatment as tolerated  Progress to busy background for VOR x 1  Precautions: no lifting, no strain, no cardio work, keep /80 (if DBP >90 pt goes to ER)  Short Term Goal Expiration Date:(10/18/19)  Long Term Goal Expiration Date: (11/15/19)  POC Expiration Date: (11/15/19)      Manual                                                     Exercise Diary  9/25 10/4      Treadmill 1 8 mph no UE  With HTs H/V 2x30s ea;  VORcx H/V 2x30s ea Not available      VORx1, H/V  standing Plain  2x30s ea Plain 45" x 2      Walking with turns 360 EO  4x40' 360 EO  2x40'      Saccades, H/V  standing Plain  2x30s ea Plain 45" x 2      Foam EC semitandem  4x30s Tandem 30" x 4, arms crossed      Foam HTs, H/V  EC  30" x 2      Tandem gait EC In yun  CGA  2x40'       Ambulation with HT/HN  Fw/bw 40' x 2      VOR cx  Gait 40' x 2                                                                                                  Modalities

## 2019-10-07 ENCOUNTER — TELEPHONE (OUTPATIENT)
Dept: NEUROLOGY | Facility: CLINIC | Age: 49
End: 2019-10-07

## 2019-10-07 NOTE — TELEPHONE ENCOUNTER
Bere from pcp office called to ask if last office note with Dr Marii Thompson could be faxed to [phone]  Last office note faxed

## 2019-10-10 ENCOUNTER — OFFICE VISIT (OUTPATIENT)
Dept: PHYSICAL THERAPY | Facility: CLINIC | Age: 49
End: 2019-10-10
Payer: COMMERCIAL

## 2019-10-10 DIAGNOSIS — R42 DIZZINESS: ICD-10-CM

## 2019-10-10 DIAGNOSIS — Z86.73 HISTORY OF CVA (CEREBROVASCULAR ACCIDENT): Primary | ICD-10-CM

## 2019-10-10 PROCEDURE — 97112 NEUROMUSCULAR REEDUCATION: CPT | Performed by: PHYSICAL THERAPIST

## 2019-10-10 NOTE — PROGRESS NOTES
Daily Note     Today's date: 10/10/2019  Patient name: Lux Kolb  : 1970  MRN: [id number]  Referring provider: Martha Campa MD  Dx:   Encounter Diagnosis     ICD-10-CM    1  History of CVA (cerebrovascular accident) Z86 73    2  Dizziness R42                   Subjective: No dizziness since last session  Denies any other complaints  Has not gotten back to driving yet  Will try small distances before NV  Also has not gotten back to gym, was cleared by Dr Desmond Voss for light cardio  Objective: See treatment diary below      Assessment: Able to progress to busy background today, pt with difficulty maintaining focus on target, however with slower speeds about to complete  Able to progress 360 turns to [facility] as well, did have complaints of dizziness however able to complete  Overall more dizziness triggered today with progressions of exercises  Plan: Continue with busy background  Progress TM to with VR next visit as pt able  Precautions: no lifting, no strain, no cardio work, keep /80 (if DBP >90 pt goes to ER)  Short Term Goal Expiration Date:(10/18/19)  Long Term Goal Expiration Date: (11/15/19)  POC Expiration Date: (11/15/19)      Manual                                                     Exercise Diary  9/25 10/4 10/10     Treadmill 1 8 mph no UE  With HTs H/V 2x30s ea; VORcx H/V 2x30s ea Not available 1 8 mph no UE  With HTs H/V 2x30s ea;  VORcx H/V 2x30s ea     VORx1, H/V  standing Plain  2x30s ea Plain 45" x 2 Busy  2x45s ea     Walking with turns 360 EO  4x40' 360 EO  2x40' 360  EO 2x40'  EC 2x40'     Saccades, H/V  standing Plain  2x30s ea Plain 45" x 2 Busy  2x45s ea     Foam EC semitandem  4x30s Tandem 30" x 4, arms crossed semitandem  4x30s     Foam HTs, H/V  EC  30" x 2 FT EC  2x30s ea     Tandem gait EC In yun  CGA  2x40'  In uyn  2x40'  EC     Ambulation with HT/HN  Fw/bw 40' x 2 BWD  2x40' ea     VOR cx  Gait 40' x 2 On TM Modalities

## 2019-10-11 ENCOUNTER — OFFICE VISIT (OUTPATIENT)
Dept: PHYSICAL THERAPY | Facility: CLINIC | Age: 49
End: 2019-10-11
Payer: COMMERCIAL

## 2019-10-11 DIAGNOSIS — Z86.73 HISTORY OF CVA (CEREBROVASCULAR ACCIDENT): Primary | ICD-10-CM

## 2019-10-11 DIAGNOSIS — R42 DIZZINESS: ICD-10-CM

## 2019-10-11 PROCEDURE — 97112 NEUROMUSCULAR REEDUCATION: CPT | Performed by: PHYSICAL THERAPIST

## 2019-10-11 NOTE — PROGRESS NOTES
Daily Note     Today's date: 10/11/2019  Patient name: Katherine Godoy  : 1970  MRN: [id number]  Referring provider: Hardy Diaz MD  Dx:   Encounter Diagnosis     ICD-10-CM    1  History of CVA (cerebrovascular accident) Z86 73    2  Dizziness R42                   Subjective: Reports feeling well yesterday but today is with increased dizziness and headache noting possibly form increased activity yesterday  Objective: See treatment diary below      Assessment: dizziness and headache reported after most activities this session, requiring  standing rest break but able to continue with session  Advised patient to trial driving and going to the gym this weekend, patient agreeable  Discussed easing herself into activity  Plan: Continue with busy background  Progress TM to with VR next visit as pt able  Precautions: no lifting, no strain, no cardio work, keep /80 (if DBP >90 pt goes to ER)  Short Term Goal Expiration Date:(10/18/19)  Long Term Goal Expiration Date: (11/15/19)  POC Expiration Date: (11/15/19)      Manual                                                     Exercise Diary  9/25 10/4 10/10 10/11/19    Treadmill 1 8 mph no UE  With HTs H/V 2x30s ea VORcx H/V 2x30s ea Not available 1 8 mph no UE  With HTs H/V 2x30s ea; VORcx H/V 2x30s ea 2 0 mph no UE  With HTs H/V 2x30s ea;  VORcx H/V 2x30s ea    VORx1, H/V  standing Plain  2x30s ea Plain 45" x 2 Busy  2x45s ea Busy  45s ea    Walking with turns 360 EO  4x40' 360 EO  2x40' 360  EO 2x40'  EC 2x40' 360  EC 40ft ea     Saccades, H/V  standing Plain  2x30s ea Plain 45" x 2 Busy  2x45s ea 45" ea    Foam EC semitandem  4x30s Tandem 30" x 4, arms crossed semitandem  4x30s     Foam HTs, H/V  EC  30" x 2 FT EC  2x30s ea FTEC  Semi tandem   Foam   30"x2     Tandem gait EC In yun  CGA  2x40'  In yun  2x40'  EC In ynu fwd EC 40ft  Backwards   EO 15ft  EC 25ft    Ambulation with HT/HN  Fw/bw 40' x 2 BWD  2x40' ea BWD  2x40' ea    VOR cx Gait 40' x 2 On TM On TM                                                                                                Modalities

## 2019-10-17 ENCOUNTER — APPOINTMENT (OUTPATIENT)
Dept: PHYSICAL THERAPY | Facility: CLINIC | Age: 49
End: 2019-10-17
Payer: COMMERCIAL

## 2019-10-18 ENCOUNTER — EVALUATION (OUTPATIENT)
Dept: SPEECH THERAPY | Facility: CLINIC | Age: 49
End: 2019-10-18
Payer: COMMERCIAL

## 2019-10-18 ENCOUNTER — OFFICE VISIT (OUTPATIENT)
Dept: PHYSICAL THERAPY | Facility: CLINIC | Age: 49
End: 2019-10-18
Payer: COMMERCIAL

## 2019-10-18 DIAGNOSIS — Z86.73 HISTORY OF CVA (CEREBROVASCULAR ACCIDENT): Primary | ICD-10-CM

## 2019-10-18 DIAGNOSIS — R42 DIZZINESS: ICD-10-CM

## 2019-10-18 DIAGNOSIS — R41.0 DISORIENTATION: ICD-10-CM

## 2019-10-18 PROCEDURE — 97112 NEUROMUSCULAR REEDUCATION: CPT | Performed by: PHYSICAL THERAPIST

## 2019-10-18 PROCEDURE — 92523 SPEECH SOUND LANG COMPREHEN: CPT | Performed by: SPEECH-LANGUAGE PATHOLOGIST

## 2019-10-18 NOTE — PROGRESS NOTES
Speech-Language Pathology Initial Evaluation    Today's date: 10/18/2019  Patients name: Bia Augustin  : 1970  MRN: [id number]  Safety measures: n/a  Referring provider: Rosa Baltazar MD    Subjective comments: 'had a bad week but PT went well'    How did the patient hear about us? Physician    Patient's goal(s): improve memory and word finding    Reason for referral: Change in cognitive status  Prior functional status: Communication effective and appropriate in all situations  Clinically complex situations: N/A    History: Patient is a 52 y o  female who was referred to outpatient skilled Speech Therapy services for a cognitive-linguistic evaluation secondary to reported memory impairments  Pt is currently seeing PT for dizziness symptoms  Hearing: WFL  Vision: WFL    Home environment/lifestyle:  son cats, calm atmosphere  Highest level of education: some college  Vocational status:  previously, on disability and going through the disability social security process      Mental status: Alert  Behavior status: Cooperative  Communication modalities: Verbal  Rehabilitation prognosis: Excellent rehab potential to reach and maintain prior level of function    Assessments      COGNITIVE CHECKLIST:  *Patient indicated that she is experiencing difficulties in the following areas:    · Memory: Remembering what people have told you and Remembering peoples' names    · Attention: Easily distracted, Keeping your attention/concentrating on a task, Dividing your attention (i e , multi-tasking) and Losing your train of thought    · Processing: Processing new information  and Following directions    · Executive Functions: Organizing your thoughts, Planning daily tasks, Initiating/starting tasks and Sequencing activities (such as cooking or following a recipe)    · Communication: Word finding in conversation    · Visual: Losing spot on the page when reading and Poor screen tolerance with electronics    · Emotional: Personality changes, Increased depression, Easily agitated or irritable and Sleep changes    Increased Sensitivities to: Lighting, Noise, Touch, Smell, Movement and Crowds or busy environments       The Repeatable Battery for the Assessment of Neuropsychological Status (RBANS) is a brief, individually-administered assessment which measures attention, language, visuospatial/constructional abilities, and immediate & delayed memory  The RBANS is intended for use with adolescents to adults, ages 15 to 80 years  The following results were obtained during the administration of the assessment  Form: A    Cognitive Domain/Subtest: Index Score: Percentile Rank: Classification:   IMMEDIATE MEMORY 81 10%ile Low Average        1  List Learning (24/40)        2  Story Memory (14/24)       VISUOSPATIAL/  CONSTRUCTIONAL 92 30%ile Average        3  Figure Copy (20/20)        4  Line Orientation (10/20)       LANGUAGE 79 8%ile Borderline        5  Picture Naming (8/10)        6  Semantic Fluency (11/49)       ATTENTION 135 99%ile Superior        7  Digit Span (12/16)        8  Coding (79/89)       DELAYED MEMORY 95 37%ile Average        9  List Recall (6/10)        10  List Recognition (19/20)        11  Story Recall (7/12)        12  Figure Recall (13/20)         Sum of Index Scores:  482   Total Score:  94   Percentile: 34%ile   Classification: Average       *Patient named 18 concrete category members (fruit & veg) in 60 sec (norm=15+)  -- AVERAGE    Goals  Short-term goals:  1  Patient will complete auditory immediate and short term memory tasks to 80% accuracy to facilitate increased ability to retell narratives and recall information within functional living environment, to be achieved in 4-6 weeks      2  Patient will complete word generation tasks (e g , analogies, category matrices, etc ) with 80% accuracy using word finding strategies to facilitate improved word retrieval skills, to be achieved in 4-6 weeks  3  Patient will be educated on the use of internal and external memory aids and compensatory strategies with 80% accuracy to facilitate increased recall of routine, personal information, and recent events, to be achieved in 4-6 weeks      4  Patient will demonstrate divided attention by responding to multiple tasks or details within tasks at the same time with min cues in a distracting environment with 80% accuracy, to be achieved in 4-6 weeks  5  Patient will participate in assessment of executive function skills of initiating, organizing, planning and following through for complex everyday tasks (e g  Planning a party, solving a problem), to be achieved in 4-6 weeks  Long-term goals:  1  Pt will demonstrate cognitive-linguistic skills that are functional for social communication in daily life  Impressions/Recommendations    Impressions: Patient presents with cognitive linguistic difficulties that are impacting her language and social communication skills  Pt demonstrates good attention in controlled quiet environments but significant difficulties with language and memory as indicated by the RBANS  Pt also reported significant executive function difficulties that impact communication, that warrent further investigation  Recommendations:  -Patient would benefit from outpatient skilled Speech Therapy services : Cognitive-Linguistic therapy    -Frequency: 2x weekly  -Duration: 6-8 weeks    -Intervention certification from: 27/94/5122  -Intervention certification to: 38/04/6720    -Intervention comments: Pt reports significant difficulty remembering episodic experiences (e g what she watch on TV or read or a conversation she had)  Pt also indicated that she has significant difficulty with multitasking - cooking, taking a message on the telephone and dealing with it   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

## 2019-10-18 NOTE — PROGRESS NOTES
Daily Note     Today's date: 10/18/2019  Patient name: Valley Dubin  : 1970  MRN: [id number]  Referring provider: Padmini Ahmadi MD  Dx:   Encounter Diagnosis     ICD-10-CM    1  History of CVA (cerebrovascular accident) Z86 73    2  Dizziness R42                   Subjective: Reports not feeling well all week    Objective: See treatment diary below      Assessment: Held on Tm with VR today as patient reported not feeling well She felt better at the end of the session  She did well with olding balance during all activities  Plan: Continue with busy background  Progress TM to with VR next visit as pt able  Precautions: no lifting, no strain, no cardio work, keep /80 (if DBP >90 pt goes to ER)  Short Term Goal Expiration Date:(10/18/19)  Long Term Goal Expiration Date: (11/15/19)  POC Expiration Date: (11/15/19)      Manual                                                     Exercise Diary  9/25 10/4 10/10 10/11/19 10/18   Treadmill 1 8 mph no UE  With HTs H/V 2x30s ea VORcx H/V 2x30s ea Not available 1 8 mph no UE  With HTs H/V 2x30s ea; VORcx H/V 2x30s ea 2 0 mph no UE  With HTs H/V 2x30s ea;  VORcx H/V 2x30s ea 2 0 no ue ht/hn 2x30se ea vorcx h/v [id number]    VORx1, H/V  standing Plain  2x30s ea Plain 45" x 2 Busy  2x45s ea Busy  45s ea Busy 45 sec standing ea   Walking with turns 360 EO  4x40' 360 EO  2x40' 360  EO 2x40'  EC 2x40' 360  EC 40ft ea     Saccades, H/V  standing Plain  2x30s ea Plain 45" x 2 Busy  2x45s ea 45" ea Busy 45 sec ea   Foam EC semitandem  4x30s Tandem 30" x 4, arms crossed semitandem  4x30s  Tashia tandem 30 sec x 2    Foam HTs, H/V  EC  30" x 2 FT EC  2x30s ea FTEC  Semi tandem   Foam   30"x2  ftec 30"x 2   Tandem gait EC In yun  CGA  2x40'  In yun  2x40'  EC In yun fwd EC 40ft  Backwards   EO 15ft  EC 25ft 40'x 2 fwd   Ambulation with HT/HN  Fw/bw 40' x 2 BWD  2x40' ea BWD  2x40' ea bkwd 2x40' ea   VOR cx  Gait 40' x 2 On TM On TM Modalities

## 2019-10-23 ENCOUNTER — OFFICE VISIT (OUTPATIENT)
Dept: PHYSICAL THERAPY | Facility: CLINIC | Age: 49
End: 2019-10-23
Payer: COMMERCIAL

## 2019-10-23 DIAGNOSIS — R42 DIZZINESS: ICD-10-CM

## 2019-10-23 DIAGNOSIS — Z86.73 HISTORY OF CVA (CEREBROVASCULAR ACCIDENT): Primary | ICD-10-CM

## 2019-10-23 PROCEDURE — 97112 NEUROMUSCULAR REEDUCATION: CPT | Performed by: PHYSICAL THERAPIST

## 2019-10-23 NOTE — PROGRESS NOTES
Progress Update     Today's date: 10/23/2019  Patient name: Nori Browning  : 1970  MRN: [id number]  Referring provider: Jj Salter MD  Dx:   Encounter Diagnosis     ICD-10-CM    1  History of CVA (cerebrovascular accident) Z86 73    2  Dizziness R42                   Subjective: Pt reports HA about 1-2 times per week with pain never worse than a 4/10  States the past week her dizziness was worse the past week but has come back to baseline  Pt still has not returned to full driving and the treadmill at the gym which she says are her main functional limitations at this time  Only drives for a mile or so because she starts to get dizzy and says she feels she has to keep her eyes forward the whole time  Objective: See treatment diary below      ABC scale: 72 5%    FGA:       Assessment: Progress update performed today which indicates pt has made progress in PT  Score improved significanty on FGA today with no deficits identified, however does reports some dizziness symptoms  Also preforming well on treadmill with good safety and balance, was advised to return to treadmill use at the gym within the precautions given to her by her doctor  ABC scale yet to significant improve indicating continued functional limitations  Initiated VR today which was challenging for pt with difficulty moving eyes to scan the environment  Would benefit from continued PT  Plan: Continue PT for 2-4 more weeks as initially planned in POC  Continue with busy background  Progress TM to with VR next visit as pt able  Precautions: no lifting, no strain, no cardio work, keep /80 (if DBP >90 pt goes to ER)  Short Term Goal Expiration Date:(10/18/19)  Long Term Goal Expiration Date: (11/15/19)  POC Expiration Date: (11/15/19)      Manual                                                     Exercise Diary  10/4 10/10 10/11/19 10/18 10/23   Treadmill Not available 1 8 mph no UE  With HTs H/V 2x30s ea;  VORcx H/V 2x30s ea 2 0 mph no UE  With HTs H/V 2x30s ea;  VORcx H/V 2x30s ea 2 0 no ue ht/hn 2x30se ea vorcx h/v [id number]  2 0 no ue ht/hn 2x30se ea vorcx h/v [id number]   VORx1, H/V  standing Plain 45" x 2 Busy  2x45s ea Busy  45s ea Busy 45 sec standing ea Mirror 45s ea at 120bpm   Walking with turns 360 EO  2x40' 360  EO 2x40'  EC 2x40' 360  EC 40ft ea      Saccades, H/V  standing Plain 45" x 2 Busy  2x45s ea 45" ea Busy 45 sec ea Busy 45 sec ea   Foam EC Tandem 30" x 4, arms crossed semitandem  4x30s  Tashia tandem 30 sec x 2     Foam HTs, H/V  EC 30" x 2 FT EC  2x30s ea FTEC  Semi tandem   Foam   30"x2  ftec 30"x 2 Semi tandem EC foam HT/HNs 30"x2 ea   Tandem gait EC  In yun  2x40'  EC In yun fwd [facility] 40ft  Backwards   EO 15ft  EC 25ft 40'x 2 fwd    Ambulation with HT/HN Fw/bw 40' x 2 BWD  2x40' ea BWD  2x40' ea bkwd 2x40' ea    VOR cx Gait 40' x 2 On TM On TM     VR driving videos     10'  (try while on TM NV)                                                                                       Modalities

## 2019-10-25 ENCOUNTER — OFFICE VISIT (OUTPATIENT)
Dept: PHYSICAL THERAPY | Facility: CLINIC | Age: 49
End: 2019-10-25
Payer: COMMERCIAL

## 2019-10-25 DIAGNOSIS — R42 DIZZINESS: ICD-10-CM

## 2019-10-25 DIAGNOSIS — Z86.73 HISTORY OF CVA (CEREBROVASCULAR ACCIDENT): Primary | ICD-10-CM

## 2019-10-25 PROCEDURE — 97112 NEUROMUSCULAR REEDUCATION: CPT | Performed by: PHYSICAL THERAPIST

## 2019-10-25 NOTE — PROGRESS NOTES
Daily Note     Today's date: 10/25/2019  Patient name: Chris Bee  : 1970  MRN: [id number]  Referring provider: Sonia Mcgowan MD  Dx:   Encounter Diagnosis     ICD-10-CM    1  History of CVA (cerebrovascular accident) Z86 73    2  Dizziness R42                   Subjective: yesterday was moving around and switching out winter and summer clothes; a lot of up and down movement by 2-3:00 she started to get dizzy and needed to sit down  Last week incrased dizziness when getting hair colored  Objective: See treatment diary below      Assessment: Patient is frustrated over her situation and consistency of dizziness  Patient able to perform vestibular activities well  PT suspects some symptoms of dizziness coming from her neck however per patient she has a an email form her neurologist indicating no massages or manipulation to CS to do artery dissection  Plan: Continue with busy background  Progress TM to with VR next visit as pt able  Precautions: no lifting, no strain, no cardio work, keep /80 (if DBP >90 pt goes to ER)  Short Term Goal Expiration Date:(10/18/19)  Long Term Goal Expiration Date: (11/15/19)  POC Expiration Date: (11/15/19)      Manual                                                     Exercise Diary  10/25  10/11/19 10/18 10/23   Treadmill 2 0 no ue ht/hn 2x30se ea vorcx h/v [id number]  No UE  2 0 mph no UE  With HTs H/V 2x30s ea;  VORcx H/V 2x30s ea 2 0 no ue ht/hn 2x30se ea vorcx h/v [id number]  2 0 no ue ht/hn 2x30se ea vorcx h/v [id number]   VORx1, H/V  standing Mirror 60" each   Busy  45s ea Busy 45 sec standing ea Mirror 45s ea at 120bpm   Walking with turns 360  EC 40ft ea   360  EC 40ft ea      Saccades, H/V  standing   45" ea Busy 45 sec ea Busy 45 sec ea   Foam EC Tashia tandem 30 sec x 2    Atshia tandem 30 sec x 2     Foam HTs, H/V  EC   FTEC  Semi tandem   Foam   30"x2  ftec 30"x 2 Semi tandem EC foam HT/HNs 30"x2 ea   Tandem gait EC 40'x 2 fwd  In [facility] 40ft  Backwards   EO 15ft  EC 25ft 40'x 2 fwd    Ambulation with HT/HN bkwd 2x40' ea  BWD  2x40' ea bkwd 2x40' ea    VOR cx   On TM     VR driving videos     10'  (try while on TM NV)                                                                                       Modalities

## 2019-10-30 ENCOUNTER — OFFICE VISIT (OUTPATIENT)
Dept: PHYSICAL THERAPY | Facility: CLINIC | Age: 49
End: 2019-10-30
Payer: COMMERCIAL

## 2019-10-30 DIAGNOSIS — R42 DIZZINESS: ICD-10-CM

## 2019-10-30 DIAGNOSIS — Z86.73 HISTORY OF CVA (CEREBROVASCULAR ACCIDENT): Primary | ICD-10-CM

## 2019-10-30 PROCEDURE — 97112 NEUROMUSCULAR REEDUCATION: CPT

## 2019-10-30 NOTE — PROGRESS NOTES
Daily Note     Today's date: 10/30/2019  Patient name: Kayleigh Poster  : 1970  MRN: [id number]  Referring provider: Arden Ho MD  Dx:   Encounter Diagnosis     ICD-10-CM    1  History of CVA (cerebrovascular accident) Z86 73    2  Dizziness R42          Subjective: Denies dizziness upon arrival  Reports that she still has issues with driving  Objective: See treatment diary below      Assessment: VR performed with TM walking  Patient noting increased dizziness with busy environment with driving simulation  Noted increased dizziness with habituation training, but did subside with rest break  Plan: Continue per POC         Precautions: no lifting, no strain, no cardio work, keep /80 (if DBP >90 pt goes to ER)  Short Term Goal Expiration Date:(10/18/19)  Long Term Goal Expiration Date: (11/15/19)  POC Expiration Date: (11/15/19)      Manual                                                     Exercise Diary  10/25 10/30      Treadmill 2 0 no ue ht/hn 2x30se ea vorcx h/v [id number]  No UE 2 0mph, w/ VR, watching driving videos 10 min      VORx1, H/V  standing Mirror 60" each  Mirror 60" each       Walking with turns 360  EC 40ft ea  360, 1 lap ea      Saccades, H/V  standing        Foam EC Tashia tandem 30 sec x 2        Foam HTs, H/V  EC        Tandem gait EC 40'x 2 fwd 40'x 2 fwd      Ambulation with HT/HN bkwd 2x40' ea bkwd 2x40' ea      VOR cx        VR driving videos                                                                                            Modalities

## 2019-10-31 ENCOUNTER — OFFICE VISIT (OUTPATIENT)
Dept: PHYSICAL THERAPY | Facility: CLINIC | Age: 49
End: 2019-10-31
Payer: COMMERCIAL

## 2019-10-31 DIAGNOSIS — Z86.73 HISTORY OF CVA (CEREBROVASCULAR ACCIDENT): Primary | ICD-10-CM

## 2019-10-31 DIAGNOSIS — R42 DIZZINESS: ICD-10-CM

## 2019-10-31 PROCEDURE — 97014 ELECTRIC STIMULATION THERAPY: CPT | Performed by: PHYSICAL THERAPIST

## 2019-10-31 PROCEDURE — 97110 THERAPEUTIC EXERCISES: CPT | Performed by: PHYSICAL THERAPIST

## 2019-10-31 PROCEDURE — 97112 NEUROMUSCULAR REEDUCATION: CPT | Performed by: PHYSICAL THERAPIST

## 2019-10-31 PROCEDURE — G0283 ELEC STIM OTHER THAN WOUND: HCPCS | Performed by: PHYSICAL THERAPIST

## 2019-10-31 NOTE — PROGRESS NOTES
Daily Note     Today's date: 10/31/2019  Patient name: Ratna Mendez  : 1970  MRN: [id number]  Referring provider: Chon Kwong MD  Dx:   Encounter Diagnosis     ICD-10-CM    1  History of CVA (cerebrovascular accident) Z86 73    2  Dizziness R42          Subjective: Denies symptoms upon arrival   Does have neck tension and frequent Has  Discussed potential d/c from PT and adding some gentle neck exercises today  Objective: See treatment diary below  DVA: normal, lost 1 line  VOMS (Vestibular/Ocular Motor Screen)     Smooth pursuit Normal     Saccades (horizontal) Normal     Saccades (vertical)  Normal     Convergence (near point)--Normal     VOR (horizontal) Normal     VOR (vertical) Normal     Visual Motion Sensitivity Abnormal, eyes slower than head    Assessment: DVA and VOMs testing completed today with pt showing significant improvements since eval   Continues with difficulty with VORcx, however other motions WNL and asymptomatic  Completed gentle posture stretches today and initiated moist heat, all which reduced pt's neck tension  Issued and reviewed HEP, pt demonstrated and communicated understanding  Plan: Discharge NV if pt compliance and successful with new HEP         Precautions: no lifting, no strain, no cardio work, keep /80 (if DBP >90 pt goes to ER)  Short Term Goal Expiration Date:(10/18/19)  Long Term Goal Expiration Date: (11/15/19)  POC Expiration Date: (11/15/19)      Manual                                                     Exercise Diary  10/25 10/30 10/31     Treadmill 2 0 no ue ht/hn 2x30se ea vorcx h/v [id number]  No UE 2 0mph, w/ VR, watching driving videos 10 min      VORx1, H/V  standing Mirror 60" each  Mirror 60" each       Walking with turns 360  EC 40ft ea  360, 1 lap ea      Saccades, H/V  standing        Foam EC Tashia tandem 30 sec x 2        Foam HTs, H/V  EC        Tandem gait EC 40'x 2 fwd 40'x 2 fwd      Ambulation with HT/HN bkwd 2x40' ea bkwd 2x40' ea      VOR cx   Reviewed for HEP     Optokinetic videos   [place] Poag, handout issued with links for home     [address], reviewed for HEP     Corner pec stretch   2x30s, reviewed for HEP     Chin tucks   Supine, 5s holds, x5  reviewed for HEP                                                                 Modalities 10/31       MH to neck Supine x10 min

## 2019-11-04 ENCOUNTER — APPOINTMENT (OUTPATIENT)
Dept: PHYSICAL THERAPY | Facility: CLINIC | Age: 49
End: 2019-11-04
Payer: COMMERCIAL

## 2019-11-04 ENCOUNTER — APPOINTMENT (OUTPATIENT)
Dept: SPEECH THERAPY | Facility: CLINIC | Age: 49
End: 2019-11-04
Payer: COMMERCIAL

## 2019-11-04 NOTE — PROGRESS NOTES
Daily Speech Treatment Note    Today's date: 2019  Patients name: Li Sim  : 1970  MRN: [id number]  Safety measures: N/A  Referring provider: Elvira Vásquez MD    Primary Diagnosis/Billing code: L60 2, E86 15  Secondary Diagnosis/ Billing code: R41 0    Visit Tracking:  -Referring provider: Epic  -Billing guidelines: AMA  -Visit #2/  (**12 visits pcy**)  -CBC  -RE due 19  Subjective/Behavioral:  -"I'm good "    Objective/Assessment:  -Reviewed testing results and goals in plan care with patient  Patient is in agreement at this time  Short-term goals:  1  Patient will complete auditory immediate and short term memory tasks to 80% accuracy to facilitate increased ability to retell narratives and recall information within functional living environment, to be achieved in 4-6 weeks  -Mental Manipulation (Scrambled Sentences): Patient was read 4 words and asked to unscramble them to form a sentence  Task complete in 10/10 opp independently  Patient was then read 5 words and was asked to unscramble them to form a sentence  Task complete in 16/20 opp independently, increasing to 19/20 opp given moderate verbal repetition and 20/20 given maximal verbal repetition and a visual cue      2  Patient will complete word generation tasks (e g , analogies, category matrices, etc ) with 80% accuracy using word finding strategies to facilitate improved word retrieval skills, to be achieved in 4-6 weeks   -Category Members: Patient was given a grid and asked to fill in the appropriate category member provided the category and the beginning letter  Task complete in 59/64 opp independently, increasing to 64/64 given minimal semantic cues      3  Patient will be educated on the use of internal and external memory aids and compensatory strategies with 80% accuracy to facilitate increased recall of routine, personal information, and recent events, to be achieved in 4-6 weeks      4  Patient will demonstrate divided attention by responding to multiple tasks or details within tasks at the same time with min cues in a distracting environment with 80% accuracy, to be achieved in 4-6 weeks  5  Patient will participate in assessment of executive function skills of initiating, organizing, planning and following through for complex everyday tasks (e g  Planning a party, solving a problem), to be achieved in 4-6 weeks  Plan:  -Patient was provided with home exercises/activities to target goals in plan of care at the end of today's session   -Continue with current plan of care  Patient was treated by Rdoger Beckman, graduate SLP student, and was under the direct supervision of VIVIANA Wellington , CCC-SLP

## 2019-11-07 ENCOUNTER — OFFICE VISIT (OUTPATIENT)
Dept: SPEECH THERAPY | Facility: CLINIC | Age: 49
End: 2019-11-07
Payer: COMMERCIAL

## 2019-11-07 ENCOUNTER — APPOINTMENT (OUTPATIENT)
Dept: PHYSICAL THERAPY | Facility: CLINIC | Age: 49
End: 2019-11-07
Payer: COMMERCIAL

## 2019-11-07 DIAGNOSIS — R48.8 OTHER SYMBOLIC DYSFUNCTIONS: Primary | ICD-10-CM

## 2019-11-07 DIAGNOSIS — Z86.73 HISTORY OF CVA (CEREBROVASCULAR ACCIDENT): ICD-10-CM

## 2019-11-07 DIAGNOSIS — R41.0 DISORIENTATION: ICD-10-CM

## 2019-11-07 PROCEDURE — 92507 TX SP LANG VOICE COMM INDIV: CPT | Performed by: SPEECH-LANGUAGE PATHOLOGIST

## 2019-11-08 ENCOUNTER — APPOINTMENT (OUTPATIENT)
Dept: LAB | Facility: MEDICAL CENTER | Age: 49
End: 2019-11-08
Payer: COMMERCIAL

## 2019-11-08 DIAGNOSIS — Z86.73 HISTORY OF CVA (CEREBROVASCULAR ACCIDENT): ICD-10-CM

## 2019-11-11 ENCOUNTER — APPOINTMENT (OUTPATIENT)
Dept: PHYSICAL THERAPY | Facility: CLINIC | Age: 49
End: 2019-11-11
Payer: COMMERCIAL

## 2019-11-11 ENCOUNTER — APPOINTMENT (OUTPATIENT)
Dept: SPEECH THERAPY | Facility: CLINIC | Age: 49
End: 2019-11-11
Payer: COMMERCIAL

## 2019-11-12 NOTE — PROGRESS NOTES
Daily Speech Treatment Note    Today's date: 2019   Patients name: Nori Browning  : 1970  MRN: [id number]  Safety measures: N/A  Referring provider: Jj Salter MD    Primary Diagnosis/Billing code: Z02 3, K16 28  Secondary Diagnosis/ Billing code: R41 0    Visit Tracking:  -Referring provider: Epic  -Billing guidelines: AMA  -Visit #3/  (**12 visits pcy**)   -CBC  -RE due 19  Subjective/Behavioral:  -"This week has been so hectic "    -Patient arrived 10 minutes late to today's session due to traffic  Objective/Assessment:  -Reviewed patient's HEP from previous session  Category matrices: task complete in 91/96 opp independently, increasing to 96/96 given minimal semantic cues  Short-term goals:  1  Patient will complete auditory immediate and short term memory tasks to 80% accuracy to facilitate increased ability to retell narratives and recall information within functional living environment, to be achieved in 4-6 weeks  2  Patient will complete word generation tasks (e g , analogies, category matrices, etc ) with 80% accuracy using word finding strategies to facilitate improved word retrieval skills, to be achieved in 4-6 weeks  3  Patient will be educated on the use of internal and external memory aids and compensatory strategies with 80% accuracy to facilitate increased recall of routine, personal information, and recent events, to be achieved in 4-6 weeks   -Patient was educated on memory strategies (e g  Chunking, verbal rehearsal, pocket calendar, etc ) to implement in her every day life       4  Patient will demonstrate divided attention by responding to multiple tasks or details within tasks at the same time with min cues in a distracting environment with 80% accuracy, to be achieved in 4-6 weeks      5  Patient will participate in assessment of executive function skills of initiating, organizing, planning and following through for complex everyday tasks (e g  Planning a party, solving a problem), to be achieved in 4-6 weeks   -Patient was given a deduction puzzle where she was asked to use the provided clues to determine the appropriate information to go in the grid  Task complete in 21/24 opp independently, increasing to 24/24 given minimal verbal cues as to which clue to look at for information  Plan:  -Patient was provided with home exercises/activities to target goals in plan of care at the end of today's session   -Continue with current plan of care  Patient was treated by Cheri Denise, graduate SLP student, and was under the direct supervision of VIVIANA Rhodes , CCC-SLP

## 2019-11-14 ENCOUNTER — OFFICE VISIT (OUTPATIENT)
Dept: SPEECH THERAPY | Facility: CLINIC | Age: 49
End: 2019-11-14
Payer: COMMERCIAL

## 2019-11-14 ENCOUNTER — OFFICE VISIT (OUTPATIENT)
Dept: PHYSICAL THERAPY | Facility: CLINIC | Age: 49
End: 2019-11-14
Payer: COMMERCIAL

## 2019-11-14 DIAGNOSIS — Z86.73 HISTORY OF CVA (CEREBROVASCULAR ACCIDENT): ICD-10-CM

## 2019-11-14 DIAGNOSIS — R41.0 DISORIENTATION: ICD-10-CM

## 2019-11-14 DIAGNOSIS — R42 DIZZINESS: ICD-10-CM

## 2019-11-14 DIAGNOSIS — R48.8 OTHER SYMBOLIC DYSFUNCTIONS: Primary | ICD-10-CM

## 2019-11-14 DIAGNOSIS — Z86.73 HISTORY OF CVA (CEREBROVASCULAR ACCIDENT): Primary | ICD-10-CM

## 2019-11-14 PROCEDURE — 97110 THERAPEUTIC EXERCISES: CPT | Performed by: PHYSICAL THERAPIST

## 2019-11-14 PROCEDURE — 92507 TX SP LANG VOICE COMM INDIV: CPT | Performed by: SPEECH-LANGUAGE PATHOLOGIST

## 2019-11-14 NOTE — PROGRESS NOTES
Daily Note     Today's date: 2019  Patient name: Lino Lehman  : 1970  MRN: [id number]  Referring provider: Rosa Bolden MD  Dx:   Encounter Diagnosis     ICD-10-CM    1  History of CVA (cerebrovascular accident) Z86 73    2  Dizziness R42        Start Time: 1200  Stop Time: 1230  Total time in clinic (min): 30 minutes    Subjective: Had a good week last week  Was driving with her son, going to the gym, decreased Has and dizziness  This week not having a good week  Having a lot of anxiety due to her condition and more Has and dizziness  Has not gone to the gym  Reports compliance with HEP, stated her neck tension and reduced and Has had been reduced last week  Did come to realization this week that she need to acknowledge what happened to her and started taking her lexapro  Is thankful for PT and everything that improved since coming here  Recognizes she needs to be more honest with her counselor to be able to work through the anxiety and panic feelings she gets that may be contributing to her HA and dizziness  Confirms d/c today from PT  Objective: See treatment diary below      Assessment: Most of session spent on pt education on her condition, symptoms, managing anxiety/stress, her HEP  Pt able to discuss many things with PT she has not discussed with her counselor and physicians  Educated on disclosing her feels more to her counselor to be able to manage her anxiety/panic better  Pt to continue on her own at home with her HEP at this time  Will follow-up with PT as needed      Plan: Discharge     Precautions: no lifting, no strain, no cardio work, keep /80 (if DBP >90 pt goes to ER)  Short Term Goal Expiration Date:(10/18/19)  Long Term Goal Expiration Date: (11/15/19)  POC Expiration Date: (11/15/19)      Manual                                                     Exercise Diary  10/25 10/30 10/31 11/14    Treadmill 2 0 no ue ht/hn 2x30se ea vorcx h/v [id number]  No UE 2 0mph, w/ VR, watching driving videos 10 min      VORx1, H/V  standing Mirror 60" each  Mirror 60" each       Walking with turns 360  EC 40ft ea  360, 1 lap ea      Saccades, H/V  standing        Foam EC Tashia tandem 30 sec x 2        Foam HTs, H/V  EC        Tandem gait EC 40'x 2 fwd 40'x 2 fwd      Ambulation with HT/HN bkwd 2x40' ea bkwd 2x40' ea      VOR cx   Reviewed for HEP     Optokinetic videos   Kendra Murphy, handout issued with links for home     [id number], reviewed for HEP x10 reviewed for HEP    Corner pec stretch   2x30s, reviewed for HEP     Chin tucks   Supine, 5s holds, x5  reviewed for HEP                                                                 Modalities 10/31       MH to neck Supine x10 min

## 2019-11-15 NOTE — PROGRESS NOTES
Daily Speech Treatment Note    Today's date: 2019   Patients name: Iman Claudio  : 1970  MRN: [id number]  Safety measures: N/A  Referring provider: Bryan Rae MD    Primary Diagnosis/Billing code: T34 7, K69 79  Secondary Diagnosis/ Billing code: R41 0    Visit Tracking:  -Referring provider: Epic  -Billing guidelines: AMA  -Visit #  (**12 visits pcy**)   -CBC  -RE due 19  Subjective/Behavioral:  -"I went to visit my sisters new dog this weekend "    Objective/Assessment:  -Reviewed patient's HEP from previous session  Deduction Puzzle #3: task complete in  opp independently, increasing to  given moderate semantic and verbal cues  Short-term goals:  1  Patient will complete auditory immediate and short term memory tasks to 80% accuracy to facilitate increased ability to retell narratives and recall information within functional living environment, to be achieved in 4-6 weeks   -Patient was read a list of word associations (Mixed) and told "When I say ____, you'll say____"  Patient was asked to remember them  Patient remembered 30/30 across 3 trials immediately after hearing them  A distraction task was given before asking the patient to recall the associated word from earlier  Patient remembered 30/30 across 3 trial for delayed recall  2  Patient will complete word generation tasks (e g , analogies, category matrices, etc ) with 80% accuracy using word finding strategies to facilitate improved word retrieval skills, to be achieved in 4-6 weeks    -Patient participated in the game "Anomia"  The patient was shown cards looking for a targeted answer (e g  Fictional character) and asked to provide answers to as many cards as possible in 1 minute  Task complete at level 6 with an average of 13 cards over 3 trials with skips x6  (Goal is written for an average of 10 cards/minute)      3  Patient will be educated on the use of internal and external memory aids and compensatory strategies with 80% accuracy to facilitate increased recall of routine, personal information, and recent events, to be achieved in 4-6 weeks      4  Patient will demonstrate divided attention by responding to multiple tasks or details within tasks at the same time with min cues in a distracting environment with 80% accuracy, to be achieved in 4-6 weeks  (Distraction Task):  -Patient was asked to give the opposite and synonym for a list of words given  Task complete in 20/22 opp independently, increasing to 22/22 given minimal semantic and phonemic cues  5  Patient will participate in assessment of executive function skills of initiating, organizing, planning and following through for complex everyday tasks (e g  Planning a party, solving a problem), to be achieved in 4-6 weeks  Plan:  -Patient was provided with home exercises/activities to target goals in plan of care at the end of today's session   -Continue with current plan of care  Patient was treated by Roberto Carlson, graduate SLP student, and was under the direct supervision of VIVIANA Bang , CCC-SLP

## 2019-11-18 ENCOUNTER — OFFICE VISIT (OUTPATIENT)
Dept: SPEECH THERAPY | Facility: CLINIC | Age: 49
End: 2019-11-18
Payer: COMMERCIAL

## 2019-11-18 ENCOUNTER — APPOINTMENT (OUTPATIENT)
Dept: PHYSICAL THERAPY | Facility: CLINIC | Age: 49
End: 2019-11-18
Payer: COMMERCIAL

## 2019-11-18 DIAGNOSIS — R48.8 OTHER SYMBOLIC DYSFUNCTIONS: Primary | ICD-10-CM

## 2019-11-18 DIAGNOSIS — Z86.73 HISTORY OF CVA (CEREBROVASCULAR ACCIDENT): ICD-10-CM

## 2019-11-18 DIAGNOSIS — R41.0 DISORIENTATION: ICD-10-CM

## 2019-11-18 PROCEDURE — 92507 TX SP LANG VOICE COMM INDIV: CPT | Performed by: SPEECH-LANGUAGE PATHOLOGIST

## 2019-11-20 NOTE — PROGRESS NOTES
Daily Speech Treatment Note    Today's date: 2019   Patients name: Harika Umaña  : 1970  MRN: [id number]  Safety measures: N/A  Referring provider: Leslee Strickland MD    Primary Diagnosis/Billing code: D28 6, S47 95  Secondary Diagnosis/ Billing code: R41 0    Visit Tracking:  -Referring provider: Epic  -Billing guidelines: AMA  -Visit #5/  (**12 visits pcy**)   -CBC  -RE due 19  Subjective/Behavioral:  -"I'm good  "    -Patient reported that she felt dizzy coming into the session today  Objective/Assessment:  -Reviewed patient's HEP from previous session  Providing Synonyms and Opposites: task complete in  opp independently, increasing to  given minimal semantic cues  Short-term goals:  1  Patient will complete auditory immediate and short term memory tasks to 80% accuracy to facilitate increased ability to retell narratives and recall information within functional living environment, to be achieved in 4-6 weeks  2  Patient will complete word generation tasks (e g , analogies, category matrices, etc ) with 80% accuracy using word finding strategies to facilitate improved word retrieval skills, to be achieved in 4-6 weeks    -Patient was asked to complete the analogies presented to her  Task complete in  opp independently, increasing to  given minimal semantic cues  3  Patient will be educated on the use of internal and external memory aids and compensatory strategies with 80% accuracy to facilitate increased recall of routine, personal information, and recent events, to be achieved in 4-6 weeks      4  Patient will demonstrate divided attention by responding to multiple tasks or details within tasks at the same time with min cues in a distracting environment with 80% accuracy, to be achieved in 4-6 weeks    -Field Squaredtegories: Patient participated in the game ShopReply where she was given 5 minutes to complete a list of members following a list of given categories (e g  Things that make you scream) when provided with the starting letter each answer must start with  Patient was asked to name 1 member from all 12 listed categories in 5 minutes  Task completed with an average of 6 words across 2 trials  5  Patient will participate in assessment of executive function skills of initiating, organizing, planning and following through for complex everyday tasks (e g  Planning a party, solving a problem), to be achieved in 4-6 weeks  Plan:  -Patient was provided with home exercises/activities to target goals in plan of care at the end of today's session   -Continue with current plan of care  Patient was treated by Tamara Lima, graduate SLP student, and was under the direct supervision of VIVIANA Jackman , CCC-SLP

## 2019-11-21 ENCOUNTER — APPOINTMENT (OUTPATIENT)
Dept: PHYSICAL THERAPY | Facility: CLINIC | Age: 49
End: 2019-11-21
Payer: COMMERCIAL

## 2019-11-21 ENCOUNTER — OFFICE VISIT (OUTPATIENT)
Dept: SPEECH THERAPY | Facility: CLINIC | Age: 49
End: 2019-11-21
Payer: COMMERCIAL

## 2019-11-21 DIAGNOSIS — Z86.73 HISTORY OF CVA (CEREBROVASCULAR ACCIDENT): ICD-10-CM

## 2019-11-21 DIAGNOSIS — R48.8 OTHER SYMBOLIC DYSFUNCTIONS: Primary | ICD-10-CM

## 2019-11-21 DIAGNOSIS — R41.0 DISORIENTATION: ICD-10-CM

## 2019-11-21 PROCEDURE — 92507 TX SP LANG VOICE COMM INDIV: CPT | Performed by: SPEECH-LANGUAGE PATHOLOGIST

## 2019-11-25 ENCOUNTER — APPOINTMENT (OUTPATIENT)
Dept: PHYSICAL THERAPY | Facility: CLINIC | Age: 49
End: 2019-11-25
Payer: COMMERCIAL

## 2019-11-25 ENCOUNTER — APPOINTMENT (OUTPATIENT)
Dept: SPEECH THERAPY | Facility: CLINIC | Age: 49
End: 2019-11-25
Payer: COMMERCIAL

## 2019-11-25 NOTE — PROGRESS NOTES
Speech-Language Pathology Re-Evaluation    Today's date: 2019 (***update)  Patients name: Damien Hogan  : 1970  MRN: [id number]  Safety measures: h/o CVA  Referring provider: Aryan Lorenzo MD    Primary Diagnosis/Billing code: N38 9, E26 65  Secondary Diagnosis/ Billing code: R41 0    Visit Tracking:  -Referring provider: Epic  -Billing guidelines: AMA  -Visit #  (**12 visits pcy**) (***update)  -CBC  -RE due 01/10/2020  Subjective comments: ***    Patient's goal(s): "improve memory and word finding"    Assessments    The Repeatable Battery for the Assessment of Neuropsychological Status (RBANS) is a brief, individually-administered assessment which measures attention, language, visuospatial/constructional abilities, and immediate & delayed memory  The RBANS is intended for use with adolescents to adults, ages 15 to 80 years  The following results were obtained during the administration of the assessment  Form: B    Cognitive Domain/Subtest: Index Score: Percentile Rank: Classification: IE: Status:   IMMEDIATE MEMORY *** ***%ile {RBANS Classifications:37655} 81 {Improvement:03238}        1  List Learning (***/40)          2  Story Memory (***/)           VISUOSPATIAL/  CONSTRUCTIONAL *** ***%ile {RBANS Classifications:59578} 92 {Improvement:67682}        3  Figure Copy (***/)          4  Line Orientation (***/)           LANGUAGE *** ***%ile {RBANS Classifications:97666} 79 {Improvement:39721}        5  Picture Naming (***/10)          6  Semantic Fluency (***/40)           ATTENTION *** ***%ile {RBANS Classifications:26715} 135 {Improvement:37625}        7  Digit Span (***/16)          8  Coding (***/)           DELAYED MEMORY *** ***%ile {RBANS Classifications:25324} 95 {Improvement:84382}        9  List Recall (***/10)          10  List Recognition (***/20)          11  Story Recall (***/)          12   Figure Recall (***)           Sum of Index Scores:  ***  482 {Improvement:13591}   Total Score:  ***      Percentile: ***%ile      Classification: {RBANS Classifications:04646}          IE indicates the scores from the initial evaluation (10/18/2019)  Form: A      *Patient named *** concrete category members (***) in 60 sec (norm=15+)  -- {average/below average:61762}    *Patient named *** abstract category members (words beginning with letter '***') in 60 sec (norm=10+)  -- {average/below average:79426}      The Test of Adult Language  Fourth Edition (TOAL-4) is a standardized, norm-referenced assessment measuring important communicative abilities in spoken and written language  The test in normed on individuals 12:0-24:11  Due to these age restrictions, these standardized scores should not be compared to standard or percentile rank  Objective measures were taken to complete a diagnostic impression and prognosis for this pt  The TOAL-4 has 6 subtests; their results are reported as percentile ranks and scaled scores  The results of the TOAL-4 are generally used for three purposes; (a) to identify adolescents and adults who score significantly below their peers and therefore might need help improving their language proficiency, (b) to determine areas of relative strength and weakness among language abilities, and (c) to serve as a research tool in studies investigating language problems in adolescents and adults  Due to time constraints, only portions of the standardized assessment were administered on this date of service  Subtest: Raw Score:   1  Word Opposites ***/34   3  Spoken Analogies ***/26   4  Word Similarities ***/40     Goals    Short-term goals:  1   Patient will complete auditory immediate and short term memory tasks to 80% accuracy to facilitate increased ability to retell narratives and recall information within functional living environment, to be achieved in 4-6 weeks      2  Patient will complete word generation tasks (e g , analogies, category matrices, etc ) with 80% accuracy using word finding strategies to facilitate improved word retrieval skills, to be achieved in 4-6 weeks      3  Patient will be educated on the use of internal and external memory aids and compensatory strategies with 80% accuracy to facilitate increased recall of routine, personal information, and recent events, to be achieved in 4-6 weeks      4  Patient will demonstrate divided attention by responding to multiple tasks or details within tasks at the same time with min cues in a distracting environment with 80% accuracy, to be achieved in 4-6 weeks      5  Patient will participate in assessment of executive function skills of initiating, organizing, planning and following through for complex everyday tasks (e g  Planning a party, solving a problem), to be achieved in 4-6 weeks      Long-term goals:  1  Pt will demonstrate cognitive-linguistic skills that are functional for social communication in daily life  NEW GOAL:  2  ***    Impressions/Recommendations    Impressions: Patient presents with ***      **OLD: Patient presents with cognitive linguistic difficulties that are impacting her language and social communication skills  Pt demonstrates good attention in controlled quiet environments but significant difficulties with language and memory as indicated by the RBANS  Pt also reported significant executive function difficulties that impact communication, that warrent further investigation; -Intervention comments: Pt reports significant difficulty remembering episodic experiences (e g what she watch on TV or read or a conversation she had)  Pt also indicated that she has significant difficulty with multitasking - cooking, taking a message on the telephone and dealing with it  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

## 2019-11-27 ENCOUNTER — APPOINTMENT (OUTPATIENT)
Dept: SPEECH THERAPY | Facility: CLINIC | Age: 49
End: 2019-11-27
Payer: COMMERCIAL

## 2019-11-27 ENCOUNTER — APPOINTMENT (OUTPATIENT)
Dept: PHYSICAL THERAPY | Facility: CLINIC | Age: 49
End: 2019-11-27
Payer: COMMERCIAL

## 2019-11-29 ENCOUNTER — APPOINTMENT (OUTPATIENT)
Dept: SPEECH THERAPY | Facility: CLINIC | Age: 49
End: 2019-11-29
Payer: COMMERCIAL

## 2019-12-03 NOTE — PROGRESS NOTES
Speech-Language Pathology Re- Evaluation    Today's date: 2019   Patients name: Massimo Montgomery  : 1970  MRN: [id number]  Safety measures: n/a  Referring provider: Dorothy Calabrese MD    Subjective comments: "I forgot my folder "    Patient's goal(s): improve memory and word finding      Assessments      The Repeatable Battery for the Assessment of Neuropsychological Status (RBANS) is a brief, individually-administered assessment which measures attention, language, visuospatial/constructional abilities, and immediate & delayed memory  The RBANS is intended for use with adolescents to adults, ages 15 to 80 years  The following results were obtained during the administration of the assessment  Form: D    Cognitive Domain/Subtest: Index Score: Percentile Rank: Classification: IE: Status:   IMMEDIATE MEMORY 109 73%ile Average 81 IMPROVEMENT        1  List Learning ()          2  Story Memory ()           VISUOSPATIAL/  CONSTRUCTIONAL 116 23%ile Low Average 92 IMPROVEMENT        3  Figure Copy ()          4  Line Orientation ()           LANGUAGE 88 21%ile Low Average 79 IMPROVEMENT        5  Picture Naming (10/10)          6  Semantic Fluency ()           ATTENTION 88 21%ile Low Average 135 DECLINE        7  Digit Span (16)          8  Coding (49)           DELAYED MEMORY 98 45%ile Average 95 IMPROVEMENT        9  List Recall (9/10)          10  List Recognition ()          11  Story Recall ()          12  Figure Recall ()           Sum of Index Scores:  499  482 IMPROVEMENT   Total Score:  99      Percentile: 47%ile      Classification: Average          IE indicates the scores from the initial evaluation (10/18/2019)  Form: A      *Patient named 15 concrete category members (Clothing items) in 60 sec (norm=15+)  -- AVERAGE    *Patient named 6 abstract category members (words beginning with letter 'B') in 60 sec (norm=10+)   -- AVERAGE  Goals  Short-term goals: 1  Patient will complete auditory immediate and short term memory tasks to 80% accuracy to facilitate increased ability to retell narratives and recall information within functional living environment, to be achieved in 4-6 weeks  --PARTIALLY MET    2  Patient will complete word generation tasks (e g , analogies, category matrices, etc ) with 80% accuracy using word finding strategies to facilitate improved word retrieval skills, to be achieved in 4-6 weeks  --PARTIALLY MET    3  Patient will be educated on the use of internal and external memory aids and compensatory strategies with 80% accuracy to facilitate increased recall of routine, personal information, and recent events, to be achieved in 4-6 weeks  GOAL MET     4  Patient will demonstrate divided attention by responding to multiple tasks or details within tasks at the same time with min cues in a distracting environment with 80% accuracy, to be achieved in 4-6 weeks  --PARTIALLY MET    5  Patient will participate in assessment of executive function skills of initiating, organizing, planning and following through for complex everyday tasks (e g  Planning a party, solving a problem), to be achieved in 4-6 weeks  --GOAL D/C    6  Patient will be educated on word finding strategies (i e , circumlocution) for improved generative naming and verbal expression skills  --NEW GOAL    7  To target mental manipulation and working memory, patient will participate in word finding activity (i e , anagrams) with 80% accuracy, to be achieved in 4-6 weeks  --NEW GOAL    8  Patient will facilitate planning by completing thought organization tasks (e g , sequencing, deduction puzzles, etc ) with 80% accuracy to facilitate increased executive functioning, working memory, problem solving, and processing skills, to be achieved in 4-6 weeks --NEW GOAL    Long-term goals:  1   Pt will demonstrate cognitive-linguistic skills that are functional for social communication in daily life  --PARTIALLY MET      2  Patient will demonstrate cognitive-communication skills consistent with age and education given use of compensatory strategies when needed to resume baseline activities and responsibilities in home, community, and work/school settings by discharge  --NEW GOAL    Impressions/Recommendations    Impressions: Patient continues to present with (improving) cognitive linguistic difficulties that are impacting her language and social communication skills  Patient has made slow steady progress towards achieving goals  She demonstrated improvement in the cognitive assessment administered today when compared to first evaluation (10/18/19)   Areas of improvement included immediate/delayed memory, attention and language  Due to patient c/o word finding difficulty a word finding goal was added to POC  Patient is motivated to attend therapy and completes HEP consisntently  Patient reports she has been implementing memory strategies to her daily life  It is recommended she continue to receive cognitive-linguistic therapy to increase overall cognitive skills  Recommendations:  -Patient would benefit from outpatient skilled Speech Therapy services : Cognitive-Linguistic therapy    -Frequency: 2x weekly  -Duration: 4-6 weeks    -Intervention certification from: 25/2/5947  -Intervention certification to: 4/59/6365    -Intervention comments: Administration of evaluation: 60 minutes Scoring and POC developmet: 60 minutes    Visit Tracking:  -Referring provider: Epic  -Billing guidelines: AMA  -Visit #6/12   -CBC  -RE due 1/15/20

## 2019-12-04 ENCOUNTER — EVALUATION (OUTPATIENT)
Dept: SPEECH THERAPY | Facility: CLINIC | Age: 49
End: 2019-12-04
Payer: COMMERCIAL

## 2019-12-04 DIAGNOSIS — R41.0 DISORIENTATION: ICD-10-CM

## 2019-12-04 DIAGNOSIS — Z86.73 HISTORY OF CVA (CEREBROVASCULAR ACCIDENT): ICD-10-CM

## 2019-12-04 DIAGNOSIS — R48.8 OTHER SYMBOLIC DYSFUNCTIONS: Primary | ICD-10-CM

## 2019-12-04 PROCEDURE — 96125 COGNITIVE TEST BY HC PRO: CPT

## 2019-12-04 NOTE — PROGRESS NOTES
Daily Speech Treatment Note    Today's date: 2019  Patients name: Phillip Sauceda  : 1970  MRN: [id number]  Safety measures: N/A  Referring provider: Nelsy García MD    Primary Diagnosis/Billing code:R48 8, C07 99  Secondary Diagnosis/ Billing code: R41 0    Visit Tracking:  -Referring provider: Epic  -Billing guidelines: AMA  -Visit #  (**12 visits pcy**)   -CBC  -RE due 1/15/20  Subjective/Behavioral:  -"I'm good  How are you?"    Objective/Assessment:  -Reviewed testing results and goals in plan care with patient  Patient is in agreement at this time  Short-term goals:  1  Patient will complete auditory immediate and short term memory tasks to 80% accuracy to facilitate increased ability to retell narratives and recall information within functional living environment, to be achieved in 4-6 weeks  --PARTIALLY MET    2  Patient will complete word generation tasks (e g , analogies, category matrices, etc ) with 80% accuracy using word finding strategies to facilitate improved word retrieval skills, to be achieved in 4-6 weeks  --PARTIALLY MET    3  Patient will be educated on the use of internal and external memory aids and compensatory strategies with 80% accuracy to facilitate increased recall of routine, personal information, and recent events, to be achieved in 4-6 weeks  GOAL MET     4  Patient will demonstrate divided attention by responding to multiple tasks or details within tasks at the same time with min cues in a distracting environment with 80% accuracy, to be achieved in 4-6 weeks  --PARTIALLY MET  Compass Activity: Patient was asked to follow a list of compass directions (e g  4NW) to plot marks on a grid to reveal a word  Task complete in 10 minutes with 0 errors while carrying out a conversation with the clinician      5  Patient will participate in assessment of executive function skills of initiating, organizing, planning and following through for complex everyday tasks (e g  Planning a party, solving a problem), to be achieved in 4-6 weeks  --GOAL D/C    6  Patient will be educated on word finding strategies (i e , circumlocution) for improved generative naming and verbal expression skills  --NEW GOAL    7  To target mental manipulation and working memory, patient will participate in word finding activity (i e , anagrams) with 80% accuracy, to be achieved in 4-6 weeks  --NEW GOAL    8  Patient will facilitate planning by completing thought organization tasks (e g , sequencing, deduction puzzles, etc ) with 80% accuracy to facilitate increased executive functioning, working memory, problem solving, and processing skills, to be achieved in 4-6 weeks --NEW GOAL  Patient was read a word and asked to use the same letters to create a new word (e g  Item = time)  Task complete in 59/60 opp independently, increasing to 60/60 given minimal verbal cues  See goal #4  Plan:  -Patient was provided with home exercises/activities to target goals in plan of care at the end of today's session   -Continue with current plan of care  Patient was treated by Rodger Beckman, graduate SLP student, and was under the direct supervision of VIVIANA Wellington , CCC-SLP

## 2019-12-06 ENCOUNTER — OFFICE VISIT (OUTPATIENT)
Dept: SPEECH THERAPY | Facility: CLINIC | Age: 49
End: 2019-12-06
Payer: COMMERCIAL

## 2019-12-06 DIAGNOSIS — R48.8 OTHER SYMBOLIC DYSFUNCTIONS: Primary | ICD-10-CM

## 2019-12-06 DIAGNOSIS — Z86.73 HISTORY OF CVA (CEREBROVASCULAR ACCIDENT): ICD-10-CM

## 2019-12-06 DIAGNOSIS — R41.0 DISORIENTATION: ICD-10-CM

## 2019-12-06 PROCEDURE — 92507 TX SP LANG VOICE COMM INDIV: CPT | Performed by: SPEECH-LANGUAGE PATHOLOGIST

## 2019-12-06 NOTE — PROGRESS NOTES
Daily Speech Treatment Note    Today's date: 12/10/2019   Patients name: Megan Vasquez  : 1970  MRN: [id number]  Safety measures: N/A  Referring provider: Jeff Alonzo MD    Primary Diagnosis/Billing code:R48 8, H63 43  Secondary Diagnosis/ Billing code: R41 0    Visit Tracking:  -Referring provider: Epic  -Billing guidelines: AMA  -Visit #8/  (**12 visits pcy**)  -CBC  -RE due 01/15/2020  Subjective/Behavioral:  -"I'm fine "    Objective/Assessment:  -Reviewed patient's home exercises/activities completed since last appointment  Patient with incomplete HEP due to friend's death--HEPcompleted during session  Short-term goals:  1  Patient will complete auditory immediate and short term memory tasks to 80% accuracy to facilitate increased ability to retell narratives and recall information within functional living environment, to be achieved in 4-6 weeks  --PARTIALLY MET  -Mental manipulation: Clinician read 4 words aloud and patient asked to recall words in a sequential order (e g , Paqkzwwg-Rlrrj-Vxykvr = [id number])  Task completed in 15/18 opp (83% acc) independently, increasing to  opp (100% acc) with min verbal repetition cues  -Immediate memory: Clinician presented patient with a series of 4 wordsaloud and asked patient to repeat in alphabetical order  Task completed in  opp (90% acc) independently, increasing to  opp (100% acc) with min verbal repetition cues  2  Patient will complete word generation tasks (e g , analogies, category matrices, etc ) with 80% accuracy using word finding strategies to facilitate improved word retrieval skills, to be achieved in 4-6 weeks  --PARTIALLY MET    4  Patient will demonstrate divided attention by responding to multiple tasks or details within tasks at the same time with min cues in a distracting environment with 80% accuracy, to be achieved in 4-6 weeks  --PARTIALLY MET    6  Patient will be educated on word finding strategies (i e , circumlocution) for improved generative naming and verbal expression skills  --NEW GOAL  -Word deduction: Patient was provided with a definition and asked to name what was being described  It should be noted that patient was provided with letters (IT, MAN) and fill-in-the-blank spaces to assist with word retrieval  Task completed in 31/40 opp (78% acc) independently, increasing to 36/40 opp (90% acc) with min semantic and phonemic cues  Rest of worksheet presented as HEP  7  To target mental manipulation and working memory, patient will participate in word finding activity (i e , anagrams) with 80% accuracy, to be achieved in 4-6 weeks  --MET    8  Patient will facilitate planning by completing thought organization tasks (e g , sequencing, deduction puzzles, etc ) with 80% accuracy to facilitate increased executive functioning, working memory, problem solving, and processing skills, to be achieved in 4-6 weeks --NEW GOAL  -Attention/reasoning/working memory (Compass Activity, Day 4): Patient was presented with a series of numbers/directional terms (e g , 4NW, 11S, 2NE)  Patient shaded in grid blocks based upon directional terms to discover a secret word  Task completed with 0 errors  Plan:  -Patient was provided with home exercises/activities to target goals in plan of care at the end of today's session   -Continue with current plan of care

## 2019-12-10 ENCOUNTER — OFFICE VISIT (OUTPATIENT)
Dept: SPEECH THERAPY | Facility: CLINIC | Age: 49
End: 2019-12-10
Payer: COMMERCIAL

## 2019-12-10 DIAGNOSIS — R41.0 DISORIENTATION: ICD-10-CM

## 2019-12-10 DIAGNOSIS — Z86.73 HISTORY OF CVA (CEREBROVASCULAR ACCIDENT): ICD-10-CM

## 2019-12-10 DIAGNOSIS — R48.8 OTHER SYMBOLIC DYSFUNCTIONS: Primary | ICD-10-CM

## 2019-12-10 PROCEDURE — 92507 TX SP LANG VOICE COMM INDIV: CPT | Performed by: SPEECH-LANGUAGE PATHOLOGIST

## 2019-12-10 NOTE — PROGRESS NOTES
Daily Speech Treatment Note    Today's date: 2019   Patients name: Ratna Mendez  : 1970  MRN: [id number]  Safety measures: N/A  Referring provider: Chon Kwong MD    Primary Diagnosis/Billing code:R48 8, V08 12  Secondary Diagnosis/ Billing code: R41 0    Visit Tracking:  -Referring provider: Epic  -Billing guidelines: AMA  -Visit #  (**12 visits pcy**)   -CBC  -RE due 1/15/20  Subjective/Behavioral:  -"I'm good "    Objective/Assessment:  -Reviewed patient's home exercises/activities completed since last appointment  -Word deduction: Patient was provided with a definition and asked to name what was being described  It should be noted that patient was provided with letters (MAN) and fill-in-the-blank spaces to assist with word retrieval  Task completed in  opp (90% acc) independently, increasing to  opp (100% acc) with min semantic cues  Short-term goals:  1  Patient will complete auditory immediate and short term memory tasks to 80% accuracy to facilitate increased ability to retell narratives and recall information within functional living environment, to be achieved in 4-6 weeks  --PARTIALLY MET  -Auditory comprehension/writing/recall: Clinician read short statements aloud and then had patient recall important details  Clinician instructed patient to take short-hand notes on information read aloud by clinician to facilitate increased recall  Patient asked to fill-in missing words from sentences without using written notes  Task competed in  opp (84% acc)  Educated patient on verbal rehearsal and visualization during today's session  2  Patient will complete word generation tasks (e g , analogies, category matrices, etc ) with 80% accuracy using word finding strategies to facilitate improved word retrieval skills, to be achieved in 4-6 weeks   --PARTIALLY MET     4  Patient will demonstrate divided attention by responding to multiple tasks or details within tasks at the same time with min cues in a distracting environment with 80% accuracy, to be achieved in 4-6 weeks  --PARTIALLY MET    6  Patient will be educated on word finding strategies (i e , circumlocution) for improved generative naming and verbal expression skills  --NEW GOAL  -Word finding/reasoning:  The game Twila Bees was utilized to target word finding and reasoning  Patient was presented with three cards (yellow, blue, and red)  Yellow cards represented a category (e g , An article of clothing), and the blue and red cards represented two rules (e g , Does not contain two vowels next to each other and Cannot contain the letter G) = SHIRT  Patient completed over 6 trials, naming 4 items  7 To target mental manipulation and working memory, patient will participate in word finding activity (i e , anagrams) with 80% accuracy, to be achieved in 4-6 weeks  --NEW GOAL    8  Patient will facilitate planning by completing thought organization tasks (e g , sequencing, deduction puzzles, etc ) with 80% accuracy to facilitate increased executive functioning, working memory, problem solving, and processing skills, to be achieved in 4-6 weeks --NEW GOAL    Plan:  -Patient was provided with home exercises/activities to target goals in plan of care at the end of today's session   -Continue with current plan of care

## 2019-12-12 ENCOUNTER — OFFICE VISIT (OUTPATIENT)
Dept: SPEECH THERAPY | Facility: CLINIC | Age: 49
End: 2019-12-12
Payer: COMMERCIAL

## 2019-12-12 DIAGNOSIS — R41.0 DISORIENTATION: ICD-10-CM

## 2019-12-12 DIAGNOSIS — Z86.73 HISTORY OF CVA (CEREBROVASCULAR ACCIDENT): ICD-10-CM

## 2019-12-12 DIAGNOSIS — R48.8 OTHER SYMBOLIC DYSFUNCTIONS: Primary | ICD-10-CM

## 2019-12-12 PROCEDURE — 92507 TX SP LANG VOICE COMM INDIV: CPT | Performed by: SPEECH-LANGUAGE PATHOLOGIST

## 2019-12-13 ENCOUNTER — APPOINTMENT (OUTPATIENT)
Dept: LAB | Facility: MEDICAL CENTER | Age: 49
End: 2019-12-13
Payer: COMMERCIAL

## 2019-12-13 PROCEDURE — 86146 BETA-2 GLYCOPROTEIN ANTIBODY: CPT

## 2019-12-13 PROCEDURE — 85300 ANTITHROMBIN III ACTIVITY: CPT

## 2019-12-13 PROCEDURE — 36415 COLL VENOUS BLD VENIPUNCTURE: CPT

## 2019-12-13 PROCEDURE — 86147 CARDIOLIPIN ANTIBODY EA IG: CPT

## 2019-12-14 LAB
CARDIOLIPIN IGA SER IA-ACNC: <9 APL U/ML (ref 0–11)
CARDIOLIPIN IGG SER IA-ACNC: <9 GPL U/ML (ref 0–14)
CARDIOLIPIN IGM SER IA-ACNC: 9 MPL U/ML (ref 0–12)
DEPRECATED AT III PPP: 106 % OF NORMAL (ref 92–136)

## 2019-12-16 ENCOUNTER — APPOINTMENT (OUTPATIENT)
Dept: SPEECH THERAPY | Facility: CLINIC | Age: 49
End: 2019-12-16
Payer: COMMERCIAL

## 2019-12-16 NOTE — PROGRESS NOTES
Daily Speech Treatment Note    Today's date: 2019 (***update)  Patients name: Beba Person  : 1970  MRN: [id number]  Safety measures: N/A  Referring provider: Vitaliy Wisdom MD    Primary Diagnosis/Billing code:R48 8, D56 86  Secondary Diagnosis/ Billing code: R41 0    Visit Tracking:  -Referring provider: Epic  -Billing guidelines: AMA  -Visit #  (**12 visits pcy**) (***update)  -CBC  -RE due 1/15/20  Subjective/Behavioral:  -***    Objective/Assessment:  -Reviewed patient's home exercises/activities completed since last appointment  -Word deduction: Patient was provided with a definition and asked to name what was being described  It should be noted that patient was provided with letters (MAN) and fill-in-the-blank spaces to assist with word retrieval  Task completed in  opp (90% acc) independently, increasing to  opp (100% acc) with min semantic cues  Short-term goals:  1  Patient will complete auditory immediate and short term memory tasks to 80% accuracy to facilitate increased ability to retell narratives and recall information within functional living environment, to be achieved in 4-6 weeks  --PARTIALLY MET  -Auditory comprehension/writing/recall: Clinician read short statements aloud and then had patient recall important details  Clinician instructed patient to take short-hand notes on information read aloud by clinician to facilitate increased recall  Patient asked to fill-in missing words from sentences without using written notes  Task competed in  opp (84% acc)  Educated patient on verbal rehearsal and visualization during today's session  2  Patient will complete word generation tasks (e g , analogies, category matrices, etc ) with 80% accuracy using word finding strategies to facilitate improved word retrieval skills, to be achieved in 4-6 weeks   --PARTIALLY MET     4  Patient will demonstrate divided attention by responding to multiple tasks or details within tasks at the same time with min cues in a distracting environment with 80% accuracy, to be achieved in 4-6 weeks  --PARTIALLY MET    6  Patient will be educated on word finding strategies (i e , circumlocution) for improved generative naming and verbal expression skills  --NEW GOAL  -Word finding/reasoning:  The game [employer]irani a[employer]mpbell was utilized to target word finding and reasoning  Patient was presented with three cards (yellow, blue, and red)  Yellow cards represented a category (e g , An article of clothing), and the blue and red cards represented two rules (e g , Does not contain two vowels next to each other and Cannot contain the letter G) = SHIRT  Patient completed over 6 trials, naming 4 items  7 To target mental manipulation and working memory, patient will participate in word finding activity (i e , anagrams) with 80% accuracy, to be achieved in 4-6 weeks  --NEW GOAL    8  Patient will facilitate planning by completing thought organization tasks (e g , sequencing, deduction puzzles, etc ) with 80% accuracy to facilitate increased executive functioning, working memory, problem solving, and processing skills, to be achieved in 4-6 weeks --NEW GOAL    Plan:  -Patient was provided with home exercises/activities to target goals in plan of care at the end of today's session   -Continue with current plan of care

## 2019-12-17 ENCOUNTER — OFFICE VISIT (OUTPATIENT)
Dept: NEUROLOGY | Facility: CLINIC | Age: 49
End: 2019-12-17
Payer: COMMERCIAL

## 2019-12-17 VITALS
HEIGHT: 59 IN | HEART RATE: 82 BPM | BODY MASS INDEX: 25.8 KG/M2 | DIASTOLIC BLOOD PRESSURE: 82 MMHG | WEIGHT: 128 LBS | SYSTOLIC BLOOD PRESSURE: 120 MMHG

## 2019-12-17 DIAGNOSIS — I77.74 VERTEBRAL ARTERY DISSECTION (HCC): ICD-10-CM

## 2019-12-17 DIAGNOSIS — Z86.73 HISTORY OF CVA (CEREBROVASCULAR ACCIDENT): ICD-10-CM

## 2019-12-17 DIAGNOSIS — I10 ESSENTIAL HYPERTENSION: Primary | ICD-10-CM

## 2019-12-17 LAB
B2 GLYCOPROT1 IGA SER-ACNC: <9 GPI IGA UNITS (ref 0–25)
B2 GLYCOPROT1 IGG SER-ACNC: 12 GPI IGG UNITS (ref 0–20)
B2 GLYCOPROT1 IGM SER-ACNC: <9 GPI IGM UNITS (ref 0–32)

## 2019-12-17 PROCEDURE — 3074F SYST BP LT 130 MM HG: CPT | Performed by: PSYCHIATRY & NEUROLOGY

## 2019-12-17 PROCEDURE — 99214 OFFICE O/P EST MOD 30 MIN: CPT | Performed by: PSYCHIATRY & NEUROLOGY

## 2019-12-17 PROCEDURE — 3079F DIAST BP 80-89 MM HG: CPT | Performed by: PSYCHIATRY & NEUROLOGY

## 2019-12-17 RX ORDER — MECLIZINE HYDROCHLORIDE 25 MG/1
TABLET ORAL
Qty: 30 TABLET | Refills: 1 | Status: SHIPPED | OUTPATIENT
Start: 2019-12-17

## 2019-12-17 NOTE — PATIENT INSTRUCTIONS
Stroke:  ΣΑΡΑΝΤΙ presents for a follow-up evaluation with regard to her prior stroke  In the interval since her last visit to the office she did not report any new symptoms concerning for recurrent TIA or stroke  She does continue to experience bouts of significant vertigo, and this is more severe when her eyes were closed  That is quite understandable considering the fact that her strokes potentially affected both her vision as well as her balance (cerebellum)  She continues to receive cognitive rehabilitation through the speech therapy team and has completed a course of vestibular rehabilitation   -for ongoing stroke prevention she should continue her current combination of Plavix, Lipitor, and appropriate blood pressure and glycemic control  -we will defer to the good judgment of her primary care team for monitoring of her cholesterol and blood sugar numbers   -considering that 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 based on the last note from Dr Malia Love, I agree that we should not pursue conventional angiography as it is unlikely to change our management  There is no current indication for ongoing long-term dual anti-platelet therapy so we will discontinue aspirin at this point in time   -I would like for her to begin to increase her activity level from time to time it is much she feels capable of doing so  I will provide her with a prescription for meclizine which can be used at a dose of 1/2 to 1 tablet up to every 8 hours if needed for significant vertigo  I would also like her to keep track of any activities that cause significant bouts of vertigo so that potentially she could prophylaxis them by taking meclizine prior to engaging in those activities  She should not drive after taking meclizine until she knows how it affects her   -she should continue to work with her current therapist, and I would suggest that she may benefit from techniques of a mind fullness versus cognitive behavioral therapy    She does report that her anxiety is significantly improved on her current dose of Lexapro with no side effects  We will plan to continue at 10 mg   -she is not in need of additional central nervous system imaging at this point in time  I will plan for her to return to the office to see me in 6 months time but I would be happy to see her sooner if the need should arise  If she has any symptoms concerning for TIA or stroke such as sudden painless loss of vision or double vision, difficulty speaking or swallowing, vertigo/room spinning that does not quickly resolve, or weakness/numbness affecting 1 side of the face or body she should proceed by ambulance to the nearest emergency room immediately

## 2019-12-17 NOTE — PROGRESS NOTES
Patient ID: Massimo Montgomery is a 52 y o  female  Assessment/Plan:    No problem-specific Assessment & Plan notes found for this encounter  Diagnoses and all orders for this visit:    Essential hypertension    Vertebral artery dissection (HCC)    History of CVA (cerebrovascular accident)  -     meclizine (ANTIVERT) 25 mg tablet; 1/2-1 tab up to every 8 hours for vertigo if needed       Patient Instructions   Stroke:  Dre Pickens presents for a follow-up evaluation with regard to her prior stroke  In the interval since her last visit to the office she did not report any new symptoms concerning for recurrent TIA or stroke  She does continue to experience bouts of significant vertigo, and this is more severe when her eyes were closed  That is quite understandable considering the fact that her strokes potentially affected both her vision as well as her balance (cerebellum)  She continues to receive cognitive rehabilitation through the speech therapy team and has completed a course of vestibular rehabilitation   -for ongoing stroke prevention she should continue her current combination of Plavix, Lipitor, and appropriate blood pressure and glycemic control  -we will defer to the good judgment of her primary care team for monitoring of her cholesterol and blood sugar numbers   -considering that 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 based on the last note from Dr Su Phoenix, I agree that we should not pursue conventional angiography as it is unlikely to change our management  There is no current indication for ongoing long-term dual anti-platelet therapy so we will discontinue aspirin at this point in time   -I would like for her to begin to increase her activity level from time to time it is much she feels capable of doing so  I will provide her with a prescription for meclizine which can be used at a dose of 1/2 to 1 tablet up to every 8 hours if needed for significant vertigo    I would also like her to keep track of any activities that cause significant bouts of vertigo so that potentially she could prophylaxis them by taking meclizine prior to engaging in those activities  She should not drive after taking meclizine until she knows how it affects her   -she should continue to work with her current therapist, and I would suggest that she may benefit from techniques of a mind fullness versus cognitive behavioral therapy  She does report that her anxiety is significantly improved on her current dose of Lexapro with no side effects  We will plan to continue at 10 mg   -she is not in need of additional central nervous system imaging at this point in time  I will plan for her to return to the office to see me in 6 months time but I would be happy to see her sooner if the need should arise  If she has any symptoms concerning for TIA or stroke such as sudden painless loss of vision or double vision, difficulty speaking or swallowing, vertigo/room spinning that does not quickly resolve, or weakness/numbness affecting 1 side of the face or body she should proceed by ambulance to the nearest emergency room immediately  Subjective:    ALEJANDRA Lin presents for follow-up with regard to her prior stroke  In the interval since her last visit to the office she reports no new events concerning for recurrent TIA or stroke  She notes that she was initially reticent to begin taking the Lexapro however subsequently she experienced an entire week in which she went to bed every night thinking that she may have another stroke or dye overnight  Subsequently, after working with her therapist, she did decide to begin the Lexapro at 1st at 1/2 tablet for approximately 3 weeks and then 1 full tablet  She notes that her anxiety is significantly improved  She will now drive short distances but notes that she still experiences bouts of vertigo with no specific and clear triggers      She notes that these can be, at times, quite significant  For example, 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 and notes that she was exceedingly dizzy and it really knocked her out for the remainder of that day and through the next day  She reports that she continues to work with her therapist   She has some challenges in that people tell her she looks very good and is doing so well but she knows that her memory and other functions remained relatively significantly impaired  People tends not to believe her when she reports that she had 4 strokes (we reviewed her prior MRI together in the office today once again and she did in fact have for strokes including to in the right cerebellar hemisphere and 1 in the occipital lobe on the each side  )   We discussed the possibility of getting more active physically, and particularly I think she would benefit from beginning some volunteer work  I will also provide her with a prescription for meclizine which she can use of the dizziness is quite severe, and once she is able to identify specific activities that trigger the severe dizziness she should be able to prophylax it  She does report ongoing symptoms of depersonalization, and she may benefit from mindfullness versus cognitive behavioral therapy      Past Medical History:   Diagnosis Date    CVA (cerebral vascular accident) ([facility] [address] )     GERD (gastroesophageal reflux disease)     occasionally    Hypertension     controlling with diet and exercise       Social History     Socioeconomic History    Marital status: /Civil Union     Spouse name: None    Number of children: None    Years of education: None    Highest education level: None   Occupational History    None   Social Needs    Financial resource strain: None    Food insecurity:     Worry: None     Inability: None    Transportation needs:     Medical: None     Non-medical: None   Tobacco Use    Smoking status: Never Smoker    Smokeless tobacco: Never Used   Substance and Sexual Activity    Alcohol use: Yes     Comment: SOCIAL-1-2 drinks per week    Drug use: No    Sexual activity: None   Lifestyle    Physical activity:     Days per week: None     Minutes per session: None    Stress: None   Relationships    Social connections:     Talks on phone: None     Gets together: None     Attends [employer] service: None     Active member of club or organization: None     Attends meetings of clubs or organizations: None     Relationship status: None    Intimate partner violence:     Fear of current or ex partner: None     Emotionally abused: None     Physically abused: None     Forced sexual activity: None   Other Topics Concern    None   Social History Narrative    PROBLEMS CONCERNING ADOPTED CHILD    CAFFEINE USE    MODERATE EXERCISING LESS THAN 3x WEEK       Family History   Adopted: Yes   Problem Relation Age of Onset    No Known Problems Mother     No Known Problems Father     No Known Problems Sister     No Known Problems Brother     No Known Problems Maternal Aunt     No Known Problems Maternal Uncle     No Known Problems Paternal Aunt     No Known Problems Paternal Uncle     No Known Problems Maternal Grandmother     No Known Problems Maternal Grandfather     No Known Problems Paternal Grandmother     No Known Problems Paternal Grandfather          Current Outpatient Medications:     acetaminophen (TYLENOL) 650 mg CR tablet, Take 2 tablets (1,300 mg total) by mouth every 8 (eight) hours as needed for headaches, Disp: 30 tablet, Rfl: 0    ALPRAZolam (XANAX) 0 5 mg tablet, Take 0 5 mg by mouth 4 (four) times a day as needed for anxiety (Patient takes one at night) , Disp: , Rfl:     amLODIPine (NORVASC) 10 mg tablet, Take 1 tablet (10 mg total) by mouth daily for 14 days, Disp: 14 tablet, Rfl: 0    aspirin (ECOTRIN LOW STRENGTH) 81 mg EC tablet, Take 1 tablet (81 mg total) by mouth daily, Disp: , Rfl:     atorvastatin (LIPITOR) 80 mg tablet, Take 1 tablet (80 mg total) by mouth daily with dinner, Disp: 30 tablet, Rfl: 11    clopidogrel (PLAVIX) 75 mg tablet, Take 1 tablet (75 mg total) by mouth daily for 60 days, Disp: 60 tablet, Rfl: 0    Docusate Calcium (STOOL SOFTENER PO), Take 1 tablet by mouth as needed (once a week prn) , Disp: , Rfl:     escitalopram (LEXAPRO) 10 mg tablet, Take 1 tablet (10 mg total) by mouth daily, Disp: 30 tablet, Rfl: 3    hydrochlorothiazide (HYDRODIURIL) 25 mg tablet, Take 25 mg by mouth daily, Disp: , Rfl: 0    omeprazole (PriLOSEC) 40 MG capsule, Take 40 mg by mouth daily, Disp: , Rfl: 0    Potassium Chloride ER 20 MEQ TBCR, take 1 tablet by mouth twice a day take with food, Disp: , Rfl: 0      The following portions of the patient's history were reviewed: allergies, current medications, past family history, past medical history, past social history and problem list          Objective: There were no vitals taken for this visit  /82 (BP Location: Right arm, Patient Position: Sitting, Cuff Size: Adult)   Pulse 82   Ht 4' 11" (1 499 m)   Wt 58 1 kg (128 lb)   BMI 25 85 kg/m²       Physical Exam    Neurological Exam      At the time of my evaluation   She was awake, alert, and in no distress  There was no significant ataxia  Finger-to-nose testing was  Appropriate with her eyes closed indicating intact proprioception  She was also able to perform the maneuver with her eyes open  With no significant tremor/dysmetria  Her gait was stable  ROS:    Review of Systems   Constitutional: Negative  Negative for appetite change and fever  HENT: Negative  Negative for hearing loss, tinnitus, trouble swallowing and voice change  Eyes: Negative  Negative for photophobia and pain  Respiratory: Negative  Negative for shortness of breath  Cardiovascular: Negative  Negative for palpitations  Gastrointestinal: Negative  Negative for nausea and vomiting  Endocrine: Negative    Negative for cold intolerance and heat intolerance  Genitourinary: Negative  Negative for dysuria, frequency and urgency  Musculoskeletal: Negative  Negative for myalgias and neck pain  Skin: Negative  Negative for rash  Neurological: Positive for dizziness (dizziness when turning her head or when objects on the road are coming towards her)  Negative for tremors, seizures, syncope, facial asymmetry, speech difficulty, weakness, light-headedness, numbness and headaches  Hematological: Negative  Does not bruise/bleed easily  Psychiatric/Behavioral: Negative  Negative for confusion, hallucinations and sleep disturbance  Reviewed ROS as entered by medical assistant  I have spent 35 minutes with Patient  today in which greater than 50% of this time was spent in counseling/coordination of care regarding Diagnostic results, Prognosis, Intructions for management and Impressions

## 2019-12-19 ENCOUNTER — APPOINTMENT (OUTPATIENT)
Dept: SPEECH THERAPY | Facility: CLINIC | Age: 49
End: 2019-12-19
Payer: COMMERCIAL

## 2019-12-23 ENCOUNTER — APPOINTMENT (OUTPATIENT)
Dept: SPEECH THERAPY | Facility: CLINIC | Age: 49
End: 2019-12-23
Payer: COMMERCIAL

## 2019-12-26 ENCOUNTER — OFFICE VISIT (OUTPATIENT)
Dept: SPEECH THERAPY | Facility: CLINIC | Age: 49
End: 2019-12-26
Payer: COMMERCIAL

## 2019-12-26 DIAGNOSIS — Z86.73 HISTORY OF CVA (CEREBROVASCULAR ACCIDENT): ICD-10-CM

## 2019-12-26 DIAGNOSIS — R41.0 DISORIENTATION: ICD-10-CM

## 2019-12-26 DIAGNOSIS — R48.8 OTHER SYMBOLIC DYSFUNCTIONS: Primary | ICD-10-CM

## 2019-12-26 PROCEDURE — 92507 TX SP LANG VOICE COMM INDIV: CPT | Performed by: SPEECH-LANGUAGE PATHOLOGIST

## 2019-12-26 NOTE — PROGRESS NOTES
Daily Speech Treatment Note    Today's date: 2019  Patients name: Beba Person  : 1970  MRN: [id number]  Safety measures: N/A  Referring provider: Vitaliy Wisdom MD    Primary Diagnosis/Billing code:R48 8, W42 80  Secondary Diagnosis/ Billing code: R41 0    Visit Tracking:  -Referring provider: Epic  -Billing guidelines: AMA  -Visit #11/12  (**12 visits pcy**)  -CBC  -RE due 1/15/20  Subjective/Behavioral:  -"I'm good "    Objective/Assessment:    Short-term goals:  1  Patient will complete auditory immediate and short term memory tasks to 80% accuracy to facilitate increased ability to retell narratives and recall information within functional living environment, to be achieved in 4-6 weeks  --PARTIALLY MET  -To target attention, sequencing, and memory, patient engaged in the following tasks on the [address] ([employer]):     *Activity 1: Memory (field of 4 words - reverse order), persistence off, verbal stimuli, eyes closed  (92 31% acc; 12 successful trials across 4 minutes)     *Activity 2: Memory (field of 5 words - reverse order), persistence off, verbal stimuli, eyes closed  (55 56% acc; 5 successful trials across 4 minutes)    2  Patient will complete word generation tasks (e g , analogies, category matrices, etc ) with 80% accuracy using word finding strategies to facilitate improved word retrieval skills, to be achieved in 4-6 weeks  --PARTIALLY MET  -See below (goal #8)  4  Patient will demonstrate divided attention by responding to multiple tasks or details within tasks at the same time with min cues in a distracting environment with 80% accuracy, to be achieved in 4-6 weeks  --PARTIALLY MET  -To target attention, sequencing, and memory, patient engaged in the following tasks on the [address] ([employer]):  *Activity 1: Trail making (field of 52 - numbers/letters), persistence on, central fixation off   (94 64% acc with task, time 4:31 minutes)    *Activity 2: Trail making (field of 52 - numbers/letters), persistence on, central fixation on (not flashing)  (85 48% acc with task, 33 33% acc with fixation, time 6:26 minutes)    6  Patient will be educated on word finding strategies (i e , circumlocution) for improved generative naming and verbal expression skills  --NEW GOAL    7  To target mental manipulation and working memory, patient will participate in word finding activity (i e , anagrams) with 80% accuracy, to be achieved in 4-6 weeks  --NEW GOAL    8  Patient will facilitate planning by completing thought organization tasks (e g , sequencing, deduction puzzles, etc ) with 80% accuracy to facilitate increased executive functioning, working memory, problem solving, and processing skills, to be achieved in 4-6 weeks --NEW GOAL  -Word finding/semantic language/organization: Patient participated in [address] today  Patient was required to utilize only 6 words to describe a person, place, or thing  Patient was provided with a detailed explanation of task prior to activity and was presented with an example  Patient provided appropriate clues in 12/15 opp (80% acc)  Plan:  -Patient was provided with home exercises/activities to target goals in plan of care at the end of today's session   -Patient will be placed on a 30-day hold

## 2019-12-31 ENCOUNTER — OFFICE VISIT (OUTPATIENT)
Dept: SPEECH THERAPY | Facility: CLINIC | Age: 49
End: 2019-12-31
Payer: COMMERCIAL

## 2019-12-31 DIAGNOSIS — R41.0 DISORIENTATION: ICD-10-CM

## 2019-12-31 DIAGNOSIS — R48.8 OTHER SYMBOLIC DYSFUNCTIONS: Primary | ICD-10-CM

## 2019-12-31 DIAGNOSIS — Z86.73 HISTORY OF CVA (CEREBROVASCULAR ACCIDENT): ICD-10-CM

## 2019-12-31 PROCEDURE — 92507 TX SP LANG VOICE COMM INDIV: CPT | Performed by: SPEECH-LANGUAGE PATHOLOGIST

## 2020-01-07 ENCOUNTER — TELEPHONE (OUTPATIENT)
Dept: NEUROLOGY | Facility: CLINIC | Age: 50
End: 2020-01-07

## 2020-01-07 NOTE — TELEPHONE ENCOUNTER
Received request for records from [facility] department    Scanned copy of request into chart and faxed to [facility]

## 2020-01-14 NOTE — TELEPHONE ENCOUNTER
JOSE ALBERTO Received a request from [facility] for LTD scanned into chart and sent to [facility]

## 2020-01-21 NOTE — TELEPHONE ENCOUNTER
Faxed all 24 pages of requested records including filled out form to [phone] attn made to [facility]

## 2020-01-27 ENCOUNTER — TELEPHONE (OUTPATIENT)
Dept: NEUROLOGY | Facility: CLINIC | Age: 50
End: 2020-01-27

## 2020-01-27 NOTE — TELEPHONE ENCOUNTER
[facility] [address] [facility]  He stated he faxed additional paperwork to us regading Long term disability on 1/23/2020  We did not receive it  He will fax to our back fax

## 2020-02-10 NOTE — PROGRESS NOTES
Addendum:    Patient's 30-day HOLD from outpatient skilled Speech Therapy services has   Attempted to contact patient via telephone  Left message a few weeks ago, but patient has not returned telephone call  Patient has not attempted to contact the clinic to schedule any future appointments within this time frame  Patient to be discharged from Speech Therapy at this time  If patient would like to resume therapy in the future, she will need a new prescription  Thank you

## 2020-03-21 DIAGNOSIS — Z86.73 HISTORY OF CVA (CEREBROVASCULAR ACCIDENT): ICD-10-CM

## 2020-03-21 DIAGNOSIS — I77.74 VERTEBRAL ARTERY DISSECTION (HCC): ICD-10-CM

## 2020-03-21 DIAGNOSIS — F41.9 ANXIETY DISORDER, UNSPECIFIED TYPE: ICD-10-CM

## 2020-03-22 RX ORDER — CLOPIDOGREL BISULFATE 75 MG/1
TABLET ORAL
Qty: 90 TABLET | Refills: 3 | Status: SHIPPED | OUTPATIENT
Start: 2020-03-22

## 2020-03-23 RX ORDER — ESCITALOPRAM OXALATE 10 MG/1
TABLET ORAL
Qty: 30 TABLET | Refills: 3 | Status: SHIPPED | OUTPATIENT
Start: 2020-03-23 | End: 2020-07-20 | Stop reason: SDUPTHER

## 2020-03-26 ENCOUNTER — DOCUMENTATION (OUTPATIENT)
Dept: CARDIOLOGY CLINIC | Facility: CLINIC | Age: 50
End: 2020-03-26

## 2020-04-05 DIAGNOSIS — I63.9 CVA (CEREBRAL VASCULAR ACCIDENT) (HCC): ICD-10-CM

## 2020-04-06 RX ORDER — ATORVASTATIN CALCIUM 80 MG/1
TABLET, FILM COATED ORAL
Qty: 30 TABLET | Refills: 11 | Status: SHIPPED | OUTPATIENT
Start: 2020-04-06 | End: 2021-04-06

## 2020-04-23 ENCOUNTER — TELEMEDICINE (OUTPATIENT)
Dept: CARDIOLOGY CLINIC | Facility: CLINIC | Age: 50
End: 2020-04-23
Payer: COMMERCIAL

## 2020-04-23 VITALS
SYSTOLIC BLOOD PRESSURE: 118 MMHG | BODY MASS INDEX: 25.04 KG/M2 | DIASTOLIC BLOOD PRESSURE: 92 MMHG | WEIGHT: 124 LBS | HEART RATE: 77 BPM

## 2020-04-23 DIAGNOSIS — E78.2 MIXED HYPERLIPIDEMIA: ICD-10-CM

## 2020-04-23 DIAGNOSIS — I10 ESSENTIAL HYPERTENSION: Primary | ICD-10-CM

## 2020-04-23 DIAGNOSIS — I77.74 VERTEBRAL ARTERY DISSECTION (HCC): ICD-10-CM

## 2020-04-23 PROCEDURE — 99214 OFFICE O/P EST MOD 30 MIN: CPT | Performed by: INTERNAL MEDICINE

## 2020-06-01 ENCOUNTER — TELEPHONE (OUTPATIENT)
Dept: GYNECOLOGIC ONCOLOGY | Facility: CLINIC | Age: 50
End: 2020-06-01

## 2020-06-04 ENCOUNTER — OFFICE VISIT (OUTPATIENT)
Dept: GYNECOLOGIC ONCOLOGY | Facility: CLINIC | Age: 50
End: 2020-06-04
Payer: COMMERCIAL

## 2020-06-04 VITALS
DIASTOLIC BLOOD PRESSURE: 80 MMHG | SYSTOLIC BLOOD PRESSURE: 120 MMHG | HEART RATE: 88 BPM | BODY MASS INDEX: 25.85 KG/M2 | WEIGHT: 128 LBS

## 2020-06-04 DIAGNOSIS — N89.0 VAIN I (VAGINAL INTRAEPITHELIAL NEOPLASIA GRADE I): Primary | ICD-10-CM

## 2020-06-04 PROCEDURE — 99212 OFFICE O/P EST SF 10 MIN: CPT | Performed by: OBSTETRICS & GYNECOLOGY

## 2020-06-04 PROCEDURE — 87624 HPV HI-RISK TYP POOLED RSLT: CPT | Performed by: OBSTETRICS & GYNECOLOGY

## 2020-06-04 PROCEDURE — 88175 CYTOPATH C/V AUTO FLUID REDO: CPT | Performed by: OBSTETRICS & GYNECOLOGY

## 2020-06-04 RX ORDER — LISINOPRIL 2.5 MG/1
5 TABLET ORAL DAILY
COMMUNITY
Start: 2020-05-27 | End: 2021-05-18

## 2020-06-18 ENCOUNTER — TELEPHONE (OUTPATIENT)
Dept: NEUROLOGY | Facility: CLINIC | Age: 50
End: 2020-06-18

## 2020-06-20 LAB
LAB AP GYN PRIMARY INTERPRETATION: NORMAL
Lab: NORMAL

## 2020-07-19 DIAGNOSIS — Z86.73 HISTORY OF CVA (CEREBROVASCULAR ACCIDENT): ICD-10-CM

## 2020-07-19 DIAGNOSIS — F41.9 ANXIETY DISORDER, UNSPECIFIED TYPE: ICD-10-CM

## 2020-07-20 RX ORDER — ESCITALOPRAM OXALATE 10 MG/1
TABLET ORAL
Qty: 30 TABLET | Refills: 3 | Status: SHIPPED | OUTPATIENT
Start: 2020-07-20 | End: 2020-11-09

## 2020-09-25 ENCOUNTER — TELEPHONE (OUTPATIENT)
Dept: NEUROLOGY | Facility: CLINIC | Age: 50
End: 2020-09-25

## 2020-09-25 ENCOUNTER — TELEMEDICINE (OUTPATIENT)
Dept: NEUROLOGY | Facility: CLINIC | Age: 50
End: 2020-09-25
Payer: COMMERCIAL

## 2020-09-25 DIAGNOSIS — Z86.73 HISTORY OF CVA (CEREBROVASCULAR ACCIDENT): Primary | ICD-10-CM

## 2020-09-25 DIAGNOSIS — R29.898 HEAVINESS OF UPPER EXTREMITY: ICD-10-CM

## 2020-09-25 DIAGNOSIS — R42 VERTIGO: ICD-10-CM

## 2020-09-25 DIAGNOSIS — I77.74 VERTEBRAL ARTERY DISSECTION (HCC): ICD-10-CM

## 2020-09-25 DIAGNOSIS — E78.2 MIXED HYPERLIPIDEMIA: ICD-10-CM

## 2020-09-25 DIAGNOSIS — I10 ESSENTIAL HYPERTENSION: ICD-10-CM

## 2020-09-25 PROCEDURE — 99214 OFFICE O/P EST MOD 30 MIN: CPT | Performed by: PSYCHIATRY & NEUROLOGY

## 2020-09-25 RX ORDER — RABEPRAZOLE SODIUM 20 MG/1
20 TABLET, DELAYED RELEASE ORAL DAILY
COMMUNITY

## 2020-09-25 NOTE — TELEPHONE ENCOUNTER
Called patient  Scheduled 6m f/u with Dr Lupe Sheridan  Mailed out AVS and appointment reminder card together with PT script with purple location slip

## 2020-09-25 NOTE — TELEPHONE ENCOUNTER
The patient called and said she cannot make it to her appointment today in person so she would like it to switch it to a virtual visit telephone consult  I confirmed everything on her registration and got her verbal consent for her financial policy and insurance authorization

## 2020-09-25 NOTE — PROGRESS NOTES
Patient Instructions   Stroke:  Maria C Bradshaw presents for follow-up evaluation with regard to her prior stroke which was incident to a vertebral artery dissection  She reports no new symptoms concerning for recurrent TIA or stroke  She takes her medication as prescribed  She did not endorse any significant bleeding or bruising issues  She notes that overall she continues to experience relatively significant symptoms including ongoing dizziness as well as limited tolerance for exercise/long-term sustained focus potentially     -for ongoing stroke prevention should continue her current combination of aspirin, Lipitor, and appropriate blood pressure and glycemic control   -we will defer to the good judgment of her primary care team for monitoring of her cholesterol panel and blood sugar numbers  -she should continue with her blood pressure regimen and target in ongoing blood pressure of less than 130/80  -it is reasonable to continue with counseling and medication including Lexapro and lorazepam for post stroke depression/anxiety symptoms  -consideration could be given to functional capacity testing to determine her actual tolerance for sustained exercise, focus, and positioning  I will provide her with a physical therapy referral in this regard   -she will be cleared from my standpoint to receive therapeutic massage provided that no therapeutic massage is delivered to the neck/cervical region and that her head is maintained in a normal/neutral position during the course of her massage time  I will plan for her to return to the office in 6 months time but would be happy to see her sooner if the need should arise    If she has any symptoms concerning for TIA or stroke including sudden painless loss of vision or double vision, difficulty speaking or swallowing, vertigo/room spinning that does not quickly resolve, or weakness/numbness affecting 1 side of the face or body she should proceed by ambulance to the nearest emergency room immediately  Virtual Brief Visit    Assessment/Plan:    Problem List Items Addressed This Visit     None                Reason for visit is   Chief Complaint   Patient presents with    Virtual Brief Visit        Encounter provider Opal Rodriguez MD    Provider located at [address] [place] [address] [address]  [facility] [id number] [phone]    Recent Visits  No visits were found meeting these conditions  Showing recent visits within past 7 days and meeting all other requirements     Today's Visits  Date Type Provider Dept   09/25/20 Telemedicine Opal Rodriguez MD [facility]   Showing today's visits and meeting all other requirements     Future Appointments  No visits were found meeting these conditions  Showing future appointments within next 150 days and meeting all other requirements        After connecting through telephone, the patient was identified by name and date of birth  Jasonjuan Crandall was informed that this is a telemedicine visit and that the visit is being conducted through telephone  My office door was closed  No one else was in the room  She acknowledged consent and understanding of privacy and security of the platform  The patient has agreed to participate and understands she can discontinue the visit at any time  Patient is aware this is a billable service  Barbi Garcia is a 48 y o  female who presents for follow-up with regard to stroke  She reports that she is doing reasonably well  She continues to experience vertigo/dizziness and overall limited sustained activity capacity  She still receives counseling every 2 weeks and reports that Lexapro is reasonably helpful with regard to maintaining her mood      To assist her at times 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 and the distraction is beneficial     Notably she was previously receiving long-term disability however she has been denied social security disability at this point in time  We discussed additional options including pursuing possible functional capacity testing in order to determine the actual limits of her tolerance with regard to exercise and focus  We also talked about taking the opportunity to volunteer more extensively of possible so that she can have an opportunity to evaluate what would be like in more of the work setting without the pressure of needing to continue  She reports that massage is very helpful for her with regard to stress management  She would like to pursue massaged provided that she is able to do so without the individual putting any pressure on her neck and I agree that that is reasonable  Note for massage that avoids the neck and abnormal positions and extreme neck angles or pressure      Past Medical History:   Diagnosis Date    CVA (cerebral vascular accident) ([address] )     GERD (gastroesophageal reflux disease)     occasionally    Hypertension     controlling with diet and exercise       Past Surgical History:   Procedure Laterality Date    AUGMENTATION MAMMAPLASTY      GYNECOLOGIC CRYOSURGERY      CERVIX    HYSTERECTOMY      LASER ABLATION OF CONDYLOMAS N/A 7/30/2018    Procedure: OMNIGUIDE LASER ABLATION OF VULVA;  Surgeon: Angie Vu MD;  [facility]: [facility];  Service: Gynecology Oncology    SKIN TAG REMOVAL      EXICISON OF PERIANAL    WISDOM TOOTH EXTRACTION         Current Outpatient Medications   Medication Sig Dispense Refill    acetaminophen (TYLENOL) 650 mg CR tablet Take 2 tablets (1,300 mg total) by mouth every 8 (eight) hours as needed for headaches 30 tablet 0    ALPRAZolam (XANAX) 0 5 mg tablet Take 0 5 mg by mouth 4 (four) times a day as needed for anxiety (Patient takes one at night)       amLODIPine (NORVASC) 10 mg tablet Take 1 tablet (10 mg total) by mouth daily for 14 days 14 tablet 0    atorvastatin (LIPITOR) 80 mg tablet take 1 tablet by mouth daily with dinner 30 tablet 11    clopidogrel (PLAVIX) 75 mg tablet take 1 tablet by mouth once daily 90 tablet 3    Docusate Calcium (STOOL SOFTENER PO) Take 1 tablet by mouth as needed (once a week prn)       escitalopram (LEXAPRO) 10 mg tablet take 1 tablet by mouth once daily 30 tablet 3    hydrochlorothiazide (HYDRODIURIL) 25 mg tablet Take 25 mg by mouth daily  0    lisinopril (ZESTRIL) 2 5 mg tablet Take 5 mg by mouth daily       meclizine (ANTIVERT) 25 mg tablet 1/2-1 tab up to every 8 hours for vertigo if needed 30 tablet 1    Potassium Chloride ER 20 MEQ TBCR take 1 tablet by mouth twice a day take with food  0    RABEprazole (ACIPHEX) 20 MG tablet Take 20 mg by mouth daily      omeprazole (PriLOSEC) 40 MG capsule Take 40 mg by mouth daily  0     No current facility-administered medications for this visit  Allergies   Allergen Reactions    Shellfish Allergy Anaphylaxis       Review of Systems   Constitutional: Negative  Negative for appetite change and fever  HENT: Negative  Negative for hearing loss, tinnitus, trouble swallowing and voice change  Eyes: Negative  Negative for photophobia and pain  Respiratory: Negative  Negative for shortness of breath  Cardiovascular: Negative  Negative for palpitations  Gastrointestinal: Negative  Negative for nausea and vomiting  Endocrine: Negative  Negative for cold intolerance  Genitourinary: Negative  Negative for dysuria, frequency and urgency  Musculoskeletal: Positive for neck stiffness  Negative for myalgias and neck pain  Balance issues here and there     Skin: Negative  Negative for rash  Allergic/Immunologic: Negative  Neurological: Positive for dizziness and light-headedness  Negative for tremors, seizures, syncope, facial asymmetry, speech difficulty, weakness, numbness and headaches  Sometimes trouble finding the right word     Hematological: Negative  Does not bruise/bleed easily  Psychiatric/Behavioral: Negative  Negative for confusion, hallucinations and sleep disturbance  All other systems reviewed and are negative  Reviewed ROS as entered by medical assistant  There were no vitals filed for this visit  I have spent 21 minutes with Patient  today in which greater than 50% of this time was spent in counseling/coordination of care regarding Risks and benefits of tx options, Intructions for management, Patient and family education, Risk factor reductions and Impressions  VIRTUAL VISIT [address] acknowledges that she has consented to an online visit or consultation  She understands that the online visit is based solely on information provided by her, and that, in the absence of a face-to-face physical evaluation by the physician, the diagnosis she receives is both limited and provisional in terms of accuracy and completeness  This is not intended to replace a full medical face-to-face evaluation by the physician  Tessie Babin understands and accepts these terms

## 2020-09-28 NOTE — PATIENT INSTRUCTIONS
Stroke:  Laureen Spatz presents for follow-up evaluation with regard to her prior stroke which was incident to a vertebral artery dissection  She reports no new symptoms concerning for recurrent TIA or stroke  She takes her medication as prescribed  She did not endorse any significant bleeding or bruising issues  She notes that overall she continues to experience relatively significant symptoms including ongoing dizziness as well as limited tolerance for exercise/long-term sustained focus potentially     -for ongoing stroke prevention should continue her current combination of aspirin, Lipitor, and appropriate blood pressure and glycemic control   -we will defer to the good judgment of her primary care team for monitoring of her cholesterol panel and blood sugar numbers  -she should continue with her blood pressure regimen and target in ongoing blood pressure of less than 130/80  -it is reasonable to continue with counseling and medication including Lexapro and lorazepam for post stroke depression/anxiety symptoms  -consideration could be given to functional capacity testing to determine her actual tolerance for sustained exercise, focus, and positioning  I will provide her with a physical therapy referral in this regard   -she will be cleared from my standpoint to receive therapeutic massage provided that no therapeutic massage is delivered to the neck/cervical region and that her head is maintained in a normal/neutral position during the course of her massage time  I will plan for her to return to the office in 6 months time but would be happy to see her sooner if the need should arise    If she has any symptoms concerning for TIA or stroke including sudden painless loss of vision or double vision, difficulty speaking or swallowing, vertigo/room spinning that does not quickly resolve, or weakness/numbness affecting 1 side of the face or body she should proceed by ambulance to the nearest emergency room immediately

## 2020-11-08 DIAGNOSIS — Z86.73 HISTORY OF CVA (CEREBROVASCULAR ACCIDENT): ICD-10-CM

## 2020-11-08 DIAGNOSIS — F41.9 ANXIETY DISORDER, UNSPECIFIED TYPE: ICD-10-CM

## 2020-11-09 RX ORDER — ESCITALOPRAM OXALATE 10 MG/1
TABLET ORAL
Qty: 30 TABLET | Refills: 3 | Status: SHIPPED | OUTPATIENT
Start: 2020-11-09 | End: 2021-03-04

## 2021-02-05 ENCOUNTER — TELEPHONE (OUTPATIENT)
Dept: GYNECOLOGIC ONCOLOGY | Facility: CLINIC | Age: 51
End: 2021-02-05

## 2021-02-05 NOTE — TELEPHONE ENCOUNTER
Confirmed appt for 2/9  All covid symptom screening questions are negative  Pt is aware of 1 visitor and masking policy for upcoming appt

## 2021-02-09 ENCOUNTER — TELEPHONE (OUTPATIENT)
Dept: GYNECOLOGIC ONCOLOGY | Facility: CLINIC | Age: 51
End: 2021-02-09

## 2021-02-18 ENCOUNTER — TELEPHONE (OUTPATIENT)
Dept: GYNECOLOGIC ONCOLOGY | Facility: CLINIC | Age: 51
End: 2021-02-18

## 2021-02-18 NOTE — TELEPHONE ENCOUNTER
Left message to reschedule her 2/23 appmt due to provider in OR  Per All Philip, she can RS to Friday 2/26 in the PM  Left the office phone number to call back

## 2021-02-26 ENCOUNTER — OFFICE VISIT (OUTPATIENT)
Dept: GYNECOLOGIC ONCOLOGY | Facility: CLINIC | Age: 51
End: 2021-02-26
Payer: COMMERCIAL

## 2021-02-26 VITALS
WEIGHT: 127 LBS | DIASTOLIC BLOOD PRESSURE: 74 MMHG | SYSTOLIC BLOOD PRESSURE: 118 MMHG | HEIGHT: 59 IN | BODY MASS INDEX: 25.6 KG/M2 | HEART RATE: 80 BPM | TEMPERATURE: 97.7 F

## 2021-02-26 DIAGNOSIS — A63.0 VULVOVAGINAL CONDYLOMA: Primary | ICD-10-CM

## 2021-02-26 PROCEDURE — 17110 DESTRUCTION B9 LES UP TO 14: CPT | Performed by: OBSTETRICS & GYNECOLOGY

## 2021-02-26 PROCEDURE — 99213 OFFICE O/P EST LOW 20 MIN: CPT | Performed by: OBSTETRICS & GYNECOLOGY

## 2021-02-26 RX ORDER — LISINOPRIL 5 MG/1
5 TABLET ORAL DAILY
COMMUNITY
Start: 2021-02-05

## 2021-02-26 NOTE — ASSESSMENT & PLAN NOTE
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

## 2021-02-26 NOTE — PROGRESS NOTES
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  There were 2 exophytic condylomas that measured approximately 1 mm in diameter  TCA was applied  1  Return in 1 week for additional TCA application  CHIEF COMPLAINT:   Recurrent vulvar condyloma          Patient ID: Art Neumann is a 48 y o  female   51-year-old returns with palpable vulvar lesions  She has a history of vulvovaginal condyloma  Her last Pap smear in June of 2020 was cytologically normal and HPV negative  Her last treatment was a CO2 laser of the vagina  In July of 2018  The following portions of the patient's history were reviewed and updated as appropriate: allergies, current medications, past family history, past medical history, past social history, past surgical history and problem list     Review of Systems   Constitutional: Negative for activity change and unexpected weight change  HENT: Negative  Eyes: Negative  Respiratory: Negative  Cardiovascular: Negative  Gastrointestinal: Negative for abdominal distention and abdominal pain  Endocrine: Negative  Genitourinary: Negative for pelvic pain and vaginal bleeding  Vulvar lesion   Musculoskeletal: Negative  Skin: Negative  Allergic/Immunologic: Negative  Neurological: Negative  Hematological: Negative  Psychiatric/Behavioral: Negative          Current Outpatient Medications   Medication Sig Dispense Refill    ALPRAZolam (XANAX) 0 5 mg tablet Take 0 5 mg by mouth 4 (four) times a day as needed for anxiety (Patient takes one at night)       amLODIPine (NORVASC) 10 mg tablet Take 1 tablet (10 mg total) by mouth daily for 14 days 14 tablet 0    atorvastatin (LIPITOR) 80 mg tablet take 1 tablet by mouth daily with dinner 30 tablet 11    clopidogrel (PLAVIX) 75 mg tablet take 1 tablet by mouth once daily 90 tablet 3    escitalopram (LEXAPRO) 10 mg tablet take 1 tablet by mouth once daily 30 tablet 3    hydrochlorothiazide (HYDRODIURIL) 25 mg tablet Take 25 mg by mouth daily  0    lisinopril (ZESTRIL) 5 mg tablet Take 5 mg by mouth daily      meclizine (ANTIVERT) 25 mg tablet 1/2-1 tab up to every 8 hours for vertigo if needed 30 tablet 1    omeprazole (PriLOSEC) 40 MG capsule Take 40 mg by mouth daily  0    Potassium Chloride ER 20 MEQ TBCR take 1 tablet by mouth twice a day take with food  0    acetaminophen (TYLENOL) 650 mg CR tablet Take 2 tablets (1,300 mg total) by mouth every 8 (eight) hours as needed for headaches 30 tablet 0    Docusate Calcium (STOOL SOFTENER PO) Take 1 tablet by mouth as needed (once a week prn)       lisinopril (ZESTRIL) 2 5 mg tablet Take 5 mg by mouth daily       RABEprazole (ACIPHEX) 20 MG tablet Take 20 mg by mouth daily       No current facility-administered medications for this visit  Objective:    Blood pressure 118/74, pulse 80, temperature 97 7 °F (36 5 °C), temperature source Tympanic, height 4' 11" (1 499 m), weight 57 6 kg (127 lb)  Body mass index is 25 65 kg/m²  Body surface area is 1 52 meters squared  Physical Exam  Vitals signs reviewed  Constitutional:       General: She is not in acute distress  Appearance: Normal appearance  She is normal weight  She is not ill-appearing  HENT:      Head: Normocephalic and atraumatic  Eyes:      General: No scleral icterus  Right eye: No discharge  Left eye: No discharge  Conjunctiva/sclera: Conjunctivae normal    Pulmonary:      Effort: Pulmonary effort is normal  No respiratory distress  Breath sounds: No stridor  No wheezing  Genitourinary:     Comments: External female genitalia are normal with the exception of 2 small condylomatous lesions on the left labia minora  They measure approximately 1 mm each    The superior lesion is exophytic the inferior lesion is sessile  They were grossly consistent with vulvar condyloma  There were no other visible lesions identified  Speculum examination revealed a grossly normal vagina without evidence of masses or lesions  Musculoskeletal:      Right lower leg: No edema  Left lower leg: No edema  Skin:     General: Skin is warm and dry  Coloration: Skin is not jaundiced  Findings: No rash  Neurological:      General: No focal deficit present  Mental Status: She is alert and oriented to person, place, and time  Cranial Nerves: No cranial nerve deficit  Psychiatric:         Mood and Affect: Mood normal          Behavior: Behavior normal          Thought Content: Thought content normal          Judgment: Judgment normal            Lesion Destruction    Date/Time: 2/26/2021 1:38 PM  Performed by: Peggy Sotomayor MD  Authorized by: Peggy Sotomayor MD   Universal Protocol:  Consent: Verbal consent obtained  Consent given by: patient  Patient understanding: patient states understanding of the procedure being performed      Procedure Details - Lesion Destruction:     Number of Lesions:  2  Lesion 1:     Body area: Anogenital    Anogenital location:  Vulva    Malignancy: benign lesion      Destruction method: chemical removal    Lesion 2:     Body area:   Anogenital    Anogenital location:  Vulva    Malignancy: benign lesion      Destruction method: chemical removal      Extensive destruction required?: No

## 2021-03-03 ENCOUNTER — TELEPHONE (OUTPATIENT)
Dept: GYNECOLOGIC ONCOLOGY | Facility: HOSPITAL | Age: 51
End: 2021-03-03

## 2021-03-04 DIAGNOSIS — F41.9 ANXIETY DISORDER, UNSPECIFIED TYPE: ICD-10-CM

## 2021-03-04 DIAGNOSIS — Z86.73 HISTORY OF CVA (CEREBROVASCULAR ACCIDENT): ICD-10-CM

## 2021-03-04 RX ORDER — ESCITALOPRAM OXALATE 10 MG/1
TABLET ORAL
Qty: 30 TABLET | Refills: 3 | Status: SHIPPED | OUTPATIENT
Start: 2021-03-04 | End: 2021-07-07

## 2021-03-05 ENCOUNTER — OFFICE VISIT (OUTPATIENT)
Dept: GYNECOLOGIC ONCOLOGY | Facility: CLINIC | Age: 51
End: 2021-03-05

## 2021-03-05 VITALS
RESPIRATION RATE: 18 BRPM | DIASTOLIC BLOOD PRESSURE: 62 MMHG | HEIGHT: 59 IN | SYSTOLIC BLOOD PRESSURE: 104 MMHG | HEART RATE: 74 BPM | TEMPERATURE: 98.4 F | BODY MASS INDEX: 25.8 KG/M2 | WEIGHT: 128 LBS

## 2021-03-05 DIAGNOSIS — A63.0 VULVOVAGINAL CONDYLOMA: Primary | ICD-10-CM

## 2021-03-05 PROCEDURE — 99024 POSTOP FOLLOW-UP VISIT: CPT | Performed by: PHYSICIAN ASSISTANT

## 2021-03-05 NOTE — ASSESSMENT & PLAN NOTE
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

## 2021-03-05 NOTE — PROGRESS NOTES
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  Almost complete near resolution of condyloma after one application of TCA  Re-applied today  Will follow-up in 1 week for re-evaluation  Will likely not require additional application  CHIEF COMPLAINT:   1 week follow-up for TCA application    Problem:  Recurrent vulvar condyloma        Patient ID: Danie Zaragoza is a 48 y o  female  who presents to the office for 1 week follow and reapplication of TCA  The patient is without acute complaints  She notes the lesions are no longer visible to her  She denies skin irritation  The following portions of the patient's history were reviewed and updated as appropriate: allergies, current medications, past medical history, past surgical history and problem list     Review of Systems   Constitutional: Negative  Gastrointestinal: Negative  Genitourinary: Negative  Skin: Negative          Current Outpatient Medications   Medication Sig Dispense Refill    acetaminophen (TYLENOL) 650 mg CR tablet Take 2 tablets (1,300 mg total) by mouth every 8 (eight) hours as needed for headaches 30 tablet 0    ALPRAZolam (XANAX) 0 5 mg tablet Take 0 5 mg by mouth 4 (four) times a day as needed for anxiety (Patient takes one at night)       amLODIPine (NORVASC) 10 mg tablet Take 1 tablet (10 mg total) by mouth daily for 14 days 14 tablet 0    atorvastatin (LIPITOR) 80 mg tablet take 1 tablet by mouth daily with dinner 30 tablet 11    clopidogrel (PLAVIX) 75 mg tablet take 1 tablet by mouth once daily 90 tablet 3    Docusate Calcium (STOOL SOFTENER PO) Take 1 tablet by mouth as needed (once a week prn)       escitalopram (LEXAPRO) 10 mg tablet take 1 tablet by mouth once daily 30 tablet 3    hydrochlorothiazide (HYDRODIURIL) 25 mg tablet Take 25 mg by mouth daily  0    lisinopril (ZESTRIL) 2 5 mg tablet Take 5 mg by mouth daily       lisinopril (ZESTRIL) 5 mg tablet Take 5 mg by mouth daily      meclizine (ANTIVERT) 25 mg tablet 1/2-1 tab up to every 8 hours for vertigo if needed 30 tablet 1    omeprazole (PriLOSEC) 40 MG capsule Take 40 mg by mouth daily  0    Potassium Chloride ER 20 MEQ TBCR take 1 tablet by mouth twice a day take with food  0    RABEprazole (ACIPHEX) 20 MG tablet Take 20 mg by mouth daily       No current facility-administered medications for this visit  Objective:    Blood pressure 104/62, pulse 74, temperature 98 4 °F (36 9 °C), temperature source Temporal, resp  rate 18, height 4' 11" (1 499 m), weight 58 1 kg (128 lb)  Body mass index is 25 85 kg/m²  Body surface area is 1 53 meters squared  Physical Exam  Vitals signs reviewed  Exam conducted with a chaperone present  Constitutional:       Appearance: Normal appearance  HENT:      Head: Normocephalic and atraumatic  Pulmonary:      Effort: Pulmonary effort is normal    Genitourinary:     Comments: External female genitalia examined  Attention to left labia minora  Skin changes noted at prior site of TCA application  Very minimal residual condyloma noted  TCA applied  Patient tolerated well  Neurological:      Mental Status: She is alert

## 2021-03-16 ENCOUNTER — OFFICE VISIT (OUTPATIENT)
Dept: GYNECOLOGIC ONCOLOGY | Facility: CLINIC | Age: 51
End: 2021-03-16
Payer: COMMERCIAL

## 2021-03-16 VITALS
TEMPERATURE: 98.2 F | HEART RATE: 55 BPM | DIASTOLIC BLOOD PRESSURE: 60 MMHG | RESPIRATION RATE: 17 BRPM | BODY MASS INDEX: 25.8 KG/M2 | WEIGHT: 128 LBS | HEIGHT: 59 IN | SYSTOLIC BLOOD PRESSURE: 104 MMHG

## 2021-03-16 DIAGNOSIS — A63.0 VULVOVAGINAL CONDYLOMA: Primary | ICD-10-CM

## 2021-03-16 PROCEDURE — 99212 OFFICE O/P EST SF 10 MIN: CPT | Performed by: OBSTETRICS & GYNECOLOGY

## 2021-03-16 NOTE — PROGRESS NOTES
Assessment/Plan:    Problem List Items Addressed This Visit        Other    Vulvovaginal condyloma - Primary       20-year-old with recurrent vulvar condyloma status post 2 treatments of TCA with complete resolution  1  Return for her Pap smear as scheduled                 CHIEF COMPLAINT:  Here for follow-up TCA treatment          Patient ID: Karlene Severance is a 48 y o  female   20-year-old returns for follow-up TCA treatment  She has received 2 treatments of TCA for recurrent vulvar condyloma  She tolerated the treatments well  She does not feel any palpable lesions  No other interval change in her medications or medical history since her last visit  The following portions of the patient's history were reviewed and updated as appropriate: allergies, current medications, past family history, past medical history, past social history, past surgical history and problem list     Review of Systems   Constitutional: Negative for activity change and unexpected weight change  HENT: Negative  Eyes: Negative  Respiratory: Negative  Cardiovascular: Negative  Gastrointestinal: Negative for abdominal distention and abdominal pain  Endocrine: Negative  Genitourinary: Negative for pelvic pain and vaginal bleeding  Musculoskeletal: Negative  Skin: Negative  Allergic/Immunologic: Negative  Neurological: Negative  Hematological: Negative  Psychiatric/Behavioral: Negative          Current Outpatient Medications   Medication Sig Dispense Refill    acetaminophen (TYLENOL) 650 mg CR tablet Take 2 tablets (1,300 mg total) by mouth every 8 (eight) hours as needed for headaches 30 tablet 0    ALPRAZolam (XANAX) 0 5 mg tablet Take 0 5 mg by mouth 4 (four) times a day as needed for anxiety (Patient takes one at night)       amLODIPine (NORVASC) 10 mg tablet Take 1 tablet (10 mg total) by mouth daily for 14 days 14 tablet 0    atorvastatin (LIPITOR) 80 mg tablet take 1 tablet by mouth daily with dinner 30 tablet 11    clopidogrel (PLAVIX) 75 mg tablet take 1 tablet by mouth once daily 90 tablet 3    Docusate Calcium (STOOL SOFTENER PO) Take 1 tablet by mouth as needed (once a week prn)       escitalopram (LEXAPRO) 10 mg tablet take 1 tablet by mouth once daily 30 tablet 3    hydrochlorothiazide (HYDRODIURIL) 25 mg tablet Take 25 mg by mouth daily  0    lisinopril (ZESTRIL) 2 5 mg tablet Take 5 mg by mouth daily       lisinopril (ZESTRIL) 5 mg tablet Take 5 mg by mouth daily      meclizine (ANTIVERT) 25 mg tablet 1/2-1 tab up to every 8 hours for vertigo if needed 30 tablet 1    omeprazole (PriLOSEC) 40 MG capsule Take 40 mg by mouth daily  0    Potassium Chloride ER 20 MEQ TBCR take 1 tablet by mouth twice a day take with food  0    RABEprazole (ACIPHEX) 20 MG tablet Take 20 mg by mouth daily       No current facility-administered medications for this visit  Objective:    Blood pressure 104/60, pulse 55, temperature 98 2 °F (36 8 °C), temperature source Temporal, resp  rate 17, height 4' 11" (1 499 m), weight 58 1 kg (128 lb)  Body mass index is 25 85 kg/m²  Body surface area is 1 53 meters squared  Physical Exam  Constitutional:       General: She is not in acute distress  Appearance: Normal appearance  She is well-developed and normal weight  She is not ill-appearing, toxic-appearing or diaphoretic  Pulmonary:      Effort: Pulmonary effort is normal    Genitourinary:     Comments: There are no visible lesions on the external female genitalia  Neurological:      Mental Status: She is alert and oriented to person, place, and time  Psychiatric:         Behavior: Behavior normal          Thought Content:  Thought content normal          Judgment: Judgment normal

## 2021-03-16 NOTE — ASSESSMENT & PLAN NOTE
70-year-old with recurrent vulvar condyloma status post 2 treatments of TCA with complete resolution    1  Return for her Pap smear as scheduled

## 2021-03-24 ENCOUNTER — TELEPHONE (OUTPATIENT)
Dept: NEUROLOGY | Facility: CLINIC | Age: 51
End: 2021-03-24

## 2021-03-24 NOTE — TELEPHONE ENCOUNTER
Left detailed message for patient that I am calling to reschedule her appointment with Dr Harriett Fonseca on 3/26/21 due to provider being out of the office  Offered earliest available on 5/18/21 at 10 AM at [facility] office  Scheduled appointment and left call back number [phone] to reschedule  Mailed out appt reminder card to patient

## 2021-04-01 ENCOUNTER — OFFICE VISIT (OUTPATIENT)
Dept: CARDIOLOGY CLINIC | Facility: CLINIC | Age: 51
End: 2021-04-01
Payer: COMMERCIAL

## 2021-04-01 VITALS
DIASTOLIC BLOOD PRESSURE: 72 MMHG | HEIGHT: 59 IN | SYSTOLIC BLOOD PRESSURE: 110 MMHG | HEART RATE: 55 BPM | WEIGHT: 126 LBS | BODY MASS INDEX: 25.4 KG/M2

## 2021-04-01 DIAGNOSIS — E78.2 MIXED HYPERLIPIDEMIA: ICD-10-CM

## 2021-04-01 DIAGNOSIS — I77.74 VERTEBRAL ARTERY DISSECTION (HCC): ICD-10-CM

## 2021-04-01 DIAGNOSIS — I10 ESSENTIAL HYPERTENSION: Primary | ICD-10-CM

## 2021-04-01 PROCEDURE — 93000 ELECTROCARDIOGRAM COMPLETE: CPT | Performed by: INTERNAL MEDICINE

## 2021-04-01 PROCEDURE — 99214 OFFICE O/P EST MOD 30 MIN: CPT | Performed by: INTERNAL MEDICINE

## 2021-04-01 NOTE — PROGRESS NOTES
Cardiology Follow Up    Tevin Casas  1970  [id number]  [facility] [address] [address]  [phone]    1  Essential hypertension  POCT ECG    VAS carotid complete study   2  Vertebral artery dissection (HCC)  POCT ECG    VAS carotid complete study   3  Mixed hyperlipidemia  POCT ECG    VAS carotid complete study       Discussion/Summary:  She has been doing well from cardiac standpoint  Functional capacity has been excellent with no limitations  Blood pressure is well controlled  Lipids have been doing well on her current dose of statin  She is due for a carotid Doppler to evaluate carotid artery plaque as well as evaluate vertebrals with history of dissection  Continue with diet and exercise follow-up yearly  Interval History:  49-year-old female with a history of difficult to control hypertension mostly secondary to medication noncompliance presents for a hospital follow-up  She has had headaches and visual changes with blood pressures over 898 systolic  She was admitted to the hospital overnight started on medications  She has some stressors in her life diet is moderate in sodium  She denies any exertional chest pain or discomfort shortness of breath, palpitations, lower extremity edema, PND, orthopnea  She has had some periods where she states that she was clumsy  She had 2 CT scans of the head which did not show any abnormalities  Overall she has been doing well  She remains quite physically active with no limitations  Functional capacity has been good  There has been no chest pain, shortness of breath, palpitations, lightheadedness, dizziness, or syncope  She has had no neurologic symptoms  She remains well hydrated  She continues to be active and doing well    Problem List     VAIN I (vaginal intraepithelial neoplasia grade I)    Vulvovaginal condyloma    Acute nonintractable headache    Disorientation    Hypertensive urgency    Essential hypertension    Vision changes        Past Medical History:   Diagnosis Date    CVA (cerebral vascular accident) ([facility] [address] )     GERD (gastroesophageal reflux disease)     occasionally    Hypertension     controlling with diet and exercise     Social History     Socioeconomic History    Marital status: /Civil Union     Spouse name: Not on file    Number of children: Not on file    Years of education: Not on file    Highest education level: Not on file   Occupational History    Not on file   Social Needs    Financial resource strain: Not on file    Food insecurity     Worry: Not on file     Inability: Not on file   [employer] needs     Medical: Not on file     Non-medical: Not on file   Tobacco Use    Smoking status: Never Smoker    Smokeless tobacco: Never Used   Substance and Sexual Activity    Alcohol use: Yes     Comment: SOCIAL-1-2 drinks per week    Drug use: No    Sexual activity: Not on file   Lifestyle    Physical activity     Days per week: Not on file     Minutes per session: Not on file    Stress: Not on file   Relationships    Social connections     Talks on phone: Not on file     Gets together: Not on file     Attends [employer] service: Not on file     Active member of club or organization: Not on file     Attends meetings of clubs or organizations: Not on file     Relationship status: Not on file    Intimate partner violence     Fear of current or ex partner: Not on file     Emotionally abused: Not on file     Physically abused: Not on file     Forced sexual activity: Not on file   Other Topics Concern    Not on file   Social History Narrative    PROBLEMS CONCERNING ADOPTED CHILD    CAFFEINE USE    MODERATE EXERCISING LESS THAN 3x WEEK      Family History   Adopted: Yes   Problem Relation Age of Onset    No Known Problems Mother     No Known Problems Father     No Known Problems Sister     No Known Problems Brother     No Known Problems Maternal Aunt     No Known Problems Maternal Uncle     No Known Problems Paternal Aunt     No Known Problems Paternal Uncle     No Known Problems Maternal Grandmother     No Known Problems Maternal Grandfather     No Known Problems Paternal Grandmother     No Known Problems Paternal Grandfather      Past Surgical History:   Procedure Laterality Date    AUGMENTATION MAMMAPLASTY      GYNECOLOGIC CRYOSURGERY      CERVIX    HYSTERECTOMY      LASER ABLATION OF CONDYLOMAS N/A 7/30/2018    Procedure: OMNIGUIDE LASER ABLATION OF VULVA;  Surgeon: Lesly Kwan MD;  Location: [facility];  Service: Gynecology Oncology    SKIN TAG REMOVAL      EXICISON OF PERIANAL    WISDOM TOOTH EXTRACTION         Current Outpatient Medications:     acetaminophen (TYLENOL) 650 mg CR tablet, Take 2 tablets (1,300 mg total) by mouth every 8 (eight) hours as needed for headaches, Disp: 30 tablet, Rfl: 0    ALPRAZolam (XANAX) 0 5 mg tablet, Take 0 5 mg by mouth 4 (four) times a day as needed for anxiety (Patient takes one at night) , Disp: , Rfl:     amLODIPine (NORVASC) 10 mg tablet, Take 1 tablet (10 mg total) by mouth daily for 14 days, Disp: 14 tablet, Rfl: 0    atorvastatin (LIPITOR) 80 mg tablet, take 1 tablet by mouth daily with dinner, Disp: 30 tablet, Rfl: 11    clopidogrel (PLAVIX) 75 mg tablet, take 1 tablet by mouth once daily, Disp: 90 tablet, Rfl: 3    Docusate Calcium (STOOL SOFTENER PO), Take 1 tablet by mouth as needed (once a week prn) , Disp: , Rfl:     escitalopram (LEXAPRO) 10 mg tablet, take 1 tablet by mouth once daily, Disp: 30 tablet, Rfl: 3    hydrochlorothiazide (HYDRODIURIL) 25 mg tablet, Take 25 mg by mouth daily, Disp: , Rfl: 0    lisinopril (ZESTRIL) 5 mg tablet, Take 5 mg by mouth daily, Disp: , Rfl:     meclizine (ANTIVERT) 25 mg tablet, 1/2-1 tab up to every 8 hours for vertigo if needed, Disp: 30 tablet, Rfl: 1    Potassium Chloride ER 20 MEQ TBCR, take 1 tablet by mouth twice a day take with food, Disp: , Rfl: 0    RABEprazole (ACIPHEX) 20 MG tablet, Take 20 mg by mouth daily, Disp: , Rfl:     lisinopril (ZESTRIL) 2 5 mg tablet, Take 5 mg by mouth daily , Disp: , Rfl:     omeprazole (PriLOSEC) 40 MG capsule, Take 40 mg by mouth daily, Disp: , Rfl: 0  Allergies   Allergen Reactions    Shellfish Allergy - Food Allergy Anaphylaxis       Labs:     Chemistry        Component Value Date/Time    K 3 1 (L) 07/26/2019 0612     07/26/2019 0612    CO2 30 07/26/2019 0612    BUN 13 07/26/2019 0612    CREATININE 0 75 07/26/2019 0612        Component Value Date/Time    CALCIUM 8 7 07/26/2019 0612    ALKPHOS 76 07/26/2019 0612    AST 16 07/26/2019 0612    ALT 28 07/26/2019 0612            No results found for: CHOL  Lab Results   Component Value Date    HDL 50 07/26/2019    HDL 43 02/22/2019    HDL 58 02/05/2019     Lab Results   Component Value Date    LDLCALC 67 07/26/2019    LDLCALC 74 02/22/2019    LDLCALC 181 (H) 02/05/2019     Lab Results   Component Value Date    TRIG 70 07/26/2019    TRIG 79 02/22/2019    TRIG 115 02/05/2019     No results found for: CHOLHDL    Imaging: Ct Head Without Contrast    Result Date: 1/29/2019  Narrative: CT BRAIN - WITHOUT CONTRAST INDICATION:   Headache, hypertension  COMPARISON:  January 27, 2019 TECHNIQUE:  CT examination of the brain was performed  In addition to axial images, coronal 2D reformatted images were created and submitted for interpretation  Radiation dose length product (DLP) for this visit:  830 mGy-cm   This examination, like all CT scans performed in the [facility], was performed utilizing techniques to minimize radiation dose exposure, including the use of iterative reconstruction and automated exposure control  IMAGE QUALITY:  Diagnostic  FINDINGS: PARENCHYMA:  No intracranial mass, mass effect or midline shift  No CT signs of acute infarction  No acute parenchymal hemorrhage  VENTRICLES AND EXTRA-AXIAL SPACES:  Normal for the patient's age  VISUALIZED ORBITS AND PARANASAL SINUSES:  Unremarkable  CALVARIUM AND EXTRACRANIAL SOFT TISSUES:  Normal      Impression: No acute intracranial abnormality  Workstation performed: [id number]     Ct Head Without Contrast    Result Date: 1/27/2019  Narrative: CT BRAIN - WITHOUT CONTRAST INDICATION:   headaches, confusion, hypertensive urgency  COMPARISON:  March 22, 2009 TECHNIQUE:  CT examination of the brain was performed  In addition to axial images, coronal 2D reformatted images were created and submitted for interpretation  Radiation dose length product (DLP) for this visit:  923 mGy-cm   This examination, like all CT scans performed in the [facility], was performed utilizing techniques to minimize radiation dose exposure, including the use of iterative reconstruction and automated exposure control  IMAGE QUALITY:  Diagnostic  FINDINGS: PARENCHYMA:  No intracranial mass, mass effect or midline shift  No CT signs of acute infarction  No acute parenchymal hemorrhage  VENTRICLES AND EXTRA-AXIAL SPACES:  Normal for the patient's age  VISUALIZED ORBITS AND PARANASAL SINUSES:  Unremarkable  CALVARIUM AND EXTRACRANIAL SOFT TISSUES:  Normal      Impression: No acute intracranial abnormality  Workstation performed: [id number]       ECG:        Review of Systems   Constitution: Negative  HENT: Negative  Eyes: Negative  Cardiovascular: Negative  Respiratory: Negative  Endocrine: Negative  Hematologic/Lymphatic: Negative  Skin: Negative  Musculoskeletal: Negative  Gastrointestinal: Negative  Genitourinary: Negative  Neurological: Negative  Psychiatric/Behavioral: Negative  Vitals:    04/01/21 1425   BP: 110/72   Pulse: 55     Vitals:    04/01/21 1425   Weight: 57 2 kg (126 lb)     Height: 4' 11" (149 9 cm)   Body mass index is 25 45 kg/m²      Physical Exam:  Vital signs reviewed  General: Alert and cooperative, appears stated age, no acute distress  HEENT:  PERRLA, EOMI, no scleral icterus, no conjunctival pallor  Neck:  No lymphadenopathy, no thyromegaly, no carotid bruits, no elevated JVP  Heart:  Regular rate and rhythm, normal S1/S2, no S3/S4, no murmur, rubs or gallops  PMI nondisplaced  Lungs:  Clear to auscultation bilaterally, no wheezes rales or rhonchi  Abdomen:  Soft, non-tender, positive bowel sounds, no rebound or guarding,   no organomegaly   Extremities:  Normal range of motion    No clubbing, cyanosis or edema   Vascular:  2+ pedal pulses  Skin:  No rashes or lesions on exposed skin  Neurologic:  Cranial nerves II-XII grossly intact without focal deficits  Psych:  Normal mood and affect

## 2021-04-06 DIAGNOSIS — I63.9 CVA (CEREBRAL VASCULAR ACCIDENT) (HCC): ICD-10-CM

## 2021-04-06 RX ORDER — ATORVASTATIN CALCIUM 80 MG/1
80 TABLET, FILM COATED ORAL
Qty: 30 TABLET | Refills: 11 | Status: SHIPPED | OUTPATIENT
Start: 2021-04-06

## 2021-04-07 ENCOUNTER — HOSPITAL ENCOUNTER (OUTPATIENT)
Dept: RADIOLOGY | Facility: MEDICAL CENTER | Age: 51
Discharge: HOME | End: 2021-04-07
Payer: COMMERCIAL

## 2021-04-07 DIAGNOSIS — I10 ESSENTIAL HYPERTENSION: ICD-10-CM

## 2021-04-07 DIAGNOSIS — E78.2 MIXED HYPERLIPIDEMIA: ICD-10-CM

## 2021-04-07 DIAGNOSIS — I77.74 VERTEBRAL ARTERY DISSECTION (HCC): ICD-10-CM

## 2021-04-07 PROCEDURE — 93880 EXTRACRANIAL BILAT STUDY: CPT | Performed by: SURGERY

## 2021-04-07 PROCEDURE — 93880 EXTRACRANIAL BILAT STUDY: CPT

## 2021-05-18 ENCOUNTER — OFFICE VISIT (OUTPATIENT)
Dept: NEUROLOGY | Facility: CLINIC | Age: 51
End: 2021-05-18
Payer: COMMERCIAL

## 2021-05-18 VITALS
HEART RATE: 60 BPM | SYSTOLIC BLOOD PRESSURE: 112 MMHG | HEIGHT: 59 IN | WEIGHT: 120.4 LBS | DIASTOLIC BLOOD PRESSURE: 70 MMHG | BODY MASS INDEX: 24.27 KG/M2

## 2021-05-18 DIAGNOSIS — I10 ESSENTIAL HYPERTENSION: ICD-10-CM

## 2021-05-18 DIAGNOSIS — Z86.73 HISTORY OF CVA (CEREBROVASCULAR ACCIDENT): Primary | ICD-10-CM

## 2021-05-18 DIAGNOSIS — I77.74 VERTEBRAL ARTERY DISSECTION (HCC): ICD-10-CM

## 2021-05-18 DIAGNOSIS — E78.2 MIXED HYPERLIPIDEMIA: ICD-10-CM

## 2021-05-18 PROCEDURE — 99214 OFFICE O/P EST MOD 30 MIN: CPT | Performed by: PSYCHIATRY & NEUROLOGY

## 2021-05-18 NOTE — PATIENT INSTRUCTIONS
Stroke:  Vlad Pierson presents for follow-up evaluation with regard to her prior stroke  She does not endorse any new clear stroke symptoms  She does take her medication as prescribed  She reports that over the course of the last few days she has been experiencing some right occipital headaches and over the course of the last month she is experiencing brief episodes of depersonalization  Her neurologic exam is excellent in the office today  - for overall stroke prevention she should continue her current combination of aspirin, Lipitor, and appropriate blood pressure glycemic control   - we will continue to defer to her primary care team with regard to monitoring of her lab work   - she should remain physically active in as much she feels capable of doing so and especially as the temperature continues to rise I would like for her to ensure that she is well hydrated drinking at least 48-64 oz of non caffeinated beverages per day   -with regard to the occipital headaches they are really quite centered on the greater occipital nerve  This is not particularly concerning for any type of vascular cause  It would be reasonable for her to do a course of ibuprofen 600 mg every 6 hours for approximately 72 hours  She could take it with meals in order to protect her stomach  If she finds this to be ineffective only marginally effective she can certainly discontinue     -with regard to these episodes of depersonalization she is not experiencing any focal or lateralized symptoms that would suggest that they are related to stroke or cerebrovascular disease  I would like for her to look up mind fullness meditation in order to find concrete items that are present in her environment that she can anchor to when she is experiencing these episodes and to keep track of them  She can also utilize her watch to track her blood pressure during episodes      - she is not in need of repeat cerebrovascular imaging at this point in time I will plan for her to come back to see me in 8 months but I would be happy to see her sooner if the need should arise  If she finds that her symptoms either with regard to her occipital headaches for with regard to these depersonalization episodes are not improving over the course of the next 2-3 weeks I would like her to let me know and at that time we will consider any additional imaging that would be necessary

## 2021-05-18 NOTE — PROGRESS NOTES
Patient ID: Jacklyn Ervin is a 48 y o  female  Assessment/Plan:   Patient Instructions   Stroke:  Keesha Elliott presents for follow-up evaluation with regard to her prior stroke  She does not endorse any new clear stroke symptoms  She does take her medication as prescribed  She reports that over the course of the last few days she has been experiencing some right occipital headaches and over the course of the last month she is experiencing brief episodes of depersonalization  Her neurologic exam is excellent in the office today  - for overall stroke prevention she should continue her current combination of aspirin, Lipitor, and appropriate blood pressure glycemic control   - we will continue to defer to her primary care team with regard to monitoring of her lab work   - she should remain physically active in as much she feels capable of doing so and especially as the temperature continues to rise I would like for her to ensure that she is well hydrated drinking at least 48-64 oz of non caffeinated beverages per day   -with regard to the occipital headaches they are really quite centered on the greater occipital nerve  This is not particularly concerning for any type of vascular cause  It would be reasonable for her to do a course of ibuprofen 600 mg every 6 hours for approximately 72 hours  She could take it with meals in order to protect her stomach  If she finds this to be ineffective only marginally effective she can certainly discontinue     -with regard to these episodes of depersonalization she is not experiencing any focal or lateralized symptoms that would suggest that they are related to stroke or cerebrovascular disease  I would like for her to look up mind fullness meditation in order to find concrete items that are present in her environment that she can not anchor to when she is experiencing these episodes and to keep track of them    She can also utilize her watch to track her blood pressure during episodes  - she is not in need of repeat cerebrovascular imaging at this point in time     I will plan for her to come back to see me in 8 months but I would be happy to see her sooner if the need should arise  If she finds that her symptoms either with regard to her occipital headaches for with regard to these depersonalization episodes are not improving over the course of the next 2-3 weeks I would like her to let me know and at that time we will consider any additional imaging that would be necessary  No problem-specific Assessment & Plan notes found for this encounter  Diagnoses and all orders for this visit:    Vertebral artery dissection [facility])    Essential hypertension    History of CVA (cerebrovascular accident)    Mixed hyperlipidemia           Subjective:    HPI        Maria C Bradshaw presents for a follow-up evaluation with regard to her prior stroke  She reports no new stroke-like symptoms  She is taking her medications  She does have 2 specific  Concerns today  She reports that over the course of last few days she has been experiencing intermittent episodes of a right occipital headache  These episodes may last for a matter of minutes in approximately 6/10 in intensity and pulsating in quality  She has not been able to discern any specific triggers or pattern to them  She also reports that over the course of last month she is experiencing episodes of depersonalization there are now occurring on a daily basis  She finds that she is performing a few anxiety provoking maneuvers such as checking herself for stroke-like symptoms and placing her hands out in front of her while she is walking  She has not discovered any specific reason behind these episodes, and did not endorse any particular time of day or pattern to them    She notes that she does maintain control during the episodes, and does not specifically endorse any unilateral weakness or numbness etc   She does report that she has been trying to push herself a little bit harder in terms of her overall activity level  Objective:    /70 (BP Location: Left arm, Patient Position: Sitting, Cuff Size: Standard)   Pulse 60   Ht 4' 11" (1 499 m)   Wt 54 6 kg (120 lb 6 4 oz)   BMI 24 32 kg/m²       Physical Exam    Neurological Exam      At the time of my examination she was awake, alert, and in no distress  She is an excellent historian with no dysarthria or aphasia  She is little bit nervous overall in the office  Cranial nerves 2-12 were symmetrically intact bilaterally  She does confirm that the region of her pain is specifically centered on the greater occipital nerve outlet  Motor testing reveals 5/5 strength throughout the bilateral upper lower extremities  Finger-to-nose testing reveals no ataxia, dysmetria, or limitation in proprioceptive function  She is able to rise without assistance and her gait is stable  Extended Romberg sign is absent  ROS:    Review of Systems   Constitutional: Negative  Negative for appetite change and fever  HENT: Negative  Negative for hearing loss, tinnitus, trouble swallowing and voice change  Eyes: Negative  Negative for photophobia and pain  Respiratory: Negative  Negative for shortness of breath  Cardiovascular: Negative  Negative for palpitations  Gastrointestinal: Negative  Negative for nausea and vomiting  Endocrine: Negative  Negative for cold intolerance  Genitourinary: Negative  Negative for dysuria, frequency and urgency  Musculoskeletal: Negative  Negative for myalgias and neck pain  Pain in the back of the head   Skin: Negative  Negative for rash  Allergic/Immunologic: Negative  Neurological: Negative  Negative for dizziness, tremors, seizures, syncope, facial asymmetry, speech difficulty, weakness, light-headedness, numbness and headaches  Hematological: Negative  Does not bruise/bleed easily  Psychiatric/Behavioral: Negative  Negative for confusion, hallucinations and sleep disturbance  All other systems reviewed and are negative  Reviewed ROS as entered by medical assistant        Past Medical History:   Diagnosis Date    CVA (cerebral vascular accident) ([address] )     GERD (gastroesophageal reflux disease)     occasionally    Hypertension     controlling with diet and exercise       Social History     Socioeconomic History    Marital status: /Civil Union     Spouse name: None    Number of children: None    Years of education: None    Highest education level: None   Occupational History    None   Social Needs    Financial resource strain: None    Food insecurity     Worry: None     Inability: None    Transportation needs     Medical: None     Non-medical: None   Tobacco Use    Smoking status: Never Smoker    Smokeless tobacco: Never Used   Substance and Sexual Activity    Alcohol use: Yes     Comment: SOCIAL-1-2 drinks per week    Drug use: No    Sexual activity: None   Lifestyle    Physical activity     Days per week: None     Minutes per session: None    Stress: None   Relationships    Social connections     Talks on phone: None     Gets together: None     Attends Anabaptist service: None     Active member of club or organization: None     Attends meetings of clubs or organizations: None     Relationship status: None    Intimate partner violence     Fear of current or ex partner: None     Emotionally abused: None     Physically abused: None     Forced sexual activity: None   Other Topics Concern    None   Social History Narrative    PROBLEMS CONCERNING ADOPTED CHILD    CAFFEINE USE    MODERATE EXERCISING LESS THAN 3x WEEK         Current Outpatient Medications:     acetaminophen (TYLENOL) 650 mg CR tablet, Take 2 tablets (1,300 mg total) by mouth every 8 (eight) hours as needed for headaches, Disp: 30 tablet, Rfl: 0    ALPRAZolam (XANAX) 0 5 mg tablet, Take 0 5 mg by mouth 4 (four) times a day as needed for anxiety (Patient takes one at night) , Disp: , Rfl:     amLODIPine (NORVASC) 10 mg tablet, Take 1 tablet (10 mg total) by mouth daily for 14 days, Disp: 14 tablet, Rfl: 0    atorvastatin (LIPITOR) 80 mg tablet, Take 1 tablet (80 mg total) by mouth daily with dinner, Disp: 30 tablet, Rfl: 11    clopidogrel (PLAVIX) 75 mg tablet, take 1 tablet by mouth once daily, Disp: 90 tablet, Rfl: 3    escitalopram (LEXAPRO) 10 mg tablet, take 1 tablet by mouth once daily, Disp: 30 tablet, Rfl: 3    hydrochlorothiazide (HYDRODIURIL) 25 mg tablet, Take 25 mg by mouth daily, Disp: , Rfl: 0    lisinopril (ZESTRIL) 5 mg tablet, Take 5 mg by mouth daily, Disp: , Rfl:     Potassium Chloride ER 20 MEQ TBCR, take 1 tablet by mouth twice a day take with food, Disp: , Rfl: 0    RABEprazole (ACIPHEX) 20 MG tablet, Take 20 mg by mouth daily, Disp: , Rfl:     Docusate Calcium (STOOL SOFTENER PO), Take 1 tablet by mouth as needed (once a week prn) , Disp: , Rfl:     meclizine (ANTIVERT) 25 mg tablet, 1/2-1 tab up to every 8 hours for vertigo if needed (Patient not taking: Reported on 5/18/2021), Disp: 30 tablet, Rfl: 1    Allergies   Allergen Reactions    Shellfish Allergy - Food Allergy Anaphylaxis

## 2021-06-10 ENCOUNTER — OFFICE VISIT (OUTPATIENT)
Dept: GYNECOLOGIC ONCOLOGY | Facility: CLINIC | Age: 51
End: 2021-06-10
Payer: COMMERCIAL

## 2021-06-10 VITALS
TEMPERATURE: 97.4 F | HEIGHT: 59 IN | SYSTOLIC BLOOD PRESSURE: 122 MMHG | RESPIRATION RATE: 16 BRPM | WEIGHT: 123 LBS | BODY MASS INDEX: 24.8 KG/M2 | DIASTOLIC BLOOD PRESSURE: 82 MMHG | HEART RATE: 60 BPM

## 2021-06-10 DIAGNOSIS — N89.0 VAIN I (VAGINAL INTRAEPITHELIAL NEOPLASIA GRADE I): Primary | ICD-10-CM

## 2021-06-10 PROCEDURE — 88175 CYTOPATH C/V AUTO FLUID REDO: CPT | Performed by: OBSTETRICS & GYNECOLOGY

## 2021-06-10 PROCEDURE — 87624 HPV HI-RISK TYP POOLED RSLT: CPT | Performed by: OBSTETRICS & GYNECOLOGY

## 2021-06-10 PROCEDURE — 99212 OFFICE O/P EST SF 10 MIN: CPT | Performed by: OBSTETRICS & GYNECOLOGY

## 2021-06-10 NOTE — ASSESSMENT & PLAN NOTE
22-year-old with a history of low-grade Pap smears of the vagina  Her last Pap in June of 2020 was within normal limits and HPV negative  Her last treatment was a CO2 laser of the vagina on 7/30/2018  She also has a history of vulvar condyloma most recently treated with TCA application  Repeat Pap smear was performed today  Performance status is 0   1  Follow-up results of Pap smear with HPV  2  Return in 1 year for gynecologic evaluation pending results of Pap smear

## 2021-06-10 NOTE — PROGRESS NOTES
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  Repeat Pap smear was performed today  Performance status is 0   1  Follow-up results of Pap smear with HPV  2  Return in 1 year for gynecologic evaluation pending results of Pap smear  Relevant Orders    Liquid-based pap, diagnostic            CHIEF COMPLAINT:  Here for follow-up Pap smear          Patient ID: Jean Crow is a 48 y o  female  51-year-old returns for follow-up Pap smear  She has a history of low-grade vaginal dysplasia  She has been treated with laser in 2018  Recent Pap in June of 2020 was within normal limits and HPV negative  She has no palpable lesions on the vulva after recent treatment with TCA  No other interval change in her medications or medical history since her last visit  No vaginal bleeding  Quality of life is good  The following portions of the patient's history were reviewed and updated as appropriate: allergies, current medications, past family history, past medical history, past social history, past surgical history and problem list     Review of Systems   Constitutional: Negative for activity change and unexpected weight change  HENT: Negative  Eyes: Negative  Respiratory: Negative  Cardiovascular: Negative  Gastrointestinal: Negative for abdominal distention and abdominal pain  Endocrine: Negative  Genitourinary: Negative for pelvic pain and vaginal bleeding  Musculoskeletal: Negative  Skin: Negative  Allergic/Immunologic: Negative  Neurological: Negative  Hematological: Negative  Psychiatric/Behavioral: Negative  Current Outpatient Medications   Medication Sig Dispense Refill    acetaminophen (TYLENOL) 650 mg CR tablet Take 2 tablets (1,300 mg total) by mouth every 8 (eight) hours as needed for headaches 30 tablet 0    ALPRAZolam (XANAX) 0 5 mg tablet Take 0 5 mg by mouth 4 (four) times a day as needed for anxiety (Patient takes one at night)       amLODIPine (NORVASC) 10 mg tablet Take 1 tablet (10 mg total) by mouth daily for 14 days 14 tablet 0    atorvastatin (LIPITOR) 80 mg tablet Take 1 tablet (80 mg total) by mouth daily with dinner 30 tablet 11    clopidogrel (PLAVIX) 75 mg tablet take 1 tablet by mouth once daily 90 tablet 3    Docusate Calcium (STOOL SOFTENER PO) Take 1 tablet by mouth as needed (once a week prn)       escitalopram (LEXAPRO) 10 mg tablet take 1 tablet by mouth once daily 30 tablet 3    hydrochlorothiazide (HYDRODIURIL) 25 mg tablet Take 25 mg by mouth daily  0    lisinopril (ZESTRIL) 5 mg tablet Take 5 mg by mouth daily      meclizine (ANTIVERT) 25 mg tablet 1/2-1 tab up to every 8 hours for vertigo if needed 30 tablet 1    Potassium Chloride ER 20 MEQ TBCR take 1 tablet by mouth twice a day take with food  0    RABEprazole (ACIPHEX) 20 MG tablet Take 20 mg by mouth daily       No current facility-administered medications for this visit  Objective:    Blood pressure 122/82, pulse 60, temperature (!) 97 4 °F (36 3 °C), temperature source Tympanic, resp  rate 16, height 4' 11" (1 499 m), weight 55 8 kg (123 lb)  Body mass index is 24 84 kg/m²  Body surface area is 1 5 meters squared  Physical Exam  Vitals signs reviewed  Exam conducted with a chaperone present  Constitutional:       General: She is not in acute distress  Appearance: Normal appearance  She is well-developed and normal weight  She is not ill-appearing, toxic-appearing or diaphoretic  HENT:      Head: Normocephalic and atraumatic     Eyes:      General: No scleral icterus  Right eye: No discharge  Left eye: No discharge  Extraocular Movements: Extraocular movements intact  Pupils: Pupils are equal, round, and reactive to light  Neck:      Thyroid: No thyromegaly  Pulmonary:      Effort: Pulmonary effort is normal    Genitourinary:     Comments: The external female genitalia is normal  The bartholin's, uretheral and skenes glands are normal  The urethral meatus is normal (midline with no lesions)  Anus without fissure or lesion  Speculum exam reveals a grossly normal vagina  No masses, lesions,discharge or bleeding  Pap smear obtained  No significant cystocele or rectocele noted  Bimanual exam notes a surgical absent cervix, uterus  No masses or fullness  Bladder is without fullness, mass or tenderness  Skin:     General: Skin is warm and dry  Neurological:      Mental Status: She is alert and oriented to person, place, and time  Psychiatric:         Mood and Affect: Mood normal          Behavior: Behavior normal          Thought Content:  Thought content normal          Judgment: Judgment normal

## 2021-06-18 LAB
LAB AP GYN PRIMARY INTERPRETATION: NORMAL
Lab: NORMAL

## 2021-07-07 DIAGNOSIS — Z86.73 HISTORY OF CVA (CEREBROVASCULAR ACCIDENT): ICD-10-CM

## 2021-07-07 DIAGNOSIS — F41.9 ANXIETY DISORDER, UNSPECIFIED TYPE: ICD-10-CM

## 2021-07-07 RX ORDER — ESCITALOPRAM OXALATE 10 MG/1
TABLET ORAL
Qty: 30 TABLET | Refills: 3 | Status: SHIPPED | OUTPATIENT
Start: 2021-07-07 | End: 2021-11-12

## 2021-09-05 ENCOUNTER — APPOINTMENT (EMERGENCY)
Dept: RADIOLOGY | Facility: HOSPITAL | Age: 51
End: 2021-09-05
Payer: COMMERCIAL

## 2021-09-05 ENCOUNTER — HOSPITAL ENCOUNTER (EMERGENCY)
Facility: HOSPITAL | Age: 51
Discharge: HOME | End: 2021-09-05
Attending: EMERGENCY MEDICINE
Payer: COMMERCIAL

## 2021-09-05 VITALS
OXYGEN SATURATION: 97 % | WEIGHT: 110 LBS | TEMPERATURE: 99.2 F | RESPIRATION RATE: 18 BRPM | HEART RATE: 60 BPM | SYSTOLIC BLOOD PRESSURE: 137 MMHG | BODY MASS INDEX: 22.22 KG/M2 | DIASTOLIC BLOOD PRESSURE: 82 MMHG

## 2021-09-05 DIAGNOSIS — R07.9 CHEST PAIN, UNSPECIFIED TYPE: Primary | ICD-10-CM

## 2021-09-05 LAB
ANION GAP SERPL CALCULATED.3IONS-SCNC: 13 MMOL/L (ref 4–13)
BASOPHILS # BLD AUTO: 0.01 THOUSANDS/ΜL (ref 0–0.1)
BASOPHILS NFR BLD AUTO: 0 % (ref 0–1)
BUN SERPL-MCNC: 11 MG/DL (ref 5–25)
CALCIUM SERPL-MCNC: 9.9 MG/DL (ref 8.3–10.1)
CHLORIDE SERPL-SCNC: 100 MMOL/L (ref 100–108)
CO2 SERPL-SCNC: 28 MMOL/L (ref 21–32)
CREAT SERPL-MCNC: 0.84 MG/DL (ref 0.6–1.3)
D DIMER PPP FEU-MCNC: 0.44 UG/ML FEU
EOSINOPHIL # BLD AUTO: 0.02 THOUSAND/ΜL (ref 0–0.61)
EOSINOPHIL NFR BLD AUTO: 0 % (ref 0–6)
ERYTHROCYTE [DISTWIDTH] IN BLOOD BY AUTOMATED COUNT: 11.9 % (ref 11.6–15.1)
GFR SERPL CREATININE-BSD FRML MDRD: 81 ML/MIN/1.73SQ M
GLUCOSE SERPL-MCNC: 90 MG/DL (ref 65–140)
HCT VFR BLD AUTO: 48.7 % (ref 34.8–46.1)
HGB BLD-MCNC: 16.6 G/DL (ref 11.5–15.4)
IMM GRANULOCYTES # BLD AUTO: 0.04 THOUSAND/UL (ref 0–0.2)
IMM GRANULOCYTES NFR BLD AUTO: 1 % (ref 0–2)
LYMPHOCYTES # BLD AUTO: 1.51 THOUSANDS/ΜL (ref 0.6–4.47)
LYMPHOCYTES NFR BLD AUTO: 26 % (ref 14–44)
MCH RBC QN AUTO: 31.3 PG (ref 26.8–34.3)
MCHC RBC AUTO-ENTMCNC: 34.1 G/DL (ref 31.4–37.4)
MCV RBC AUTO: 92 FL (ref 82–98)
MONOCYTES # BLD AUTO: 0.47 THOUSAND/ΜL (ref 0.17–1.22)
MONOCYTES NFR BLD AUTO: 8 % (ref 4–12)
NEUTROPHILS # BLD AUTO: 3.78 THOUSANDS/ΜL (ref 1.85–7.62)
NEUTS SEG NFR BLD AUTO: 65 % (ref 43–75)
NRBC BLD AUTO-RTO: 0 /100 WBCS
NT-PROBNP SERPL-MCNC: 95 PG/ML
PLATELET # BLD AUTO: 150 THOUSANDS/UL (ref 149–390)
PMV BLD AUTO: 10.9 FL (ref 8.9–12.7)
POTASSIUM SERPL-SCNC: 3.6 MMOL/L (ref 3.5–5.3)
RBC # BLD AUTO: 5.3 MILLION/UL (ref 3.81–5.12)
SODIUM SERPL-SCNC: 141 MMOL/L (ref 136–145)
TROPONIN I SERPL-MCNC: <0.02 NG/ML
TROPONIN I SERPL-MCNC: <0.02 NG/ML
WBC # BLD AUTO: 5.83 THOUSAND/UL (ref 4.31–10.16)

## 2021-09-05 PROCEDURE — 93005 ELECTROCARDIOGRAM TRACING: CPT

## 2021-09-05 PROCEDURE — 85025 COMPLETE CBC W/AUTO DIFF WBC: CPT

## 2021-09-05 PROCEDURE — 83880 ASSAY OF NATRIURETIC PEPTIDE: CPT

## 2021-09-05 PROCEDURE — 99284 EMERGENCY DEPT VISIT MOD MDM: CPT | Performed by: EMERGENCY MEDICINE

## 2021-09-05 PROCEDURE — 36415 COLL VENOUS BLD VENIPUNCTURE: CPT

## 2021-09-05 PROCEDURE — 85379 FIBRIN DEGRADATION QUANT: CPT

## 2021-09-05 PROCEDURE — 71045 X-RAY EXAM CHEST 1 VIEW: CPT

## 2021-09-05 PROCEDURE — 84484 ASSAY OF TROPONIN QUANT: CPT

## 2021-09-05 PROCEDURE — 80048 BASIC METABOLIC PNL TOTAL CA: CPT

## 2021-09-05 PROCEDURE — 99285 EMERGENCY DEPT VISIT HI MDM: CPT

## 2021-09-05 PROCEDURE — 84484 ASSAY OF TROPONIN QUANT: CPT | Performed by: EMERGENCY MEDICINE

## 2021-09-05 RX ORDER — SODIUM CHLORIDE 9 MG/ML
3 INJECTION INTRAVENOUS
Status: DISCONTINUED | OUTPATIENT
Start: 2021-09-05 | End: 2021-09-05 | Stop reason: HOSPADM

## 2021-09-05 RX ORDER — VALACYCLOVIR HYDROCHLORIDE 1 G/1
TABLET, FILM COATED ORAL EVERY 12 HOURS
COMMUNITY
Start: 2021-03-10 | End: 2022-01-24 | Stop reason: ALTCHOICE

## 2021-09-05 RX ORDER — POTASSIUM CHLORIDE 20 MEQ/1
TABLET, EXTENDED RELEASE ORAL EVERY 12 HOURS
COMMUNITY
Start: 2021-06-06

## 2021-09-05 NOTE — ED ATTENDING ATTESTATION
9/5/2021  IAkosua MD, saw and evaluated the patient  I have discussed the patient with the resident/non-physician practitioner and agree with the resident's/non-physician practitioner's findings, Plan of Care, and MDM as documented in the resident's/non-physician practitioner's note, except where noted  All available labs and Radiology studies were reviewed  I was present for key portions of any procedure(s) performed by the resident/non-physician practitioner and I was immediately available to provide assistance  At this point I agree with the current assessment done in the Emergency Department  I have conducted an independent evaluation of this patient a history and physical is as follows:    [de-identified] female with history of multiple prior CVAs without residual to sits, on daily Plavix, presenting for evaluation of left-sided chest discomfort  Of note, patient recently tested positive for COVID-19 and has had symptoms for the last 9 days  Denies cough  States that she is intermittently short of breath but denies difficulty breathing currently  No vomiting or diarrhea  Chest pain is described as sharp, beneath the left breast, worse with deep inspiration  Vital signs reviewed  Patient is nontoxic appearing, appears well-hydrated  Heart with regular rate and rhythm, no murmurs, rubs, or gallops  Lungs clear to auscultation bilaterally with no increased work of breathing, wheezing, rales, or rhonchi  Patient is satting 98% on room air  Abdomen with positive bowel sounds throughout all 4 quadrants, nontender to deep palpation  No lower extremity edema or calf tenderness  2+ distal pulses  I personally interpreted the patient's EKG which reveals sinus bradycardia 55 beats per minute, normal axis, normal intervals, Q waves in leads V1-V2 unchanged from prior, no acute ischemic changes, unchanged from most recent prior EKG  Delta troponins undetectable   Low risk by HEART score, doubt ischemia  Description of symptoms not consistent with aortic dissection  CXR with no cardiopulmonary abnormalities  Symptoms likely secondary to pleurisy in the setting of COVID-19      ED Course         Critical Care Time  Procedures

## 2021-09-05 NOTE — ED PROVIDER NOTES
History  Chief Complaint   Patient presents with    Chest Pain     per pt  she is covid positive since last saturday, not vaccinate, complainig of a sudden onset chest pain since this am     Pt c/o chest pain upon waking this morning  Describes the pain as crampy, 6/10 pain, just inferior to L breast  Still feels the pain at this moment, but less  Waxes and wanes in severity  Nothing makes it worse or better  Does not radiate  Never happened before  No pain anywhere else  Denies SOB, N/V, palpitations  Covid positive since Saturday  Sx include fever, chills, loss of smell and taste, headaches, congestion, cough productive of clear sputum, sore throat    PMH includes essential HTN, history of strokes (4x, most recently in 2019) on Plavix since 2019  Prior to Admission Medications   Prescriptions Last Dose Informant Patient Reported? Taking?    ALPRAZolam (XANAX) 0 5 mg tablet 9/4/2021 at Unknown time Self Yes Yes   Sig: Take 0 5 mg by mouth 4 (four) times a day as needed for anxiety (Patient takes one at night)    Docusate Calcium (STOOL SOFTENER PO) More than a month at Unknown time Self Yes No   Sig: Take 1 tablet by mouth as needed (once a week prn)    RABEprazole (ACIPHEX) 20 MG tablet 9/4/2021 at Unknown time Self Yes Yes   Sig: Take 20 mg by mouth daily   acetaminophen (TYLENOL) 650 mg CR tablet Past Week at Unknown time Self No Yes   Sig: Take 2 tablets (1,300 mg total) by mouth every 8 (eight) hours as needed for headaches   amLODIPine (NORVASC) 10 mg tablet 9/5/2021 at Unknown time Self No Yes   Sig: Take 1 tablet (10 mg total) by mouth daily for 14 days   atorvastatin (LIPITOR) 80 mg tablet 9/4/2021 at Unknown time Self No Yes   Sig: Take 1 tablet (80 mg total) by mouth daily with dinner   clopidogrel (PLAVIX) 75 mg tablet 9/5/2021 at Unknown time Self No Yes   Sig: take 1 tablet by mouth once daily   escitalopram (LEXAPRO) 10 mg tablet 9/5/2021 at Unknown time  No Yes   Sig: take 1 tablet by mouth once daily   hydrochlorothiazide (HYDRODIURIL) 25 mg tablet 2021 at Unknown time Self Yes Yes   Sig: Take 25 mg by mouth daily   lisinopril (ZESTRIL) 5 mg tablet 2021 at Unknown time Self Yes Yes   Sig: Take 5 mg by mouth daily   meclizine (ANTIVERT) 25 mg tablet More than a month at Unknown time Self No No   Si/2-1 tab up to every 8 hours for vertigo if needed   potassium chloride (K-DUR,KLOR-CON) 20 mEq tablet 2021 at Unknown time  Yes Yes   Sig: Take by mouth every 12 (twelve) hours   valACYclovir (Valtrex) 1,000 mg tablet Not Taking at Unknown time  Yes No   Sig: Take by mouth every 12 (twelve) hours   Patient not taking: Reported on 2021      Facility-Administered Medications: None       Past Medical History:   Diagnosis Date    CVA (cerebral vascular accident) ([address] )     GERD (gastroesophageal reflux disease)     occasionally    Hypertension     controlling with diet and exercise       Past Surgical History:   Procedure Laterality Date    AUGMENTATION MAMMAPLASTY      GYNECOLOGIC CRYOSURGERY      CERVIX    HYSTERECTOMY      LASER ABLATION OF CONDYLOMAS N/A 2018    Procedure: Fuentes Lowry LASER ABLATION OF VULVA;  Surgeon: Shirin Marquez MD;  [facility];  Service: Gynecology Oncology    SKIN TAG REMOVAL      EXICISON OF PERIANAL    WISDOM TOOTH EXTRACTION         Family History   Adopted: Yes   Problem Relation Age of Onset    No Known Problems Mother     No Known Problems Father     No Known Problems Sister     No Known Problems Brother     No Known Problems Maternal Aunt     No Known Problems Maternal Uncle     No Known Problems Paternal Aunt     No Known Problems Paternal Uncle     No Known Problems Maternal Grandmother     No Known Problems Maternal Grandfather     No Known Problems Paternal Grandmother     No Known Problems Paternal Grandfather      I have reviewed and agree with the history as documented      E-Cigarette/Vaping    E-Cigarette Use Never User      E-Cigarette/Vaping Substances    Nicotine No     THC No     CBD No     Flavoring No     Other No     Unknown No      Social History     Tobacco Use    Smoking status: Never Smoker    Smokeless tobacco: Never Used   Vaping Use    Vaping Use: Never used   Substance Use Topics    Alcohol use: Yes     Comment: SOCIAL-1-2 drinks per week    Drug use: No        Review of Systems   Constitutional: Positive for chills, diaphoresis, fatigue and fever  HENT: Positive for congestion, rhinorrhea and sore throat  Negative for postnasal drip, sinus pressure and sinus pain  Eyes: Negative  Respiratory: Positive for cough  Negative for shortness of breath and wheezing  Cardiovascular: Positive for chest pain  Negative for palpitations and leg swelling  Gastrointestinal: Negative for abdominal pain, diarrhea, nausea and vomiting  Endocrine: Negative  Genitourinary: Negative  Musculoskeletal: Positive for myalgias  Skin: Negative for rash  Neurological: Positive for weakness, light-headedness and headaches  Psychiatric/Behavioral: The patient is nervous/anxious  Physical Exam  ED Triage Vitals [09/05/21 1420]   Temperature Pulse Respirations Blood Pressure SpO2   99 2 °F (37 3 °C) 70 20 123/77 99 %      Temp Source Heart Rate Source Patient Position - Orthostatic VS BP Location FiO2 (%)   Oral Monitor Sitting Left arm --      Pain Score       6             Orthostatic Vital Signs  Vitals:    09/05/21 1420 09/05/21 1551 09/05/21 1748   BP: 123/77 133/79 137/82   Pulse: 70 (!) 53 60   Patient Position - Orthostatic VS: Sitting Lying Lying       Physical Exam  Vitals and nursing note reviewed  Constitutional:       General: She is not in acute distress  Appearance: She is well-developed and normal weight  HENT:      Head: Normocephalic and atraumatic  Eyes:      Extraocular Movements: Extraocular movements intact        Conjunctiva/sclera: Conjunctivae normal  Pupils: Pupils are equal, round, and reactive to light  Cardiovascular:      Rate and Rhythm: Normal rate and regular rhythm  Heart sounds: Heart sounds are distant  No murmur heard  Pulmonary:      Effort: Pulmonary effort is normal  No respiratory distress  Breath sounds: Examination of the right-middle field reveals decreased breath sounds  Examination of the right-lower field reveals decreased breath sounds  Decreased breath sounds present  No wheezing, rhonchi or rales  Chest:      Chest wall: No deformity, swelling, tenderness or edema  Abdominal:      General: Abdomen is flat  Bowel sounds are normal  There is no distension  Palpations: Abdomen is soft  Tenderness: There is no abdominal tenderness  Musculoskeletal:      Cervical back: Neck supple  Right lower leg: No tenderness  No edema  Left lower leg: No tenderness  No edema  Skin:     General: Skin is warm and dry  Capillary Refill: Capillary refill takes more than 3 seconds  Neurological:      General: No focal deficit present  Mental Status: She is alert and oriented to person, place, and time     Psychiatric:         Behavior: Behavior normal          ED Medications  Medications   sodium chloride (PF) 0 9 % injection 3 mL (has no administration in time range)       Diagnostic Studies  Results Reviewed     Procedure Component Value Units Date/Time    Troponin I [[id number]]  (Normal) Collected: 09/05/21 1653    Lab Status: Final result Specimen: Blood from Arm, Right Updated: 09/05/21 1738     Troponin I <0 02 ng/mL     Troponin I [[id number]]     Lab Status: No result Specimen: Blood     Basic metabolic panel [[id number]] Collected: 09/05/21 1527    Lab Status: Final result Specimen: Blood from Arm, Right Updated: 09/05/21 1610     Sodium 141 mmol/L      Potassium 3 6 mmol/L      Chloride 100 mmol/L      CO2 28 mmol/L      ANION GAP 13 mmol/L      BUN 11 mg/dL      Creatinine 0 84 mg/dL      Glucose 90 mg/dL      Calcium 9 9 mg/dL      eGFR 81 ml/min/1 73sq m     Narrative:      National Kidney Disease Foundation guidelines for Chronic Kidney Disease (CKD):     Stage 1 with normal or high GFR (GFR > 90 mL/min/1 73 square meters)    Stage 2 Mild CKD (GFR = 60-89 mL/min/1 73 square meters)    Stage 3A Moderate CKD (GFR = 45-59 mL/min/1 73 square meters)    Stage 3B Moderate CKD (GFR = 30-44 mL/min/1 73 square meters)    Stage 4 Severe CKD (GFR = 15-29 mL/min/1 73 square meters)    Stage 5 End Stage CKD (GFR <15 mL/min/1 73 square meters)  Note: GFR calculation is accurate only with a steady state creatinine    NT-BNP PRO [[id number]]  (Normal) Collected: 09/05/21 1527    Lab Status: Final result Specimen: Blood from Arm, Right Updated: 09/05/21 1610     NT-proBNP 95 pg/mL     D-dimer, quantitative [[id number]]  (Normal) Collected: 09/05/21 1527    Lab Status: Final result Specimen: Blood from Arm, Right Updated: 09/05/21 1602     D-Dimer, Quant 0 44 ug/ml FEU     CBC and differential [[id number]]  (Abnormal) Collected: 09/05/21 1527    Lab Status: Final result Specimen: Blood from Arm, Right Updated: 09/05/21 1539     WBC 5 83 Thousand/uL      RBC 5 30 Million/uL      Hemoglobin 16 6 g/dL      Hematocrit 48 7 %      MCV 92 fL      MCH 31 3 pg      MCHC 34 1 g/dL      RDW 11 9 %      MPV 10 9 fL      Platelets 550 Thousands/uL      nRBC 0 /100 WBCs      Neutrophils Relative 65 %      Immat GRANS % 1 %      Lymphocytes Relative 26 %      Monocytes Relative 8 %      Eosinophils Relative 0 %      Basophils Relative 0 %      Neutrophils Absolute 3 78 Thousands/µL      Immature Grans Absolute 0 04 Thousand/uL      Lymphocytes Absolute 1 51 Thousands/µL      Monocytes Absolute 0 47 Thousand/µL      Eosinophils Absolute 0 02 Thousand/µL      Basophils Absolute 0 01 Thousands/µL                  XR chest portable    (Results Pending)         Procedures  ECG 12 Lead Documentation Only    Date/Time: 9/5/2021 3:12 PM  Performed by: Becky River MD  Authorized by: Becky River MD     Indications / Diagnosis:  Chest pain  ECG reviewed by me, the ED Provider: yes    Patient location:  ED  Previous ECG:     Previous ECG:  Compared to current    Similarity:  No change  Interpretation:     Interpretation: abnormal    Rate:     ECG rate:  55    ECG rate assessment: bradycardic    Rhythm:     Rhythm: sinus rhythm    Ectopy:     Ectopy: none    QRS:     QRS axis:  Normal  Conduction:     Conduction: normal    ST segments:     ST segments:  Normal  T waves:     T waves: normal            ED Course  ED Course as of Sep 05 1754   Sun Sep 05, 2021   1748 Checked with patient regarding chest pain  Pt says it is somewhat improved, however she is feeling anxious  HEART Risk Score      Most Recent Value   Heart Score Risk Calculator   History  0 Filed at: 09/05/2021 1521   ECG  0 Filed at: 09/05/2021 1521   Age  1 Filed at: 09/05/2021 1521   Risk Factors  2 Filed at: 09/05/2021 1521   Troponin  --   HEART Score  --                      SBIRT 22yo+      Most Recent Value   SBIRT (25 yo +)   In order to provide better care to our patients, we are screening all of our patients for alcohol and drug use  Would it be okay to ask you these screening questions? Yes Filed at: 09/05/2021 1433   Initial Alcohol Screen: US AUDIT-C    1  How often do you have a drink containing alcohol? 3 Filed at: 09/05/2021 1433   2  How many drinks containing alcohol do you have on a typical day you are drinking? 1 Filed at: 09/05/2021 1433   3a  Male UNDER 65: How often do you have five or more drinks on one occasion? 0 Filed at: 09/05/2021 1433   3b  FEMALE Any Age, or MALE 65+: How often do you have 4 or more drinks on one occassion? 0 Filed at: 09/05/2021 1433   Audit-C Score  4 Filed at: 09/05/2021 1433   JOSHUA: How many times in the past year have you       Used an illegal drug or used a prescription medication for non-medical reasons? Never Filed at: 09/05/2021 1433          Wells' Criteria for PE      Most Recent Value   Wells' Criteria for PE   Clinical signs and symptoms of DVT  0 Filed at: 09/05/2021 1754   PE is primary diagnosis or equally likely  0 Filed at: 09/05/2021 1754   HR >100  0 Filed at: 09/05/2021 1754   Immobilization at least 3 days or Surgery in the previous 4 weeks  0 Filed at: 09/05/2021 1754   Previous, objectively diagnosed PE or DVT  0 Filed at: 09/05/2021 1754   Hemoptysis  0 Filed at: 09/05/2021 1754   Malignancy with treatment within 6 months or palliative  0 Filed at: 09/05/2021 1754   Wells' Criteria Total  0 Filed at: 09/05/2021 1754            MDM  Number of Diagnoses or Management Options  Chest pain, unspecified type: new and requires workup  Diagnosis management comments: Chest pain concerning for cardiac ischemia, viral pericarditis, pulmonary embolus, or costochondritis  Amount and/or Complexity of Data Reviewed  Clinical lab tests: ordered and reviewed  Tests in the radiology section of CPT®: ordered and reviewed  Tests in the medicine section of CPT®: reviewed  Discuss the patient with other providers: yes    Risk of Complications, Morbidity, and/or Mortality  Presenting problems: low  Diagnostic procedures: minimal  Management options: minimal  General comments: Due to location of chest pain (L-sided CP) and past medical history for hypertension, CVA x4, began workup for CAD and DVT/PE  Viral pericarditis also considered due to current Covid infection  EKG negative for ischemia or other acute cardiac pathology  CXR, CBC w/diff, D-dimer, BNP, Troponin negative x2 for acute cardiopulmonary pathology  Suspect CP related to anxiety      Patient Progress  Patient progress: stable      Disposition  Final diagnoses:   Chest pain, unspecified type     Time reflects when diagnosis was documented in both MDM as applicable and the Disposition within this note     Time User Action Codes Description Comment    9/5/2021  5:24 PM Anabel Miller Add [R07 9] Chest pain, unspecified type       ED Disposition     ED Disposition Condition Date/Time Comment    Discharge Stable Sun Sep 5, 2021  5:23  [address] discharge to home/self care  Follow-up Information     Follow up With Specialties Details Why Contact Info Additional Information    Prasanth Rosen, DO Family Medicine In 1 week for recheck [address]   [phone]       [facility]  If chest pain worsens or continues for longer than 14 days, if you have vomiting severe enough that you can't keep food down [address] Emergency Department, [address], [place], [place], [address]          Patient's Medications   Discharge Prescriptions    No medications on file     No discharge procedures on file  PDMP Review     None           ED Provider  Attending physically available and evaluated [address]  I managed the patient along with the ED Attending      Electronically Signed by         Miguel Ángel Calero MD  09/05/21 [id number]       Miguel Ángel Calero MD  09/05/21 3011       Miguel Ángel Calero MD  09/05/21 3699       Miguel Ángel Calero MD  09/05/21 [id number]

## 2021-09-06 LAB
ATRIAL RATE: 55 BPM
P AXIS: 73 DEGREES
PR INTERVAL: 146 MS
QRS AXIS: 66 DEGREES
QRSD INTERVAL: 78 MS
QT INTERVAL: 430 MS
QTC INTERVAL: 411 MS
T WAVE AXIS: 67 DEGREES
VENTRICULAR RATE: 55 BPM

## 2021-09-06 PROCEDURE — 93010 ELECTROCARDIOGRAM REPORT: CPT | Performed by: INTERNAL MEDICINE

## 2021-10-28 ENCOUNTER — TELEPHONE (OUTPATIENT)
Dept: CARDIOLOGY CLINIC | Facility: CLINIC | Age: 51
End: 2021-10-28

## 2021-10-28 PROBLEM — A63.0 ANAL CONDYLOMA: Status: ACTIVE | Noted: 2021-10-28

## 2021-11-12 DIAGNOSIS — F41.9 ANXIETY DISORDER, UNSPECIFIED TYPE: ICD-10-CM

## 2021-11-12 DIAGNOSIS — Z86.73 HISTORY OF CVA (CEREBROVASCULAR ACCIDENT): ICD-10-CM

## 2021-11-12 RX ORDER — ESCITALOPRAM OXALATE 10 MG/1
TABLET ORAL
Qty: 30 TABLET | Refills: 3 | Status: SHIPPED | OUTPATIENT
Start: 2021-11-12 | End: 2022-04-13 | Stop reason: SDUPTHER

## 2022-01-21 ENCOUNTER — TELEPHONE (OUTPATIENT)
Dept: GASTROENTEROLOGY | Facility: HOSPITAL | Age: 52
End: 2022-01-21

## 2022-01-24 ENCOUNTER — ANESTHESIA EVENT (OUTPATIENT)
Dept: GASTROENTEROLOGY | Facility: HOSPITAL | Age: 52
End: 2022-01-24

## 2022-01-24 ENCOUNTER — HOSPITAL ENCOUNTER (OUTPATIENT)
Dept: GASTROENTEROLOGY | Facility: HOSPITAL | Age: 52
Setting detail: OUTPATIENT SURGERY
Discharge: HOME | End: 2022-01-24
Attending: COLON & RECTAL SURGERY | Admitting: COLON & RECTAL SURGERY
Payer: COMMERCIAL

## 2022-01-24 ENCOUNTER — ANESTHESIA (OUTPATIENT)
Dept: GASTROENTEROLOGY | Facility: HOSPITAL | Age: 52
End: 2022-01-24

## 2022-01-24 VITALS
DIASTOLIC BLOOD PRESSURE: 77 MMHG | SYSTOLIC BLOOD PRESSURE: 124 MMHG | OXYGEN SATURATION: 99 % | TEMPERATURE: 96.7 F | WEIGHT: 120 LBS | RESPIRATION RATE: 18 BRPM | HEIGHT: 59 IN | BODY MASS INDEX: 24.19 KG/M2 | HEART RATE: 54 BPM

## 2022-01-24 DIAGNOSIS — Z12.11 COLON CANCER SCREENING: ICD-10-CM

## 2022-01-24 PROCEDURE — G0121 COLON CA SCRN NOT HI RSK IND: HCPCS | Performed by: COLON & RECTAL SURGERY

## 2022-01-24 RX ORDER — PROPOFOL 10 MG/ML
INJECTION, EMULSION INTRAVENOUS AS NEEDED
Status: DISCONTINUED | OUTPATIENT
Start: 2022-01-24 | End: 2022-01-24

## 2022-01-24 RX ORDER — SODIUM CHLORIDE 9 MG/ML
INJECTION, SOLUTION INTRAVENOUS CONTINUOUS PRN
Status: DISCONTINUED | OUTPATIENT
Start: 2022-01-24 | End: 2022-01-24

## 2022-01-24 RX ADMIN — PROPOFOL 20 MG: 10 INJECTION, EMULSION INTRAVENOUS at 11:32

## 2022-01-24 RX ADMIN — PROPOFOL 50 MG: 10 INJECTION, EMULSION INTRAVENOUS at 11:22

## 2022-01-24 RX ADMIN — SODIUM CHLORIDE: 0.9 INJECTION, SOLUTION INTRAVENOUS at 11:00

## 2022-01-24 RX ADMIN — PROPOFOL 20 MG: 10 INJECTION, EMULSION INTRAVENOUS at 11:35

## 2022-01-24 RX ADMIN — PROPOFOL 100 MG: 10 INJECTION, EMULSION INTRAVENOUS at 11:19

## 2022-01-24 RX ADMIN — PROPOFOL 50 MG: 10 INJECTION, EMULSION INTRAVENOUS at 11:25

## 2022-01-24 RX ADMIN — PROPOFOL 30 MG: 10 INJECTION, EMULSION INTRAVENOUS at 11:30

## 2022-01-24 NOTE — H&P
History and Physical   Colon and Rectal Surgery   Mackey Najjar 46 y o  female MRN: [id number]  Unit/Bed#:  Encounter: [id number]  01/24/22   @NOW    No chief complaint on file  History of Present Illness   HPI:  Mackey Najjar is a 46 y o  female who presents for screening colonoscopy        Historical Information   Past Medical History:   Diagnosis Date    CVA (cerebral vascular accident) ([address] )     GERD (gastroesophageal reflux disease)     occasionally    Hypertension     controlling with diet and exercise     Past Surgical History:   Procedure Laterality Date    AUGMENTATION MAMMAPLASTY      GYNECOLOGIC CRYOSURGERY      CERVIX    HYSTERECTOMY      LASER ABLATION OF CONDYLOMAS N/A 7/30/2018    Procedure: Ewelina Grave ABLATION OF VULVA;  Surgeon: Wu Carvalho MD;  Location: [facility];  Service: Gynecology Oncology    SKIN TAG REMOVAL      EXICISON OF PERIANAL    WISDOM TOOTH EXTRACTION         Meds/Allergies     (Not in a hospital admission)        Current Outpatient Medications:     acetaminophen (TYLENOL) 650 mg CR tablet, Take 2 tablets (1,300 mg total) by mouth every 8 (eight) hours as needed for headaches, Disp: 30 tablet, Rfl: 0    ALPRAZolam (XANAX) 0 5 mg tablet, Take 0 5 mg by mouth 4 (four) times a day as needed for anxiety (Patient takes one at night) , Disp: , Rfl:     amLODIPine (NORVASC) 10 mg tablet, Take 1 tablet (10 mg total) by mouth daily for 14 days, Disp: 14 tablet, Rfl: 0    atorvastatin (LIPITOR) 80 mg tablet, Take 1 tablet (80 mg total) by mouth daily with dinner, Disp: 30 tablet, Rfl: 11    clopidogrel (PLAVIX) 75 mg tablet, take 1 tablet by mouth once daily, Disp: 90 tablet, Rfl: 3    Docusate Calcium (STOOL SOFTENER PO), Take 1 tablet by mouth as needed (once a week prn) , Disp: , Rfl:     escitalopram (LEXAPRO) 10 mg tablet, take 1 tablet by mouth once daily, Disp: 30 tablet, Rfl: 3    hydrochlorothiazide (HYDRODIURIL) 25 mg tablet, Take 25 mg by mouth daily, Disp: , Rfl: 0    lisinopril (ZESTRIL) 5 mg tablet, Take 5 mg by mouth daily, Disp: , Rfl:     meclizine (ANTIVERT) 25 mg tablet, 1/2-1 tab up to every 8 hours for vertigo if needed, Disp: 30 tablet, Rfl: 1    potassium chloride (K-DUR,KLOR-CON) 20 mEq tablet, Take by mouth every 12 (twelve) hours, Disp: , Rfl:     RABEprazole (ACIPHEX) 20 MG tablet, Take 20 mg by mouth daily, Disp: , Rfl:     valACYclovir (Valtrex) 1,000 mg tablet, Take by mouth every 12 (twelve) hours , Disp: , Rfl:     Allergies   Allergen Reactions    Shellfish Allergy - Food Allergy Anaphylaxis         Social History   Social History     Substance and Sexual Activity   Alcohol Use Yes    Comment: SOCIAL-1-2 drinks per week     Social History     Substance and Sexual Activity   Drug Use No     Social History     Tobacco Use   Smoking Status Never Smoker   Smokeless Tobacco Never Used         Family History:   Family History   Adopted: Yes   Problem Relation Age of Onset    No Known Problems Mother     No Known Problems Father     No Known Problems Sister     No Known Problems Brother     No Known Problems Maternal Aunt     No Known Problems Maternal Uncle     No Known Problems Paternal Aunt     No Known Problems Paternal Uncle     No Known Problems Maternal Grandmother     No Known Problems Maternal Grandfather     No Known Problems Paternal Grandmother     No Known Problems Paternal Grandfather          Objective     Current Vitals:      No intake or output data in the 24 hours ending 01/24/22 1016    Physical Exam:  General:  Resting comfortably in bed   Eyes:Sclera anicteric  ENT: Trachea midline  Pulm:  Symmetric chest raise  No respiratory Distress  CV:  Regular on monitor  Abdomen:  Soft NT ND  Extremities:  No clubbing/ cyanosis/ edema    Lab Results: I have personally reviewed pertinent lab results  Imaging: I have personally reviewed pertinent reports  ASSESSMENT:  Shyla Shah is a 46 y o  female who presents for outpatient colonoscopy  PLAN:  For colonoscopy    Risks/ Benefits reviewed to include but not limited to anesthesia, bleeding, missed lesions, and colonoscopic perforation requiring surgery

## 2022-01-24 NOTE — ANESTHESIA PREPROCEDURE EVALUATION
Procedure:  COLONOSCOPY    Relevant Problems   CARDIO   (+) Essential hypertension   (+) Hypertensive urgency   (+) Mixed hyperlipidemia      NEURO/PSYCH   (+) Acute nonintractable headache   (+) History of CVA (cerebrovascular accident)      Other   (+) Anal condyloma        Physical Exam    Airway      TM Distance: >3 FB  Neck ROM: full     Dental       Cardiovascular  Cardiovascular exam normal    Pulmonary  Pulmonary exam normal     Other Findings        Anesthesia Plan  ASA Score- 3     Anesthesia Type- IV sedation with anesthesia with ASA Monitors  Additional Monitors:   Airway Plan:           Plan Factors-    Induction- intravenous  Postoperative Plan- Plan for postoperative opioid use  Informed Consent- Anesthetic plan and risks discussed with patient  I personally reviewed this patient with the CRNA  Discussed and agreed on the Anesthesia Plan with the CRNA  Devante Woods

## 2022-03-11 ENCOUNTER — TELEPHONE (OUTPATIENT)
Dept: GYNECOLOGIC ONCOLOGY | Facility: CLINIC | Age: 52
End: 2022-03-11

## 2022-04-13 DIAGNOSIS — Z86.73 HISTORY OF CVA (CEREBROVASCULAR ACCIDENT): ICD-10-CM

## 2022-04-13 DIAGNOSIS — F41.9 ANXIETY DISORDER, UNSPECIFIED TYPE: ICD-10-CM

## 2022-04-13 RX ORDER — ESCITALOPRAM OXALATE 10 MG/1
10 TABLET ORAL DAILY
Qty: 30 TABLET | Refills: 3 | Status: SHIPPED | OUTPATIENT
Start: 2022-04-13 | End: 2022-08-08

## 2022-04-13 NOTE — TELEPHONE ENCOUNTER
Fax received from [facility] requesting a refill for escitalopram      Please review and sign script if in agreement  Last written on 11/12/21 for 30 day supply with 3 refills  LOV 5/18/21

## 2022-05-04 NOTE — PROGRESS NOTES
Daily Speech Treatment Note    Today's date: 2019  Patients name: Ellie Coulter  : 1970  MRN: [id number]  Safety measures: N/A  Referring provider: Goran Prescott MD    Primary Diagnosis/Billing code:R48 8, M74 28  Secondary Diagnosis/ Billing code: R41 0    Visit Tracking:  -Referring provider: Epic  -Billing guidelines: AMA  -Visit #10/12  (**12 visits pcy**)  -CBC  -RE due 1/15/20  Subjective/Behavioral:  -"I'm good,"    Objective/Assessment:    Short-term goals:  1  Patient will complete auditory immediate and short term memory tasks to 80% accuracy to facilitate increased ability to retell narratives and recall information within functional living environment, to be achieved in 4-6 weeks  --PARTIALLY MET  -Auditory comprehension/writing/recall: Clinician read short statements aloud and then had patient recall important details  Clinician instructed patient to take short-hand notes on information read aloud by clinician to facilitate increased recall  Patient asked to fill-in missing words from sentences without using written notes  Task competed in  opp (89% acc)  Educated patient on verbal rehearsal and visualization during today's session  2  Patient will complete word generation tasks (e g , analogies, category matrices, etc ) with 80% accuracy using word finding strategies to facilitate improved word retrieval skills, to be achieved in 4-6 weeks  --PARTIALLY MET     4  Patient will demonstrate divided attention by responding to multiple tasks or details within tasks at the same time with min cues in a distracting environment with 80% accuracy, to be achieved in 4-6 weeks  --PARTIALLY MET  -Attention/working memory/organization: Patient participated in [facility] card game today  DIVIDED ATTENTION:  4  Game 4 (sort by color, say shape): 0 errors (time: 1:59)  5  Game 5 (sort by shape, say # of items): 0 errors (time: 1:54)  6   Game 6 (sort by # of items, say color): 0 errors (time: 1:59)  7  Game 7 (sort by color, say # of items): 0 errors (time: 1:41)  8  Game 8 (sort by shape, say color): 0 errors (time: 2:05)    6  Patient will be educated on word finding strategies (i e , circumlocution) for improved generative naming and verbal expression skills  --NEW GOAL    7  To target mental manipulation and working memory, patient will participate in word finding activity (i e , anagrams) with 80% accuracy, to be achieved in 4-6 weeks  --NEW GOAL    8  Patient will facilitate planning by completing thought organization tasks (e g , sequencing, deduction puzzles, etc ) with 80% accuracy to facilitate increased executive functioning, working memory, problem solving, and processing skills, to be achieved in 4-6 weeks --NEW GOAL  -See above (goal #5)  Plan:  -Patient was provided with home exercises/activities to target goals in plan of care at the end of today's session   -Continue with current plan of care  Medications

## 2022-05-18 ENCOUNTER — TELEPHONE (OUTPATIENT)
Dept: NEUROLOGY | Facility: CLINIC | Age: 52
End: 2022-05-18

## 2022-05-18 NOTE — TELEPHONE ENCOUNTER
Called patient to further assess symptoms; reached vm, left message that I was calling to further assess symtoms, please call back to discuss; also let her know I would send her appointment via My chart, please call back if unable lto make appointment  Scheduled office visit as requested with [facility], 6/29 at 7:30 AM in [facility]  My chart message sent to patient

## 2022-05-18 NOTE — TELEPHONE ENCOUNTER
----- Message from Otis Alvarado RN sent at 5/18/2022 12:41 PM EDT -----  Regarding: FW: Request for appointment     ----- Message -----  From: Taniya Fischer  Sent: 5/18/2022  12:28 PM EDT  To: [facility]  Subject: Request for appointment                          Good Morning,  I have been meaning to check in with the office to schedule an appointment and keep forgetting to call  I am volunteering today & get poor phone service so decided it was best to send a message  Unfortunately, last night while I was in bed talking to my son I started having swirls in my vision field  I do not have any stroke symptoms & feel fine as of now  Before yesterday, my thought was to schedule a routine check up at any time but I’m somewhat concerned about the vision occurrence last night so I am hoping I can get in within the next few weeks, possibly? I am available for an appointment at any time but would prefer Paola Tian or Minh, if possible  I’m pretty sure your team reviews these messages so I’m hoping someone can respond to this or text me [phone] because a call won’t come through       Thank you so much for your consideration,  Lorraine Guthrie

## 2022-06-22 ENCOUNTER — TELEPHONE (OUTPATIENT)
Dept: GYNECOLOGIC ONCOLOGY | Facility: CLINIC | Age: 52
End: 2022-06-22

## 2022-06-22 NOTE — TELEPHONE ENCOUNTER
Phone call to patient to reschedule appointment on 6/23 with [facility] for another day due to provider being unavailable at that time  Appointment cancelled at this time due to provider being unavailable  Left message to return my call

## 2022-06-29 ENCOUNTER — OFFICE VISIT (OUTPATIENT)
Dept: NEUROLOGY | Facility: CLINIC | Age: 52
End: 2022-06-29
Payer: COMMERCIAL

## 2022-06-29 VITALS
SYSTOLIC BLOOD PRESSURE: 120 MMHG | DIASTOLIC BLOOD PRESSURE: 76 MMHG | OXYGEN SATURATION: 98 % | HEART RATE: 56 BPM | WEIGHT: 108 LBS | BODY MASS INDEX: 21.77 KG/M2 | HEIGHT: 59 IN | TEMPERATURE: 97.1 F

## 2022-06-29 DIAGNOSIS — H53.9 VISION CHANGES: Primary | ICD-10-CM

## 2022-06-29 DIAGNOSIS — Z86.73 HISTORY OF CVA (CEREBROVASCULAR ACCIDENT): ICD-10-CM

## 2022-06-29 DIAGNOSIS — I10 ESSENTIAL HYPERTENSION: ICD-10-CM

## 2022-06-29 DIAGNOSIS — E78.2 MIXED HYPERLIPIDEMIA: ICD-10-CM

## 2022-06-29 PROCEDURE — 99214 OFFICE O/P EST MOD 30 MIN: CPT

## 2022-06-29 NOTE — ASSESSMENT & PLAN NOTE
Ashlee Postal has had 2 episodes of visual disturbance, one including transient vision loss  Will further evaluate with MRI Brain to evaluate for any acute intracranial change

## 2022-06-29 NOTE — PROGRESS NOTES
Current Issues/Problems reviewed on call: Care Coordinator spoke with Roscoe for monthly follow up assessment, he states that he has been doing well. Roscoe reports that his blood pressure has been running between 140/90, he denies any headaches or dizziness and states that he has been taking medication as prescribed. He states that he has an upcoming appointment with his PCP next week. Roscoe states that he has an appointment with his PCP next week and will discuss getting his Covid vaccine then. Care Coordinator offered phone number for vaccine clinic and he declined.       Details for Interventions/Education completed on call:    Screening completed for  COVID -19 using the [facility] Symptom . Screening is Negative for symptoms    Time Frame for Follow up:  Within within 2-3 weeks    Plan for Next Call: f/u on pcp appntmnt    Care Plan reviewed with Patient  and he agrees with the plan of care and the call follow up plan.           Patient ID: Lux Kolb is a 46 y o  female  Assessment/Plan:    History of CVA (cerebrovascular accident)  Lux Kolb is a 46year old female who is know to the practice for her prior hx of stroke (2/2019)  She has had no clear symptoms concerning for recurrent TIA or Stroke, her exam is excellent today and she has no lateralizing symptoms  However, due to her report of occasional medication noncompliance and 2 episodes of visual disturbance, with the more concerning being the episode of transient vision loss, I would like her to complete an updated MRI Brain to evaluate for any acute changes  Her PCP is private practice [facility] therefore I do not have access to any lab work, and she denies recently having had labs  Therefore, she will also complete a CBC, CMP and Lipid panel  I encouraged her to continue with mindfulness, meditation, therapy/counseling, and to exercise self care including medication compliance  Plan discussed with patient today:    - Continue with good blood pressure control; Goal of <130/80  - Continue with good cholesterol control; Goal LDL <70  - Continue with good blood sugar control; Goal HgbA1c <7 0  - Will defer monitoring of cholesterol and blood sugar and management of hypertensive medications to the primary care provider  - Stay well hydrated by drinking enough water; Goal of 2L daily   - Eat a healthy diet, high in lean meats fish, turkey, chicken  Low in fats, cholesterol, sugars and sodium  Avoid canned foods,  get lets of fresh/frozen vegetables/fruits    - Do not skip meals  - Get routine exercise/physical activity as much as able to tolerate  - Keep follow ups with your other health care providers  - Continue Plavix 75 mg daily and Lipitor 80 mg daily   - Set an alarm on your phone to remind you to take your medications  - Complete Labs  - Complete MRI Brain   - I will let you know results via mychart  - Follow up in 1 year, sooner if needed    I will plan for her to return to the office in 1 years time but would be happy to see her sooner if the need should arise  If she has any symptoms concerning for TIA or stroke including sudden painless loss of vision or double vision, difficulty speaking or swallowing, vertigo/room spinning that does not quickly resolve, or weakness/numbness affecting 1 side of the face or body she should proceed by ambulance to the nearest emergency room immediately  Vision changes  Leo Clayton has had 2 episodes of visual disturbance, one including transient vision loss  Will further evaluate with MRI Brain to evaluate for any acute intracranial change  Diagnoses and all orders for this visit:    Vision changes  -     MRI brain without contrast; Future  -     CBC and differential; Future  -     Comprehensive metabolic panel; Future    History of CVA (cerebrovascular accident)  -     MRI brain without contrast; Future  -     CBC and differential; Future  -     Comprehensive metabolic panel; Future    Mixed hyperlipidemia  -     Lipid Panel with Direct LDL reflex; Future    Essential hypertension  -     CBC and differential; Future  -     Comprehensive metabolic panel; Future           Subjective:    HPI Sara Clemens is a 46year old female who is know to the practice for her prior hx of stroke (2/2019)  She was last seen by Dr Nathalie Schwab 5/18/21  She is maintained on Atorvastatin 80 mg daily, Plavix 75 mg daily and antihypertensives  She is tolerating the medications without any adverse effects  She denies any excessive bruising or bleeding  She does admit to missing doses of her medications at times, on average once a week      Review of prior HPI:  MRI brain demonstrated acute to early subacute infarcts in the bilateral posterior parietal lobes and the right cerebellar hemisphere  MRA head revealed absence of signal within the distal cervical and proximal intradural segment of the left vertebral artery concerning for thrombus or dissection  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 level extending just proximal of the vertebrobasilar junction, multifocal progressive narrowing of the distal intradural segment of the right vertebral artery and mild narrowing in the proximal basilar artery, and multifocal mild to moderate narrowing in the left PCA  She returns to the office today with one specific concern regarding an episode of "swirled vision" that occurred on 5/17/22  She states she was in bed speaking to her son when she began to experience this visual disturbance  This lasted only a couple of seconds  She had no loss of vision, loss of awareness/concsiousness, no other associated symptoms I e headaches or confusion  Then again 5/29/22 she was in the car and began to feel very hot and almost presyncopal  She states for about 5-10 seconds her vision went blurred and then she had complete loss of vision in both eyes although she could hear during this time and did not lose consciousness  She is not sure if she missed her medications on these days however had not eaten anything  She does not routinely check her blood pressure at home, as she then becomes "obsessive"  She tells me she has been under more stress than usual lately with her son being admitted for behavioral health reasons and they are currently moving  She admits to not "taking care of herself" lately and has not been eating much lately, missing doses of her medications and just overall feeling more anxious  She has continued to see her therapist and recognizes that she tends to "fixate" on symptoms and becomes panicked about having another stroke  She has been practicing mindfulness and has been increasing her therapy to once a week  She denies any dizziness, difficulty speaking or swallowing, numbness/tingling, or weakness and denies any other significant health changes over the last 1 year        The following portions of the patient's history were reviewed and updated as appropriate: allergies, current medications, past family history, past medical history, past social history and past surgical history  Objective:    Blood pressure 120/76, pulse 56, temperature (!) 97 1 °F (36 2 °C), temperature source Temporal, height 4' 11" (1 499 m), weight 49 kg (108 lb), SpO2 98 %  Neurological Exam    On neurological examination patient is alert, awake, oriented and in no distress  Speech is fluent without dysarthria or aphasia  Cranial nerves 2-12 were symmetrically intact bilaterally  No evidence of any focal weakness or sensory loss in the upper or lower extremities  Motor testing reveals 5/5 strength of the bilateral upper and lower extremities  There was no pronator drift  No fasciculations present  No abnormal involuntary movements  Finger- to-nose reveals no tremor or ataxia and intact proprioceptive function, no dysmetria was noted  Sensation was intact to vibration, light touch, pin prick and temperature in bilateral upper and lower extremities  Deep tendon reflexes were 2+ and symmetric in the bilateral upper and lower extremities  She is able to rise easily without assistance from a seated position  Casual gait is normal including stance, stride, and arm swing  Normal tandem gait  Romberg is absent  There is no tenderness on palpation of the cervical spinous process, with no radicular pain noted  No temporal artery tenderness  ROS:  Review of Systems   Constitutional: Negative  Negative for appetite change and fever  HENT: Negative  Negative for hearing loss, tinnitus, trouble swallowing and voice change  Eyes: Positive for visual disturbance  Negative for photophobia and pain  Respiratory: Negative  Negative for shortness of breath  Cardiovascular: Negative  Negative for palpitations  Gastrointestinal: Negative  Negative for nausea and vomiting  Endocrine: Negative    Negative for cold intolerance  Genitourinary: Negative  Negative for dysuria, frequency and urgency  Musculoskeletal: Negative  Negative for myalgias and neck pain  Skin: Negative  Negative for rash  Neurological: Negative  Negative for dizziness, tremors, seizures, syncope, facial asymmetry, speech difficulty, weakness, light-headedness, numbness and headaches  Hematological: Negative  Does not bruise/bleed easily  Psychiatric/Behavioral: Negative  Negative for confusion, hallucinations and sleep disturbance  Reviewed ROS as entered by medical assistant

## 2022-06-29 NOTE — ASSESSMENT & PLAN NOTE
Jimmy Murphy is a 46year old female who is know to the practice for her prior hx of stroke (2/2019)  She has had no clear symptoms concerning for recurrent TIA or Stroke, her exam is excellent today and she has no lateralizing symptoms  However, due to her report of occasional medication noncompliance and 2 episodes of visual disturbance, with the more concerning being the episode of transient vision loss, I would like her to complete an updated MRI Brain to evaluate for any acute changes  Her PCP is private practice [facility] therefore I do not have access to any lab work, and she denies recently having had labs  Therefore, she will also complete a CBC, CMP and Lipid panel  I encouraged her to continue with mindfulness, meditation, therapy/counseling, and to exercise self care including medication compliance  Plan discussed with patient today:    - Continue with good blood pressure control; Goal of <130/80  - Continue with good cholesterol control; Goal LDL <70  - Continue with good blood sugar control; Goal HgbA1c <7 0  - Will defer monitoring of cholesterol and blood sugar and management of hypertensive medications to the primary care provider  - Stay well hydrated by drinking enough water; Goal of 2L daily   - Eat a healthy diet, high in lean meats fish, turkey, chicken  Low in fats, cholesterol, sugars and sodium  Avoid canned foods,  get lets of fresh/frozen vegetables/fruits  - Do not skip meals  - Get routine exercise/physical activity as much as able to tolerate  - Keep follow ups with your other health care providers  - Continue Plavix 75 mg daily and Lipitor 80 mg daily   - Set an alarm on your phone to remind you to take your medications  - Complete Labs  - Complete MRI Brain   - I will let you know results via mychart  - Follow up in 1 year, sooner if needed    I will plan for her to return to the office in 1 years time but would be happy to see her sooner if the need should arise    If she has any symptoms concerning for TIA or stroke including sudden painless loss of vision or double vision, difficulty speaking or swallowing, vertigo/room spinning that does not quickly resolve, or weakness/numbness affecting 1 side of the face or body she should proceed by ambulance to the nearest emergency room immediately

## 2022-06-29 NOTE — PROGRESS NOTES
Review of Systems   Constitutional: Negative  Negative for appetite change and fever  HENT: Negative  Negative for hearing loss, tinnitus, trouble swallowing and voice change  Eyes: Positive for visual disturbance  Negative for photophobia and pain  Respiratory: Negative  Negative for shortness of breath  Cardiovascular: Negative  Negative for palpitations  Gastrointestinal: Negative  Negative for nausea and vomiting  Endocrine: Negative  Negative for cold intolerance  Genitourinary: Negative  Negative for dysuria, frequency and urgency  Musculoskeletal: Negative  Negative for myalgias and neck pain  Skin: Negative  Negative for rash  Neurological: Negative  Negative for dizziness, tremors, seizures, syncope, facial asymmetry, speech difficulty, weakness, light-headedness, numbness and headaches  Hematological: Negative  Does not bruise/bleed easily  Psychiatric/Behavioral: Negative  Negative for confusion, hallucinations and sleep disturbance

## 2022-06-29 NOTE — PATIENT INSTRUCTIONS
- Continue with good blood pressure control; Goal of <130/80  - Continue with good cholesterol control; Goal LDL <70  - Continue with good blood sugar control; Goal HgbA1c <7 0  - Will defer monitoring of cholesterol and blood sugar and management of hypertensive medications to the primary care provider  - Stay well hydrated by drinking enough water; Goal of 2L daily   - Eat a healthy diet, high in lean meats fish, turkey, chicken  Low in fats, cholesterol, sugars and sodium  Avoid canned foods,  get lets of fresh/frozen vegetables/fruits  - Do not skip meals  - Get routine exercise/physical activity as much as able to tolerate  - Keep follow ups with your other health care providers  - Continue Plavix 75 mg daily and Lipitor 80 mg daily   - Set an alarm on your phone to remind you to take your medications  - Complete Labs  - Complete MRI Brain   - I will let you know results via [email or web address]  - Follow up in 1 year, sooner if needed    I will plan for her to return to the office in 1 years time but would be happy to see her sooner if the need should arise  If she has any symptoms concerning for TIA or stroke including sudden painless loss of vision or double vision, difficulty speaking or swallowing, vertigo/room spinning that does not quickly resolve, or weakness/numbness affecting 1 side of the face or body she should proceed by ambulance to the nearest emergency room immediately

## 2022-07-04 ENCOUNTER — HOSPITAL ENCOUNTER (OUTPATIENT)
Dept: MRI IMAGING | Facility: HOSPITAL | Age: 52
Discharge: HOME | End: 2022-07-04
Payer: COMMERCIAL

## 2022-07-04 DIAGNOSIS — H53.9 VISION CHANGES: ICD-10-CM

## 2022-07-04 DIAGNOSIS — Z86.73 HISTORY OF CVA (CEREBROVASCULAR ACCIDENT): ICD-10-CM

## 2022-07-04 PROCEDURE — 70551 MRI BRAIN STEM W/O DYE: CPT

## 2022-07-04 PROCEDURE — G1004 CDSM NDSC: HCPCS

## 2022-08-06 DIAGNOSIS — F41.9 ANXIETY DISORDER, UNSPECIFIED TYPE: ICD-10-CM

## 2022-08-06 DIAGNOSIS — Z86.73 HISTORY OF CVA (CEREBROVASCULAR ACCIDENT): ICD-10-CM

## 2022-08-08 RX ORDER — ESCITALOPRAM OXALATE 10 MG/1
TABLET ORAL
Qty: 90 TABLET | Refills: 3 | Status: SHIPPED | OUTPATIENT
Start: 2022-08-08

## 2024-01-23 ENCOUNTER — PREPPED CHART (OUTPATIENT)
Dept: URBAN - METROPOLITAN AREA CLINIC 6 | Facility: CLINIC | Age: 54
End: 2024-01-23

## 2024-03-01 ENCOUNTER — NEW PATIENT COMPREHENSIVE (OUTPATIENT)
Dept: URBAN - METROPOLITAN AREA CLINIC 6 | Facility: CLINIC | Age: 54
End: 2024-03-01

## 2024-03-01 DIAGNOSIS — D31.31: ICD-10-CM

## 2024-03-01 DIAGNOSIS — H52.4: ICD-10-CM

## 2024-03-01 DIAGNOSIS — D31.32: ICD-10-CM

## 2024-03-01 DIAGNOSIS — I69.898: ICD-10-CM

## 2024-03-01 PROCEDURE — 92015 DETERMINE REFRACTIVE STATE: CPT

## 2024-03-01 PROCEDURE — 92004 COMPRE OPH EXAM NEW PT 1/>: CPT

## 2024-03-01 PROCEDURE — 92250 FUNDUS PHOTOGRAPHY W/I&R: CPT

## 2024-03-01 PROCEDURE — 92083 EXTENDED VISUAL FIELD XM: CPT

## 2024-03-01 ASSESSMENT — TONOMETRY
OS_IOP_MMHG: 15
OD_IOP_MMHG: 15

## 2024-03-01 ASSESSMENT — VISUAL ACUITY
OD_SC: 20/40+2
OS_SC: 20/30

## 2024-10-02 DIAGNOSIS — Z00.6 ENCOUNTER FOR EXAMINATION FOR NORMAL COMPARISON OR CONTROL IN CLINICAL RESEARCH PROGRAM: ICD-10-CM

## 2024-10-08 ENCOUNTER — HOSPITAL ENCOUNTER (OUTPATIENT)
Facility: HOSPITAL | Age: 54
Setting detail: OBSERVATION
Discharge: HOME | End: 2024-10-10
Attending: EMERGENCY MEDICINE | Admitting: HOSPITALIST
Payer: COMMERCIAL

## 2024-10-08 ENCOUNTER — APPOINTMENT (EMERGENCY)
Dept: RADIOLOGY | Facility: HOSPITAL | Age: 54
End: 2024-10-08
Payer: COMMERCIAL

## 2024-10-08 DIAGNOSIS — R07.9 CHEST PAIN, UNSPECIFIED TYPE: Primary | ICD-10-CM

## 2024-10-08 DIAGNOSIS — R94.31 EKG ABNORMALITY: ICD-10-CM

## 2024-10-08 LAB
2HR DELTA HS TROPONIN: 1 NG/L
ALBUMIN SERPL BCG-MCNC: 5 G/DL (ref 3.5–5)
ALP SERPL-CCNC: 68 U/L (ref 34–104)
ALT SERPL W P-5'-P-CCNC: 14 U/L (ref 7–52)
ANION GAP SERPL CALCULATED.3IONS-SCNC: 12 MMOL/L (ref 4–13)
APTT PPP: 28 SECONDS (ref 23–34)
AST SERPL W P-5'-P-CCNC: 18 U/L (ref 13–39)
BASOPHILS # BLD AUTO: 0.02 THOUSANDS/ΜL (ref 0–0.1)
BASOPHILS NFR BLD AUTO: 0 % (ref 0–1)
BILIRUB SERPL-MCNC: 0.76 MG/DL (ref 0.2–1)
BUN SERPL-MCNC: 16 MG/DL (ref 5–25)
CALCIUM SERPL-MCNC: 10.3 MG/DL (ref 8.4–10.2)
CARDIAC TROPONIN I PNL SERPL HS: 6 NG/L
CARDIAC TROPONIN I PNL SERPL HS: 7 NG/L
CHLORIDE SERPL-SCNC: 100 MMOL/L (ref 96–108)
CO2 SERPL-SCNC: 27 MMOL/L (ref 21–32)
CREAT SERPL-MCNC: 0.85 MG/DL (ref 0.6–1.3)
D DIMER PPP FEU-MCNC: 0.33 UG/ML FEU
EOSINOPHIL # BLD AUTO: 0.05 THOUSAND/ΜL (ref 0–0.61)
EOSINOPHIL NFR BLD AUTO: 1 % (ref 0–6)
ERYTHROCYTE [DISTWIDTH] IN BLOOD BY AUTOMATED COUNT: 12 % (ref 11.6–15.1)
FLUAV AG UPPER RESP QL IA.RAPID: NEGATIVE
FLUBV AG UPPER RESP QL IA.RAPID: NEGATIVE
GFR SERPL CREATININE-BSD FRML MDRD: 77 ML/MIN/1.73SQ M
GLUCOSE SERPL-MCNC: 121 MG/DL (ref 65–140)
HCT VFR BLD AUTO: 43.5 % (ref 34.8–46.1)
HGB BLD-MCNC: 14.5 G/DL (ref 11.5–15.4)
IMM GRANULOCYTES # BLD AUTO: 0.02 THOUSAND/UL (ref 0–0.2)
IMM GRANULOCYTES NFR BLD AUTO: 0 % (ref 0–2)
INR PPP: 1.02 (ref 0.85–1.19)
LYMPHOCYTES # BLD AUTO: 2.22 THOUSANDS/ΜL (ref 0.6–4.47)
LYMPHOCYTES NFR BLD AUTO: 33 % (ref 14–44)
MAGNESIUM SERPL-MCNC: 1.9 MG/DL (ref 1.9–2.7)
MCH RBC QN AUTO: 30.5 PG (ref 26.8–34.3)
MCHC RBC AUTO-ENTMCNC: 33.3 G/DL (ref 31.4–37.4)
MCV RBC AUTO: 91 FL (ref 82–98)
MONOCYTES # BLD AUTO: 0.52 THOUSAND/ΜL (ref 0.17–1.22)
MONOCYTES NFR BLD AUTO: 8 % (ref 4–12)
NEUTROPHILS # BLD AUTO: 3.91 THOUSANDS/ΜL (ref 1.85–7.62)
NEUTS SEG NFR BLD AUTO: 58 % (ref 43–75)
NRBC BLD AUTO-RTO: 0 /100 WBCS
PHOSPHATE SERPL-MCNC: 3.6 MG/DL (ref 2.7–4.5)
PLATELET # BLD AUTO: 300 THOUSANDS/UL (ref 149–390)
PMV BLD AUTO: 10 FL (ref 8.9–12.7)
POTASSIUM SERPL-SCNC: 3.5 MMOL/L (ref 3.5–5.3)
PROT SERPL-MCNC: 8.2 G/DL (ref 6.4–8.4)
PROTHROMBIN TIME: 13.9 SECONDS (ref 12.3–15)
RBC # BLD AUTO: 4.76 MILLION/UL (ref 3.81–5.12)
SARS-COV+SARS-COV-2 AG RESP QL IA.RAPID: NEGATIVE
SODIUM SERPL-SCNC: 139 MMOL/L (ref 135–147)
WBC # BLD AUTO: 6.74 THOUSAND/UL (ref 4.31–10.16)

## 2024-10-08 PROCEDURE — 85025 COMPLETE CBC W/AUTO DIFF WBC: CPT

## 2024-10-08 PROCEDURE — 85610 PROTHROMBIN TIME: CPT | Performed by: EMERGENCY MEDICINE

## 2024-10-08 PROCEDURE — 84484 ASSAY OF TROPONIN QUANT: CPT

## 2024-10-08 PROCEDURE — 87804 INFLUENZA ASSAY W/OPTIC: CPT | Performed by: EMERGENCY MEDICINE

## 2024-10-08 PROCEDURE — 87811 SARS-COV-2 COVID19 W/OPTIC: CPT | Performed by: EMERGENCY MEDICINE

## 2024-10-08 PROCEDURE — 84100 ASSAY OF PHOSPHORUS: CPT | Performed by: EMERGENCY MEDICINE

## 2024-10-08 PROCEDURE — 93005 ELECTROCARDIOGRAM TRACING: CPT

## 2024-10-08 PROCEDURE — 83735 ASSAY OF MAGNESIUM: CPT | Performed by: EMERGENCY MEDICINE

## 2024-10-08 PROCEDURE — 99285 EMERGENCY DEPT VISIT HI MDM: CPT

## 2024-10-08 PROCEDURE — 36415 COLL VENOUS BLD VENIPUNCTURE: CPT

## 2024-10-08 PROCEDURE — 85730 THROMBOPLASTIN TIME PARTIAL: CPT | Performed by: EMERGENCY MEDICINE

## 2024-10-08 PROCEDURE — 80053 COMPREHEN METABOLIC PANEL: CPT

## 2024-10-08 PROCEDURE — 99285 EMERGENCY DEPT VISIT HI MDM: CPT | Performed by: EMERGENCY MEDICINE

## 2024-10-08 PROCEDURE — 71045 X-RAY EXAM CHEST 1 VIEW: CPT

## 2024-10-08 PROCEDURE — 85379 FIBRIN DEGRADATION QUANT: CPT | Performed by: EMERGENCY MEDICINE

## 2024-10-08 RX ORDER — ASPIRIN 81 MG/1
324 TABLET, CHEWABLE ORAL ONCE
Status: COMPLETED | OUTPATIENT
Start: 2024-10-08 | End: 2024-10-08

## 2024-10-08 RX ADMIN — NITROGLYCERIN 0.5 INCH: 20 OINTMENT TOPICAL at 23:28

## 2024-10-08 RX ADMIN — ASPIRIN 81 MG 324 MG: 81 TABLET ORAL at 23:27

## 2024-10-09 ENCOUNTER — APPOINTMENT (OUTPATIENT)
Dept: NON INVASIVE DIAGNOSTICS | Facility: HOSPITAL | Age: 54
End: 2024-10-09
Payer: COMMERCIAL

## 2024-10-09 ENCOUNTER — APPOINTMENT (OUTPATIENT)
Dept: CT IMAGING | Facility: HOSPITAL | Age: 54
End: 2024-10-09
Payer: COMMERCIAL

## 2024-10-09 PROBLEM — R07.89 OTHER CHEST PAIN: Status: ACTIVE | Noted: 2024-10-09

## 2024-10-09 PROBLEM — R51.9 ACUTE NONINTRACTABLE HEADACHE: Status: RESOLVED | Noted: 2019-01-27 | Resolved: 2024-10-09

## 2024-10-09 PROBLEM — I16.0 HYPERTENSIVE URGENCY: Status: RESOLVED | Noted: 2019-01-27 | Resolved: 2024-10-09

## 2024-10-09 LAB
4HR DELTA HS TROPONIN: 1 NG/L
ANION GAP SERPL CALCULATED.3IONS-SCNC: 7 MMOL/L (ref 4–13)
ATRIAL RATE: 61 BPM
ATRIAL RATE: 67 BPM
ATRIAL RATE: 90 BPM
BSA FOR ECHO PROCEDURE: 1.36 M2
BUN SERPL-MCNC: 17 MG/DL (ref 5–25)
CALCIUM SERPL-MCNC: 9.5 MG/DL (ref 8.4–10.2)
CARDIAC TROPONIN I PNL SERPL HS: 7 NG/L
CHLORIDE SERPL-SCNC: 105 MMOL/L (ref 96–108)
CO2 SERPL-SCNC: 26 MMOL/L (ref 21–32)
CREAT SERPL-MCNC: 0.76 MG/DL (ref 0.6–1.3)
ERYTHROCYTE [DISTWIDTH] IN BLOOD BY AUTOMATED COUNT: 12.1 % (ref 11.6–15.1)
GFR SERPL CREATININE-BSD FRML MDRD: 89 ML/MIN/1.73SQ M
GLUCOSE P FAST SERPL-MCNC: 102 MG/DL (ref 65–99)
GLUCOSE SERPL-MCNC: 102 MG/DL (ref 65–140)
HCT VFR BLD AUTO: 38.1 % (ref 34.8–46.1)
HGB BLD-MCNC: 12.7 G/DL (ref 11.5–15.4)
MCH RBC QN AUTO: 30.8 PG (ref 26.8–34.3)
MCHC RBC AUTO-ENTMCNC: 33.3 G/DL (ref 31.4–37.4)
MCV RBC AUTO: 93 FL (ref 82–98)
P AXIS: 82 DEGREES
P AXIS: 84 DEGREES
P AXIS: 86 DEGREES
PLATELET # BLD AUTO: 263 THOUSANDS/UL (ref 149–390)
PMV BLD AUTO: 10.1 FL (ref 8.9–12.7)
POTASSIUM SERPL-SCNC: 3.8 MMOL/L (ref 3.5–5.3)
PR INTERVAL: 140 MS
PR INTERVAL: 148 MS
PR INTERVAL: 148 MS
QRS AXIS: 79 DEGREES
QRS AXIS: 80 DEGREES
QRS AXIS: 81 DEGREES
QRSD INTERVAL: 76 MS
QRSD INTERVAL: 78 MS
QRSD INTERVAL: 80 MS
QT INTERVAL: 350 MS
QT INTERVAL: 440 MS
QT INTERVAL: 448 MS
QTC INTERVAL: 428 MS
QTC INTERVAL: 450 MS
QTC INTERVAL: 464 MS
RBC # BLD AUTO: 4.12 MILLION/UL (ref 3.81–5.12)
SL CV LV EF: 55
SODIUM SERPL-SCNC: 138 MMOL/L (ref 135–147)
T WAVE AXIS: 50 DEGREES
T WAVE AXIS: 77 DEGREES
T WAVE AXIS: 83 DEGREES
VENTRICULAR RATE: 61 BPM
VENTRICULAR RATE: 67 BPM
VENTRICULAR RATE: 90 BPM
WBC # BLD AUTO: 5.47 THOUSAND/UL (ref 4.31–10.16)

## 2024-10-09 PROCEDURE — 93306 TTE W/DOPPLER COMPLETE: CPT

## 2024-10-09 PROCEDURE — 99244 OFF/OP CNSLTJ NEW/EST MOD 40: CPT | Performed by: INTERNAL MEDICINE

## 2024-10-09 PROCEDURE — 93005 ELECTROCARDIOGRAM TRACING: CPT

## 2024-10-09 PROCEDURE — 80048 BASIC METABOLIC PNL TOTAL CA: CPT | Performed by: PHYSICIAN ASSISTANT

## 2024-10-09 PROCEDURE — 93306 TTE W/DOPPLER COMPLETE: CPT | Performed by: INTERNAL MEDICINE

## 2024-10-09 PROCEDURE — 74177 CT ABD & PELVIS W/CONTRAST: CPT

## 2024-10-09 PROCEDURE — 84484 ASSAY OF TROPONIN QUANT: CPT

## 2024-10-09 PROCEDURE — 71260 CT THORAX DX C+: CPT

## 2024-10-09 PROCEDURE — 36415 COLL VENOUS BLD VENIPUNCTURE: CPT

## 2024-10-09 PROCEDURE — 93010 ELECTROCARDIOGRAM REPORT: CPT | Performed by: INTERNAL MEDICINE

## 2024-10-09 PROCEDURE — 99223 1ST HOSP IP/OBS HIGH 75: CPT | Performed by: HOSPITALIST

## 2024-10-09 PROCEDURE — 85027 COMPLETE CBC AUTOMATED: CPT | Performed by: PHYSICIAN ASSISTANT

## 2024-10-09 RX ORDER — ALPRAZOLAM 0.5 MG
0.5 TABLET ORAL 4 TIMES DAILY PRN
Status: DISCONTINUED | OUTPATIENT
Start: 2024-10-09 | End: 2024-10-10 | Stop reason: HOSPADM

## 2024-10-09 RX ORDER — HYDROCHLOROTHIAZIDE 25 MG/1
25 TABLET ORAL DAILY
Status: DISCONTINUED | OUTPATIENT
Start: 2024-10-09 | End: 2024-10-10 | Stop reason: HOSPADM

## 2024-10-09 RX ORDER — MECLIZINE HYDROCHLORIDE 25 MG/1
25 TABLET ORAL EVERY 8 HOURS PRN
Status: DISCONTINUED | OUTPATIENT
Start: 2024-10-09 | End: 2024-10-10 | Stop reason: HOSPADM

## 2024-10-09 RX ORDER — IBUPROFEN 400 MG/1
400 TABLET, FILM COATED ORAL ONCE
Status: COMPLETED | OUTPATIENT
Start: 2024-10-09 | End: 2024-10-09

## 2024-10-09 RX ORDER — HYDRALAZINE HYDROCHLORIDE 20 MG/ML
10 INJECTION INTRAMUSCULAR; INTRAVENOUS EVERY 6 HOURS PRN
Status: DISCONTINUED | OUTPATIENT
Start: 2024-10-09 | End: 2024-10-10 | Stop reason: HOSPADM

## 2024-10-09 RX ORDER — POTASSIUM CHLORIDE 1500 MG/1
20 TABLET, EXTENDED RELEASE ORAL 2 TIMES DAILY WITH MEALS
Status: DISCONTINUED | OUTPATIENT
Start: 2024-10-09 | End: 2024-10-10 | Stop reason: HOSPADM

## 2024-10-09 RX ORDER — CLOPIDOGREL BISULFATE 75 MG/1
75 TABLET ORAL DAILY
Status: DISCONTINUED | OUTPATIENT
Start: 2024-10-09 | End: 2024-10-10 | Stop reason: HOSPADM

## 2024-10-09 RX ORDER — AMLODIPINE BESYLATE 10 MG/1
10 TABLET ORAL DAILY
Status: DISCONTINUED | OUTPATIENT
Start: 2024-10-09 | End: 2024-10-10 | Stop reason: HOSPADM

## 2024-10-09 RX ORDER — ATORVASTATIN CALCIUM 80 MG/1
80 TABLET, FILM COATED ORAL
Status: DISCONTINUED | OUTPATIENT
Start: 2024-10-09 | End: 2024-10-10 | Stop reason: HOSPADM

## 2024-10-09 RX ORDER — ESCITALOPRAM OXALATE 10 MG/1
10 TABLET ORAL DAILY
Status: DISCONTINUED | OUTPATIENT
Start: 2024-10-09 | End: 2024-10-10 | Stop reason: HOSPADM

## 2024-10-09 RX ORDER — ACETAMINOPHEN 325 MG/1
650 TABLET ORAL EVERY 6 HOURS PRN
Status: DISCONTINUED | OUTPATIENT
Start: 2024-10-09 | End: 2024-10-10 | Stop reason: HOSPADM

## 2024-10-09 RX ORDER — ONDANSETRON 2 MG/ML
4 INJECTION INTRAMUSCULAR; INTRAVENOUS EVERY 6 HOURS PRN
Status: DISCONTINUED | OUTPATIENT
Start: 2024-10-09 | End: 2024-10-10 | Stop reason: HOSPADM

## 2024-10-09 RX ORDER — LISINOPRIL 20 MG/1
20 TABLET ORAL DAILY
Status: DISCONTINUED | OUTPATIENT
Start: 2024-10-10 | End: 2024-10-10 | Stop reason: HOSPADM

## 2024-10-09 RX ORDER — LISINOPRIL 5 MG/1
5 TABLET ORAL DAILY
Status: DISCONTINUED | OUTPATIENT
Start: 2024-10-09 | End: 2024-10-09

## 2024-10-09 RX ORDER — PANTOPRAZOLE SODIUM 40 MG/1
40 TABLET, DELAYED RELEASE ORAL
Status: DISCONTINUED | OUTPATIENT
Start: 2024-10-09 | End: 2024-10-10 | Stop reason: HOSPADM

## 2024-10-09 RX ADMIN — LISINOPRIL 5 MG: 5 TABLET ORAL at 08:02

## 2024-10-09 RX ADMIN — LISINOPRIL 15 MG: 10 TABLET ORAL at 11:40

## 2024-10-09 RX ADMIN — ALPRAZOLAM 0.5 MG: 0.5 TABLET ORAL at 19:41

## 2024-10-09 RX ADMIN — PANTOPRAZOLE SODIUM 40 MG: 40 TABLET, DELAYED RELEASE ORAL at 06:07

## 2024-10-09 RX ADMIN — ESCITALOPRAM OXALATE 10 MG: 10 TABLET ORAL at 08:02

## 2024-10-09 RX ADMIN — POTASSIUM CHLORIDE 20 MEQ: 1500 TABLET, EXTENDED RELEASE ORAL at 16:20

## 2024-10-09 RX ADMIN — ATORVASTATIN CALCIUM 80 MG: 80 TABLET, FILM COATED ORAL at 16:20

## 2024-10-09 RX ADMIN — POTASSIUM CHLORIDE 20 MEQ: 1500 TABLET, EXTENDED RELEASE ORAL at 08:02

## 2024-10-09 RX ADMIN — IOHEXOL 85 ML: 350 INJECTION, SOLUTION INTRAVENOUS at 13:54

## 2024-10-09 RX ADMIN — CLOPIDOGREL 75 MG: 75 TABLET ORAL at 08:01

## 2024-10-09 RX ADMIN — IBUPROFEN 400 MG: 400 TABLET, FILM COATED ORAL at 19:41

## 2024-10-09 RX ADMIN — AMLODIPINE BESYLATE 10 MG: 10 TABLET ORAL at 08:02

## 2024-10-09 RX ADMIN — HYDROCHLOROTHIAZIDE 25 MG: 12.5 TABLET ORAL at 08:02

## 2024-10-09 RX ADMIN — HYDRALAZINE HYDROCHLORIDE 10 MG: 20 INJECTION INTRAMUSCULAR; INTRAVENOUS at 19:42

## 2024-10-09 NOTE — ASSESSMENT & PLAN NOTE
-With atypical presentation.  Patient does note symptoms do worsen with varying degrees of exertion although not constant or consistent and particularly worse with stressful situations.  She notes discomfort was not improved after topical nitroglycerin.  And still intermittent with waxing and waning intensity  -ECG did have transients mild ST depressions  -Troponins negative x 3  -Given underlying symptoms and risk factors, will recommend inpatient pharmacologic nuclear stress testing  -Will attempt better blood pressure control to assist as can contribute to ECG changes as well as discomfort  -Counseled patient on medication and lifestyle compliance  -Would continue to monitor on telemetry at this time.

## 2024-10-09 NOTE — CONSULTS
Consultation - Cardiology   Name: Frances Paredes 54 y.o. female I MRN: [id number]  Unit/Bed#: ED 28 I Date of Admission: 10/8/2024   Date of Service: 10/9/2024 I Hospital Day: 0   Inpatient consult to Cardiology  Consult performed by: Sergey Sky DO  Consult ordered by: Joseph Tomlin PA-C        Physician Requesting Evaluation: Hammad Mesa MD   Reason for Evaluation / Principal Problem: Chest pain    Assessment & Plan  Other chest pain  -With atypical presentation.  Patient does note symptoms do worsen with varying degrees of exertion although not constant or consistent and particularly worse with stressful situations.  She notes discomfort was not improved after topical nitroglycerin.  And still intermittent with waxing and waning intensity  -ECG did have transients mild ST depressions  -Troponins negative x 3  -Given underlying symptoms and risk factors, will recommend inpatient pharmacologic nuclear stress testing  -Will attempt better blood pressure control to assist as can contribute to ECG changes as well as discomfort  -Counseled patient on medication and lifestyle compliance  -Would continue to monitor on telemetry at this time.  Essential hypertension  -Currently still with elevated blood pressure readings  -Patient notes medication noncompliance for at least the last 2 weeks in the outpatient setting due to family stressors.  -Will increase lisinopril to 20 mg daily and give additional dosing today for equivalency.  -Patient will continue amlodipine 10 mg daily, hydrochlorothiazide 25 mg daily with potassium supplementation  -Would monitor renal function and electrolytes and counseled patient on sodium restriction, DASH diet and nicotine and marijuana cessation.  -Continue to monitor at this time with up titration of medical therapy  History of CVA (cerebrovascular accident)  -Can continue Plavix 75 mg daily and atorvastatin 80 mg daily  -Continue to monitor at this time  -Patient should  follow-up with her neurologist in the outpatient setting.  Mixed hyperlipidemia  -Counseled patient on dietary and lifestyle modifications  -Continue atorvastatin 80 mg daily    Outpatient cardiologist: Previously seen by Dr. Joseph last 4/1/2021    Other summary comments:   -Given ongoing waxing and waning symptoms, transient ECG abnormalities and overall risk factors will have patient undergo pharmacologic nuclear stress testing with additional recommendations pending results  -Continue Plavix 70 mg daily, atorvastatin 80 mg daily and will increase lisinopril to 20 mg daily as blood pressure still not adequately controlled  -Counseled patient on dietary and lifestyle modifications along with need for nicotine cessation and marijuana cessation.      HPI: Frances Paredes is a 54 y.o. year old female with history of medication noncompliance, hypertension, hyperlipidemia, previous CVA with history of vertebral artery dissection.  She presented to [facility] 10/8/2024 for episodes of chest discomfort that she notes began approximately 2 to 3 days prior.  She states that the pain is left-sided in nature described as an aching sensation however not improved by any position.  She notes that there is no significant tenderness to palpation and notes that when she feels stressed symptoms do worsen.  She states this is likely exacerbated again by social stressors at home which she believes may also been contributing.  She states that she did also noticed some tightening with going up and down stairs at her home but this was not constant or consistent.  Initial EKG showed some ST depressions and therefore cardiology was consulted.  -Currently at bedside patient notes no active severe symptoms but does state chest pain continues to wax and wane.  She states that the topical nitroglycerin did not improve, relieved for change intensity of symptoms.  She denies any active shortness of breath, loss of  consciousness, palpitations, lightness or dizziness, lower extremity edema, orthopnea or bendopnea.    EKG:   Currently sinus rhythm on telemetry    MOST  RECENT CARDIAC IMAGING:   -Transthoracic echocardiogram 10/9/2024 showing ventricular systolic function normal estimated LVEF 55% with grade 1 diastolic dysfunction, mild mitral regurgitation with IVC normal in size.      Review of Systems:   Review of Systems   Constitutional:  Negative for chills, diaphoresis, fatigue and fever.   HENT:  Negative for trouble swallowing.    Eyes:  Negative for pain and redness.   Respiratory:  Negative for shortness of breath and wheezing.    Cardiovascular:  Positive for chest pain (intermittent). Negative for palpitations and leg swelling.   Gastrointestinal:  Negative for abdominal pain, blood in stool, constipation, diarrhea, nausea and vomiting.   Genitourinary:  Negative for dysuria.   Musculoskeletal:  Positive for arthralgias. Negative for neck pain and neck stiffness.   Skin:  Negative for rash.   Neurological:  Negative for dizziness, syncope, light-headedness and headaches.   Psychiatric/Behavioral:  Negative for agitation and hallucinations.    All other systems reviewed and are negative.         Historical Information   Past Medical History:   Diagnosis Date    CVA (cerebral vascular accident) (HCC)     GERD (gastroesophageal reflux disease)     occasionally    Hypertension     controlling with diet and exercise     Past Surgical History:   Procedure Laterality Date    AUGMENTATION MAMMAPLASTY      GYNECOLOGIC CRYOSURGERY      CERVIX    HYSTERECTOMY      LASER ABLATION OF CONDYLOMAS N/A 7/30/2018    Procedure: OMNIGUIDE LASER ABLATION OF VULVA;  Surgeon: Victor M Singh MD;  Location: [facility];  Service: Gynecology Oncology    SKIN TAG REMOVAL      EXICISON OF PERIANAL    WISDOM TOOTH EXTRACTION       Social History     Substance and Sexual Activity   Alcohol Use Yes    Comment: SOCIAL-1-2 drinks per week  "    Social History     Substance and Sexual Activity   Drug Use No     Social History     Tobacco Use   Smoking Status Never   Smokeless Tobacco Never       Family History:   Family History   Adopted: Yes   Problem Relation Age of Onset    No Known Problems Mother     No Known Problems Father     No Known Problems Sister     No Known Problems Brother     No Known Problems Maternal Aunt     No Known Problems Maternal Uncle     No Known Problems Paternal Aunt     No Known Problems Paternal Uncle     No Known Problems Maternal Grandmother     No Known Problems Maternal Grandfather     No Known Problems Paternal Grandmother     No Known Problems Paternal Grandfather         Meds/Allergies   all current active meds have been reviewed  Not in a hospital admission.    Allergies   Allergen Reactions    Shellfish Allergy - Food Allergy Anaphylaxis       Objective   Vitals: Blood pressure 160/78, pulse 72, temperature 97.8 °F (36.6 °C), temperature source Temporal, resp. rate 16, height 4' 10\" (1.473 m), weight 45.4 kg (100 lb), SpO2 98%., Body mass index is 20.9 kg/m².,   Orthostatic Blood Pressures      Flowsheet Row Most Recent Value   Blood Pressure 160/78 filed at 10/09/2024 0950   Patient Position - Orthostatic VS Lying filed at 10/08/2024 2114            Systolic (24hrs), Av , Min:134 , Max:180     Diastolic (24hrs), Av, Min:66, Max:111    Physical Exam:  Physical Exam  Vitals reviewed.   Constitutional:       General: She is not in acute distress.     Appearance: Normal appearance. She is not diaphoretic.   HENT:      Head: Normocephalic and atraumatic.   Eyes:      General:         Right eye: No discharge.         Left eye: No discharge.   Neck:      Comments: Trachea midline, no significant JVD appreciated  Cardiovascular:      Rate and Rhythm: Normal rate and regular rhythm.      Heart sounds:      No friction rub.   Pulmonary:      Effort: Pulmonary effort is normal. No respiratory distress.      Breath " sounds: No wheezing.   Chest:      Chest wall: No tenderness.   Abdominal:      General: Bowel sounds are normal.      Palpations: Abdomen is soft.      Tenderness: There is no abdominal tenderness. There is no rebound.   Musculoskeletal:      Right lower leg: No edema.      Left lower leg: No edema.   Skin:     General: Skin is warm and dry.   Neurological:      Mental Status: She is alert.      Comments: Awake, alert, able to answer questions appropriately, able to move extremities bilaterally.   Psychiatric:         Mood and Affect: Mood normal.         Behavior: Behavior normal.          Lab Results:   Labs reviewed and prominent abnormalities reviewed above and/or below.    Troponins:    Results from last 7 days   Lab Units 10/09/24  0226 10/08/24  2332 10/08/24  2125   HS TNI 0HR ng/L  --   --  6   HS TNI 2HR ng/L  --  7  --    HSTNI D2 ng/L  --  1  --    HS TNI 4HR ng/L 7  --   --    HSTNI D4 ng/L 1  --   --      BNP:

## 2024-10-09 NOTE — ASSESSMENT & PLAN NOTE
-Currently still with elevated blood pressure readings  -Patient notes medication noncompliance for at least the last 2 weeks in the outpatient setting due to family stressors.  -Will increase lisinopril to 20 mg daily and give additional dosing today for equivalency.  -Patient will continue amlodipine 10 mg daily, hydrochlorothiazide 25 mg daily with potassium supplementation  -Would monitor renal function and electrolytes and counseled patient on sodium restriction, DASH diet and nicotine and marijuana cessation.  -Continue to monitor at this time with up titration of medical therapy

## 2024-10-09 NOTE — ASSESSMENT & PLAN NOTE
Place in observation telemetry  Trend cardiac enzymes  Troponins have been reassuring at 6, 7 and 7  EKG shows new downsloping ST segment changes in inferior lateral leads  As discussed by ER provider with Dr. Larkin cardiology on-call who recommended observation  Will give aspirin 81 mg p.o. daily  Continue prehospital Lipitor 80 mg p.o. daily, Plavix 75 mg p.o. daily, Norvasc 10 mg p.o. daily and lisinopril 5 mg p.o. daily  Obtain echocardiogram in a.m.  Consult cardiology in a.m.

## 2024-10-09 NOTE — ASSESSMENT & PLAN NOTE
Continue prehospital Norvasc 10 mg p.o. daily, hydrochlorothiazide 25 mg p.o. daily and lisinopril 5 mg p.o. daily

## 2024-10-09 NOTE — ED PROVIDER NOTES
Final diagnoses:   Chest pain, unspecified type   EKG abnormality     ED Disposition       ED Disposition   Admit    Condition   Stable    Date/Time   Wed Oct 9, 2024 12:33 AM    Comment   Case was discussed with Joseph Tomlin and the patient's admission status was agreed to be Admission Status: observation status to the service of Dr. Mesa.               Assessment & Plan       Medical Decision Making  54-year-old female presents emergency department complaining of chest pain which she describes as an aching and burning sensation along the left lower rib margin.  Patient also felt short of breath.  The patient is not sure if this is due to anxiety because she was stressed out due to issues that occurred with visiting family from [place].  The patient notes though she has had a history of a stroke and is on Plavix and has a history of high blood pressure.  The patient decided after 2 days of fairly repetitive symptoms to come to the hospital and did not chance missing a acute coronary issue.  Differential diagnosis based on my evaluation is stress, hypertension, or cardiac disease such as cardiomyopathy or ischemic coronary disease.  EKG however is concerning as there is some changes from the last EKG done 3 years ago with downsloping ST segment changes in the inferior lateral leads.  This was discussed with cardiology on-call who did state that they feel that this could certainly be due to hypertensive and however ischemia cannot be ruled out.  The patient has 2 nonpositive troponins while in the emergency department however her EKG can still be a sign of potential disease.  The patient will be observed in the ER for further testing.  Aspirin given as well as Nitropaste.    Amount and/or Complexity of Data Reviewed  Labs: ordered.  Radiology: independent interpretation performed.    Risk  OTC drugs.  Prescription drug management.  Decision regarding hospitalization.        ED Course as of 10/09/24 0053   Tue Oct 08,  2024 2314 Case discussed with Dr. Larkin - Recommends observation, AM echo and BP control.  May be due to of LVH but cannot rule out ischemia.   2335 Nitropaste applied.       Medications   aspirin chewable tablet 324 mg (324 mg Oral Given 10/8/24 2327)   nitroglycerin (NITRO-BID) 2 % TD ointment 0.5 inch (0.5 inches Topical Given 10/8/24 2328)       ED Risk Strat Scores   HEART Risk Score      Flowsheet Row Most Recent Value   Heart Score Risk Calculator    History 1 Filed at: 10/09/2024 0035   ECG 2 Filed at: 10/09/2024 0035   Age 1 Filed at: 10/09/2024 0035   Risk Factors 1 Filed at: 10/09/2024 0035   Troponin 0 Filed at: 10/09/2024 0035   HEART Score 5 Filed at: 10/09/2024 0035                               SBIRT 20yo+      Flowsheet Row Most Recent Value   Initial Alcohol Screen: US AUDIT-C     1. How often do you have a drink containing alcohol? 0 Filed at: 10/08/2024 2117   2. How many drinks containing alcohol do you have on a typical day you are drinking?  0 Filed at: 10/08/2024 2117   3b. FEMALE Any Age, or MALE 65+: How often do you have 4 or more drinks on one occassion? 0 Filed at: 10/08/2024 2117   Audit-C Score 0 Filed at: 10/08/2024 2117   JOSUHA: How many times in the past year have you...    Used an illegal drug or used a prescription medication for non-medical reasons? Never Filed at: 10/08/2024 2117                            History of Present Illness       Chief Complaint   Patient presents with    Chest Pain     According to the patient she has had chest pain for the last day, denies nausea or episodes of emesis.       Past Medical History:   Diagnosis Date    CVA (cerebral vascular accident) (HCC)     GERD (gastroesophageal reflux disease)     occasionally    Hypertension     controlling with diet and exercise      Past Surgical History:   Procedure Laterality Date    AUGMENTATION MAMMAPLASTY      GYNECOLOGIC CRYOSURGERY      CERVIX    HYSTERECTOMY      LASER ABLATION OF CONDYLOMAS N/A  "7/30/2018    Procedure: OMNIGUIDE LASER ABLATION OF VULVA;  Surgeon: Victor M Singh MD;  Location: [facility];  Service: Gynecology Oncology    SKIN TAG REMOVAL      EXICISON OF PERIANAL    WISDOM TOOTH EXTRACTION        Family History   Adopted: Yes   Problem Relation Age of Onset    No Known Problems Mother     No Known Problems Father     No Known Problems Sister     No Known Problems Brother     No Known Problems Maternal Aunt     No Known Problems Maternal Uncle     No Known Problems Paternal Aunt     No Known Problems Paternal Uncle     No Known Problems Maternal Grandmother     No Known Problems Maternal Grandfather     No Known Problems Paternal Grandmother     No Known Problems Paternal Grandfather       Social History     Tobacco Use    Smoking status: Never    Smokeless tobacco: Never   Vaping Use    Vaping status: Never Used   Substance Use Topics    Alcohol use: Yes     Comment: SOCIAL-1-2 drinks per week    Drug use: No      E-Cigarette/Vaping    E-Cigarette Use Never User       E-Cigarette/Vaping Substances    Nicotine No     THC No     CBD No     Flavoring No     Other No     Unknown No       I have reviewed and agree with the history as documented.     54-year-old female presents emergency department complaining of chest pain.  Describes this as a burning squeezing sensation but it has been present for over 2 days.  Patient notes she has family members visiting from [place] and notes that she has been under a lot of stress.  The patient notes the pain right now is almost completely resolved at this time.  At its worst she said it was a 4-5 out of 10.  The patient is currently on Plavix and has had multiple strokes in the past and noted that she had too many medical issues,\" to blow this off.\"  Feels short of breath when chest pain is more severe.  Of note the patient also notes that she is lost 15 pounds in the last few weeks and is not sure why it was not trying to.        Review of Systems "   Constitutional:  Positive for activity change. Negative for chills and fever.   HENT:  Negative for ear pain and sore throat.    Eyes:  Negative for pain and visual disturbance.   Respiratory:  Negative for cough and shortness of breath.    Cardiovascular:  Positive for chest pain. Negative for palpitations.   Gastrointestinal:  Negative for abdominal pain and vomiting.   Genitourinary:  Negative for dysuria and hematuria.   Musculoskeletal:  Negative for arthralgias and back pain.   Skin:  Negative for color change and rash.   Neurological:  Negative for seizures and syncope.   Psychiatric/Behavioral:  Positive for behavioral problems.    All other systems reviewed and are negative.          Objective       ED Triage Vitals [10/08/24 2114]   Temperature Pulse Blood Pressure Respirations SpO2 Patient Position - Orthostatic VS   97.8 °F (36.6 °C) 91 (!) 171/99 18 98 % Lying      Temp Source Heart Rate Source BP Location FiO2 (%) Pain Score    Temporal Monitor Right arm -- 5      Vitals      Date and Time Temp Pulse SpO2 Resp BP Pain Score FACES Pain Rating User   10/08/24 2114 97.8 °F (36.6 °C) 91 98 % 18 171/99 5 -- [facility]            Physical Exam  Vitals and nursing note reviewed.   Constitutional:       General: She is not in acute distress.     Appearance: Normal appearance. She is well-developed.   HENT:      Head: Normocephalic and atraumatic.      Right Ear: External ear normal.      Left Ear: External ear normal.      Nose: Nose normal.      Mouth/Throat:      Mouth: Mucous membranes are moist.   Eyes:      Conjunctiva/sclera: Conjunctivae normal.   Cardiovascular:      Rate and Rhythm: Normal rate and regular rhythm.      Pulses: Normal pulses.      Heart sounds: Normal heart sounds. No murmur heard.  Pulmonary:      Effort: Pulmonary effort is normal. No respiratory distress.      Breath sounds: Normal breath sounds. No decreased breath sounds, wheezing or rhonchi.   Chest:      Chest wall: No mass,  deformity or tenderness.   Abdominal:      General: Bowel sounds are normal.      Palpations: Abdomen is soft.      Tenderness: There is no abdominal tenderness.   Musculoskeletal:         General: No swelling or deformity.      Cervical back: Neck supple.      Right lower leg: No edema.      Left lower leg: No edema.   Skin:     General: Skin is warm and dry.      Capillary Refill: Capillary refill takes less than 2 seconds.   Neurological:      General: No focal deficit present.      Mental Status: She is alert and oriented to person, place, and time. Mental status is at baseline.         Results Reviewed       Procedure Component Value Units Date/Time    HS Troponin I 2hr [[id number]]  (Normal) Collected: 10/08/24 2332    Lab Status: Final result Specimen: Blood from Arm, Right Updated: 10/08/24 2357     hs TnI 2hr 7 ng/L      Delta 2hr hsTnI 1 ng/L     HS Troponin I 4hr [[id number]]     Lab Status: No result Specimen: Blood     FLU/COVID Rapid Antigen (30 min. TAT) - Preferred screening test in ED [[id number]]  (Normal) Collected: 10/08/24 2139    Lab Status: Final result Specimen: Nares from Nose Updated: 10/08/24 2217     SARS COV Rapid Antigen Negative     Influenza A Rapid Antigen Negative     Influenza B Rapid Antigen Negative    Narrative:      This test has been performed using the Quidel Robyn 2 FLU+SARS Antigen test under the Emergency Use Authorization (EUA). This test has been validated by the  and verified by the performing laboratory. The Robyn uses lateral flow immunofluorescent sandwich assay to detect SARS-COV, Influenza A and Influenza B Antigen.     The Quidel Robyn 2 SARS Antigen test does not differentiate between SARS-CoV and SARS-CoV-2.     Negative results are presumptive and may be confirmed with a molecular assay, if necessary, for patient management. Negative results do not rule out SARS-CoV-2 or influenza infection and should not be used as the sole basis for treatment or  patient management decisions. A negative test result may occur if the level of antigen in a sample is below the limit of detection of this test.     Positive results are indicative of the presence of viral antigens, but do not rule out bacterial infection or co-infection with other viruses.     All test results should be used as an adjunct to clinical observations and other information available to the provider.    FOR PEDIATRIC PATIENTS - copy/paste COVID Guidelines URL to browser: [email or web address]    D-dimer, quantitative [[id number]]  (Normal) Collected: 10/08/24 2139    Lab Status: Final result Specimen: Blood from Arm, Right Updated: 10/08/24 2203     D-Dimer, Quant 0.33 ug/ml FEU     Narrative:      In the evaluation for possible pulmonary embolism, in the appropriate (Well's Score of 4 or less) patient, the age adjusted d-dimer cutoff for this patient can be calculated as:    Age x 0.01 (in ug/mL) for Age-adjusted D-dimer exclusion threshold for a patient over 50 years.    Protime-INR [[id number]]  (Normal) Collected: 10/08/24 2139    Lab Status: Final result Specimen: Blood from Arm, Right Updated: 10/08/24 2201     Protime 13.9 seconds      INR 1.02    Narrative:      INR Therapeutic Range    Indication                                             INR Range      Atrial Fibrillation                                               2.0-3.0  Hypercoagulable State                                    2.0.2.3  Left Ventricular Asist Device                            2.0-3.0  Mechanical Heart Valve                                  -    Aortic(with afib, MI, embolism, HF, LA enlargement,    and/or coagulopathy)                                     2.0-3.0 (2.5-3.5)     Mitral                                                             2.5-3.5  Prosthetic/Bioprosthetic Heart Valve               2.0-3.0  Venous thromboembolism (VTE: VT, PE        2.0-3.0    APTT  [[id number]]  (Normal) Collected: 10/08/24 2139    Lab Status: Final result Specimen: Blood from Arm, Right Updated: 10/08/24 2201     PTT 28 seconds     HS Troponin 0hr (reflex protocol) [[id number]]  (Normal) Collected: 10/08/24 2125    Lab Status: Final result Specimen: Blood from Arm, Right Updated: 10/08/24 2159     hs TnI 0hr 6 ng/L     Comprehensive metabolic panel [[id number]]  (Abnormal) Collected: 10/08/24 2125    Lab Status: Final result Specimen: Blood from Arm, Right Updated: 10/08/24 2156     Sodium 139 mmol/L      Potassium 3.5 mmol/L      Chloride 100 mmol/L      CO2 27 mmol/L      ANION GAP 12 mmol/L      BUN 16 mg/dL      Creatinine 0.85 mg/dL      Glucose 121 mg/dL      Calcium 10.3 mg/dL      AST 18 U/L      ALT 14 U/L      Alkaline Phosphatase 68 U/L      Total Protein 8.2 g/dL      Albumin 5.0 g/dL      Total Bilirubin 0.76 mg/dL      eGFR 77 ml/min/1.73sq m     Narrative:      National Kidney Disease Foundation guidelines for Chronic Kidney Disease (CKD):     Stage 1 with normal or high GFR (GFR > 90 mL/min/1.73 square meters)    Stage 2 Mild CKD (GFR = 60-89 mL/min/1.73 square meters)    Stage 3A Moderate CKD (GFR = 45-59 mL/min/1.73 square meters)    Stage 3B Moderate CKD (GFR = 30-44 mL/min/1.73 square meters)    Stage 4 Severe CKD (GFR = 15-29 mL/min/1.73 square meters)    Stage 5 End Stage CKD (GFR <15 mL/min/1.73 square meters)  Note: GFR calculation is accurate only with a steady state creatinine    Phosphorus [[id number]]  (Normal) Collected: 10/08/24 2125    Lab Status: Final result Specimen: Blood from Arm, Right Updated: 10/08/24 2156     Phosphorus 3.6 mg/dL     Magnesium [[id number]]  (Normal) Collected: 10/08/24 2125    Lab Status: Final result Specimen: Blood from Arm, Right Updated: 10/08/24 2156     Magnesium 1.9 mg/dL     CBC and differential [[id number]] Collected: 10/08/24 2125    Lab Status: Final result Specimen: Blood from Arm, Right Updated: 10/08/24 2132     WBC 6.74  Thousand/uL      RBC 4.76 Million/uL      Hemoglobin 14.5 g/dL      Hematocrit 43.5 %      MCV 91 fL      MCH 30.5 pg      MCHC 33.3 g/dL      RDW 12.0 %      MPV 10.0 fL      Platelets 300 Thousands/uL      nRBC 0 /100 WBCs      Segmented % 58 %      Immature Grans % 0 %      Lymphocytes % 33 %      Monocytes % 8 %      Eosinophils Relative 1 %      Basophils Relative 0 %      Absolute Neutrophils 3.91 Thousands/µL      Absolute Immature Grans 0.02 Thousand/uL      Absolute Lymphocytes 2.22 Thousands/µL      Absolute Monocytes 0.52 Thousand/µL      Eosinophils Absolute 0.05 Thousand/µL      Basophils Absolute 0.02 Thousands/µL             XR chest 1 view portable   ED Interpretation by Gary Hutchinson Jr., DO (10/08 2157)   No acute pulmonary pathology.          ECG 12 Lead Documentation Only    Date/Time: 10/8/2024 9:37 PM    Performed by: Gary Hutchinson Jr., DO  Authorized by: Gary Hutchinson Jr., DO    ECG reviewed by me, the ED Provider: yes    Patient location:  ED  Comments:      EKG shows a sinus rhythm at 90 beats a minute with a normal axis.  There is LVH by voltage criteria.  Questionable old anterior septal MI.  There is some mild downsloping ST segment changes in the inferior lateral leads which appear to be new from previous EKGs.      ED Medication and Procedure Management   Prior to Admission Medications   Prescriptions Last Dose Informant Patient Reported? Taking?   ALPRAZolam (XANAX) 0.5 mg tablet  Self Yes No   Sig: Take 0.5 mg by mouth 4 (four) times a day as needed for anxiety (Patient takes one at night)    Docusate Calcium (STOOL SOFTENER PO)  Self Yes No   Sig: Take 1 tablet by mouth as needed (once a week prn)    RABEprazole (ACIPHEX) 20 MG tablet  Self Yes No   Sig: Take 20 mg by mouth daily   amLODIPine (NORVASC) 10 mg tablet  Self No No   Sig: Take 1 tablet (10 mg total) by mouth daily for 14 days   atorvastatin (LIPITOR) 80 mg tablet  Self No No   Sig: Take 1 tablet (80 mg  total) by mouth daily with dinner   clopidogrel (PLAVIX) 75 mg tablet  Self No No   Sig: take 1 tablet by mouth once daily   escitalopram (LEXAPRO) 10 mg tablet   No No   Sig: take 1 tablet by mouth once daily   hydrochlorothiazide (HYDRODIURIL) 25 mg tablet  Self Yes No   Sig: Take 25 mg by mouth daily   lisinopril (ZESTRIL) 5 mg tablet  Self Yes No   Sig: Take 5 mg by mouth daily   meclizine (ANTIVERT) 25 mg tablet  Self No No   Si/2-1 tab up to every 8 hours for vertigo if needed   potassium chloride (K-DUR,KLOR-CON) 20 mEq tablet   Yes No   Sig: Take by mouth every 12 (twelve) hours      Facility-Administered Medications: None     Patient's Medications   Discharge Prescriptions    No medications on file     No discharge procedures on file.  ED SEPSIS DOCUMENTATION   Time reflects when diagnosis was documented in both MDM as applicable and the Disposition within this note       Time User Action Codes Description Comment    10/9/2024 12:32 AM Gary Hutchinson [R07.9] Chest pain, unspecified type     10/9/2024 12:33 AM Gary Hutchinson [R94.31] EKG abnormality                  Gary Hutchinson Jr., DO  10/09/24 0054

## 2024-10-09 NOTE — ASSESSMENT & PLAN NOTE
-Counseled patient on dietary and lifestyle modifications  -Continue atorvastatin 80 mg daily    Outpatient cardiologist: Previously seen by Dr. Joseph last 4/1/2021

## 2024-10-09 NOTE — PLAN OF CARE
Problem: PAIN - ADULT  Goal: Verbalizes/displays adequate comfort level or baseline comfort level  Description: Interventions:  - Encourage patient to monitor pain and request assistance  - Assess pain using appropriate pain scale  - Administer analgesics based on type and severity of pain and evaluate response  - Implement non-pharmacological measures as appropriate and evaluate response  - Consider cultural and social influences on pain and pain management  - Notify physician/advanced practitioner if interventions unsuccessful or patient reports new pain  Outcome: Progressing     Problem: INFECTION - ADULT  Goal: Absence or prevention of progression during hospitalization  Description: INTERVENTIONS:  - Assess and monitor for signs and symptoms of infection  - Monitor lab/diagnostic results  - Monitor all insertion sites, i.e. indwelling lines, tubes, and drains  - Monitor endotracheal if appropriate and nasal secretions for changes in amount and color  - Willard appropriate cooling/warming therapies per order  - Administer medications as ordered  - Instruct and encourage patient and family to use good hand hygiene technique  - Identify and instruct in appropriate isolation precautions for identified infection/condition  Outcome: Progressing  Goal: Absence of fever/infection during neutropenic period  Description: INTERVENTIONS:  - Monitor WBC    Outcome: Progressing     Problem: SAFETY ADULT  Goal: Patient will remain free of falls  Description: INTERVENTIONS:  - Educate patient/family on patient safety including physical limitations  - Instruct patient to call for assistance with activity   - Consult OT/PT to assist with strengthening/mobility   - Keep Call bell within reach  - Keep bed low and locked with side rails adjusted as appropriate  - Keep care items and personal belongings within reach  - Initiate and maintain comfort rounds  - Make Fall Risk Sign visible to staff  - Offer Toileting every 2 Hours,  in advance of need  - Initiate/Maintain n/aalarm  - Obtain necessary fall risk management equipment: nonslip socks  - Apply yellow socks and bracelet for high fall risk patients  - Consider moving patient to room near nurses station  Outcome: Progressing  Goal: Maintain or return to baseline ADL function  Description: INTERVENTIONS:  -  Assess patient's ability to carry out ADLs; assess patient's baseline for ADL function and identify physical deficits which impact ability to perform ADLs (bathing, care of mouth/teeth, toileting, grooming, dressing, etc.)  - Assess/evaluate cause of self-care deficits   - Assess range of motion  - Assess patient's mobility; develop plan if impaired  - Assess patient's need for assistive devices and provide as appropriate  - Encourage maximum independence but intervene and supervise when necessary  - Involve family in performance of ADLs  - Assess for home care needs following discharge   - Consider OT consult to assist with ADL evaluation and planning for discharge  - Provide patient education as appropriate  Outcome: Progressing  Goal: Maintains/Returns to pre admission functional level  Description: INTERVENTIONS:  - Perform AM-PAC 6 Click Basic Mobility/ Daily Activity assessment daily.  - Set and communicate daily mobility goal to care team and patient/family/caregiver.   - Collaborate with rehabilitation services on mobility goals if consulted  - Perform Range of Motion 3 times a day.  - Reposition patient every 2 hours.  - Dangle patient 3 times a day  - Stand patient 3 times a day  - Ambulate patient 3 times a day  - Out of bed to chair 3 times a day   - Out of bed for meals 3 times a day  - Out of bed for toileting  - Record patient progress and toleration of activity level   Outcome: Progressing     Problem: DISCHARGE PLANNING  Goal: Discharge to home or other facility with appropriate resources  Description: INTERVENTIONS:  - Identify barriers to discharge w/patient and  caregiver  - Arrange for needed discharge resources and transportation as appropriate  - Identify discharge learning needs (meds, wound care, etc.)  - Arrange for interpretive services to assist at discharge as needed  - Refer to Case Management Department for coordinating discharge planning if the patient needs post-hospital services based on physician/advanced practitioner order or complex needs related to functional status, cognitive ability, or social support system  Outcome: Progressing     Problem: Knowledge Deficit  Goal: Patient/family/caregiver demonstrates understanding of disease process, treatment plan, medications, and discharge instructions  Description: Complete learning assessment and assess knowledge base.  Interventions:  - Provide teaching at level of understanding  - Provide teaching via preferred learning methods  Outcome: Progressing

## 2024-10-09 NOTE — ASSESSMENT & PLAN NOTE
-Can continue Plavix 75 mg daily and atorvastatin 80 mg daily  -Continue to monitor at this time  -Patient should follow-up with her neurologist in the outpatient setting.

## 2024-10-09 NOTE — ED NOTES
Nitro patch removed at this time following verbal request from provider.       Gifty Freeman RN  10/09/24 2829

## 2024-10-09 NOTE — PLAN OF CARE
Problem: PAIN - ADULT  Goal: Verbalizes/displays adequate comfort level or baseline comfort level  Description: Interventions:  - Encourage patient to monitor pain and request assistance  - Assess pain using appropriate pain scale  - Administer analgesics based on type and severity of pain and evaluate response  - Implement non-pharmacological measures as appropriate and evaluate response  - Consider cultural and social influences on pain and pain management  - Notify physician/advanced practitioner if interventions unsuccessful or patient reports new pain  Outcome: Progressing     Problem: INFECTION - ADULT  Goal: Absence or prevention of progression during hospitalization  Description: INTERVENTIONS:  - Assess and monitor for signs and symptoms of infection  - Monitor lab/diagnostic results  - Monitor all insertion sites, i.e. indwelling lines, tubes, and drains  - Monitor endotracheal if appropriate and nasal secretions for changes in amount and color  - [place] appropriate cooling/warming therapies per order  - Administer medications as ordered  - Instruct and encourage patient and family to use good hand hygiene technique  - Identify and instruct in appropriate isolation precautions for identified infection/condition  Outcome: Progressing  Goal: Absence of fever/infection during neutropenic period  Description: INTERVENTIONS:  - Monitor WBC    Outcome: Progressing     Problem: SAFETY ADULT  Goal: Patient will remain free of falls  Description: INTERVENTIONS:  - Educate patient/family on patient safety including physical limitations  - Instruct patient to call for assistance with activity   - Consult OT/PT to assist with strengthening/mobility   - Keep Call bell within reach  - Keep bed low and locked with side rails adjusted as appropriate  - Keep care items and personal belongings within reach  - Initiate and maintain comfort rounds  - Make Fall Risk Sign visible to staff  - Offer Toileting every 2 Hours,  in advance of need  - Initiate/Maintain bed alarm  - Obtain necessary fall risk management equipment: walker  - Apply yellow socks and bracelet for high fall risk patients  - Consider moving patient to room near nurses station  Outcome: Progressing  Goal: Maintain or return to baseline ADL function  Description: INTERVENTIONS:  -  Assess patient's ability to carry out ADLs; assess patient's baseline for ADL function and identify physical deficits which impact ability to perform ADLs (bathing, care of mouth/teeth, toileting, grooming, dressing, etc.)  - Assess/evaluate cause of self-care deficits   - Assess range of motion  - Assess patient's mobility; develop plan if impaired  - Assess patient's need for assistive devices and provide as appropriate  - Encourage maximum independence but intervene and supervise when necessary  - Involve family in performance of ADLs  - Assess for home care needs following discharge   - Consider OT consult to assist with ADL evaluation and planning for discharge  - Provide patient education as appropriate  Outcome: Progressing  Goal: Maintains/Returns to pre admission functional level  Description: INTERVENTIONS:  - Perform AM-PAC 6 Click Basic Mobility/ Daily Activity assessment daily.  - Set and communicate daily mobility goal to care team and patient/family/caregiver.   - Collaborate with rehabilitation services on mobility goals if consulted  - Perform Range of Motion 3 times a day.  - Reposition patient every 2 hours.  - Dangle patient 3 times a day  - Stand patient 3 times a day  - Ambulate patient 3 times a day  - Out of bed to chair 3 times a day   - Out of bed for meals 3 times a day  - Out of bed for toileting  - Record patient progress and toleration of activity level   Outcome: Progressing     Problem: DISCHARGE PLANNING  Goal: Discharge to home or other facility with appropriate resources  Description: INTERVENTIONS:  - Identify barriers to discharge w/patient and  caregiver  - Arrange for needed discharge resources and transportation as appropriate  - Identify discharge learning needs (meds, wound care, etc.)  - Arrange for interpretive services to assist at discharge as needed  - Refer to Case Management Department for coordinating discharge planning if the patient needs post-hospital services based on physician/advanced practitioner order or complex needs related to functional status, cognitive ability, or social support system  Outcome: Progressing     Problem: Knowledge Deficit  Goal: Patient/family/caregiver demonstrates understanding of disease process, treatment plan, medications, and discharge instructions  Description: Complete learning assessment and assess knowledge base.  Interventions:  - Provide teaching at level of understanding  - Provide teaching via preferred learning methods  Outcome: Progressing

## 2024-10-10 ENCOUNTER — APPOINTMENT (OUTPATIENT)
Dept: NUCLEAR MEDICINE | Facility: HOSPITAL | Age: 54
End: 2024-10-10
Payer: COMMERCIAL

## 2024-10-10 ENCOUNTER — APPOINTMENT (OUTPATIENT)
Dept: NON INVASIVE DIAGNOSTICS | Facility: HOSPITAL | Age: 54
End: 2024-10-10
Payer: COMMERCIAL

## 2024-10-10 VITALS
HEIGHT: 58 IN | WEIGHT: 100 LBS | DIASTOLIC BLOOD PRESSURE: 76 MMHG | BODY MASS INDEX: 20.99 KG/M2 | RESPIRATION RATE: 16 BRPM | OXYGEN SATURATION: 97 % | HEART RATE: 70 BPM | SYSTOLIC BLOOD PRESSURE: 131 MMHG | TEMPERATURE: 98 F

## 2024-10-10 LAB
ANION GAP SERPL CALCULATED.3IONS-SCNC: 11 MMOL/L (ref 4–13)
BASOPHILS # BLD AUTO: 0.04 THOUSANDS/ΜL (ref 0–0.1)
BASOPHILS NFR BLD AUTO: 1 % (ref 0–1)
BUN SERPL-MCNC: 15 MG/DL (ref 5–25)
CALCIUM SERPL-MCNC: 10 MG/DL (ref 8.4–10.2)
CHEST PAIN STATEMENT: NORMAL
CHLORIDE SERPL-SCNC: 103 MMOL/L (ref 96–108)
CO2 SERPL-SCNC: 25 MMOL/L (ref 21–32)
CREAT SERPL-MCNC: 1.11 MG/DL (ref 0.6–1.3)
EOSINOPHIL # BLD AUTO: 0.09 THOUSAND/ΜL (ref 0–0.61)
EOSINOPHIL NFR BLD AUTO: 2 % (ref 0–6)
ERYTHROCYTE [DISTWIDTH] IN BLOOD BY AUTOMATED COUNT: 12.3 % (ref 11.6–15.1)
GFR SERPL CREATININE-BSD FRML MDRD: 56 ML/MIN/1.73SQ M
GLUCOSE SERPL-MCNC: 85 MG/DL (ref 65–140)
HCT VFR BLD AUTO: 42.8 % (ref 34.8–46.1)
HGB BLD-MCNC: 14.2 G/DL (ref 11.5–15.4)
IMM GRANULOCYTES # BLD AUTO: 0 THOUSAND/UL (ref 0–0.2)
IMM GRANULOCYTES NFR BLD AUTO: 0 % (ref 0–2)
LYMPHOCYTES # BLD AUTO: 1.47 THOUSANDS/ΜL (ref 0.6–4.47)
LYMPHOCYTES NFR BLD AUTO: 25 % (ref 14–44)
MAX DIASTOLIC BP: 80 MMHG
MAX PREDICTED HEART RATE: 166 BPM
MCH RBC QN AUTO: 30.7 PG (ref 26.8–34.3)
MCHC RBC AUTO-ENTMCNC: 33.2 G/DL (ref 31.4–37.4)
MCV RBC AUTO: 92 FL (ref 82–98)
MONOCYTES # BLD AUTO: 0.58 THOUSAND/ΜL (ref 0.17–1.22)
MONOCYTES NFR BLD AUTO: 10 % (ref 4–12)
NEUTROPHILS # BLD AUTO: 3.73 THOUSANDS/ΜL (ref 1.85–7.62)
NEUTS SEG NFR BLD AUTO: 62 % (ref 43–75)
NRBC BLD AUTO-RTO: 0 /100 WBCS
NUC STRESS EJECTION FRACTION: 88 %
PLATELET # BLD AUTO: 313 THOUSANDS/UL (ref 149–390)
PMV BLD AUTO: 10.5 FL (ref 8.9–12.7)
POTASSIUM SERPL-SCNC: 4.1 MMOL/L (ref 3.5–5.3)
PROTOCOL NAME: NORMAL
RATE PRESSURE PRODUCT: NORMAL
RBC # BLD AUTO: 4.63 MILLION/UL (ref 3.81–5.12)
REASON FOR TERMINATION: NORMAL
SL CV REST NUCLEAR ISOTOPE DOSE: 10.8 MCI
SL CV STRESS NUCLEAR ISOTOPE DOSE: 32.7 MCI
SL CV STRESS RECOVERY BP: NORMAL MMHG
SL CV STRESS RECOVERY HR: 111 BPM
SL CV STRESS RECOVERY O2 SAT: 98 %
SODIUM SERPL-SCNC: 139 MMOL/L (ref 135–147)
STRESS ANGINA INDEX: 0
STRESS BASELINE BP: NORMAL MMHG
STRESS BASELINE HR: 78 BPM
STRESS O2 SAT REST: 98 %
STRESS PEAK HR: 144 BPM
STRESS POST EXERCISE DUR MIN: 3 MIN
STRESS POST EXERCISE DUR SEC: 0 SEC
STRESS POST O2 SAT PEAK: 99 %
STRESS POST PEAK BP: 136 MMHG
STRESS POST PEAK HR: 144 BPM
STRESS POST PEAK SYSTOLIC BP: 136 MMHG
TARGET HR FORMULA: NORMAL
TEST INDICATION: NORMAL
WBC # BLD AUTO: 5.91 THOUSAND/UL (ref 4.31–10.16)

## 2024-10-10 PROCEDURE — 99214 OFFICE O/P EST MOD 30 MIN: CPT | Performed by: PHYSICIAN ASSISTANT

## 2024-10-10 PROCEDURE — 93016 CV STRESS TEST SUPVJ ONLY: CPT | Performed by: INTERNAL MEDICINE

## 2024-10-10 PROCEDURE — 78452 HT MUSCLE IMAGE SPECT MULT: CPT | Performed by: INTERNAL MEDICINE

## 2024-10-10 PROCEDURE — 99239 HOSP IP/OBS DSCHRG MGMT >30: CPT | Performed by: HOSPITALIST

## 2024-10-10 PROCEDURE — 93018 CV STRESS TEST I&R ONLY: CPT | Performed by: INTERNAL MEDICINE

## 2024-10-10 PROCEDURE — 93017 CV STRESS TEST TRACING ONLY: CPT

## 2024-10-10 PROCEDURE — 78452 HT MUSCLE IMAGE SPECT MULT: CPT

## 2024-10-10 PROCEDURE — A9502 TC99M TETROFOSMIN: HCPCS

## 2024-10-10 PROCEDURE — 80048 BASIC METABOLIC PNL TOTAL CA: CPT | Performed by: HOSPITALIST

## 2024-10-10 PROCEDURE — 85025 COMPLETE CBC W/AUTO DIFF WBC: CPT | Performed by: HOSPITALIST

## 2024-10-10 RX ORDER — REGADENOSON 0.08 MG/ML
0.4 INJECTION, SOLUTION INTRAVENOUS ONCE
Status: COMPLETED | OUTPATIENT
Start: 2024-10-10 | End: 2024-10-10

## 2024-10-10 RX ADMIN — POTASSIUM CHLORIDE 20 MEQ: 1500 TABLET, EXTENDED RELEASE ORAL at 10:52

## 2024-10-10 RX ADMIN — REGADENOSON 0.4 MG: 0.08 INJECTION, SOLUTION INTRAVENOUS at 09:42

## 2024-10-10 RX ADMIN — ESCITALOPRAM OXALATE 10 MG: 10 TABLET ORAL at 10:52

## 2024-10-10 RX ADMIN — HYDROCHLOROTHIAZIDE 25 MG: 12.5 TABLET ORAL at 10:52

## 2024-10-10 RX ADMIN — CLOPIDOGREL 75 MG: 75 TABLET ORAL at 10:52

## 2024-10-10 RX ADMIN — AMLODIPINE BESYLATE 10 MG: 10 TABLET ORAL at 10:52

## 2024-10-10 RX ADMIN — PANTOPRAZOLE SODIUM 40 MG: 40 TABLET, DELAYED RELEASE ORAL at 06:04

## 2024-10-10 RX ADMIN — LISINOPRIL 20 MG: 20 TABLET ORAL at 10:52

## 2024-10-10 NOTE — CASE MANAGEMENT
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     Vertigo 2020     Mixed hyperlipidemia 2020     Heaviness of upper extremity 2019     History of CVA (cerebrovascular accident) 2019     Vertebral artery dissection (HCC) 2019     Essential hypertension 2019     Vision changes 2019     Disorientation 2019     VAIN I (vaginal intraepithelial neoplasia grade I) 2018     Vulvovaginal condyloma 2018       LOS (days): 0  Geometric Mean LOS (GMLOS) (days):   Days to GMLOS:     OBJECTIVE:            Current admission status: Observation   Preferred Pharmacy:   [id number] [address] [address]  Phone: [phone] Fax: [phone]    Primary Care Provider: Yosvany Vila DO    Primary Insurance: BLUE CROSS  Secondary Insurance:     DISCHARGE DETAILS:          Additional Comments: Chart reviewed for discharge planning. Pt completely independent with ADLs prior to admission. Pt has no identified CM discharge needs at this time. CM to follow as needed.

## 2024-10-10 NOTE — ASSESSMENT & PLAN NOTE
Non-cardiac chest pain   Troponin negative x3  Nuclear stress test negative for ischemia   No further cardiac testing is needed

## 2024-10-10 NOTE — ASSESSMENT & PLAN NOTE
Continue prehospital Norvasc 10 mg p.o. daily, hydrochlorothiazide 25 mg p.o. daily and lisinopril 5 mg p.o. daily post discharge

## 2024-10-10 NOTE — PROGRESS NOTES
"Progress Note - Cardiology   Name: Frances Paredes 54 y.o. female I MRN: [id number]  Unit/Bed#: -01 I Date of Admission: 10/8/2024   Date of Service: 10/10/2024 I Hospital Day: 0    Assessment & Plan  Other chest pain  Non-cardiac chest pain   Troponin negative x3  Nuclear stress test negative for ischemia   No further cardiac testing is needed    Essential hypertension  BP controlled on current medical regimen   Continue amlodipine and lisinopril     History of CVA (cerebrovascular accident)  -Can continue Plavix 75 mg daily and atorvastatin 80 mg daily  -Continue to monitor at this time  -Patient should follow-up with her neurologist in the outpatient setting.  Mixed hyperlipidemia  Tolerating statin therapy   Continue atorvastatin 80 mg daily     Summary Comments:  Patient continues to have atypical chest discomfort that is non-cardiac chest pain. Troponin levels have been negative and nuclear stress test is non-ischemic. No further cardiac testing is warranted.      Outpatient Cardiologist: patient has seen Dr. Joseph in the past     Subjective:  Patient seen and examined at the bedside.  No events overnight.  Feeling better. No dyspnea, or palpitations. Continues to have chest pain that is constant and non-cardiac.     Objective:   Vitals: Blood pressure 131/76, pulse 70, temperature 98 °F (36.7 °C), resp. rate 16, height 4' 10\" (1.473 m), weight 45.4 kg (100 lb), SpO2 97%.,   Orthostatic Blood Pressures      Flowsheet Row Most Recent Value   Blood Pressure 131/76 filed at 10/10/2024 1104   Patient Position - Orthostatic VS Lying filed at 10/09/2024 1941            Telemetry/ECG/Cardiac Testing:   Nuclear stress test negative for ischemia    Physical Exam:  Physical Exam  Vitals and nursing note reviewed.   Constitutional:       Appearance: She is well-developed.   HENT:      Head: Normocephalic and atraumatic.      Mouth/Throat:      Mouth: Mucous membranes are moist.   Eyes:      General: No scleral " icterus.     Conjunctiva/sclera: Conjunctivae normal.   Neck:      Vascular: No JVD.      Trachea: No tracheal deviation.   Cardiovascular:      Rate and Rhythm: Normal rate and regular rhythm.      Pulses: Intact distal pulses.      Heart sounds: Normal heart sounds, S1 normal and S2 normal. No murmur heard.     No friction rub. No gallop.   Pulmonary:      Effort: Pulmonary effort is normal. No respiratory distress.      Breath sounds: Normal breath sounds. No wheezing or rales.   Chest:      Chest wall: No tenderness.   Abdominal:      General: Bowel sounds are normal. There is no distension.      Palpations: Abdomen is soft.      Tenderness: There is no abdominal tenderness.      Comments: Aorta not palpable    Musculoskeletal:         General: No tenderness. Normal range of motion.      Cervical back: Normal range of motion and neck supple.      Right lower leg: No edema.      Left lower leg: No edema.   Skin:     General: Skin is warm and dry.      Coloration: Skin is not pale.      Findings: No erythema or rash.   Neurological:      General: No focal deficit present.      Mental Status: She is alert and oriented to person, place, and time.   Psychiatric:         Mood and Affect: Mood normal.         Behavior: Behavior normal.         Judgment: Judgment normal.         Medications:    Current Facility-Administered Medications:     acetaminophen (TYLENOL) tablet 650 mg, 650 mg, Oral, Q6H PRN, Joseph Tomlin PA-C    ALPRAZolam (XANAX) tablet 0.5 mg, 0.5 mg, Oral, 4x Daily PRN, Joseph Tomlin PA-C, 0.5 mg at 10/09/24 1941    amLODIPine (NORVASC) tablet 10 mg, 10 mg, Oral, Daily, Joseph Tomlin PA-C, 10 mg at 10/10/24 1052    atorvastatin (LIPITOR) tablet 80 mg, 80 mg, Oral, Daily With Dinner, Joseph Tomlin PA-C, 80 mg at 10/09/24 1620    clopidogrel (PLAVIX) tablet 75 mg, 75 mg, Oral, Daily, Joseph Tomlin PA-C, 75 mg at 10/10/24 1052    escitalopram (LEXAPRO) tablet 10 mg, 10 mg, Oral, Daily, Joseph Tomlin PA-C, 10  mg at 10/10/24 1052    hydrALAZINE (APRESOLINE) injection 10 mg, 10 mg, Intravenous, Q6H PRN, Uma Yi MD, 10 mg at 10/09/24 1942    hydroCHLOROthiazide tablet 25 mg, 25 mg, Oral, Daily, Joseph Tomlin PA-C, 25 mg at 10/10/24 1052    lisinopril (ZESTRIL) tablet 20 mg, 20 mg, Oral, Daily, Sergey Sky DO, 20 mg at 10/10/24 1052    meclizine (ANTIVERT) tablet 25 mg, 25 mg, Oral, Q8H PRN, Joseph Tomlin PA-C    morphine injection 2 mg, 2 mg, Intravenous, Q4H PRN, Hammad Mesa MD    ondansetron (ZOFRAN) injection 4 mg, 4 mg, Intravenous, Q6H PRN, Joseph Tomlin PA-C    pantoprazole (PROTONIX) EC tablet 40 mg, 40 mg, Oral, Early Morning, Joseph Tomlin PA-C, 40 mg at 10/10/24 0604    potassium chloride (Klor-Con M20) CR tablet 20 mEq, 20 mEq, Oral, BID With Meals, Joseph Tomlin PA-C, 20 mEq at 10/10/24 1052    Labs & Results:      Results from last 7 days   Lab Units 10/10/24  0603   WBC Thousand/uL 5.91   HEMOGLOBIN g/dL 14.2   HEMATOCRIT % 42.8   PLATELETS Thousands/uL 313         Results from last 7 days   Lab Units 10/10/24  0603 10/09/24  0607 10/08/24  2125   POTASSIUM mmol/L 4.1   < > 3.5   CHLORIDE mmol/L 103   < > 100   CO2 mmol/L 25   < > 27   BUN mg/dL 15   < > 16   CREATININE mg/dL 1.11   < > 0.85   MAGNESIUM mg/dL  --   --  1.9    < > = values in this interval not displayed.     Results from last 7 days   Lab Units 10/08/24  2139   INR  1.02

## 2024-10-10 NOTE — PLAN OF CARE
Problem: PAIN - ADULT  Goal: Verbalizes/displays adequate comfort level or baseline comfort level  Description: Interventions:  - Encourage patient to monitor pain and request assistance  - Assess pain using appropriate pain scale  - Administer analgesics based on type and severity of pain and evaluate response  - Implement non-pharmacological measures as appropriate and evaluate response  - Consider cultural and social influences on pain and pain management  - Notify physician/advanced practitioner if interventions unsuccessful or patient reports new pain  10/10/2024 1207 by Beverly Stovall RN  Outcome: Adequate for Discharge  10/10/2024 1105 by Beverly Stovall RN  Outcome: Progressing     Problem: INFECTION - ADULT  Goal: Absence or prevention of progression during hospitalization  Description: INTERVENTIONS:  - Assess and monitor for signs and symptoms of infection  - Monitor lab/diagnostic results  - Monitor all insertion sites, i.e. indwelling lines, tubes, and drains  - Monitor endotracheal if appropriate and nasal secretions for changes in amount and color  - [place] appropriate cooling/warming therapies per order  - Administer medications as ordered  - Instruct and encourage patient and family to use good hand hygiene technique  - Identify and instruct in appropriate isolation precautions for identified infection/condition  Outcome: Adequate for Discharge  Goal: Absence of fever/infection during neutropenic period  Description: INTERVENTIONS:  - Monitor WBC    Outcome: Adequate for Discharge     Problem: SAFETY ADULT  Goal: Patient will remain free of falls  Description: INTERVENTIONS:  - Educate patient/family on patient safety including physical limitations  - Instruct patient to call for assistance with activity   - Consult OT/PT to assist with strengthening/mobility   - Keep Call bell within reach  - Keep bed low and locked with side rails adjusted as appropriate  - Keep care items and personal belongings  within reach  - Initiate and maintain comfort rounds  - Make Fall Risk Sign visible to staff  - Offer Toileting every 1 Hours, in advance of need  - Initiate/Maintain alarm  - Obtain necessary fall risk management equipment:   - Apply yellow socks and bracelet for high fall risk patients  - Consider moving patient to room near nurses station  Outcome: Adequate for Discharge  Goal: Maintain or return to baseline ADL function  Description: INTERVENTIONS:  -  Assess patient's ability to carry out ADLs; assess patient's baseline for ADL function and identify physical deficits which impact ability to perform ADLs (bathing, care of mouth/teeth, toileting, grooming, dressing, etc.)  - Assess/evaluate cause of self-care deficits   - Assess range of motion  - Assess patient's mobility; develop plan if impaired  - Assess patient's need for assistive devices and provide as appropriate  - Encourage maximum independence but intervene and supervise when necessary  - Involve family in performance of ADLs  - Assess for home care needs following discharge   - Consider OT consult to assist with ADL evaluation and planning for discharge  - Provide patient education as appropriate  Outcome: Adequate for Discharge  Goal: Maintains/Returns to pre admission functional level  Description: INTERVENTIONS:  - Perform AM-PAC 6 Click Basic Mobility/ Daily Activity assessment daily.  - Set and communicate daily mobility goal to care team and patient/family/caregiver.   - Collaborate with rehabilitation services on mobility goals if consulted  - Perform Range of Motion 3 times a day.  - Reposition patient every 2 hours.  - Dangle patient 3 times a day  - Stand patient 3 times a day  - Ambulate patient 3 times a day  - Out of bed to chair 3 times a day   - Out of bed for meals 3 times a day  - Out of bed for toileting  - Record patient progress and toleration of activity level   Outcome: Adequate for Discharge     Problem: DISCHARGE  PLANNING  Goal: Discharge to home or other facility with appropriate resources  Description: INTERVENTIONS:  - Identify barriers to discharge w/patient and caregiver  - Arrange for needed discharge resources and transportation as appropriate  - Identify discharge learning needs (meds, wound care, etc.)  - Arrange for interpretive services to assist at discharge as needed  - Refer to Case Management Department for coordinating discharge planning if the patient needs post-hospital services based on physician/advanced practitioner order or complex needs related to functional status, cognitive ability, or social support system  Outcome: Adequate for Discharge     Problem: Knowledge Deficit  Goal: Patient/family/caregiver demonstrates understanding of disease process, treatment plan, medications, and discharge instructions  Description: Complete learning assessment and assess knowledge base.  Interventions:  - Provide teaching at level of understanding  - Provide teaching via preferred learning methods  Outcome: Adequate for Discharge

## 2024-10-10 NOTE — DISCHARGE INSTR - AVS FIRST PAGE
Dear Frances Paredes,     It was our pleasure to care for you here at [facility].  It is our hope that we were always able to exceed the expected standards for your care during your stay.  You were hospitalized due to chest pain.  You were cared for on the medical/surgical floor by Hammad Mesa MD with the [facility] who covers for your primary care physician (PCP), Yosvany Vila DO, while you were hospitalized.  You were additionally seen by Dr. Jacob Dela Cruz of the [facility].  If you have any questions or concerns related to this hospitalization, you may contact us at .  For follow up as well as any medication refills, we recommend that you follow up with your primary care physician.  A registered nurse will reach out to you by phone within a few days after your discharge to answer any additional questions that you may have after going home.  However, at this time we provide for you here, the most important instructions / recommendations at discharge:     Notable Medication Adjustments -   Okay to restart all preadmission medications at the preadmission doses without change  Testing Required after Discharge -   To be further determined in the outpatient setting by your PCP  Important follow up information -   Please follow-up with your PCP within 7 to 10 days  Other Instructions -   Please maintain a healthy diet  Please review this entire after visit summary as additional general instructions including medication list, appointments, activity, diet, any pertinent wound care, and other additional recommendations from your care team that may be provided for you.      Sincerely,     Hammad Mesa MD

## 2024-10-10 NOTE — ASSESSMENT & PLAN NOTE
ACS has been ruled out  High-sensitivity troponin testing x 3 is negative  2D echocardiogram-EF of 55%, no regional wall motion abnormalities  Nuclear stress test-    Stress ECG: No ST deviation is noted. The ECG was not diagnostic due to pharmacological (vasodilator) stress.    Perfusion: There are no perfusion defects.    Stress Function: Left ventricular function post-stress is normal. Stress ejection fraction is 88%.    Stress Combined Conclusion: The ECG and SPECT imaging portions of the stress study are concordant with no evidence of stress induced myocardial ischemia.  Patient has been deemed medically stable for discharge, cleared by cardiology  CT chest, abdomen, pelvis-no acute pathology  Most likely secondary to a variant of acid reflux disease  DC home today  No further inpatient testing treatment, and/or workup  No changes to any of her preadmission medications, and/or to the preadmission doses  Outpatient follow-up with PCP, may benefit from an ambulatory referral to GI for an outpatient endoscopy

## 2024-10-10 NOTE — DISCHARGE SUMMARY
Discharge Summary - Hospitalist   Name: Frances Paredes 54 y.o. female I MRN: [id number]  Unit/Bed#: -01 I Date of Admission: 10/8/2024   Date of Service: 10/10/2024 I Hospital Day: 0     Assessment & Plan  Other chest pain  ACS has been ruled out  High-sensitivity troponin testing x 3 is negative  2D echocardiogram-EF of 55%, no regional wall motion abnormalities  Nuclear stress test-    Stress ECG: No ST deviation is noted. The ECG was not diagnostic due to pharmacological (vasodilator) stress.    Perfusion: There are no perfusion defects.    Stress Function: Left ventricular function post-stress is normal. Stress ejection fraction is 88%.    Stress Combined Conclusion: The ECG and SPECT imaging portions of the stress study are concordant with no evidence of stress induced myocardial ischemia.  Patient has been deemed medically stable for discharge, cleared by cardiology  CT chest, abdomen, pelvis-no acute pathology  Most likely secondary to a variant of acid reflux disease  DC home today  No further inpatient testing treatment, and/or workup  No changes to any of her preadmission medications, and/or to the preadmission doses  Outpatient follow-up with PCP, may benefit from an ambulatory referral to GI for an outpatient endoscopy  Essential hypertension  Continue prehospital Norvasc 10 mg p.o. daily, hydrochlorothiazide 25 mg p.o. daily and lisinopril 5 mg p.o. daily post discharge  History of CVA (cerebrovascular accident)  Continue prehospital Lipitor 80 mg p.o. daily and Plavix 75 mg p.o. daily post discharge  Mixed hyperlipidemia  Continue prehospital Lipitor 80 mg p.o. nightly post discharge     Medical Problems       Resolved Problems  Date Reviewed: 10/9/2024   None       Discharging Physician / Practitioner: Hammad Mesa MD  PCP: Yosvany Vila DO  Admission Date:   Admission Orders (From admission, onward)       Ordered        10/09/24 0033  Place in Observation  Once                           Discharge Date: 10/10/24    Consultations During Hospital Stay:  Cardiology    Procedures Performed:   None    Significant Findings / Test Results:   CT chest, abdomen, pelvis-No evidence of an acute inflammatory or neoplastic process.   2D echocardiogram-EF of 55%, no regional wall motion abnormalities, see the full report for additional details  Nuclear cardiac stress testing-see the results as outlined above    Incidental Findings:   None    Test Results Pending at Discharge (will require follow up):   None     Outpatient Tests Requested:  None    Complications: None    Reason for Admission: Chest pain rule out ACS    Hospital Course:   Frances Paredes is a 54 y.o. female patient who originally presented to the hospital on 10/8/2024 due to chest pain.  Please refer to the initial history and physical examination completed by Joseph Tomlin PA-C for the initial presenting features and complaints.  In brief, the patient is a 54-year-old female, who was admitted to the hospital for the further evaluation of chest pain.  She ruled out for the possibility of an acute coronary event with negative high-sensitivity troponin testing, and normal echocardiogram, and negative inpatient stress testing.  It was felt ultimately that her pain was possibly secondary to a variant of acid reflux disease.  She additionally had a CT chest, abdomen, pelvis-2 was within normal limits.  She was cleared by cardiology for discharge on 10/10/2024.  She will follow-up in the outpatient setting with her PCP in the near future.  No changes were made to any of her preadmission medications, and/or to the preadmission doses.  Please refer to the assessment/plan portion of this discharge summary as outlined above for additional details regarding her stay.      Please see above list of diagnoses and related plan for additional information.     Condition at Discharge: good    Discharge Day Visit / Exam:   Subjective: Patient seen, looks and  "feels well, doing okay, and feels ready to go home  Vitals: Blood Pressure: 131/76 (10/10/24 1104)  Pulse: 70 (10/10/24 1104)  Temperature: 98 °F (36.7 °C) (10/10/24 0712)  Temp Source: Oral (10/09/24 1444)  Respirations: 16 (10/10/24 1104)  Height: 4' 10\" (147.3 cm) (10/10/24 0931)  Weight - Scale: 45.4 kg (100 lb) (10/10/24 0931)  SpO2: 97 % (10/10/24 1104)  Physical Exam  Constitutional:       General: She is not in acute distress.     Appearance: Normal appearance. She is normal weight. She is not ill-appearing.   HENT:      Head: Normocephalic and atraumatic.      Nose: Nose normal.      Mouth/Throat:      Mouth: Mucous membranes are moist.   Eyes:      Extraocular Movements: Extraocular movements intact.      Pupils: Pupils are equal, round, and reactive to light.   Cardiovascular:      Rate and Rhythm: Normal rate and regular rhythm.      Pulses: Normal pulses.      Heart sounds: Normal heart sounds. No murmur heard.     No friction rub. No gallop.   Pulmonary:      Effort: Pulmonary effort is normal. No respiratory distress.      Breath sounds: Normal breath sounds. No wheezing, rhonchi or rales.   Abdominal:      General: There is no distension.      Palpations: Abdomen is soft. There is no mass.      Tenderness: There is no abdominal tenderness.      Hernia: No hernia is present.   Musculoskeletal:         General: No swelling or tenderness. Normal range of motion.      Cervical back: Normal range of motion and neck supple. No rigidity.      Right lower leg: No edema.      Left lower leg: No edema.   Skin:     General: Skin is warm.      Capillary Refill: Capillary refill takes less than 2 seconds.      Findings: No erythema or rash.   Neurological:      General: No focal deficit present.      Mental Status: She is alert and oriented to person, place, and time. Mental status is at baseline.      Cranial Nerves: No cranial nerve deficit.      Motor: No weakness.   Psychiatric:         Mood and Affect: Mood " normal.         Behavior: Behavior normal.          Discussion with Family: Patient declined call to .     Discharge instructions/Information to patient and family:   See after visit summary for information provided to patient and family.      Provisions for Follow-Up Care:  See after visit summary for information related to follow-up care and any pertinent home health orders.      Mobility at time of Discharge:   Basic Mobility Inpatient Raw Score: 23  JH-HLM Goal: 7: Walk 25 feet or more  JH-HLM Achieved: 7: Walk 25 feet or more  HLM Goal achieved. Continue to encourage appropriate mobility.     Disposition:   Home    Planned Readmission: None    Discharge Medications:  See after visit summary for reconciled discharge medications provided to patient and/or family.      Administrative Statements   Discharge Statement:  I have spent a total time of 35 minutes in caring for this patient on the day of the visit/encounter. >30 minutes of time was spent on: Diagnostic results, Prognosis, Risks and benefits of tx options, Instructions for management, Patient and family education, Impressions, Counseling / Coordination of care, Documenting in the medical record, Reviewing / ordering tests, medicine, procedures  , and Communicating with other healthcare professionals .    **Please Note: This note may have been constructed using a voice recognition system**

## 2024-10-10 NOTE — UTILIZATION REVIEW
Initial Clinical Review    Admission: Date/Time/Statement:   Admission Orders (From admission, onward)       Ordered        10/09/24 0033  Place in Observation  Once                          Orders Placed This Encounter   Procedures    Place in Observation     Standing Status:   Standing     Number of Occurrences:   1     Order Specific Question:   Level of Care     Answer:   Med Surg [16]     Order Specific Question:   Bed request comments     Answer:   with tele     ED Arrival Information       Expected   -    Arrival   10/8/2024 21:08    Acuity   Urgent              Means of arrival   Walk-In    Escorted by   Spouse    Service   Hospitalist    Admission type   Emergency              Arrival complaint   chest pain             Chief Complaint   Patient presents with    Chest Pain     According to the patient she has had chest pain for the last day, denies nausea or episodes of emesis.       Initial Presentation: 54 y.o. female to the ED from home with complaints of chest pain for the past day. Admitted under observation for chest pain.  H/O hyperlipidemia, hypertension , CVA. Troponin neg.  EKG shows new downsloping ST segment changes in inferior lateral leads.  Give ASA.  Continue lipitor, plavix, norvasc.      Date: 10/10   Day 2:      Cardiology consult: work on better BP control.  Increase lisinopril.  Continue tele. Troponins neg x 3. Recommend stress test    ED Treatment-Medication Administration from 10/08/2024 2108 to 10/09/2024 1229         Date/Time Order Dose Route Action     10/08/2024 2327 aspirin chewable tablet 324 mg 324 mg Oral Given     10/08/2024 2328 nitroglycerin (NITRO-BID) 2 % TD ointment 0.5 inch 0.5 inch Topical Given     10/09/2024 0802 amLODIPine (NORVASC) tablet 10 mg 10 mg Oral Given     10/09/2024 0801 clopidogrel (PLAVIX) tablet 75 mg 75 mg Oral Given     10/09/2024 0802 escitalopram (LEXAPRO) tablet 10 mg 10 mg Oral Given     10/09/2024 0802 hydroCHLOROthiazide tablet 25 mg 25 mg  Oral Given     10/09/2024 0802 lisinopril (ZESTRIL) tablet 5 mg 5 mg Oral Given     10/09/2024 0802 potassium chloride (Klor-Con M20) CR tablet 20 mEq 20 mEq Oral Given     10/09/2024 0607 pantoprazole (PROTONIX) EC tablet 40 mg 40 mg Oral Given     10/09/2024 1140 lisinopril (ZESTRIL) tablet 15 mg 15 mg Oral Given            Scheduled Medications:  amLODIPine, 10 mg, Oral, Daily  atorvastatin, 80 mg, Oral, Daily With Dinner  clopidogrel, 75 mg, Oral, Daily  escitalopram, 10 mg, Oral, Daily  hydroCHLOROthiazide, 25 mg, Oral, Daily  lisinopril, 20 mg, Oral, Daily  pantoprazole, 40 mg, Oral, Early Morning  potassium chloride, 20 mEq, Oral, BID With Meals      Continuous IV Infusions:     PRN Meds:  acetaminophen, 650 mg, Oral, Q6H PRN  ALPRAZolam, 0.5 mg, Oral, 4x Daily PRN  hydrALAZINE, 10 mg, Intravenous, Q6H PRN  meclizine, 25 mg, Oral, Q8H PRN  morphine injection, 2 mg, Intravenous, Q4H PRN  ondansetron, 4 mg, Intravenous, Q6H PRN      ED Triage Vitals [10/08/24 2114]   Temperature Pulse Respirations Blood Pressure SpO2 Pain Score   97.8 °F (36.6 °C) 91 18 (!) 171/99 98 % 5     Weight (last 2 days)       Date/Time Weight    10/09/24 0751 45.4 (100)    10/08/24 2114 45.4 (100)            Vital Signs (last 3 days)       Date/Time Temp Pulse Resp BP MAP (mmHg) SpO2 O2 Device Patient Position - Orthostatic VS [place] Coma Scale Score Pain    10/10/24 07:12:45 98 °F (36.7 °C) 80 16 117/68 84 96 % -- -- -- --    10/10/24 03:00:45 97.6 °F (36.4 °C) 63 18 102/59 73 97 % -- -- -- --    10/09/24 23:01:44 98.2 °F (36.8 °C) 79 18 115/64 81 97 % None (Room air) -- -- --    10/09/24 22:13:32 97.5 °F (36.4 °C) 86 -- 138/96 110 96 % -- -- -- --    10/09/24 1948 -- -- -- -- -- -- -- -- 15 6    10/09/24 1941 -- 78 -- 164/103 122 97 % None (Room air) Lying -- 6    10/09/24 19:28:42 -- 80 -- 165/101 122 97 % None (Room air) Lying -- --    10/09/24 19:02:49 98.2 °F (36.8 °C) 96 18 189/110 136 96 % None (Room air) Lying -- --     10/09/24 14:44:23 98.4 °F (36.9 °C) 71 18 174/96 122 96 % None (Room air) Lying -- --    10/09/24 12:33:31 97.8 °F (36.6 °C) 62 18 149/91 110 98 % None (Room air) Lying 15 5    10/09/24 1200 -- 60 16 150/90 116 98 % None (Room air) Lying -- --    10/09/24 1140 -- -- -- 156/89 -- -- -- -- -- --    10/09/24 1100 -- 65 16 165/88 118 97 % -- -- -- --    10/09/24 0950 -- 72 16 160/78 112 98 % None (Room air) -- -- --    10/09/24 0940 -- 68 16 180/89 -- 98 % None (Room air) -- -- --    10/09/24 0804 -- 76 16 164/111 -- 97 % None (Room air) -- -- --    10/09/24 0802 -- -- -- 164/111 -- -- -- -- -- --    10/09/24 0751 -- 56 -- 142/90 -- -- -- -- -- --    10/09/24 0615 -- 52 17 149/92 115 97 % -- -- -- 5    10/09/24 0549 -- -- -- -- -- -- -- -- 15 --    10/09/24 0200 -- 54 18 169/93 126 98 % -- -- -- 5    10/09/24 0100 -- 55 17 165/99 126 98 % -- -- -- --    10/09/24 0000 -- 71 17 141/66 95 98 % -- -- -- --    10/08/24 2300 -- 62 17 153/84 112 98 % -- -- -- 5    10/08/24 2200 -- 68 16 134/72 96 96 % -- -- -- --    10/08/24 2114 97.8 °F (36.6 °C) 91 18 171/99 -- 98 % None (Room air) Lying -- 5              Pertinent Labs/Diagnostic Test Results:   Radiology:  CT chest abdomen pelvis w contrast   Final Interpretation by Gifty Weinstein MD (10/09 1736)      No evidence of an acute inflammatory or neoplastic process.               Workstation performed: [id number]         XR chest 1 view portable   ED Interpretation by Gary Hutchinson Jr., DO (10/08 2157)   No acute pulmonary pathology.      Final Interpretation by Miguel Lamb MD (10/09 8205)      No acute cardiopulmonary disease.            Workstation performed: [id number]           Cardiology:  No orders to display   10/9 ECHO: Interpretation Summary         Left Ventricle: Left ventricular cavity size is normal. Wall thickness is normal. The left ventricular ejection fraction is 55%. Systolic function is normal. Wall motion is normal. Diastolic function is  mildly abnormal, consistent with grade I (abnormal) relaxation.    Tricuspid Valve: There is mild regurgitation.  10/9  Narrative & Impression    Normal sinus rhythm with sinus arrhythmia  Nonspecific T wave abnormality  Baseline Artifact  When compared with ECG of 08-OCT-2024 23:30, (unconfirmed)  No significant change was found         Results from last 7 days   Lab Units 10/10/24  0603 10/09/24  0607 10/08/24  2125   WBC Thousand/uL 5.91 5.47 6.74   HEMOGLOBIN g/dL 14.2 12.7 14.5   HEMATOCRIT % 42.8 38.1 43.5   PLATELETS Thousands/uL 313 263 300   TOTAL NEUT ABS Thousands/µL 3.73  --  3.91         Results from last 7 days   Lab Units 10/10/24  0603 10/09/24  0607 10/08/24  2125   SODIUM mmol/L 139 138 139   POTASSIUM mmol/L 4.1 3.8 3.5   CHLORIDE mmol/L 103 105 100   CO2 mmol/L 25 26 27   ANION GAP mmol/L 11 7 12   BUN mg/dL 15 17 16   CREATININE mg/dL 1.11 0.76 0.85   EGFR ml/min/1.73sq m 56 89 77   CALCIUM mg/dL 10.0 9.5 10.3*   MAGNESIUM mg/dL  --   --  1.9   PHOSPHORUS mg/dL  --   --  3.6     Results from last 7 days   Lab Units 10/08/24  2125   AST U/L 18   ALT U/L 14   ALK PHOS U/L 68   TOTAL PROTEIN g/dL 8.2   ALBUMIN g/dL 5.0   TOTAL BILIRUBIN mg/dL 0.76         Results from last 7 days   Lab Units 10/10/24  0603 10/09/24  0607 10/08/24  2125   GLUCOSE RANDOM mg/dL 85 102 121               Results from last 7 days   Lab Units 10/09/24  0226 10/08/24  2332 10/08/24  2125   HS TNI 0HR ng/L  --   --  6   HS TNI 2HR ng/L  --  7  --    HSTNI D2 ng/L  --  1  --    HS TNI 4HR ng/L 7  --   --    HSTNI D4 ng/L 1  --   --      Results from last 7 days   Lab Units 10/08/24  2139   D-DIMER QUANTITATIVE ug/ml FEU 0.33     Results from last 7 days   Lab Units 10/08/24  2139   PROTIME seconds 13.9   INR  1.02   PTT seconds 28       Past Medical History:   Diagnosis Date    CVA (cerebral vascular accident) (HCC)     GERD (gastroesophageal reflux disease)     occasionally    Hypertension     controlling with diet and  exercise         Admitting Diagnosis: Chest pain [R07.9]  EKG abnormality [R94.31]  Chest pain, unspecified type [R07.9]  Age/Sex: 54 y.o. female    Network Utilization Review Department  ATTENTION: Please call with any questions or concerns to [phone] and carefully listen to the prompts so that you are directed to the right person. All voicemails are confidential.   For Discharge needs, contact Care Management DC Support Team at [phone] opt. [phone]  Send all requests for admission clinical reviews, approved or denied determinations and any other requests to dedicated fax number below belonging to the campus where the patient is receiving treatment. List of dedicated fax numbers for the Facilities:  FACILITY NAME UR FAX NUMBER   ADMISSION DENIALS (Administrative/Medical Necessity) [phone]   DISCHARGE SUPPORT TEAM (NETWORK) [phone]   PARENT CHILD HEALTH (Maternity/NICU/Pediatrics) [phone]   [facility] [phone]   [facility] [phone]   [facility] [phone]   [facility] [phone]   [facility] [phone]   [facility] [phone]   [facility] [phone]   [facility] [phone]   [facility] [phone]   [facility] [phone]   [facility] [phone]   [facility] [phone]

## 2024-10-10 NOTE — NURSING NOTE
Patient's IV removed with catheter tip intact. DSD and pressure applied. AVS was reviewed with patient and no further questions or concerns at this time.

## 2025-07-04 ENCOUNTER — APPOINTMENT (EMERGENCY)
Dept: RADIOLOGY | Facility: HOSPITAL | Age: 55
End: 2025-07-04
Payer: COMMERCIAL

## 2025-07-04 ENCOUNTER — HOSPITAL ENCOUNTER (EMERGENCY)
Facility: HOSPITAL | Age: 55
Discharge: HOME | End: 2025-07-04
Attending: EMERGENCY MEDICINE
Payer: COMMERCIAL

## 2025-07-04 VITALS
OXYGEN SATURATION: 98 % | HEART RATE: 70 BPM | RESPIRATION RATE: 18 BRPM | TEMPERATURE: 97.9 F | SYSTOLIC BLOOD PRESSURE: 203 MMHG | DIASTOLIC BLOOD PRESSURE: 107 MMHG

## 2025-07-04 DIAGNOSIS — S92.424B NONDISPLACED FRACTURE OF DISTAL PHALANX OF RIGHT GREAT TOE, INITIAL ENCOUNTER FOR OPEN FRACTURE: Primary | ICD-10-CM

## 2025-07-04 DIAGNOSIS — S91.209A TOENAIL AVULSION, INITIAL ENCOUNTER: ICD-10-CM

## 2025-07-04 PROCEDURE — 99283 EMERGENCY DEPT VISIT LOW MDM: CPT

## 2025-07-04 PROCEDURE — 99284 EMERGENCY DEPT VISIT MOD MDM: CPT | Performed by: EMERGENCY MEDICINE

## 2025-07-04 PROCEDURE — 11730 AVULSION NAIL PLATE SIMPLE 1: CPT | Performed by: EMERGENCY MEDICINE

## 2025-07-04 PROCEDURE — 73630 X-RAY EXAM OF FOOT: CPT

## 2025-07-04 PROCEDURE — 96365 THER/PROPH/DIAG IV INF INIT: CPT

## 2025-07-04 PROCEDURE — 90715 TDAP VACCINE 7 YRS/> IM: CPT

## 2025-07-04 RX ORDER — GINSENG 100 MG
1 CAPSULE ORAL ONCE
Status: COMPLETED | OUTPATIENT
Start: 2025-07-04 | End: 2025-07-04

## 2025-07-04 RX ORDER — CEPHALEXIN 500 MG/1
500 CAPSULE ORAL EVERY 6 HOURS SCHEDULED
Qty: 40 CAPSULE | Refills: 0 | Status: SHIPPED | OUTPATIENT
Start: 2025-07-04 | End: 2025-07-14

## 2025-07-04 RX ORDER — LIDOCAINE HYDROCHLORIDE 10 MG/ML
10 INJECTION, SOLUTION EPIDURAL; INFILTRATION; INTRACAUDAL; PERINEURAL ONCE
Status: COMPLETED | OUTPATIENT
Start: 2025-07-04 | End: 2025-07-04

## 2025-07-04 RX ORDER — CEFAZOLIN SODIUM 2 G/50ML
2000 SOLUTION INTRAVENOUS ONCE
Status: COMPLETED | OUTPATIENT
Start: 2025-07-04 | End: 2025-07-04

## 2025-07-04 RX ADMIN — BACITRACIN ZINC 1 SMALL APPLICATION: 500 OINTMENT TOPICAL at 22:36

## 2025-07-04 RX ADMIN — TETANUS TOXOID, REDUCED DIPHTHERIA TOXOID AND ACELLULAR PERTUSSIS VACCINE, ADSORBED 0.5 ML: 5; 2.5; 8; 8; 2.5 SUSPENSION INTRAMUSCULAR at 19:48

## 2025-07-04 RX ADMIN — CEFAZOLIN SODIUM 2000 MG: 2 SOLUTION INTRAVENOUS at 20:20

## 2025-07-04 RX ADMIN — LIDOCAINE HYDROCHLORIDE 10 ML: 10 INJECTION, SOLUTION EPIDURAL; INFILTRATION; INTRACAUDAL at 20:21

## 2025-07-05 NOTE — ED PROVIDER NOTES
Time reflects when diagnosis was documented in both MDM as applicable and the Disposition within this note       Time User Action Codes Description Comment    7/4/2025  9:04 PM Yadira Sloan Add [S92.424B] Nondisplaced fracture of distal phalanx of right great toe, initial encounter for open fracture     7/4/2025  9:06 PM Yadira Sloan Add [S91.209A] Toenail avulsion, initial encounter           ED Disposition       ED Disposition   Discharge    Condition   Stable    Date/Time   Fri Jul 4, 2025  6:41 PM    Comment   Frances Paredes discharge to home/self care.                   Assessment & Plan       Medical Decision Making  Amount and/or Complexity of Data Reviewed  Radiology: ordered and independent interpretation performed.    Risk  OTC drugs.  Prescription drug management.      Patient is a 54-year-old female presenting for right toe pain after traumatic injury.  Will obtain x-ray imaging to evaluate for fractures, anticipate need for hallux block to examine wound and nailbed.  Patient also noted to be hypertensive, patient reports she has not taken her blood pressure medications today, and will take when she gets home.  Denies any chest pain, shortness of breath, vision changes or any other symptoms at this time.    Differential diagnosis including but not limited to: sprain, strain, fracture, dislocation, contusion    ED Course as of 07/05/25 0056   Fri Jul 04, 2025 2031 Spoke with trauma and orthopedics, recommends outpatient follow-up with either orthopedic or podiatry next week.    Spoke with Dr. Abreu, podiatry, recommends continuing antibiotics until follow-up and also recommends removal of nail.   2130 Discussed results with patient and plan for discharge with outpatient follow up with podiatry, referral placed and Dr. Abreu's information provided. Instructed patient to take Keflex and to return to ED for new or worsening symptoms. Patient voices understanding and agrees with plan. No other concerns at this  time.         Medications   tetanus-diphtheria-acellular pertussis (BOOSTRIX) IM injection 0.5 mL (0.5 mL Intramuscular Given 7/4/25 1948)   lidocaine (PF) (XYLOCAINE-MPF) 1 % injection 10 mL (10 mL Infiltration Given by Other 7/4/25 2021)   ceFAZolin (ANCEF) IVPB (premix in dextrose) 2,000 mg 50 mL (0 mg Intravenous Stopped 7/4/25 2055)   bacitracin topical ointment 1 small application (1 small application Topical Given by Other 7/4/25 2236)       ED Risk Strat Scores                    No data recorded        SBIRT 22yo+      Flowsheet Row Most Recent Value   Initial Alcohol Screen: US AUDIT-C     1. How often do you have a drink containing alcohol? 0 Filed at: 07/04/2025 1822   2. How many drinks containing alcohol do you have on a typical day you are drinking?  0 Filed at: 07/04/2025 1822   3a. Male UNDER 65: How often do you have five or more drinks on one occasion? 0 Filed at: 07/04/2025 1822   3b. FEMALE Any Age, or MALE 65+: How often do you have 4 or more drinks on one occassion? 0 Filed at: 07/04/2025 1822   Audit-C Score 0 Filed at: 07/04/2025 1822   JOSHUA: How many times in the past year have you...    Used an illegal drug or used a prescription medication for non-medical reasons? Never Filed at: 07/04/2025 1822                            History of Present Illness       Chief Complaint   Patient presents with    Foot Injury     Right foot injury, dropped umbrella stand on foot. -numbness. +PLAVIX       Past Medical History[1]   Past Surgical History[2]   Family History[3]   Social History[4]   E-Cigarette/Vaping    E-Cigarette Use Never User       E-Cigarette/Vaping Substances    Nicotine No     THC No     CBD No     Flavoring No     Other No     Unknown No       I have reviewed and agree with the history as documented.     HPI  Patient is a 54-year-old female presenting for right great toe pain.  Patient was moving a an umbrella stand when she excellently dropped it on her toe with pain and bleeding  afterwards.  Not on blood thinners.  Denies chest pain, shortness of breath, LOC, or any other symptoms at this time.    Review of Systems  As per HPI      Objective       ED Triage Vitals [07/04/25 1822]   Temperature Pulse Blood Pressure Respirations SpO2 Patient Position - Orthostatic VS   97.9 °F (36.6 °C) 78 (!) 212/121 18 99 % Sitting      Temp Source Heart Rate Source BP Location FiO2 (%) Pain Score    Oral Monitor Right arm -- --      Vitals      Date and Time Temp Pulse SpO2 Resp BP Pain Score FACES Pain Rating User   07/04/25 1948 -- 70 98 % -- 203/107 -- -- JA   07/04/25 1822 97.9 °F (36.6 °C) 78 99 % 18 212/121 -- -- KB            Physical Exam  Vitals and nursing note reviewed.   Constitutional:       Appearance: She is not toxic-appearing or diaphoretic.   HENT:      Head: Normocephalic and atraumatic.      Right Ear: External ear normal.      Left Ear: External ear normal.      Nose: Nose normal.      Mouth/Throat:      Mouth: Mucous membranes are moist.     Eyes:      General: No scleral icterus.     Extraocular Movements: Extraocular movements intact.      Conjunctiva/sclera: Conjunctivae normal.       Cardiovascular:      Rate and Rhythm: Normal rate and regular rhythm.      Pulses: Normal pulses.           Radial pulses are 2+ on the right side.        Dorsalis pedis pulses are 2+ on the right side and 2+ on the left side.      Heart sounds: Normal heart sounds, S1 normal and S2 normal. No murmur heard.  Pulmonary:      Effort: Pulmonary effort is normal. No respiratory distress.      Breath sounds: Normal breath sounds. No stridor. No wheezing.   Abdominal:      Palpations: Abdomen is soft.      Tenderness: There is no abdominal tenderness. There is no guarding or rebound.     Musculoskeletal:         General: Normal range of motion.      Cervical back: Normal range of motion.     Skin:     General: Skin is warm and dry.      Comments: Wound to the right great toe with avulsion of the right  "toenail proximally.  Minimal bleeding, neurovascularly intact to the right great toe.  No other pain or deformities elsewhere. Please see images below     Neurological:      General: No focal deficit present.      Mental Status: She is alert and oriented to person, place, and time.     Psychiatric:         Mood and Affect: Mood normal.          Media Information          Document Information    Clinical Image - Mobile Device   Right big toe   07/04/2025 21:33   Attached To:   Hospital Encounter on 7/4/25   Source Information    Sheron Daley RN  [facility]   Document History      Results Reviewed       None            XR foot 3+ vw right   ED Interpretation by Ivana Alas MD (07/04 2031)   Right great toe fracture on my independent interpretation.          Nail removal    Date/Time: 7/4/2025 10:00 PM    Performed by: Ivana Alas MD  Authorized by: Ivana Alas MD    Patient location:  ED  [place] Protocol:  Consent: Verbal consent obtained  Risks and benefits: risks, benefits and alternatives were discussed  Consent given by: patient  Time out: Immediately prior to procedure a \"time out\" was called to verify the correct patient, procedure, equipment, support staff and site/side marked as required.  Patient understanding: patient states understanding of the procedure being performed  Patient consent: the patient's understanding of the procedure matches consent given  Procedure consent: procedure consent matches procedure scheduled  Relevant documents: relevant documents present and verified  Test results: test results available and properly labeled  Required items: required blood products, implants, devices, and special equipment available  Patient identity confirmed: verbally with patient    Location:     Foot:  R big toe  Anesthesia (see MAR for exact dosages):     Anesthesia method:  Nerve block    Block location:  R hallux    Block needle gauge:  24 G    Block anesthetic:  Lidocaine 1% w/o epi    Block " injection procedure:  Anatomic landmarks identified, introduced needle, incremental injection, anatomic landmarks palpated and negative aspiration for blood    Block outcome:  Anesthesia achieved  Nail Removal:     Nail removed:  Complete    Nail bed sutured: no      Removed nail replaced and anchored: no    Post-procedure details:     Dressing:  Antibiotic ointment, petrolatum-impregnated gauze and 4x4 sterile gauze    Patient tolerance of procedure:  Tolerated well, no immediate complications      ED Medication and Procedure Management   Prior to Admission Medications   Prescriptions Last Dose Informant Patient Reported? Taking?   ALPRAZolam (XANAX) 0.5 mg tablet  Self Yes No   Sig: Take 0.5 mg by mouth 4 (four) times a day as needed for anxiety (Patient takes one at night)    Docusate Calcium (STOOL SOFTENER PO)  Self Yes No   Sig: Take 1 tablet by mouth as needed (once a week prn)    RABEprazole (ACIPHEX) 20 MG tablet  Self Yes No   Sig: Take 20 mg by mouth daily   amLODIPine (NORVASC) 10 mg tablet  Self No No   Sig: Take 1 tablet (10 mg total) by mouth daily for 14 days   atorvastatin (LIPITOR) 80 mg tablet  Self No No   Sig: Take 1 tablet (80 mg total) by mouth daily with dinner   clopidogrel (PLAVIX) 75 mg tablet  Self No No   Sig: take 1 tablet by mouth once daily   escitalopram (LEXAPRO) 10 mg tablet   No No   Sig: take 1 tablet by mouth once daily   hydrochlorothiazide (HYDRODIURIL) 25 mg tablet  Self Yes No   Sig: Take 25 mg by mouth daily   lisinopril (ZESTRIL) 5 mg tablet  Self Yes No   Sig: Take 5 mg by mouth daily   meclizine (ANTIVERT) 25 mg tablet  Self No No   Si/2-1 tab up to every 8 hours for vertigo if needed   potassium chloride (K-DUR,KLOR-CON) 20 mEq tablet   Yes No   Sig: Take by mouth every 12 (twelve) hours      Facility-Administered Medications: None     Discharge Medication List as of 2025 10:17 PM        START taking these medications    Details   cephalexin (KEFLEX) 500 mg  capsule Take 1 capsule (500 mg total) by mouth every 6 (six) hours for 10 days, Starting Fri 7/4/2025, Until Mon 7/14/2025, Normal           CONTINUE these medications which have NOT CHANGED    Details   ALPRAZolam (XANAX) 0.5 mg tablet Take 0.5 mg by mouth 4 (four) times a day as needed for anxiety (Patient takes one at night) , Historical Med      amLODIPine (NORVASC) 10 mg tablet Take 1 tablet (10 mg total) by mouth daily for 14 days, Starting Tue 1/29/2019, Print      atorvastatin (LIPITOR) 80 mg tablet Take 1 tablet (80 mg total) by mouth daily with dinner, Starting Tue 4/6/2021, Normal      clopidogrel (PLAVIX) 75 mg tablet take 1 tablet by mouth once daily, Normal      Docusate Calcium (STOOL SOFTENER PO) Take 1 tablet by mouth as needed (once a week prn) , Historical Med      escitalopram (LEXAPRO) 10 mg tablet take 1 tablet by mouth once daily, Normal      hydrochlorothiazide (HYDRODIURIL) 25 mg tablet Take 25 mg by mouth daily, Starting Tue 2/19/2019, Historical Med      lisinopril (ZESTRIL) 5 mg tablet Take 5 mg by mouth daily, Starting Fri 2/5/2021, Historical Med      meclizine (ANTIVERT) 25 mg tablet 1/2-1 tab up to every 8 hours for vertigo if needed, Normal      potassium chloride (K-DUR,KLOR-CON) 20 mEq tablet Take by mouth every 12 (twelve) hours, Starting Sun 6/6/2021, Historical Med      RABEprazole (ACIPHEX) 20 MG tablet Take 20 mg by mouth daily, Historical Med             ED SEPSIS DOCUMENTATION   Time reflects when diagnosis was documented in both MDM as applicable and the Disposition within this note       Time User Action Codes Description Comment    7/4/2025  9:04 PM Yadira Sloan [S92.424B] Nondisplaced fracture of distal phalanx of right great toe, initial encounter for open fracture     7/4/2025  9:06 PM Yadira Sloan [S91.209A] Toenail avulsion, initial encounter                    [1]   Past Medical History:  Diagnosis Date    CVA (cerebral vascular accident) (HCC)     GERD  (gastroesophageal reflux disease)     occasionally    Hypertension     controlling with diet and exercise    Left-sided chest pain    [2]   Past Surgical History:  Procedure Laterality Date    AUGMENTATION MAMMAPLASTY      GYNECOLOGIC CRYOSURGERY      CERVIX    HYSTERECTOMY      LASER ABLATION OF CONDYLOMAS N/A 7/30/2018    Procedure: OMNIGUIDE LASER ABLATION OF VULVA;  Surgeon: Victor M Singh MD;  Location: [facility];  Service: Gynecology Oncology    SKIN TAG REMOVAL      EXICISON OF PERIANAL    WISDOM TOOTH EXTRACTION     [3]   Family History  Adopted: Yes   Problem Relation Name Age of Onset    No Known Problems Mother      Heart disease Father      No Known Problems Sister      No Known Problems Brother      No Known Problems Maternal Aunt      No Known Problems Maternal Uncle      No Known Problems Paternal Aunt      No Known Problems Paternal Uncle      No Known Problems Maternal Grandmother      No Known Problems Maternal Grandfather      No Known Problems Paternal Grandmother      No Known Problems Paternal Grandfather     [4]   Social History  Tobacco Use    Smoking status: Never    Smokeless tobacco: Never   Vaping Use    Vaping status: Never Used   Substance Use Topics    Alcohol use: Yes     Comment: SOCIAL-1-2 drinks per week    Drug use: No        Ivana Alas MD  07/05/25 0056

## 2025-07-05 NOTE — ED ATTENDING ATTESTATION
7/4/2025  IYadira MD, saw and evaluated the patient. I have discussed the patient with the resident/non-physician practitioner and agree with the resident's/non-physician practitioner's findings, Plan of Care, and MDM as documented in the resident's/non-physician practitioner's note, except where noted. All available labs and Radiology studies were reviewed.  I was present for key portions of any procedure(s) performed by the resident/non-physician practitioner and I was immediately available to provide assistance.       At this point I agree with the current assessment done in the Emergency Department.  I have conducted an independent evaluation of this patient a history and physical is as follows:    ED Course  ED Course as of 07/04/25 2132 Fri Jul 04, 2025 1828 54-year-old woman presenting to the emergency department with foot pain after dropping an umbrella stand on her foot.   1840 Vital signs on arrival are notable for hypertension.   1840 Blood Pressure(!): 212/121   1840 Constitutional: see vital signs; well-appearing, conversant, cooperative   General: well-developed, normocephalic/atraumatic, well-groomed  Eyes: No inflammation or discharge of conjunctiva or lids; sclera clear; pupils 3 mm RENY, EOMI, no nystagmus  HEENT: no inflammation, lesion, or mass of lips  Neck: supple, no masses, ROM intact  Musculoskeletal: Wound to right great toe with avulsion of the right toenail and disruption of the nailbed, mild bleeding.  See resident note for further picture.  Sensation intact.  Range of motion intact.  Pain on palpation of the entire toe.  No other injuries or pain noted in other extremities.  Skin: no visible rashes or skin lesion, no pallor  Pulmonary/Chest: effort normal. no respiratory distress. Speaking full sentences, no stridor.  CV:  normal rate and rhythm, S1 S2  Abd:  Nontender, nondistended, no guarding, no rebound, normal bowel sounds  Neurological: cranial nerves grossly intact, no  tremor, no rigidity, Ao4, GCS 15, no weakness  Psychiatry: appropriate affect, normal behavior     Clinical presentation puts patient at risk for the following differential diagnoses:    Fracture, dislocation, sprain, hematoma, nail avulsion    Imaging was ordered.   2040 XR foot 3+ vw right  Imaging shows fracture of the first toe.  Ancef was given due to open wound and fracture.  Case discussed with trauma, orthopedics, and podiatry.  Podiatry recommends removing the nail.   2041 Tetanus given.   2111 Nail was removed by ED resident and myself.  See separate procedure note.   2111 Upon re-assessment, patient feels improved.  Patient remained hemodynamically and clinically stable in the ED.  Strict return precautions given.  Patient verbalized understanding and will follow up as outpatient with podiatry.  Upon discharge, patient was AO4, GCS15, and without further complaints..             Critical Care Time  Procedures

## 2025-07-07 ENCOUNTER — TELEPHONE (OUTPATIENT)
Age: 55
End: 2025-07-07

## 2025-07-07 NOTE — TELEPHONE ENCOUNTER
Hello,    Please advise if a forced appointment can be accommodated for the patient:    Call back #: [phone]    Insurance: [employer]    Reason for appointment: Open non displaced FX of rt grt toe/ER/Ref in Epic/spoke to Dr Abreu about treatment but [place] too far to go for treatment      Requested doctor and/or location: Dr Patel/Dorothy      Thank you.

## 2025-07-08 ENCOUNTER — OFFICE VISIT (OUTPATIENT)
Age: 55
End: 2025-07-08
Payer: COMMERCIAL

## 2025-07-08 VITALS — WEIGHT: 100 LBS | BODY MASS INDEX: 20.99 KG/M2 | HEIGHT: 58 IN

## 2025-07-08 DIAGNOSIS — T14.8XXA CRUSH INJURY: Primary | ICD-10-CM

## 2025-07-08 DIAGNOSIS — S92.424B NONDISPLACED FRACTURE OF DISTAL PHALANX OF RIGHT GREAT TOE, INITIAL ENCOUNTER FOR OPEN FRACTURE: ICD-10-CM
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
## 2025-07-08 PROCEDURE — 99204 OFFICE O/P NEW MOD 45 MIN: CPT | Performed by: PODIATRIST

## 2025-07-08 PROCEDURE — 28490 TREAT BIG TOE FRACTURE: CPT | Performed by: PODIATRIST

## 2025-07-08 NOTE — ASSESSMENT & PLAN NOTE
Orders:    Ambulatory Referral to Podiatry    Post Op Shoe  -Discussed fracture healing at length, she should be in a rigid shoe until around 6 weeks and then consider transitioning back to a shoe pain and swelling dependent.  - XR reviewed and show distal pulp fracture  - She is to use ice use Tylenol elevate minimal activity and she is to return in 4 to 5 weeks to check this area.  She will need x-rays potentially around 6 to 8 weeks post injury if she is not doing great

## 2025-07-08 NOTE — PROGRESS NOTES
Name: Frances Paredes      : 1970      MRN: [id number]  Encounter Provider: Jayden Patel DPM  Encounter Date: 2025   Encounter department: [facility]  :  Assessment & Plan  Nondisplaced fracture of distal phalanx of right great toe, initial encounter for open fracture    Orders:    Ambulatory Referral to Podiatry    Post Op Shoe  -Discussed fracture healing at length, she should be in a rigid shoe until around 6 weeks and then consider transitioning back to a shoe pain and swelling dependent.  - XR reviewed and show distal pulp fracture  - She is to use ice use Tylenol elevate minimal activity and she is to return in 4 to 5 weeks to check this area.  She will need x-rays potentially around 6 to 8 weeks post injury if she is not doing great  Crush injury    Orders:    Post Op Shoe  -We discussed the consequences of a crush injury at length, if she is having any odd sensations or increased pain that is not resolving like a typical type of pain which she is to immediately reappoint herself  - She should consider reappoint yourself around 8 weeks as everything is not normal she should continue dressing the right hallux with antibiotic ointment and Band-Aid continue soaking  Injury by nail, initial encounter    Orders:    Post Op Shoe  -Nail removal site appears to be well within normal limits      Orthopedic injury treatment    Date/Time: 2025 1:00 PM    Performed by: Jayden Patel DPM  Authorized by: Jayden Patel DPM    Universal Protocol:  Consent: Verbal consent obtained  Risks and benefits: risks, benefits and alternatives were discussed  Consent given by: patient  Patient understanding: patient states understanding of the procedure being performed  Patient consent: the patient's understanding of the procedure matches consent given  Patient identity confirmed: verbally with patient    Injury location:  Toe  Location details:  " Right great toe  Injury type:  Fracture  Fracture type: distal phalanx    Neurovascular status: Neurovascularly intact    Manipulation performed?: No    Patient tolerance:  Patient tolerated the procedure well with no immediate complications        History of Present Illness   HPI  Frances Paredes is a 54 y.o. female who presents for evaluation management of her right big toe, she had an event with a umbrella base stand and her toe.  She went to go see the ER and they removed her toenail and she stopped Plavix she has history of 4 strokes      Review of Systems   Constitutional:  Negative for chills and fever.   HENT:  Negative for ear pain and sore throat.    Eyes:  Negative for pain and visual disturbance.   Respiratory:  Negative for cough and shortness of breath.    Cardiovascular:  Negative for chest pain and palpitations.   Gastrointestinal:  Negative for abdominal pain and vomiting.   Genitourinary:  Negative for dysuria and hematuria.   Musculoskeletal:  Negative for arthralgias and back pain.   Skin:  Negative for color change and rash.   Neurological:  Negative for seizures and syncope.   All other systems reviewed and are negative.         Objective   Ht 4' 10\" (1.473 m)   Wt 45.4 kg (100 lb)   BMI 20.90 kg/m²      Physical Exam    Skin:     Comments: Her toe looks amazing considering the severity of the injury, she does have substantial ecchymosis bruising the nail removal site is 100% granular and I do not see any laceration that needs to be managed           "

## 2025-07-08 NOTE — ASSESSMENT & PLAN NOTE
Orders:    Post Op Shoe  -We discussed the consequences of a crush injury at length, if she is having any odd sensations or increased pain that is not resolving like a typical type of pain which she is to immediately reappoint herself  - She should consider reappoint yourself around 8 weeks as everything is not normal she should continue dressing the right hallux with antibiotic ointment and Band-Aid continue soaking

## 2025-08-05 ENCOUNTER — TELEPHONE (OUTPATIENT)
Age: 55
End: 2025-08-05

## 2025-08-11 ENCOUNTER — APPOINTMENT (OUTPATIENT)
Dept: RADIOLOGY | Facility: CLINIC | Age: 55
End: 2025-08-11
Attending: PODIATRIST
Payer: COMMERCIAL

## (undated) DEVICE — FIBER LASER ENT ELEVATE ELITE

## (undated) DEVICE — 3000CC GUARDIAN II: Brand: GUARDIAN

## (undated) DEVICE — STERILE 8 INCH PROCTO SWAB: Brand: CARDINAL HEALTH

## (undated) DEVICE — BETHLEHEM UNIVERSAL MINOR VAG: Brand: CARDINAL HEALTH

## (undated) DEVICE — SMOKE EVACUATION TUBING WITH 8 IN INTEGRAL WAND AND SPONGE GUARD: Brand: BUFFALO FILTER

## (undated) DEVICE — GLOVE SRG BIOGEL 7

## (undated) DEVICE — PLUMEPEN PRO 10FT

## (undated) DEVICE — 2000CC GUARDIAN II: Brand: GUARDIAN

## (undated) DEVICE — PVC URETHRAL CATHETER: Brand: DOVER

## (undated) DEVICE — LIGHT HANDLE COVER SLEEVE DISP BLUE STELLAR

## (undated) DEVICE — STRL COTTON TIP APPLCTR 6IN PK: Brand: CARDINAL HEALTH